# Patient Record
Sex: FEMALE | Race: WHITE | NOT HISPANIC OR LATINO | Employment: OTHER | URBAN - METROPOLITAN AREA
[De-identification: names, ages, dates, MRNs, and addresses within clinical notes are randomized per-mention and may not be internally consistent; named-entity substitution may affect disease eponyms.]

---

## 2017-01-13 ENCOUNTER — HOSPITAL ENCOUNTER (OUTPATIENT)
Dept: RADIOLOGY | Age: 82
Discharge: HOME | End: 2017-01-13
Payer: MEDICARE

## 2017-01-13 ENCOUNTER — TRANSCRIBE ORDERS (OUTPATIENT)
Dept: ADMINISTRATIVE | Age: 82
End: 2017-01-13

## 2017-01-13 ENCOUNTER — ALLSCRIPTS OFFICE VISIT (OUTPATIENT)
Dept: OTHER | Facility: OTHER | Age: 82
End: 2017-01-13

## 2017-01-13 DIAGNOSIS — R05.9 COUGH: ICD-10-CM

## 2017-01-13 PROCEDURE — 71020 HB CHEST X-RAY 2VW FRONTAL&LATL: CPT

## 2017-01-16 ENCOUNTER — GENERIC CONVERSION - ENCOUNTER (OUTPATIENT)
Dept: OTHER | Facility: OTHER | Age: 82
End: 2017-01-16

## 2017-01-16 ENCOUNTER — ALLSCRIPTS OFFICE VISIT (OUTPATIENT)
Dept: OTHER | Facility: OTHER | Age: 82
End: 2017-01-16

## 2017-01-24 ENCOUNTER — HOSPITAL ENCOUNTER (EMERGENCY)
Facility: HOSPITAL | Age: 82
Discharge: HOME | End: 2017-01-24
Attending: EMERGENCY MEDICINE | Admitting: EMERGENCY MEDICINE
Payer: MEDICARE

## 2017-01-24 ENCOUNTER — APPOINTMENT (EMERGENCY)
Dept: RADIOLOGY | Facility: HOSPITAL | Age: 82
End: 2017-01-24
Payer: MEDICARE

## 2017-01-24 VITALS
TEMPERATURE: 98.3 F | WEIGHT: 126 LBS | DIASTOLIC BLOOD PRESSURE: 63 MMHG | RESPIRATION RATE: 18 BRPM | HEART RATE: 108 BPM | SYSTOLIC BLOOD PRESSURE: 144 MMHG | OXYGEN SATURATION: 96 %

## 2017-01-24 DIAGNOSIS — R09.82 POST-NASAL DRIP: Primary | ICD-10-CM

## 2017-01-24 LAB
ANION GAP SERPL CALCULATED.3IONS-SCNC: 9 MMOL/L (ref 4–13)
BASOPHILS # BLD AUTO: 0.02 THOUSANDS/ΜL (ref 0–0.1)
BASOPHILS NFR BLD AUTO: 0 % (ref 0–1)
BUN SERPL-MCNC: 14 MG/DL (ref 5–25)
CALCIUM SERPL-MCNC: 9.3 MG/DL (ref 8.3–10.1)
CHLORIDE SERPL-SCNC: 103 MMOL/L (ref 100–108)
CO2 SERPL-SCNC: 26 MMOL/L (ref 21–32)
CREAT SERPL-MCNC: 0.95 MG/DL (ref 0.6–1.3)
EOSINOPHIL # BLD AUTO: 0.06 THOUSAND/ΜL (ref 0–0.61)
EOSINOPHIL NFR BLD AUTO: 1 % (ref 0–6)
ERYTHROCYTE [DISTWIDTH] IN BLOOD BY AUTOMATED COUNT: 12.4 % (ref 11.6–15.1)
GFR SERPL CREATININE-BSD FRML MDRD: 56.5 ML/MIN/1.73SQ M
GLUCOSE SERPL-MCNC: 89 MG/DL (ref 65–140)
HCT VFR BLD AUTO: 39.6 % (ref 34.8–46.1)
HGB BLD-MCNC: 13.4 G/DL (ref 11.5–15.4)
LYMPHOCYTES # BLD AUTO: 1.5 THOUSANDS/ΜL (ref 0.6–4.47)
LYMPHOCYTES NFR BLD AUTO: 28 % (ref 14–44)
MCH RBC QN AUTO: 34.1 PG (ref 26.8–34.3)
MCHC RBC AUTO-ENTMCNC: 33.8 G/DL (ref 31.4–37.4)
MCV RBC AUTO: 101 FL (ref 82–98)
MONOCYTES # BLD AUTO: 0.49 THOUSAND/ΜL (ref 0.17–1.22)
MONOCYTES NFR BLD AUTO: 9 % (ref 4–12)
NEUTROPHILS # BLD AUTO: 3.37 THOUSANDS/ΜL (ref 1.85–7.62)
NEUTS SEG NFR BLD AUTO: 62 % (ref 43–75)
NRBC BLD AUTO-RTO: 0 /100 WBCS
PLATELET # BLD AUTO: 284 THOUSANDS/UL (ref 149–390)
PMV BLD AUTO: 8.8 FL (ref 8.9–12.7)
POTASSIUM SERPL-SCNC: 3.9 MMOL/L (ref 3.5–5.3)
RBC # BLD AUTO: 3.93 MILLION/UL (ref 3.81–5.12)
SODIUM SERPL-SCNC: 138 MMOL/L (ref 136–145)
WBC # BLD AUTO: 5.45 THOUSAND/UL (ref 4.31–10.16)

## 2017-01-24 PROCEDURE — 94640 AIRWAY INHALATION TREATMENT: CPT

## 2017-01-24 PROCEDURE — 99283 EMERGENCY DEPT VISIT LOW MDM: CPT

## 2017-01-24 PROCEDURE — 80048 BASIC METABOLIC PNL TOTAL CA: CPT | Performed by: EMERGENCY MEDICINE

## 2017-01-24 PROCEDURE — 36415 COLL VENOUS BLD VENIPUNCTURE: CPT | Performed by: EMERGENCY MEDICINE

## 2017-01-24 PROCEDURE — 71020 HB CHEST X-RAY 2VW FRONTAL&LATL: CPT

## 2017-01-24 PROCEDURE — 85025 COMPLETE CBC W/AUTO DIFF WBC: CPT | Performed by: EMERGENCY MEDICINE

## 2017-01-24 RX ORDER — ALBUTEROL SULFATE 2.5 MG/3ML
5 SOLUTION RESPIRATORY (INHALATION) ONCE
Status: COMPLETED | OUTPATIENT
Start: 2017-01-24 | End: 2017-01-24

## 2017-01-24 RX ORDER — FLUTICASONE PROPIONATE 50 MCG
1 SPRAY, SUSPENSION (ML) NASAL ONCE
Status: COMPLETED | OUTPATIENT
Start: 2017-01-24 | End: 2017-01-24

## 2017-01-24 RX ADMIN — FLUTICASONE PROPIONATE 1 SPRAY: 50 SPRAY, METERED NASAL at 15:16

## 2017-01-24 RX ADMIN — IPRATROPIUM BROMIDE 0.5 MG: 0.5 SOLUTION RESPIRATORY (INHALATION) at 13:19

## 2017-01-24 RX ADMIN — ALBUTEROL SULFATE 5 MG: 2.5 SOLUTION RESPIRATORY (INHALATION) at 13:19

## 2017-05-30 ENCOUNTER — ALLSCRIPTS OFFICE VISIT (OUTPATIENT)
Dept: OTHER | Facility: OTHER | Age: 82
End: 2017-05-30

## 2017-07-06 ENCOUNTER — GENERIC CONVERSION - ENCOUNTER (OUTPATIENT)
Dept: OTHER | Facility: OTHER | Age: 82
End: 2017-07-06

## 2017-08-04 ENCOUNTER — GENERIC CONVERSION - ENCOUNTER (OUTPATIENT)
Dept: OTHER | Facility: OTHER | Age: 82
End: 2017-08-04

## 2017-08-14 ENCOUNTER — GENERIC CONVERSION - ENCOUNTER (OUTPATIENT)
Dept: OTHER | Facility: OTHER | Age: 82
End: 2017-08-14

## 2017-09-20 ENCOUNTER — ALLSCRIPTS OFFICE VISIT (OUTPATIENT)
Dept: OTHER | Facility: OTHER | Age: 82
End: 2017-09-20

## 2017-10-19 ENCOUNTER — GENERIC CONVERSION - ENCOUNTER (OUTPATIENT)
Dept: OTHER | Facility: OTHER | Age: 82
End: 2017-10-19

## 2017-11-03 ENCOUNTER — ALLSCRIPTS OFFICE VISIT (OUTPATIENT)
Dept: OTHER | Facility: OTHER | Age: 82
End: 2017-11-03

## 2017-11-04 NOTE — PROGRESS NOTES
Assessment  Assessed    1  Aortic valve disorder (424 1) (I35 9)   2  Cough (786 2) (R05)    Plan  Cough    · Follow-up PRN Evaluation and Treatment  Follow-up  Status: Complete  Done:  [id number]   Ordered; For: Cough; Ordered By: CHRISTINE Escamilla Performed:  Due: [id number]    Discussion/Summary  Cardiology Discussion Summary Free Text Note Form [facility]:   1  Aortic insufficiency, mild  Refractory cough better, to consider gERD or sinus  Return PRN    Patient's Capacity to Self-Care: Patient is able to Self-Care  Chief Complaint  Chief Complaint Free Text Note Form: Annual f/u with EKG - Angel Miller denies chest pain, no swelling in LE  She said she gets occasional SOB  History of Present Illness  HPI: One year follow up of aortic valve disease, still some stress, anxiety type, infrequent, dyspnea; refractory cough, improved  never severe and no LVE, echo 2012 suggested trileaflet valve normal LVEF, no need to retest   half mile walk daily  today normal  cough now mild, occasional sinusitis, no overt GERD         Review of Systems  Cardiology Female ROS:     Cardiac: no chest pain  Skin: no rashes seen   Genitourinary: no kidney problems   General: no changes in weight  Respiratory: shortness of breath-- and-- cough/sputum--   minimal AVERY, no sputm  Gastrointestinal: no abdonimal pain      Active Problems  Problems    1  Acute sinusitis (461 9) (J01 90)   2  Acute sinusitis, recurrence not specified, unspecified location (461 9) (J01 90)   3  Aortic valve disorder (424 1) (I35 9)   4  Benign essential HTN (401 1) (I10)   5  Cough (786 2) (R05)   6  History of cancer of right breast (V10 3) (Z85 3)   7  Hypercholesteremia (272 0) (E78 00)   8  Hyperlipidemia (272 4) (E78 5)   9  Loss of height (781 91) (R29 890)   10  Muscle ache (729 1) (M79 1)   11  Upper respiratory infection, acute (465 9) (J06 9)    Past Medical History  Problems    1  History of Breast Cancer (V10 3)   2   History of hyperlipidemia (V12 29) (Z86 39)    Surgical History  Problems    1  History of Breast Surgery Lumpectomy    Family History  Mother    1  Family history of malignant neoplasm of breast (V16 3) (Z80 3)  Father    2  Family history of Heart problem  Sister    3  Family history of Heart problem  Brother    4  Family history of Heart problem    Social History  Problems    · Never A Smoker    Current Meds   1  Benzonatate 100 MG Oral Capsule; TAKE 1 CAPSULE EVERY 8 HOURS AS NEEDED; Therapy: [id number] to (Olivia Groves)  Requested for: [id number]; Last   Rx:19Oct2017 Ordered   2  Calcium TABS; Therapy: (Recorded:05Nov2015) to Recorded   3  Centrum Silver Oral Tablet; Therapy: (Recorded:05Nov2015) to Recorded   4  Rosuvastatin Calcium 5 MG Oral Tablet; take 1 tablet by mouth once daily; Therapy: 09HSC5714 to Recorded    Allergies  Medication    1  Actonel TABS   2  Crestor TABS   3  Pravachol TABS   4  Vytorin TABS  Non-Medication    5  Mold    Vitals  Vital Signs    Recorded: 71GPU8345 02:10PM   Heart Rate 87, Apical   Systolic 049, LUE   Diastolic 60, LUE   Height 5 ft 3 in   Weight 125 lb    BMI Calculated 22 14   BSA Calculated 1 58     Physical Exam    Constitutional   General appearance: No acute distress, well appearing and well nourished  Neck   Neck and thyroid: Normal, supple, trachea midline, no thyromegaly  -- thyroid normal, JVP nl    Pulmonary   Respiratory effort: No increased work of breathing or signs of respiratory distress  Auscultation of lungs: Clear to auscultation, no rales, no rhonchi, no wheezing, good air movement  Cardiovascular   Palpation of heart: Normal PMI, no thrills  Auscultation of heart: Abnormal  -- no AI  Carotid pulses: Normal, 2+ bilaterally  Pedal pulses: Normal, 2+ bilaterally  Examination of extremities for edema and/or varicosities: Normal     Abdomen   Liver and spleen: No hepatomegaly or splenomegaly      Musculoskeletal Gait and station: Normal gait    Skin - Skin and subcutaneous tissue: Normal without rashes or lesions  Skin is warm and well perfused, normal turgor  Psychiatric - Orientation to person, place, and time: Normal -- Mood and affect: Normal       Signatures   Electronically signed by : JAZ Bullock ; Nov  3 2017  2:31PM EST                       (Author)

## 2018-01-11 NOTE — RESULT NOTES
Message   normal, will review with pt today     Verified Results  * XR CHEST PA & LATERAL [id number] 01:05PM Stephani Nnamdi Order Number: [id number]   Performing Comments: has some inspiratory wheeze right base     Test Name Result Flag Reference   XR CHEST PA & LATERAL (Report)     CHEST - DUAL ENERGY     INDICATION: Cough and wheezing  COMPARISON: None     VIEWS: PA (including soft tissue/bone algorithms) and lateral projections; 4 images     FINDINGS: Surgical clips overlie the right hemithorax  Cardiomediastinal silhouette appears unremarkable  The lungs are clear  No pneumothorax or pleural effusion  Bidirectional scoliosis of the thoracolumbar spine identified  IMPRESSION:     No active pulmonary disease         Workstation performed: [id number]     Signed by:   Rossy Coto MD   1/16/17

## 2018-01-14 VITALS
BODY MASS INDEX: 22.33 KG/M2 | DIASTOLIC BLOOD PRESSURE: 72 MMHG | RESPIRATION RATE: 16 BRPM | WEIGHT: 126.03 LBS | TEMPERATURE: 97.5 F | OXYGEN SATURATION: 95 % | HEIGHT: 63 IN | SYSTOLIC BLOOD PRESSURE: 128 MMHG | HEART RATE: 94 BPM

## 2018-01-14 VITALS
SYSTOLIC BLOOD PRESSURE: 120 MMHG | HEIGHT: 59 IN | BODY MASS INDEX: 25.4 KG/M2 | RESPIRATION RATE: 16 BRPM | TEMPERATURE: 98.5 F | DIASTOLIC BLOOD PRESSURE: 60 MMHG | WEIGHT: 126 LBS | HEART RATE: 70 BPM

## 2018-01-14 VITALS
HEIGHT: 63 IN | HEART RATE: 87 BPM | WEIGHT: 125 LBS | DIASTOLIC BLOOD PRESSURE: 60 MMHG | BODY MASS INDEX: 22.15 KG/M2 | SYSTOLIC BLOOD PRESSURE: 110 MMHG

## 2018-01-14 VITALS
SYSTOLIC BLOOD PRESSURE: 140 MMHG | RESPIRATION RATE: 14 BRPM | DIASTOLIC BLOOD PRESSURE: 98 MMHG | HEART RATE: 96 BPM | TEMPERATURE: 97.5 F

## 2018-01-14 VITALS
DIASTOLIC BLOOD PRESSURE: 78 MMHG | HEART RATE: 84 BPM | SYSTOLIC BLOOD PRESSURE: 128 MMHG | WEIGHT: 125.05 LBS | OXYGEN SATURATION: 98 % | HEIGHT: 63 IN | RESPIRATION RATE: 14 BRPM | TEMPERATURE: 98.2 F | BODY MASS INDEX: 22.16 KG/M2

## 2018-01-14 VITALS
TEMPERATURE: 98.7 F | RESPIRATION RATE: 12 BRPM | SYSTOLIC BLOOD PRESSURE: 136 MMHG | DIASTOLIC BLOOD PRESSURE: 70 MMHG | HEART RATE: 72 BPM

## 2018-01-14 VITALS
WEIGHT: 123 LBS | SYSTOLIC BLOOD PRESSURE: 120 MMHG | HEIGHT: 63 IN | OXYGEN SATURATION: 98 % | HEART RATE: 91 BPM | DIASTOLIC BLOOD PRESSURE: 82 MMHG | TEMPERATURE: 97.5 F | BODY MASS INDEX: 21.79 KG/M2 | RESPIRATION RATE: 16 BRPM

## 2018-02-02 DIAGNOSIS — E78.00 HYPERCHOLESTEREMIA: Primary | ICD-10-CM

## 2018-02-02 DIAGNOSIS — Z13.89 SCREENING FOR GENITOURINARY CONDITION: ICD-10-CM

## 2018-02-02 RX ORDER — ROSUVASTATIN CALCIUM 5 MG/1
1 TABLET, COATED ORAL DAILY
COMMUNITY
Start: 2017-11-03 | End: 2018-06-28

## 2018-02-28 ENCOUNTER — OFFICE VISIT (OUTPATIENT)
Dept: INTERNAL MEDICINE CLINIC | Facility: CLINIC | Age: 83
End: 2018-02-28
Payer: MEDICARE

## 2018-02-28 VITALS
TEMPERATURE: 98.5 F | SYSTOLIC BLOOD PRESSURE: 144 MMHG | BODY MASS INDEX: 24.85 KG/M2 | HEIGHT: 60 IN | HEART RATE: 109 BPM | DIASTOLIC BLOOD PRESSURE: 100 MMHG | OXYGEN SATURATION: 97 % | WEIGHT: 126.6 LBS

## 2018-02-28 DIAGNOSIS — J01.90 ACUTE SINUSITIS, RECURRENCE NOT SPECIFIED, UNSPECIFIED LOCATION: Primary | ICD-10-CM

## 2018-02-28 PROCEDURE — 99213 OFFICE O/P EST LOW 20 MIN: CPT | Performed by: INTERNAL MEDICINE

## 2018-02-28 RX ORDER — AMOXICILLIN AND CLAVULANATE POTASSIUM 875; 125 MG/1; MG/1
1 TABLET, FILM COATED ORAL EVERY 12 HOURS SCHEDULED
Qty: 14 TABLET | Refills: 0 | Status: SHIPPED | OUTPATIENT
Start: 2018-02-28 | End: 2018-03-07

## 2018-02-28 NOTE — PROGRESS NOTES
Assessment/Plan:    Acute sinusitis   Will cover with Augmentin, follow up if not improving       Diagnoses and all orders for this visit:    Acute sinusitis, recurrence not specified, unspecified location  -     amoxicillin-clavulanate (AUGMENTIN) 875-125 mg per tablet; Take 1 tablet by mouth every 12 (twelve) hours for 7 days    Other orders  -     Multiple Vitamins-Minerals (CENTRUM SILVER 50+WOMEN PO); Take by mouth  -     Calcium Carb-Cholecalciferol (CALCIUM 1000 + D) 1000-800 MG-UNIT TABS; Take by mouth          Subjective:      Patient ID: Al Metzger is a 80 y o  female  Onset this morning of feeling lousy, coughing, yellow mucus, no significant aches  No sore throat, no pleuritic pain, no shortness of breath  She does have sinus pressure  The following portions of the patient's history were reviewed and updated as appropriate: allergies, current medications, past family history, past medical history, past social history, past surgical history and problem list     Review of Systems   Constitutional: Positive for fatigue  Negative for chills and fever  HENT: Positive for congestion and sinus pressure  Negative for sore throat  Respiratory: Positive for cough  Negative for shortness of breath  Cardiovascular: Negative for chest pain  Gastrointestinal: Negative for constipation and diarrhea  Genitourinary: Negative for dysuria  Musculoskeletal: Negative for arthralgias and myalgias  Objective:      /100   Pulse (!) 109   Temp 98 5 °F (36 9 °C) (Oral)   Ht 5' (1 524 m)   Wt 57 4 kg (126 lb 9 6 oz)   SpO2 97%   BMI 24 72 kg/m²          Physical Exam   Constitutional: She appears well-developed and well-nourished  HENT:   Head: Normocephalic and atraumatic  Right Ear: External ear normal    Left Ear: External ear normal    Nose: Nose normal    Mouth/Throat: Oropharynx is clear and moist  No oropharyngeal exudate  Neck: No tracheal deviation present  Pulmonary/Chest: Effort normal and breath sounds normal  No respiratory distress  She has no wheezes  She has no rales  Vitals reviewed

## 2018-03-02 ENCOUNTER — TELEPHONE (OUTPATIENT)
Dept: INTERNAL MEDICINE CLINIC | Facility: CLINIC | Age: 83
End: 2018-03-02

## 2018-03-02 DIAGNOSIS — J01.90 ACUTE SINUSITIS, RECURRENCE NOT SPECIFIED, UNSPECIFIED LOCATION: Primary | ICD-10-CM

## 2018-03-02 RX ORDER — AZITHROMYCIN 250 MG/1
250 TABLET, FILM COATED ORAL EVERY 24 HOURS
Qty: 6 TABLET | Refills: 0 | Status: SHIPPED | OUTPATIENT
Start: 2018-03-02 | End: 2018-03-07

## 2018-03-02 NOTE — TELEPHONE ENCOUNTER
Patient is requesting a new medication  She said the one you just recently prescribed is giving her diarrhea    Pharmacy:[facility]    Please advise

## 2018-03-19 ENCOUNTER — TELEPHONE (OUTPATIENT)
Dept: OTHER | Facility: HOSPITAL | Age: 83
End: 2018-03-19

## 2018-03-19 NOTE — TELEPHONE ENCOUNTER
Pt called in stating she needs a tetanus and pertussis vaccine when she comes in for her appt on 3/21 as she is going to have new grandchildren  Marquita Warren She wanted to let you know ahead of time

## 2018-03-20 ENCOUNTER — LAB (OUTPATIENT)
Dept: LAB | Age: 83
End: 2018-03-20
Payer: MEDICARE

## 2018-03-20 ENCOUNTER — TRANSCRIBE ORDERS (OUTPATIENT)
Dept: ADMINISTRATIVE | Age: 83
End: 2018-03-20

## 2018-03-20 DIAGNOSIS — Z13.89 ENCOUNTER FOR ROUTINE SCREENING FOR MALFORMATION USING ULTRASONICS: Primary | ICD-10-CM

## 2018-03-20 DIAGNOSIS — E78.00 HYPERCHOLESTEREMIA: ICD-10-CM

## 2018-03-20 LAB
ALBUMIN SERPL BCP-MCNC: 3.7 G/DL (ref 3.5–5)
ALP SERPL-CCNC: 73 U/L (ref 46–116)
ALT SERPL W P-5'-P-CCNC: 24 U/L (ref 12–78)
ANION GAP SERPL CALCULATED.3IONS-SCNC: 6 MMOL/L (ref 4–13)
AST SERPL W P-5'-P-CCNC: 29 U/L (ref 5–45)
BACTERIA UR QL AUTO: ABNORMAL /HPF
BASOPHILS # BLD AUTO: 0.05 THOUSANDS/ΜL (ref 0–0.1)
BASOPHILS NFR BLD AUTO: 1 % (ref 0–1)
BILIRUB SERPL-MCNC: 0.67 MG/DL (ref 0.2–1)
BILIRUB UR QL STRIP: NEGATIVE
BUN SERPL-MCNC: 16 MG/DL (ref 5–25)
CALCIUM SERPL-MCNC: 8.5 MG/DL (ref 8.3–10.1)
CHLORIDE SERPL-SCNC: 103 MMOL/L (ref 100–108)
CHOLEST SERPL-MCNC: 185 MG/DL (ref 50–200)
CLARITY UR: CLEAR
CO2 SERPL-SCNC: 27 MMOL/L (ref 21–32)
COLOR UR: YELLOW
CREAT SERPL-MCNC: 0.94 MG/DL (ref 0.6–1.3)
EOSINOPHIL # BLD AUTO: 0.34 THOUSAND/ΜL (ref 0–0.61)
EOSINOPHIL NFR BLD AUTO: 8 % (ref 0–6)
ERYTHROCYTE [DISTWIDTH] IN BLOOD BY AUTOMATED COUNT: 12.6 % (ref 11.6–15.1)
GFR SERPL CREATININE-BSD FRML MDRD: 57 ML/MIN/1.73SQ M
GLUCOSE P FAST SERPL-MCNC: 88 MG/DL (ref 65–99)
GLUCOSE UR STRIP-MCNC: NEGATIVE MG/DL
HCT VFR BLD AUTO: 37.9 % (ref 34.8–46.1)
HDLC SERPL-MCNC: 70 MG/DL (ref 40–60)
HGB BLD-MCNC: 12.6 G/DL (ref 11.5–15.4)
HGB UR QL STRIP.AUTO: NEGATIVE
HYALINE CASTS #/AREA URNS LPF: ABNORMAL /LPF
KETONES UR STRIP-MCNC: NEGATIVE MG/DL
LDLC SERPL CALC-MCNC: 94 MG/DL (ref 0–100)
LEUKOCYTE ESTERASE UR QL STRIP: ABNORMAL
LYMPHOCYTES # BLD AUTO: 1.87 THOUSANDS/ΜL (ref 0.6–4.47)
LYMPHOCYTES NFR BLD AUTO: 45 % (ref 14–44)
MCH RBC QN AUTO: 34.1 PG (ref 26.8–34.3)
MCHC RBC AUTO-ENTMCNC: 33.2 G/DL (ref 31.4–37.4)
MCV RBC AUTO: 102 FL (ref 82–98)
MONOCYTES # BLD AUTO: 0.41 THOUSAND/ΜL (ref 0.17–1.22)
MONOCYTES NFR BLD AUTO: 10 % (ref 4–12)
NEUTROPHILS # BLD AUTO: 1.5 THOUSANDS/ΜL (ref 1.85–7.62)
NEUTS SEG NFR BLD AUTO: 36 % (ref 43–75)
NITRITE UR QL STRIP: NEGATIVE
NON-SQ EPI CELLS URNS QL MICRO: ABNORMAL /HPF
NRBC BLD AUTO-RTO: 0 /100 WBCS
PH UR STRIP.AUTO: 6.5 [PH] (ref 4.5–8)
PLATELET # BLD AUTO: 303 THOUSANDS/UL (ref 149–390)
PMV BLD AUTO: 9.1 FL (ref 8.9–12.7)
POTASSIUM SERPL-SCNC: 3.9 MMOL/L (ref 3.5–5.3)
PROT SERPL-MCNC: 7 G/DL (ref 6.4–8.2)
PROT UR STRIP-MCNC: NEGATIVE MG/DL
RBC # BLD AUTO: 3.7 MILLION/UL (ref 3.81–5.12)
RBC #/AREA URNS AUTO: ABNORMAL /HPF
SODIUM SERPL-SCNC: 136 MMOL/L (ref 136–145)
SP GR UR STRIP.AUTO: 1.01 (ref 1–1.03)
TRIGL SERPL-MCNC: 107 MG/DL
TSH SERPL DL<=0.05 MIU/L-ACNC: 2.91 UIU/ML (ref 0.36–3.74)
UROBILINOGEN UR QL STRIP.AUTO: 0.2 E.U./DL
WBC # BLD AUTO: 4.17 THOUSAND/UL (ref 4.31–10.16)
WBC #/AREA URNS AUTO: ABNORMAL /HPF

## 2018-03-20 PROCEDURE — 80061 LIPID PANEL: CPT

## 2018-03-20 PROCEDURE — 80053 COMPREHEN METABOLIC PANEL: CPT

## 2018-03-20 PROCEDURE — 85025 COMPLETE CBC W/AUTO DIFF WBC: CPT

## 2018-03-20 PROCEDURE — 84443 ASSAY THYROID STIM HORMONE: CPT

## 2018-03-20 PROCEDURE — 81001 URINALYSIS AUTO W/SCOPE: CPT | Performed by: INTERNAL MEDICINE

## 2018-03-20 PROCEDURE — 36415 COLL VENOUS BLD VENIPUNCTURE: CPT

## 2018-06-25 ENCOUNTER — OFFICE VISIT (OUTPATIENT)
Dept: INTERNAL MEDICINE CLINIC | Facility: CLINIC | Age: 83
End: 2018-06-25
Payer: MEDICARE

## 2018-06-25 VITALS
OXYGEN SATURATION: 97 % | HEART RATE: 104 BPM | HEIGHT: 60 IN | WEIGHT: 127 LBS | SYSTOLIC BLOOD PRESSURE: 140 MMHG | BODY MASS INDEX: 24.94 KG/M2 | DIASTOLIC BLOOD PRESSURE: 80 MMHG

## 2018-06-25 DIAGNOSIS — M25.562 ACUTE PAIN OF LEFT KNEE: Primary | ICD-10-CM

## 2018-06-25 PROCEDURE — 99213 OFFICE O/P EST LOW 20 MIN: CPT | Performed by: NURSE PRACTITIONER

## 2018-06-25 RX ORDER — SIMVASTATIN 5 MG
TABLET ORAL
Refills: 0 | COMMUNITY
Start: 2018-03-29 | End: 2018-06-28 | Stop reason: SDUPTHER

## 2018-06-28 DIAGNOSIS — E78.5 HYPERLIPIDEMIA, UNSPECIFIED HYPERLIPIDEMIA TYPE: Primary | ICD-10-CM

## 2018-06-28 RX ORDER — ROSUVASTATIN CALCIUM 5 MG/1
5 TABLET, COATED ORAL DAILY
Qty: 90 TABLET | Refills: 1 | OUTPATIENT
Start: 2018-06-28

## 2018-06-28 RX ORDER — SIMVASTATIN 5 MG
5 TABLET ORAL
Qty: 90 TABLET | Refills: 0 | Status: SHIPPED | OUTPATIENT
Start: 2018-06-28 | End: 2018-10-02 | Stop reason: SDUPTHER

## 2018-06-28 NOTE — PROGRESS NOTES
Assessment/Plan:       Diagnoses and all orders for this visit:    Acute pain of left knee  -     Ambulatory referral to Orthopedic Surgery; Future  -     diclofenac sodium (VOLTAREN) 1 %; Apply 2 g topically 4 (four) times a day    Other orders  -     simvastatin (ZOCOR) 5 MG tablet;           Subjective:      Patient ID: Dru Galindo is a 80 y o  female  3 weeks of L knee pain  Using ace wrap and aleve  Went to urgent care, neg xray      Knee Pain    The incident occurred more than 1 week ago  There was no injury mechanism  The pain is present in the left knee  The quality of the pain is described as aching  The pain is moderate  The pain has been intermittent since onset  The symptoms are aggravated by movement  She has tried rest, non-weight bearing and NSAIDs for the symptoms  The treatment provided mild relief  The following portions of the patient's history were reviewed and updated as appropriate: allergies, current medications, past family history, past medical history, past social history, past surgical history and problem list     Review of Systems   Constitutional: Negative  HENT: Negative  Eyes: Negative  Respiratory: Negative  Cardiovascular: Negative  Gastrointestinal: Negative  Musculoskeletal: Positive for arthralgias  Neurological: Negative  Objective:      /80 (BP Location: Left arm, Patient Position: Sitting, Cuff Size: Adult)   Pulse 104   Ht 5' (1 524 m)   Wt 57 6 kg (127 lb)   SpO2 97%   BMI 24 80 kg/m²          Physical Exam   Constitutional: She is oriented to person, place, and time  She appears well-developed and well-nourished  HENT:   Head: Normocephalic and atraumatic  Eyes: Conjunctivae are normal  Pupils are equal, round, and reactive to light  Neck: Normal range of motion  Neck supple  Cardiovascular: Normal rate and regular rhythm  Pulmonary/Chest: Effort normal and breath sounds normal    Abdominal: Soft   Bowel sounds are normal    Musculoskeletal: Normal range of motion  Neurological: She is alert and oriented to person, place, and time  Skin: Skin is warm and dry

## 2018-06-28 NOTE — TELEPHONE ENCOUNTER
There a refill for crestor but its saying she has an active allergy to this medication, can we check with her please?

## 2018-06-29 ENCOUNTER — TELEPHONE (OUTPATIENT)
Dept: OBGYN CLINIC | Facility: HOSPITAL | Age: 83
End: 2018-06-29

## 2018-06-29 ENCOUNTER — OFFICE VISIT (OUTPATIENT)
Dept: OBGYN CLINIC | Facility: HOSPITAL | Age: 83
End: 2018-06-29
Payer: MEDICARE

## 2018-06-29 VITALS
HEART RATE: 97 BPM | SYSTOLIC BLOOD PRESSURE: 127 MMHG | BODY MASS INDEX: 24.97 KG/M2 | WEIGHT: 127.2 LBS | HEIGHT: 60 IN | DIASTOLIC BLOOD PRESSURE: 76 MMHG

## 2018-06-29 DIAGNOSIS — M25.562 ACUTE PAIN OF LEFT KNEE: ICD-10-CM

## 2018-06-29 DIAGNOSIS — M70.51 PES ANSERINUS BURSITIS OF RIGHT KNEE: Primary | ICD-10-CM

## 2018-06-29 PROCEDURE — 20610 DRAIN/INJ JOINT/BURSA W/O US: CPT | Performed by: ORTHOPAEDIC SURGERY

## 2018-06-29 PROCEDURE — 99204 OFFICE O/P NEW MOD 45 MIN: CPT | Performed by: ORTHOPAEDIC SURGERY

## 2018-06-29 RX ORDER — MELOXICAM 7.5 MG/1
7.5 TABLET ORAL DAILY
Qty: 60 TABLET | Refills: 0 | Status: SHIPPED | OUTPATIENT
Start: 2018-06-29 | End: 2019-05-10 | Stop reason: ALTCHOICE

## 2018-06-29 RX ORDER — BETAMETHASONE SODIUM PHOSPHATE AND BETAMETHASONE ACETATE 3; 3 MG/ML; MG/ML
6 INJECTION, SUSPENSION INTRA-ARTICULAR; INTRALESIONAL; INTRAMUSCULAR; SOFT TISSUE
Status: COMPLETED | OUTPATIENT
Start: 2018-06-29 | End: 2018-06-29

## 2018-06-29 RX ORDER — BUPIVACAINE HYDROCHLORIDE 2.5 MG/ML
2 INJECTION, SOLUTION INFILTRATION; PERINEURAL
Status: COMPLETED | OUTPATIENT
Start: 2018-06-29 | End: 2018-06-29

## 2018-06-29 RX ORDER — LIDOCAINE HYDROCHLORIDE 10 MG/ML
1 INJECTION, SOLUTION INFILTRATION; PERINEURAL
Status: COMPLETED | OUTPATIENT
Start: 2018-06-29 | End: 2018-06-29

## 2018-06-29 RX ADMIN — BETAMETHASONE SODIUM PHOSPHATE AND BETAMETHASONE ACETATE 6 MG: 3; 3 INJECTION, SUSPENSION INTRA-ARTICULAR; INTRALESIONAL; INTRAMUSCULAR; SOFT TISSUE at 13:53

## 2018-06-29 RX ADMIN — BUPIVACAINE HYDROCHLORIDE 2 ML: 2.5 INJECTION, SOLUTION INFILTRATION; PERINEURAL at 13:53

## 2018-06-29 RX ADMIN — LIDOCAINE HYDROCHLORIDE 1 ML: 10 INJECTION, SOLUTION INFILTRATION; PERINEURAL at 13:53

## 2018-06-29 NOTE — PROGRESS NOTES
Assessment:  1  Acute pain of left knee  Ambulatory referral to Orthopedic Surgery     Patient Active Problem List   Diagnosis    Hypercholesteremia    Acute sinusitis           Plan        Cortisone injection given into the pes bursa  Referral to physical therapy  Voltaren gel prescribed  Follow up with us an as-needed basis if he continues have problem we can always give another injection in this area if needed  Subjective:     Patient ID:    Chief Complaint:Chelsey Tyson 80 y o  female      HPI    Patient comes in today with regards to Left knee  The patient reports that the pain is in the  Anterior medial aspect of the left knee and has been going on for  1 month  The pain is rated at6 at its best and10 at its worst   The pain is described as  Stab and now it just hurts especially when she will  It is worsened with  Walking steps also her but it is about the same going up and down, and is made better with gel oral medication  The patient has taken   Aleve, Advil,Voltaren gel for treatment  No specific injury      The following portions of the patient's history were reviewed and updated as appropriate: allergies, current medications, past family history, past social history, past surgical history and problem list         Social History     Social History    Marital status: /Civil Union     Spouse name: N/A    Number of children: N/A    Years of education: N/A     Occupational History    Not on file  Social History Main Topics    Smoking status: Never Smoker    Smokeless tobacco: Never Used    Alcohol use Yes    Drug use: No    Sexual activity: Not on file     Other Topics Concern    Not on file     Social History Narrative    No narrative on file     Past Medical History:   Diagnosis Date    Cancer [facility])      History reviewed  No pertinent surgical history    Allergies   Allergen Reactions    Molds & Smuts     Pravastatin     Risedronate      Current Outpatient Prescriptions on File Prior to Visit   Medication Sig Dispense Refill    Calcium Carb-Cholecalciferol (CALCIUM 1000 + D) 1000-800 MG-UNIT TABS Take by mouth      diclofenac sodium (VOLTAREN) 1 % Apply 2 g topically 4 (four) times a day 1 Tube 0    Multiple Vitamins-Minerals (CENTRUM SILVER 50+WOMEN PO) Take by mouth      simvastatin (ZOCOR) 5 MG tablet Take 1 tablet (5 mg total) by mouth daily at bedtime 90 tablet 0     No current facility-administered medications on file prior to visit  Objective:    Review of Systems   Constitutional: Negative  HENT: Negative  Eyes: Negative  Respiratory: Negative  Cardiovascular: Negative  Gastrointestinal: Negative  Endocrine: Negative  Genitourinary: Negative  Skin: Negative  Allergic/Immunologic: Negative  Neurological: Negative  Hematological: Negative  Psychiatric/Behavioral: Negative  Right Knee Exam   Right knee exam is normal       Left Knee Exam     Tenderness   The patient is experiencing tenderness in the medial joint line  Range of Motion   The patient has normal left knee ROM  Tests   Liang:  Medial - negative Lateral - negative  Varus: negative  Valgus: negative    Other   Erythema: absent  Sensation: normal  Pulse: present  Swelling: mild  Effusion: effusion present    Comments:   Pinpoint tender over the pes bursa this is where the her most irritated area is  Flexion with hip flexion and external rotation also causes some mild pain strength and range-of-motion normal bilateral lower extremities no effusion no ecchymosis  No erythema  Physical Exam   Constitutional: She is oriented to person, place, and time  She appears well-developed  HENT:   Head: Normocephalic  Eyes: Pupils are equal, round, and reactive to light  Neck: Normal range of motion  Cardiovascular: Normal rate  Pulmonary/Chest: Effort normal    Abdominal: She exhibits no distension     Musculoskeletal: Left knee: She exhibits effusion  Neurological: She is alert and oriented to person, place, and time  Skin: Skin is warm  Psychiatric: She has a normal mood and affect  Nursing note and vitals reviewed  Large joint arthrocentesis  Date/Time: 6/29/2018 1:53 PM  Consent given by: patient  Supporting Documentation  Indications: pain   Procedure Details  Location: knee (Pes bursa) - R knee  Needle size: 22 G  Ultrasound guidance: no  Approach: anterolateral  Medications administered: 1 mL lidocaine 1 %; 6 mg betamethasone acetate-betamethasone sodium phosphate 6 (3-3) mg/mL; 2 mL bupivacaine 0 25 %    Patient tolerance: patient tolerated the procedure well with no immediate complications               I have personally reviewed pertinent films in PACS and my interpretation is Mild arthritis right knee  Portions of the record may have been created with voice recognition software   Occasional wrong word or "sound a like" substitutions may have occurred due to the inherent limitations of voice recognition software   Read the chart carefully and recognize, using context, where substitutions have occurred

## 2018-07-02 ENCOUNTER — EVALUATION (OUTPATIENT)
Dept: PHYSICAL THERAPY | Facility: REHABILITATION | Age: 83
End: 2018-07-02
Payer: MEDICARE

## 2018-07-02 DIAGNOSIS — M25.562 ACUTE PAIN OF LEFT KNEE: Primary | ICD-10-CM

## 2018-07-02 DIAGNOSIS — M70.51 PES ANSERINUS BURSITIS OF RIGHT KNEE: ICD-10-CM

## 2018-07-02 PROCEDURE — 97140 MANUAL THERAPY 1/> REGIONS: CPT | Performed by: PHYSICAL THERAPIST

## 2018-07-02 PROCEDURE — G8991 OTHER PT/OT GOAL STATUS: HCPCS | Performed by: PHYSICAL THERAPIST

## 2018-07-02 PROCEDURE — 97161 PT EVAL LOW COMPLEX 20 MIN: CPT | Performed by: PHYSICAL THERAPIST

## 2018-07-02 PROCEDURE — G8990 OTHER PT/OT CURRENT STATUS: HCPCS | Performed by: PHYSICAL THERAPIST

## 2018-07-02 PROCEDURE — 97110 THERAPEUTIC EXERCISES: CPT | Performed by: PHYSICAL THERAPIST

## 2018-07-02 NOTE — PROGRESS NOTES
PT Evaluation     Today's date: 2018  Patient name: Keysha Kulkarni  : 1935  MRN: [id number]  Referring provider: Tennille Vega DO  Dx:   Encounter Diagnosis     ICD-10-CM    1  Acute pain of left knee M25 562 Ambulatory referral to Physical Therapy   2  Pes anserinus bursitis of right knee M70 51 Ambulatory referral to Physical Therapy       Start Time: 1400  Stop Time: 1500  Total time in clinic (min): 60 minutes    Assessment  Impairments: abnormal gait, abnormal muscle firing, abnormal muscle tone, abnormal or restricted ROM, impaired balance, impaired physical strength, lacks appropriate home exercise program, pain with function and weight-bearing intolerance    Assessment details: Keysha Kulkarni is a 80 y o  female who presents with complaints of Acute pain of left knee/ Pes anserinus bursitis of right knee  No further referral appears necessary at this time based upon examination results  She reports no relief as of yet following injection and was educated that this may take time to set in  She is presenting with quad and hip weakness leading to balance and gait dysfunction  Prognosis is good given HEP compliance and PT 2-3x/wk  Positive prognostic indicators include positive attitude toward recovery  Please contact me if you have any questions or recommendations  Thank you for the opportunity to share in  Chelsey's care      Understanding of Dx/Px/POC: good   Prognosis: fair    Plan  Patient would benefit from: skilled physical therapy  Planned modality interventions: cryotherapy  Other planned modality interventions: NMES  Planned therapy interventions: balance, manual therapy, therapeutic activities, therapeutic exercise, patient education, home exercise program, flexibility, joint mobilization, Verduzco taping, strengthening, stretching, graded motor, graded exercise, graded activity, gait training and functional ROM exercises  Frequency: 2x week  Duration in weeks: 6  Treatment plan discussed with: patient  Plan details: Pt was educated on HEP  Subjective Evaluation    History of Present Illness  Mechanism of injury: Pt is a 81 y/o female presenting with pes anserine bursitis starting 1 month ago  Pt saw MD and nurse practitioner about 1 month ago and received an x-ray  Pt went to an orthopaedic doctor on Friday and recieved a Cortizone injection  Pt denies change in sx from injection  Pt states ability to cook and clean with pain  Pt denies difficulties dressing, sleeping, and showering  Pt reports a step-to gait pattern while negotiating stairs with use of a handrail  Pt states pain during STS and uses arm of chair for support  Pt describes a pulling sensation and denies radiating sx in LE  Pt ambulates with an antalgic gait due to weakness and pain  Pain  At best pain ratin  Location: anterior medial L knee  Relieving factors: medications  Aggravating factors: stair climbing, walking, sitting and standing    Social Support  Stairs in house: yes   12      Diagnostic Tests  X-ray: normal  Patient Goals  Patient goals for therapy: decreased pain  Patient goal: play golf        Objective     Active Range of Motion     Right Knee   Flexion: 109 degrees   Extension: 1 degrees     Additional Active Range of Motion Details  Pt reported tightness in flexion    Passive Range of Motion     Right Knee   Flexion: 134 degrees with pain  Extension: 0 degrees     Strength/Myotome Testing     Left Hip   Planes of Motion   Flexion: 4-  Abduction: 3+  External rotation: 4-  Internal rotation: 4-    Right Hip   Planes of Motion   Flexion: 4    Left Knee   Flexion: 4+  Prone flexion: 4+    Right Knee   Flexion: 3  Prone flexion: 3    Left Ankle/Foot   Dorsiflexion: 5  Plantar flexion: 5    Right Ankle/Foot   Dorsiflexion: 5  Plantar flexion: 5    Additional Strength Details  Pain along pes anserine with IR    General Comments     Knee Comments  TTP around pes anserine   Decreased hamstring flexibility R>L      Flowsheet Rows      Most Recent Value   PT/OT G-Codes   Current Score  41   Projected Score  57   FOTO information reviewed  Yes   Assessment Type  Evaluation   G code set  Other PT/OT Primary   Other PT Primary Current Status ()  CK   Other PT Primary Goal Status ()  CK          Precautions: hard of hearing L ear    Daily Treatment Diary     Manual              GISTM medial thigh, pes anserine                                                                      Exercise Diary              nustep             Mini-squats pain free             HS curls             biodex LOS             biodex RC             SLR abd             SLS 5"             bridges             Side stepping             Step ups fwd/lat             HR/TR             LAQ             Quad set 2x10, 5"            LLE clam 2x10, 5"            Supine heel slide 10x10"                                                                                 Modalities              CP prn                                           ST  Pt to decrease pain by 3 subjective ratings in 4 wks  2  Pt to increase AROM knee flex by 10 degrees in 4 wks  3  Pt to increase MMT scores by 1/2 grade in 4 wks  LT  Pt to increase FOTO score, > 57 in 8 wks  2  Pt to increase MMT scores by 1 grade in 8 wks  3   Pt to be independent in HEP  4  Pt to negotiate stairs pain-free in 8 wks

## 2018-07-05 ENCOUNTER — OFFICE VISIT (OUTPATIENT)
Dept: PHYSICAL THERAPY | Facility: REHABILITATION | Age: 83
End: 2018-07-05
Payer: MEDICARE

## 2018-07-05 DIAGNOSIS — M70.51 PES ANSERINUS BURSITIS OF RIGHT KNEE: ICD-10-CM

## 2018-07-05 DIAGNOSIS — M25.562 ACUTE PAIN OF LEFT KNEE: Primary | ICD-10-CM

## 2018-07-05 PROCEDURE — 97140 MANUAL THERAPY 1/> REGIONS: CPT

## 2018-07-05 PROCEDURE — 97112 NEUROMUSCULAR REEDUCATION: CPT

## 2018-07-05 NOTE — PROGRESS NOTES
Daily Note     Today's date: 2018  Patient name: Katelynn Drew  : 1935  MRN: [id number]  Referring provider: Heron Hendricks DO  Dx:   Encounter Diagnosis     ICD-10-CM    1  Acute pain of left knee M25 562    2  Pes anserinus bursitis of right knee M70 51                   Subjective: Pt notes that knee is feeling slight improvement upon arrival      Objective: See treatment diary below      Precautions: hard of hearing L ear    Daily Treatment Diary     Manual             GISTM medial thigh, pes anserine   KP                                                                   Exercise Diary             nustep  8'           Mini-squats pain free  10           HS curls  10 ea           biodex LOS             biodex RC             SLR abd             SLS 5"             bridges             Side stepping  3 laps mirror           Step ups fwd/lat  20ea 4"           HR/TR  20ea           LAQ  2x10           Quad set 2x10, 5" 2x10, 5"           LLE clam 2x10, 5" 2x10 5"           Supine heel slide 10x10" 10x10"                                                                                Modalities              CP prn                                           Assessment: Tolerated treatment fair  Patient demonstrated fatigue post treatment and would benefit from continued PT  Pt had tenderness with mild pressure during ISTM, noted some relief afterwards  Pt was somewhat apprehensive with exercises, did fair  Reviewed HEP with pt, pt understood  Pt  able to complete all exercises with no increase in pain during or after session  Pt 1:1 with PTA [id number], IEP for remainder  Plan: Continue per plan of care

## 2018-07-09 ENCOUNTER — OFFICE VISIT (OUTPATIENT)
Dept: PHYSICAL THERAPY | Facility: REHABILITATION | Age: 83
End: 2018-07-09
Payer: MEDICARE

## 2018-07-09 DIAGNOSIS — M70.51 PES ANSERINUS BURSITIS OF RIGHT KNEE: ICD-10-CM

## 2018-07-09 DIAGNOSIS — M25.562 ACUTE PAIN OF LEFT KNEE: Primary | ICD-10-CM

## 2018-07-09 PROCEDURE — 97140 MANUAL THERAPY 1/> REGIONS: CPT

## 2018-07-09 NOTE — PROGRESS NOTES
Daily Note     Today's date: 2018  Patient name: Katelynn Drew  : 1935  MRN: [id number]  Referring provider: Heron Hendricks DO  Dx:   Encounter Diagnosis     ICD-10-CM    1  Acute pain of left knee M25 562    2  Pes anserinus bursitis of right knee M70 51                   Subjective: Pt reported increased discomfort after LV but subsided after a day or two  Improvement with ambulation noted  Objective: See treatment diary below  Manual                   GISTM medial thigh, pes anserine    KP  TK                                                                                                                       Exercise Diary                   nustep   8'  10'                 Mini-squats pain free   10  10                 HS curls   10 ea  10 ea                  biodex LOS                       biodex RC                       SLR abd                       SLS 5"                       bridges      10                 Side stepping   3 laps mirror  3 laps                 Step ups fwd/lat   20ea 4"  4"x20                 HR/TR   20ea  20 ea                  LAQ   2x10  20                 Quad set 2x10, 5" 2x10, 5"  5"x20                 LLE clam 2x10, 5" 2x10 5"  3"x20                 Supine heel slide 10x10" 10x10"  10"x10                                                                                                                                               Modalities                        CP prn                                                                              Assessment: Tolerated treatment well  Patient demonstrated fatigue post treatment and exhibited good technique with therapeutic exercises  Gentle GISTM performed to good tolerance  VC's needed for correct technique  Plan: Continue per plan of care   1 on  with PTA for 10 mins; performed rest of session under fitness program

## 2018-07-12 ENCOUNTER — OFFICE VISIT (OUTPATIENT)
Dept: PHYSICAL THERAPY | Facility: REHABILITATION | Age: 83
End: 2018-07-12
Payer: MEDICARE

## 2018-07-12 DIAGNOSIS — M25.562 ACUTE PAIN OF LEFT KNEE: ICD-10-CM

## 2018-07-12 DIAGNOSIS — M70.51 PES ANSERINUS BURSITIS OF RIGHT KNEE: Primary | ICD-10-CM

## 2018-07-12 PROCEDURE — 97140 MANUAL THERAPY 1/> REGIONS: CPT | Performed by: PHYSICAL THERAPIST

## 2018-07-12 PROCEDURE — 97110 THERAPEUTIC EXERCISES: CPT | Performed by: PHYSICAL THERAPIST

## 2018-07-12 PROCEDURE — 97112 NEUROMUSCULAR REEDUCATION: CPT | Performed by: PHYSICAL THERAPIST

## 2018-07-12 NOTE — PROGRESS NOTES
Daily Note     Today's date: 2018  Patient name: Kelsey Montes  : 1935  MRN: [id number]  Referring provider: Huan Membreno DO  Dx:   Encounter Diagnosis     ICD-10-CM    1  Pes anserinus bursitis of right knee M70 51    2  Acute pain of left knee M25 562        Start Time: 1445  Stop Time: 1540  Total time in clinic (min): 55 minutes   1on1 [id number] and performed remaining TE's as part of IEP  Subjective: Pt reports feeling much better since starting PT and was able to walk around shopping yesterday  Pt also notes she is now able to perform steps reciprocally  Objective: See treatment diary below                  GISTM medial thigh, pes anserine    KP  TK  MP                L HS stretch        perf                                                                                             Exercise Diary                 nustep   8'  10'  10'               Mini-squats pain free   10  10  10               HS curls   10 ea  10 ea   10 ea               biodex LOS                       biodex RC                       SLR abd                       SLS 5"                       bridges      10  10               Side stepping   3 laps mirror  3 laps  3 laps               Step ups fwd/lat   20ea 4"  4"x20  4"x20               HR/TR   20ea  20 ea   20 ea               LAQ   2x10  20  20               Quad set 2x10, 5" 2x10, 5"  5"x20  5"x20               LLE clam 2x10, 5" 2x10 5"  3"x20  3"x20               Supine heel slide 10x10" 10x10"  10"x10  10"x10               Hamstring stretch strap        10"x10  manual                                                                                                                     Modalities                        CP prn                                                                             Assessment: Tolerated treatment fair  Patient would benefit from continued PT   Pt presents with decrease in swelling and tenderness around pes anserine  Plan: Continue per plan of care

## 2018-07-16 ENCOUNTER — OFFICE VISIT (OUTPATIENT)
Dept: PHYSICAL THERAPY | Facility: REHABILITATION | Age: 83
End: 2018-07-16
Payer: MEDICARE

## 2018-07-16 DIAGNOSIS — M25.562 ACUTE PAIN OF LEFT KNEE: ICD-10-CM

## 2018-07-16 DIAGNOSIS — M70.51 PES ANSERINUS BURSITIS OF RIGHT KNEE: Primary | ICD-10-CM

## 2018-07-16 PROCEDURE — 97110 THERAPEUTIC EXERCISES: CPT

## 2018-07-16 PROCEDURE — 97112 NEUROMUSCULAR REEDUCATION: CPT

## 2018-07-16 NOTE — PROGRESS NOTES
Daily Note     Today's date: 2018  Patient name: Suzanne Rivera  : 1935  MRN: [id number]  Referring provider: Caren Owusu DO  Dx:   Encounter Diagnosis     ICD-10-CM    1  Pes anserinus bursitis of right knee M70 51    2  Acute pain of left knee M25 562                   Subjective: Pt reported intermittent knee stiffness  Pt noted trying to walk every morning  Objective: See treatment diary below                 GISTM medial thigh, pes anserine    KP  TK  MP  TK              L HS stretch        perf  Tk                                                                                           Exercise Diary               nustep   8'  10'  10'  10'             Mini-squats pain free   10  10  10  15             HS curls   10 ea  10 ea   10 ea  20             biodex LOS                       biodex RC                       SLR abd          10             SLS 5"                       bridges      10  10  3"x20             Side stepping   3 laps mirror  3 laps  3 laps  x3             Step ups fwd/lat   20ea 4"  4"x20  4"x20  4"x20 ea             HR/TR   20ea  20 ea   20 ea  20 ea              LAQ   2x10  20  20  20             Quad set 2x10, 5" 2x10, 5"  5"x20  5"x20  5"x20             LLE clam 2x10, 5" 2x10 5"  3"x20  3"x20  20             Supine heel slide 10x10" 10x10"  10"x10  10"x10  10"x10             Hamstring stretch strap        10"x10  manual  manual                                                                                                                   Modalities                        CP prn                                                                              Assessment: Tolerated treatment well  Patient demonstrated fatigue post treatment and exhibited good technique with therapeutic exercises  Continues to tolerate gentle GISTm well  VC's needed for correct technique  Plan: Continue per plan of care    with PTA and PT

## 2018-07-19 ENCOUNTER — OFFICE VISIT (OUTPATIENT)
Dept: PHYSICAL THERAPY | Facility: REHABILITATION | Age: 83
End: 2018-07-19
Payer: MEDICARE

## 2018-07-19 DIAGNOSIS — M25.562 ACUTE PAIN OF LEFT KNEE: Primary | ICD-10-CM

## 2018-07-19 DIAGNOSIS — M70.51 PES ANSERINUS BURSITIS OF RIGHT KNEE: ICD-10-CM

## 2018-07-19 PROCEDURE — 97110 THERAPEUTIC EXERCISES: CPT | Performed by: PHYSICAL THERAPIST

## 2018-07-19 PROCEDURE — 97112 NEUROMUSCULAR REEDUCATION: CPT | Performed by: PHYSICAL THERAPIST

## 2018-07-19 NOTE — PROGRESS NOTES
Daily Note     Today's date: 2018  Patient name: Viraj Baugh  : 1935  MRN: [id number]  Referring provider: Lalit Vyas DO  Dx:   Encounter Diagnosis     ICD-10-CM    1  Pes anserinus bursitis of right knee M70 51            1on1 entire session  Subjective: Pt reports having less pain, but continued c/o stiffness  Pt states she is able to tolerate more activity and has been performing steps in reciprocal fashion  Objective: See treatment diary below  Manuals            GISTM medial thigh, pes anserine    KP  TK  MP  TK  TP            L HS stretch        perf  Tk TP            L quads stretch            TP            L tib/fem distraction            TP                                         Exercise Diary             nustep   8'  10'  10'  10'  10'           Mini-squats pain free   10  10  10  15 20           HS curls   10 ea  10 ea   10 ea  20  20           biodex LOS            med diff x2           biodex RC            med speed 1'x2           SLR abd          10             SLS 5"                       bridges      10  10  3"x20  3"x20           Side stepping   3 laps mirror  3 laps  3 laps  x3  x3           Step ups fwd/lat   20ea 4"  4"x20  4"x20  4"x20 ea  6"x20ea           HR/TR   20ea  20 ea   20 ea  20 ea   20ea           LAQ   2x10  20  20  20  20           Quad set 2x10, 5" 2x10, 5"  5"x20  5"x20  5"x20  hep           LLE clam 2x10, 5" 2x10 5"  3"x20  3"x20  20  20           Supine heel slide 10x10" 10x10"  10"x10  10"x10  10"x10  np           Hamstring stretch strap        10"x10  manual  manual  man                                                                                                                 Modalities                        CP prn                                                     Assessment: Tolerated treatment well  Good tolerance to progressions this session    Less tenderness along pes anserine  Relief reported with additional manuals this session  Patient would benefit from continued PT      Plan: Continue per plan of care

## 2018-07-23 ENCOUNTER — OFFICE VISIT (OUTPATIENT)
Dept: PHYSICAL THERAPY | Facility: REHABILITATION | Age: 83
End: 2018-07-23
Payer: MEDICARE

## 2018-07-23 DIAGNOSIS — M70.52 PES ANSERINUS BURSITIS OF LEFT KNEE: Primary | ICD-10-CM

## 2018-07-23 DIAGNOSIS — M25.562 ACUTE PAIN OF LEFT KNEE: ICD-10-CM

## 2018-07-23 PROCEDURE — 97112 NEUROMUSCULAR REEDUCATION: CPT

## 2018-07-23 PROCEDURE — 97140 MANUAL THERAPY 1/> REGIONS: CPT

## 2018-07-23 PROCEDURE — 97110 THERAPEUTIC EXERCISES: CPT

## 2018-07-23 NOTE — PROGRESS NOTES
Daily Note     Today's date: 2018  Patient name: Terry Oh  : 1935  MRN: [id number]  Referring provider: Raciel Braun DO  Dx:   Encounter Diagnosis     ICD-10-CM    1  Pes anserinus bursitis of right knee M70 51    2  Acute pain of left knee M25 562      1:1 with PTA CR 11:50- 12:35  Subjective: Patient states that she has been attempting to be more active and walk more  States that pain has improved significantly however joint stiffness persists         Objective: See treatment diary below  Manuals          GISTM medial thigh, pes anserine    KP  TK  MP  TK  TP  5'  Distal quad          L HS stretch        perf  Tk TP  2 5'          L quads stretch            TP  2 5'          L tib/fem distraction            TP  NP               Exercise Diary         nustep   8'  10'  10'  10'  10'  10 mins       Mini-squats pain free   10  10  10  15 20 2x10       HS curls   10 ea  10 ea   10 ea  20  20  20       biodex LOS            med diff x2  med  Diff x2       biodex RC            med speed 1'x2  med  Speed  1'x2       SLR abd          10    10       SLS 5"                     bridges      10  10  3"x20  3"x20  3"x20       Side stepping   3 laps mirror  3 laps  3 laps  x3  x3  3 laps       Step ups fwd/lat   20ea 4"  4"x20  4"x20  4"x20 ea  6"x20ea  6"x20 ea        HR/TR   20ea  20 ea   20 ea  20 ea   20ea  20 ea        LAQ   2x10  20  20  20  20  5"x20       Quad set 2x10, 5" 2x10, 5"  5"x20  5"x20  5"x20  hep  HEP       LLE clam 2x10, 5" 2x10 5"  3"x20  3"x20  20  20  20       Supine heel slide 10x10" 10x10"  10"x10  10"x10  10"x10  np  10"x10       Hamstring stretch strap        10"x10  manual  manual  man  manual                                                                                                     Modalities                        CP prn                                                     Assessment: Tolerated treatment well  Poor hip and ankle strategy on Biodex despite cues  Unable to perform without UE ( A  )  Patient deferred IASTM to pes anserine and states that they have been focusing on distal quad  Patient would benefit from continued PT      Plan: Continue per plan of care

## 2018-07-26 ENCOUNTER — OFFICE VISIT (OUTPATIENT)
Dept: PHYSICAL THERAPY | Facility: REHABILITATION | Age: 83
End: 2018-07-26
Payer: MEDICARE

## 2018-07-26 DIAGNOSIS — M25.562 ACUTE PAIN OF LEFT KNEE: ICD-10-CM

## 2018-07-26 DIAGNOSIS — M70.52 PES ANSERINUS BURSITIS OF LEFT KNEE: Primary | ICD-10-CM

## 2018-07-26 DIAGNOSIS — M70.51 PES ANSERINUS BURSITIS OF RIGHT KNEE: ICD-10-CM

## 2018-07-26 PROCEDURE — 97140 MANUAL THERAPY 1/> REGIONS: CPT

## 2018-07-26 PROCEDURE — 97112 NEUROMUSCULAR REEDUCATION: CPT

## 2018-07-26 PROCEDURE — 97110 THERAPEUTIC EXERCISES: CPT

## 2018-07-26 NOTE — PROGRESS NOTES
Daily Note     Today's date: 2018  Patient name: Katelynn Drew  : 1935  MRN: [id number]  Referring provider: Heron Hendricks DO  Dx:   Encounter Diagnosis     ICD-10-CM    1  Pes anserinus bursitis of left knee M70 52    2  Acute pain of left knee M25 562    3  Pes anserinus bursitis of right knee M70 51      1:1 with PTA CR 2:00- 2:50  Subjective: Patient reports shopping for several hours yesterday with increased stiffness today but no pain         Objective: See treatment diary below  Manuals        GISTM medial thigh, pes anserine    KP  TK  MP  TK  TP  5'  Distal quad  5'  Pes anserine and quad        L HS stretch        perf  Tk TP  2 5'  2 5'        L quads stretch            TP  2 5'  2 5'        L tib/fem distraction            TP  NP  NP             Exercise Diary       nustep   8'  10'  10'  10'  10'  10 mins  10 mins     Mini-squats pain free   10  10  10  15 20 2x10  2x10     HS curls   10 ea  10 ea   10 ea  20  20  20  20 ea      biodex LOS            med diff x2  med  Diff x2  med  Static x2     biodex RC            med speed 1'x2  med  Speed  1'x2  med  Speed  1'x2     SLR abd          10    10  10     SLS 5"               5"x5     bridges      10  10  3"x20  3"x20  3"x20  3"x20     Side stepping   3 laps mirror  3 laps  3 laps  x3  x3  3 laps  3 laps     Step ups fwd/lat   20ea 4"  4"x20  4"x20  4"x20 ea  6"x20ea  6"x20 ea   6"x 20 ea      HR/TR   20ea  20 ea   20 ea  20 ea   20ea  20 ea   20 ea       LAQ   2x10  20  20  20  20  5"x20  5"x20     Quad set 2x10, 5" 2x10, 5"  5"x20  5"x20  5"x20  hep  HEP  HEP     LLE clam 2x10, 5" 2x10 5"  3"x20  3"x20  20  20  20  20     Supine heel slide 10x10" 10x10"  10"x10  10"x10  10"x10  np  10"x10 10"x10     Hamstring stretch strap        10"x10  manual  manual  man  manual  manual                                                                                                   Modalities                        CP prn                                                     Assessment: Tolerated treatment well  Patient would benefit from continued PT  Challenged with addition of SLS requiring min use of UE  Consider adding light resistance to sidestepping NV  Patient able to tolerate gentle pressure along pes anserine with IASTM  Plan: Continue per plan of care

## 2018-07-30 ENCOUNTER — OFFICE VISIT (OUTPATIENT)
Dept: PHYSICAL THERAPY | Facility: REHABILITATION | Age: 83
End: 2018-07-30
Payer: MEDICARE

## 2018-07-30 DIAGNOSIS — M25.562 ACUTE PAIN OF LEFT KNEE: ICD-10-CM

## 2018-07-30 DIAGNOSIS — M70.52 PES ANSERINUS BURSITIS OF LEFT KNEE: Primary | ICD-10-CM

## 2018-07-30 PROCEDURE — 97140 MANUAL THERAPY 1/> REGIONS: CPT | Performed by: PHYSICAL THERAPIST

## 2018-07-30 PROCEDURE — 97112 NEUROMUSCULAR REEDUCATION: CPT | Performed by: PHYSICAL THERAPIST

## 2018-07-30 PROCEDURE — 97110 THERAPEUTIC EXERCISES: CPT | Performed by: PHYSICAL THERAPIST

## 2018-07-30 PROCEDURE — G8991 OTHER PT/OT GOAL STATUS: HCPCS | Performed by: PHYSICAL THERAPIST

## 2018-07-30 PROCEDURE — G8990 OTHER PT/OT CURRENT STATUS: HCPCS | Performed by: PHYSICAL THERAPIST

## 2018-07-30 NOTE — PROGRESS NOTES
Daily Note     Today's date: 2018  Patient name: Suzanne Rivera  : 1935  MRN: [id number]  Referring provider: Caren Owusu DO  Dx:   Encounter Diagnosis     ICD-10-CM    1  Pes anserinus bursitis of left knee M70 52    2  Acute pain of left knee M25 562            1on1 entire session  Subjective: Pt reports chief complaint of knee stiffness  Pt notes intermittent twinge, but nothing that lasts  Overall improved function and is now performing steps reciprocally          Objective: See treatment diary below  Manuals      GISTM medial thigh, pes anserine    KP  TK  MP  TK  TP  5'  Distal quad  5'  Pes anserine and quad  TP      L HS stretch        perf  Tk TP  2 5'  2 5'  TP      L quads stretch            TP  2 5'  2 5'  TP      L tib/fem distraction            TP  NP  NP  NP           Exercise Diary     nustep   8'  10'  10'  10'  10'  10 mins  10 mins  10'   Mini-squats pain free   10  10  10  15 20 2x10  2x10  2x10   HS curls   10 ea  10 ea   10 ea  20  20  20  20 ea   20   biodex LOS            med diff x2  med  Diff x2  med  Static x2  med diff x2   biodex RC            med speed 1'x2  med  Speed  1'x2  med  Speed  1'x2  med speed 1'x2   SLR abd          10    10  10  10   SLS 5"               5"x5  10"x3   bridges      10  10  3"x20  3"x20  3"x20  3"x20  3"x20   Side stepping   3 laps mirror  3 laps  3 laps  x3  x3  3 laps  3 laps  3 laps   Step ups fwd/lat   20ea 4"  4"x20  4"x20  4"x20 ea  6"x20ea  6"x20 ea   6"x 20 ea   6"x20ea   HR/TR   20ea  20 ea   20 ea  20 ea   20ea  20 ea   20 ea    20ea   LAQ   2x10  20  20  20  20  5"x20  5"x20  5"x20   Quad set 2x10, 5" 2x10, 5"  5"x20  5"x20  5"x20  hep  HEP  HEP  HEP   LLE clam 2x10, 5" 2x10 5"  3"x20  3"x20  20  20  20  20  20   Supine heel slide 10x10" 10x10"  10"x10  10"x10  10"x10  np  10"x10 10"x10  10"x10   Hamstring stretch strap        10"x10  manual  manual  man  manual  manual  manual                                                                                                 Modalities                        CP prn                                                     Assessment: Tolerated treatment well  Patient denies pain with TE perrformance and manuals  Plan: Continue per plan of care  RA NV for possible d/c

## 2018-08-06 ENCOUNTER — EVALUATION (OUTPATIENT)
Dept: PHYSICAL THERAPY | Facility: REHABILITATION | Age: 83
End: 2018-08-06
Payer: MEDICARE

## 2018-08-06 DIAGNOSIS — M25.562 ACUTE PAIN OF LEFT KNEE: ICD-10-CM

## 2018-08-06 DIAGNOSIS — M70.52 PES ANSERINUS BURSITIS OF LEFT KNEE: Primary | ICD-10-CM

## 2018-08-06 PROCEDURE — 97110 THERAPEUTIC EXERCISES: CPT | Performed by: PHYSICAL THERAPIST

## 2018-08-06 PROCEDURE — G8992 OTHER PT/OT  D/C STATUS: HCPCS | Performed by: PHYSICAL THERAPIST

## 2018-08-06 PROCEDURE — G8991 OTHER PT/OT GOAL STATUS: HCPCS | Performed by: PHYSICAL THERAPIST

## 2018-08-06 NOTE — PROGRESS NOTES
PT Discharge    Today's date: 2018  Patient name: Francisco Gonzales  : 1935  MRN: [id number]  Referring provider: Jessica Camacho DO  Dx:   Encounter Diagnosis     ICD-10-CM    1  Pes anserinus bursitis of left knee M70 52    2  Acute pain of left knee M25 562                   Assessment  Impairments: abnormal or restricted ROM, activity intolerance, impaired physical strength and pain with function    Assessment details: Pt has progressed well, demonstrating improvement in knee ROM/flexibility, strength, and function with a decrease in pain and edema since starting therapy  Pt feels comfortable being d/c to ongoing HEP at this time  Updated HEP reviewed and issued to pt  Thank you for the referral    Understanding of Dx/Px/POC: good   Prognosis: good    Goals  Short Term:  Pt will report decreased levels of pain by at least 2 subjective ratings in 4 weeks-met  Pt will demonstrate improved ROM by at least 10 degrees in 4 weeks-partially met  Pt will demonstrate improved strength by 1/2 grade MMT in 4 weeks-met  Long Term:   Pt will be independent in their HEP in 8 weeks-met  Pt will demonstrate improved FOTO, > 57-met  Pt will be independent with all ADL's-met    Plan  Treatment plan discussed with: patient        Subjective Evaluation    History of Present Illness  Mechanism of injury: Pt reports 80% improvement since starting therapy  FOTO improved to 63 (was 41 at IE)  Pt reports overall improvement in pain and function since starting therapy  Pt notes improvement with steps (performing reciprocally), ambulation (has resumed taking her daily walks), prolonged standing (some stiffness after several hours while standing to do her jigsaw puzzle), household chores, sit to stand transfers (no longer requires UE assistance)  Pt has not resumed golfing      Pain  At best pain ratin  At worst pain rating: 3  Location: L knee    Treatments  Current treatment: physical therapy  Patient Goals  Patient goals for therapy: decreased pain, decreased edema, return to sport/leisure activities, independence with ADLs/IADLs, increased strength and increased motion          Objective     Tenderness     Additional Tenderness Details  No TTP noted  Active Range of Motion   Left Knee   Flexion: 115 degrees   Extension: 1 degrees     Passive Range of Motion   Left Knee   Normal passive range of motion    Strength/Myotome Testing     Left Hip   Planes of Motion   Flexion: 4+  Abduction: 4+    Left Knee   Flexion: 4  Extension: 5    General Comments     Knee Comments  Mild tightness persists in hamstrings and quads  Precautions: hearing difficulty L ear      Daily Treatment Diary   Manuals 7/2 7/5 7/9 7/12 7/16 7/19 7/23 7/26 7/30     GISTM medial thigh, pes anserine    KP  TK  MP  TK  TP  5'  Distal quad  5'  Pes anserine and quad  TP      L HS stretch        perf  Tk TP  2 5'  2 5'  TP      L quads stretch            TP  2 5'  2 5'  TP      L tib/fem distraction            TP  NP  NP  NP           Exercise Diary  7/2 7/5 7/9 7/12 7/16 7/19 7/23 7/26 7/30   nustep   8'  10'  10'  10'  10'  10 mins  10 mins  10'   Mini-squats pain free   10  10  10  15 20 2x10  2x10  2x10   HS curls   10 ea  10 ea   10 ea  20  20  20  20 ea   20   biodex LOS            med diff x2  med  Diff x2  med  Static x2  med diff x2   biodex RC            med speed 1'x2  med  Speed  1'x2  med  Speed  1'x2  med speed 1'x2   SLR abd          10    10  10  10   SLS 5"               5"x5  10"x3   bridges      10  10  3"x20  3"x20  3"x20  3"x20  3"x20   Side stepping   3 laps mirror  3 laps  3 laps  x3  x3  3 laps  3 laps  3 laps   Step ups fwd/lat   20ea 4"  4"x20  4"x20  4"x20 ea  6"x20ea  6"x20 ea   6"x 20 ea   6"x20ea   HR/TR   20ea  20 ea   20 ea  20 ea   20ea  20 ea   20 ea    20ea   LAQ   2x10  20  20  20  20  5"x20  5"x20  5"x20   Quad set 2x10, 5" 2x10, 5"  5"x20  5"x20  5"x20  hep  HEP  HEP  HEP   LLE clam 2x10, 5" 2x10 5"  3"x20  3"x20  20  20  20  20  20   Supine heel slide 10x10" 10x10"  10"x10  10"x10  10"x10  np  10"x10 10"x10  10"x10   Hamstring stretch strap        10"x10  manual  manual  man  manual  manual  manual                                                                                                 Modalities                        CP prn                                                 Updated measurements and functional status taken this session  Reviewed TE's for d/c and pt demonstrates good understanding

## 2018-10-02 ENCOUNTER — OFFICE VISIT (OUTPATIENT)
Dept: INTERNAL MEDICINE CLINIC | Facility: CLINIC | Age: 83
End: 2018-10-02
Payer: MEDICARE

## 2018-10-02 VITALS
OXYGEN SATURATION: 96 % | HEART RATE: 92 BPM | HEIGHT: 60 IN | SYSTOLIC BLOOD PRESSURE: 124 MMHG | BODY MASS INDEX: 24.58 KG/M2 | DIASTOLIC BLOOD PRESSURE: 72 MMHG | RESPIRATION RATE: 16 BRPM | WEIGHT: 125.2 LBS

## 2018-10-02 DIAGNOSIS — E78.5 HYPERLIPIDEMIA, UNSPECIFIED HYPERLIPIDEMIA TYPE: ICD-10-CM

## 2018-10-02 DIAGNOSIS — I35.9 AORTIC VALVE DISEASE: ICD-10-CM

## 2018-10-02 DIAGNOSIS — Z00.00 HEALTHCARE MAINTENANCE: Primary | ICD-10-CM

## 2018-10-02 PROCEDURE — G0439 PPPS, SUBSEQ VISIT: HCPCS | Performed by: NURSE PRACTITIONER

## 2018-10-02 RX ORDER — SIMVASTATIN 5 MG
5 TABLET ORAL
Qty: 90 TABLET | Refills: 1 | Status: SHIPPED | OUTPATIENT
Start: 2018-10-02 | End: 2019-04-12 | Stop reason: SDUPTHER

## 2018-10-02 NOTE — PROGRESS NOTES
Assessment and Plan:    Problem List Items Addressed This Visit     None      Visit Diagnoses     Healthcare maintenance    -  Primary    Hyperlipidemia, unspecified hyperlipidemia type        Relevant Medications    simvastatin (ZOCOR) 5 MG tablet    Aortic valve disease        Relevant Orders    Ambulatory referral to Cardiology        There are no preventive care reminders to display for this patient  HPI:  Nghia Valladares is a 80 y o  female here for her Subsequent Wellness Visit  Patient Active Problem List   Diagnosis    Hypercholesteremia    Acute sinusitis    Pes anserinus bursitis of right knee     Past Medical History:   Diagnosis Date    [facility] [address]     [email or web address]     Hyperlipidemia      Past Surgical History:   Procedure Laterality Date    BREAST SURGERY      cancer (> 5 years)     Family History   Problem Relation Age of Onset    Breast cancer Mother     Heart disease Father         heart problem    Heart disease Sister         heart problem    Heart disease Brother         heart problem     History   Smoking Status    Never Smoker   Smokeless Tobacco    Never Used     History   Alcohol Use    Yes      History   Drug Use No       Current Outpatient Prescriptions   Medication Sig Dispense Refill    Calcium Carb-Cholecalciferol (CALCIUM 1000 + D) 1000-800 MG-UNIT TABS Take by mouth      diclofenac sodium (VOLTAREN) 1 % Apply 2 g topically 4 (four) times a day 1 Tube 0    meloxicam (MOBIC) 7 5 mg tablet Take 1 tablet (7 5 mg total) by mouth daily 60 tablet 0    Multiple Vitamins-Minerals (CENTRUM SILVER 50+WOMEN PO) Take by mouth      simvastatin (ZOCOR) 5 MG tablet Take 1 tablet (5 mg total) by mouth daily at bedtime 90 tablet 1     No current facility-administered medications for this visit        Allergies   Allergen Reactions    Molds & Smuts     Pravastatin     Risedronate      Immunization History   Administered Date(s) Administered    Influenza 09/29/2014, 10/19/2015, 10/26/2016, 11/08/2017    Influenza Quadrivalent Preservative Free 3 years and older IM 10/19/2011       Patient Care Team:  Francy Nascimento MD as PCP - General (Internal Medicine)  MD Deidra Aldana as Nurse Practitioner (Internal Medicine)    Medicare Screening Tests and Risk Assessments:  Last Medicare Wellness visit information reviewed, patient interviewed, no change since last AWV  Health Risk Assessment:  Patient rates overall health as good  Patient feels that their physical health rating is Same  Eyesight was rated as Same  Hearing was rated as Same  Patient feels that their emotional and mental health rating is Same  Pain experienced by patient in the last 7 days has been Some  Patient's pain rating has been 5/10  Patient states that she has experienced no weight loss or gain in last 6 months  Emotional/Mental Health:  Patient has been feeling nervous/anxious  Broken Bones/Falls: Fall Risk Assessment:    In the past year, patient has experienced: No history of falling in past year          Bladder/Bowel:  Patient has not leaked urine accidently in the last six months  Patient reports no loss of bowel control  Immunizations:  Patient has had a flu vaccination within the last year  Patient has not received a pneumonia shot  Patient has not received a shingles shot  Patient has received tetanus/diphtheria shot  Home Safety:  Patient has trouble with stairs inside or outside of their home  Patient currently reports that there are safety hazards present in home , working smoke alarms, working carbon monoxide detectors  Preventative Screenings:   Breast cancer screening performed, colon cancer screen completed, cholesterol screen completed, glaucoma eye exam completed,     Nutrition:  Current diet: Regular with servings of the following:    Medications:  Patient is currently taking over-the-counter supplements  Patient is able to manage medications  Lifestyle Choices:  Patient reports no tobacco use  Patient has not smoked or used tobacco in the past   Patient reports alcohol use  Patient drives a vehicle  Patient wears seat belt  Activities of Daily Living:  Can get out of bed by his or her self, able to dress self, able to make own meals, able to do own shopping, able to bathe self, can do own laundry/housekeeping, can manage own money, pay bills and track expenses    Previous Hospitalizations:  No hospitalization or ED visit in past 12 months        Advanced Directives:  Patient has decided on a power of   Patient has spoken to designated power of   Patient has completed advanced directive          Preventative Screening/Counseling:      Cardiovascular:      General: Screening Current      Counseling: Healthy Weight and Healthy Diet          Diabetes:      General: Screening Current      Counseling: Healthy Diet and Healthy Weight          Colorectal Cancer:      General: Screening Current          Breast Cancer:      General: Screening Not Indicated          Cervical Cancer:      General: Screening Not Indicated          Osteoporosis:      General: Screening Current          AAA:      General: Screening Not Indicated          Glaucoma:      General: Screening Current          HIV:      General: Screening Not Indicated          Hepatitis C:      General: Screening Not Indicated        Advanced Directives:   Patient has living will for healthcare,

## 2018-11-15 ENCOUNTER — OFFICE VISIT (OUTPATIENT)
Dept: CARDIOLOGY CLINIC | Facility: CLINIC | Age: 83
End: 2018-11-15
Payer: MEDICARE

## 2018-11-15 VITALS
DIASTOLIC BLOOD PRESSURE: 68 MMHG | WEIGHT: 125 LBS | HEIGHT: 60 IN | BODY MASS INDEX: 24.54 KG/M2 | HEART RATE: 75 BPM | SYSTOLIC BLOOD PRESSURE: 134 MMHG

## 2018-11-15 DIAGNOSIS — I35.9 AORTIC VALVE DISEASE: ICD-10-CM

## 2018-11-15 PROCEDURE — 99213 OFFICE O/P EST LOW 20 MIN: CPT | Performed by: INTERNAL MEDICINE

## 2018-11-15 PROCEDURE — 93000 ELECTROCARDIOGRAM COMPLETE: CPT | Performed by: INTERNAL MEDICINE

## 2018-11-15 RX ORDER — A/SINGAPORE/GP1908/2015 IVR-180 (H1N1) (AN A/MICHIGAN/45/2015 (H1N1)PDM09-LIKE VIRUS), A/HONG KONG/4801/2014, NYMC X-263B (H3N2) (AN A/HONG KONG/4801/2014-LIKE VIRUS), AND B/BRISBANE/60/2008, WILD TYPE (A B/BRISBANE/60/2008-LIKE VIRUS) 15; 15; 15 UG/.5ML; UG/.5ML; UG/.5ML
INJECTION, SUSPENSION INTRAMUSCULAR
Refills: 0 | COMMUNITY
Start: 2018-10-09 | End: 2019-05-10 | Stop reason: ALTCHOICE

## 2018-11-15 NOTE — PATIENT INSTRUCTIONS
I will schedule follow-up for you in 1 year  I will ask that the cardiologist your physician requested see you      For this visit I have asked that she have an echocardiogram and also another electrocardiogram   Today's EKG was normal

## 2018-11-15 NOTE — PROGRESS NOTES
Cardiology Follow Up    Gabbie Rey  1935  [id number]  [address] [facility]  [phone]    This pleasant 51-year-old woman is seen in 1 year follow-up of aortic valve disease  She has aortic insufficiency which is quite difficult to hear, prior echocardiograms have not shown left ventricular dilatation exam today does not suggest LV enlargement, PMI not displaced    Blood pressure, usually normal   Rare orthostasis    Lipids, normal on therapy  GFR, fasting glucose, have been normal   She has some stress, her son has MS but he is now living in [place] and has adequate support  She takes good care of herself  Most days she walks 20-30 minutes without effort dyspnea or any other symptoms    1  Aortic valve disease  Ambulatory referral to Cardiology    POCT ECG    Echo complete with contrast if indicated    ECG 12 lead       Interval History:  No hospitalizations no injuries    Patient Active Problem List   Diagnosis    Hypercholesteremia    Acute sinusitis    Pes anserinus bursitis of right knee    Aortic valve disease     Past Medical History:   Diagnosis Date    [facility] cancer ([facility] )     Cancer ([facility] )     Hyperlipidemia      Social History     Social History    Marital status: /Civil Union     Spouse name: N/A    Number of children: N/A    Years of education: N/A     Occupational History    Not on file       Social History Main Topics    Smoking status: Never Smoker    Smokeless tobacco: Never Used    Alcohol use Yes    Drug use: No    Sexual activity: Not on file     Other Topics Concern    Not on file     Social History Narrative    No narrative on file      Family History   Problem Relation Age of Onset    Breast cancer Mother     Heart disease Father         heart problem    Heart disease Sister         heart problem    Heart disease Brother heart problem     Past Surgical History:   Procedure Laterality Date    BREAST SURGERY      cancer (> 5 years)       Current Outpatient Prescriptions:     Calcium Carb-Cholecalciferol (CALCIUM 1000 + D) 1000-800 MG-UNIT TABS, Take by mouth, Disp: , Rfl:     FLUAD 0 5 ML KATHIE, inject 0 5 milliliter intramuscularly, Disp: , Rfl: 0    Multiple Vitamins-Minerals (CENTRUM SILVER 50+WOMEN PO), Take by mouth, Disp: , Rfl:     simvastatin (ZOCOR) 5 MG tablet, Take 1 tablet (5 mg total) by mouth daily at bedtime, Disp: 90 tablet, Rfl: 1    diclofenac sodium (VOLTAREN) 1 %, Apply 2 g topically 4 (four) times a day (Patient not taking: Reported on 11/15/2018 ), Disp: 1 Tube, Rfl: 0    meloxicam (MOBIC) 7 5 mg tablet, Take 1 tablet (7 5 mg total) by mouth daily (Patient not taking: Reported on 11/15/2018 ), Disp: 60 tablet, Rfl: 0  Allergies   Allergen Reactions    Molds & Smuts     Pravastatin     Risedronate        Labs:not applicable  Imaging: No results found  Review of Systems:  Review of Systems   Respiratory: Negative  Cardiovascular: Negative  Neurological: Negative for dizziness, syncope, weakness and light-headedness  Physical Exam:  Physical Exam   Constitutional: She appears well-nourished  Neck: Normal carotid pulses, no hepatojugular reflux and no JVD present  Carotid bruit is not present  Cardiovascular: Normal rate and regular rhythm  Murmur (A2 is well preserved  AI could not be heard at base are apex sitting or sitting forward ) heard  Pulmonary/Chest: Effort normal and breath sounds normal  No respiratory distress  She has no wheezes  She has no rales  Abdominal: Soft  She exhibits no distension  There is no hepatosplenomegaly  Musculoskeletal: She exhibits no edema  Discussion/Summary:  Aortic valve disease    Her physician is asked that she continue to see cardiologist   Echo will be done 1 year    Lipids, good results statin well-tolerated    Blood pressure, normal

## 2019-01-23 ENCOUNTER — OFFICE VISIT (OUTPATIENT)
Dept: INTERNAL MEDICINE CLINIC | Facility: CLINIC | Age: 84
End: 2019-01-23
Payer: MEDICARE

## 2019-01-23 VITALS
HEIGHT: 60 IN | TEMPERATURE: 98.3 F | DIASTOLIC BLOOD PRESSURE: 75 MMHG | WEIGHT: 122.8 LBS | OXYGEN SATURATION: 98 % | BODY MASS INDEX: 24.11 KG/M2 | SYSTOLIC BLOOD PRESSURE: 120 MMHG | HEART RATE: 88 BPM

## 2019-01-23 DIAGNOSIS — J01.00 ACUTE NON-RECURRENT MAXILLARY SINUSITIS: Primary | ICD-10-CM

## 2019-01-23 PROCEDURE — 99213 OFFICE O/P EST LOW 20 MIN: CPT | Performed by: INTERNAL MEDICINE

## 2019-01-23 RX ORDER — AMOXICILLIN AND CLAVULANATE POTASSIUM 875; 125 MG/1; MG/1
1 TABLET, FILM COATED ORAL EVERY 12 HOURS SCHEDULED
Qty: 14 TABLET | Refills: 0 | Status: SHIPPED | OUTPATIENT
Start: 2019-01-23 | End: 2019-01-30

## 2019-01-23 NOTE — PATIENT INSTRUCTIONS
Problem List Items Addressed This Visit        Respiratory    Acute non-recurrent maxillary sinusitis - Primary     Will cover with Augmentin, follow up if not improving         Relevant Medications    amoxicillin-clavulanate (AUGMENTIN) 875-125 mg per tablet

## 2019-01-23 NOTE — PROGRESS NOTES
Assessment/Plan:    Acute non-recurrent maxillary sinusitis  Will cover with Augmentin, follow up if not improving       Diagnoses and all orders for this visit:    Acute non-recurrent maxillary sinusitis  -     amoxicillin-clavulanate (AUGMENTIN) 875-125 mg per tablet; Take 1 tablet by mouth every 12 (twelve) hours for 7 days          Subjective:      Patient ID: Farrukh Perales is a 80 y o  female  Patient started with cold symptoms a couple of weeks ago, and has now developed more sinusitis symptoms, she has lost her sense of smell, has sinus pressure, no foul-smelling mucus, no fevers chills or sweats, she has lots of postnasal drip, no cough, no pleuritic pain, no shortness of breath        The following portions of the patient's history were reviewed and updated as appropriate: allergies, current medications, past family history, past medical history, past social history, past surgical history and problem list     Review of Systems   Constitutional: Negative for chills and fever  HENT: Positive for congestion, postnasal drip and sinus pressure  Negative for sore throat  Respiratory: Negative for cough and shortness of breath  Cardiovascular: Negative for chest pain  Musculoskeletal: Negative for arthralgias and myalgias  Neurological: Positive for headaches  Objective:      /75   Pulse 88   Temp 98 3 °F (36 8 °C)   Ht 5' (1 524 m)   Wt 55 7 kg (122 lb 12 8 oz)   SpO2 98%   BMI 23 98 kg/m²          Physical Exam   Constitutional: She is oriented to person, place, and time  She appears well-developed and well-nourished  HENT:   Mouth/Throat: Oropharynx is clear and moist  No oropharyngeal exudate  Ear canals okay, cannot see the tympanic membranes, nasal mucosa okay   Eyes: No scleral icterus  Neck: Normal range of motion  Neck supple  Cardiovascular: Normal rate, regular rhythm and normal heart sounds  No murmur heard    Pulmonary/Chest: Breath sounds normal  No respiratory distress  She has no wheezes  She has no rales  Neurological: She is alert and oriented to person, place, and time  Vitals reviewed

## 2019-02-19 ENCOUNTER — OFFICE VISIT (OUTPATIENT)
Dept: INTERNAL MEDICINE CLINIC | Facility: CLINIC | Age: 84
End: 2019-02-19
Payer: MEDICARE

## 2019-02-19 VITALS
HEIGHT: 60 IN | HEART RATE: 93 BPM | BODY MASS INDEX: 23.98 KG/M2 | DIASTOLIC BLOOD PRESSURE: 76 MMHG | TEMPERATURE: 97.9 F | SYSTOLIC BLOOD PRESSURE: 130 MMHG | OXYGEN SATURATION: 98 %

## 2019-02-19 DIAGNOSIS — J01.00 ACUTE NON-RECURRENT MAXILLARY SINUSITIS: Primary | ICD-10-CM

## 2019-02-19 PROCEDURE — 99213 OFFICE O/P EST LOW 20 MIN: CPT | Performed by: NURSE PRACTITIONER

## 2019-02-19 RX ORDER — DOXYCYCLINE 100 MG/1
100 CAPSULE ORAL 2 TIMES DAILY
Qty: 14 CAPSULE | Refills: 0 | Status: SHIPPED | OUTPATIENT
Start: 2019-02-19 | End: 2019-02-26

## 2019-02-19 NOTE — PROGRESS NOTES
Assessment/Plan:     Diagnoses and all orders for this visit:    Acute non-recurrent maxillary sinusitis  -     doxycycline monohydrate (MONODOX) 100 mg capsule; Take 1 capsule (100 mg total) by mouth 2 (two) times a day for 7 days        Since she recently had amoxicillin I will prescribe doxycycline for sinusitis    Drink plenty of fluids and rest  May take over the counter mucinex and cough syrup/cough drops  Call if worsening  Subjective:      Patient ID: Nicci Wynn is a 80 y o  female  2 weeks of sinus congestion, pain, headache,     Little bit of cough  Post nasal drip  Yellow phlegm    Gradually feeling worse    Denies sob, chest pain, fever or chills      The following portions of the patient's history were reviewed and updated as appropriate: allergies, current medications, past family history, past medical history, past social history, past surgical history and problem list     Review of Systems   Constitutional: Positive for fatigue  HENT: Positive for postnasal drip, rhinorrhea, sinus pressure and sinus pain  Eyes: Negative  Respiratory: Positive for cough  Cardiovascular: Negative  Gastrointestinal: Negative  Musculoskeletal: Negative  Neurological: Negative  Objective:      /76   Pulse 93   Temp 97 9 °F (36 6 °C)   Ht 5' (1 524 m)   SpO2 98%   BMI 23 98 kg/m²          Physical Exam   Constitutional: She is oriented to person, place, and time  She appears well-developed and well-nourished  HENT:   Head: Normocephalic and atraumatic  Eyes: Pupils are equal, round, and reactive to light  Conjunctivae are normal    Neck: Normal range of motion  Neck supple  Cardiovascular: Normal rate and regular rhythm  Pulmonary/Chest: Effort normal and breath sounds normal    Abdominal: Soft  Bowel sounds are normal    Musculoskeletal: Normal range of motion  Neurological: She is alert and oriented to person, place, and time     Skin: Skin is warm and dry

## 2019-03-07 PROBLEM — H72.92 PERFORATION OF LEFT TYMPANIC MEMBRANE: Status: ACTIVE | Noted: 2019-03-07

## 2019-03-07 PROBLEM — H61.23 BILATERAL IMPACTED CERUMEN: Status: ACTIVE | Noted: 2019-03-07

## 2019-03-20 DIAGNOSIS — I10 ESSENTIAL HYPERTENSION: Primary | ICD-10-CM

## 2019-04-09 ENCOUNTER — APPOINTMENT (OUTPATIENT)
Dept: LAB | Age: 84
End: 2019-04-09
Payer: MEDICARE

## 2019-04-09 DIAGNOSIS — I10 ESSENTIAL HYPERTENSION: ICD-10-CM

## 2019-04-09 LAB
ALBUMIN SERPL BCP-MCNC: 3.8 G/DL (ref 3.5–5)
ALP SERPL-CCNC: 75 U/L (ref 46–116)
ALT SERPL W P-5'-P-CCNC: 25 U/L (ref 12–78)
ANION GAP SERPL CALCULATED.3IONS-SCNC: 5 MMOL/L (ref 4–13)
AST SERPL W P-5'-P-CCNC: 20 U/L (ref 5–45)
BASOPHILS # BLD AUTO: 0.06 THOUSANDS/ΜL (ref 0–0.1)
BASOPHILS NFR BLD AUTO: 1 % (ref 0–1)
BILIRUB SERPL-MCNC: 0.75 MG/DL (ref 0.2–1)
BUN SERPL-MCNC: 16 MG/DL (ref 5–25)
CALCIUM SERPL-MCNC: 8.3 MG/DL (ref 8.3–10.1)
CHLORIDE SERPL-SCNC: 104 MMOL/L (ref 100–108)
CHOLEST SERPL-MCNC: 244 MG/DL (ref 50–200)
CO2 SERPL-SCNC: 28 MMOL/L (ref 21–32)
CREAT SERPL-MCNC: 0.99 MG/DL (ref 0.6–1.3)
EOSINOPHIL # BLD AUTO: 0.12 THOUSAND/ΜL (ref 0–0.61)
EOSINOPHIL NFR BLD AUTO: 3 % (ref 0–6)
ERYTHROCYTE [DISTWIDTH] IN BLOOD BY AUTOMATED COUNT: 12.2 % (ref 11.6–15.1)
GFR SERPL CREATININE-BSD FRML MDRD: 53 ML/MIN/1.73SQ M
GLUCOSE P FAST SERPL-MCNC: 88 MG/DL (ref 65–99)
HCT VFR BLD AUTO: 40.4 % (ref 34.8–46.1)
HDLC SERPL-MCNC: 76 MG/DL (ref 40–60)
HGB BLD-MCNC: 13.1 G/DL (ref 11.5–15.4)
IMM GRANULOCYTES # BLD AUTO: 0.01 THOUSAND/UL (ref 0–0.2)
IMM GRANULOCYTES NFR BLD AUTO: 0 % (ref 0–2)
LDLC SERPL CALC-MCNC: 136 MG/DL (ref 0–100)
LYMPHOCYTES # BLD AUTO: 1.34 THOUSANDS/ΜL (ref 0.6–4.47)
LYMPHOCYTES NFR BLD AUTO: 31 % (ref 14–44)
MCH RBC QN AUTO: 34.5 PG (ref 26.8–34.3)
MCHC RBC AUTO-ENTMCNC: 32.4 G/DL (ref 31.4–37.4)
MCV RBC AUTO: 106 FL (ref 82–98)
MONOCYTES # BLD AUTO: 0.35 THOUSAND/ΜL (ref 0.17–1.22)
MONOCYTES NFR BLD AUTO: 8 % (ref 4–12)
NEUTROPHILS # BLD AUTO: 2.39 THOUSANDS/ΜL (ref 1.85–7.62)
NEUTS SEG NFR BLD AUTO: 57 % (ref 43–75)
NONHDLC SERPL-MCNC: 168 MG/DL
NRBC BLD AUTO-RTO: 0 /100 WBCS
PLATELET # BLD AUTO: 248 THOUSANDS/UL (ref 149–390)
PMV BLD AUTO: 9 FL (ref 8.9–12.7)
POTASSIUM SERPL-SCNC: 3.6 MMOL/L (ref 3.5–5.3)
PROT SERPL-MCNC: 7 G/DL (ref 6.4–8.2)
RBC # BLD AUTO: 3.8 MILLION/UL (ref 3.81–5.12)
SODIUM SERPL-SCNC: 137 MMOL/L (ref 136–145)
TRIGL SERPL-MCNC: 160 MG/DL
TSH SERPL DL<=0.05 MIU/L-ACNC: 3.35 UIU/ML (ref 0.36–3.74)
WBC # BLD AUTO: 4.27 THOUSAND/UL (ref 4.31–10.16)

## 2019-04-09 PROCEDURE — 80061 LIPID PANEL: CPT

## 2019-04-09 PROCEDURE — 80053 COMPREHEN METABOLIC PANEL: CPT

## 2019-04-09 PROCEDURE — 36415 COLL VENOUS BLD VENIPUNCTURE: CPT

## 2019-04-09 PROCEDURE — 85025 COMPLETE CBC W/AUTO DIFF WBC: CPT

## 2019-04-09 PROCEDURE — 84443 ASSAY THYROID STIM HORMONE: CPT

## 2019-04-12 DIAGNOSIS — E78.5 HYPERLIPIDEMIA, UNSPECIFIED HYPERLIPIDEMIA TYPE: ICD-10-CM

## 2019-04-15 RX ORDER — SIMVASTATIN 5 MG
5 TABLET ORAL
Qty: 90 TABLET | Refills: 1 | Status: SHIPPED | OUTPATIENT
Start: 2019-04-15 | End: 2019-10-15 | Stop reason: SDUPTHER

## 2019-05-10 ENCOUNTER — OFFICE VISIT (OUTPATIENT)
Dept: INTERNAL MEDICINE CLINIC | Facility: CLINIC | Age: 84
End: 2019-05-10
Payer: MEDICARE

## 2019-05-10 VITALS
HEIGHT: 60 IN | RESPIRATION RATE: 18 BRPM | SYSTOLIC BLOOD PRESSURE: 122 MMHG | BODY MASS INDEX: 24.03 KG/M2 | HEART RATE: 99 BPM | OXYGEN SATURATION: 97 % | TEMPERATURE: 99.7 F | WEIGHT: 122.4 LBS | DIASTOLIC BLOOD PRESSURE: 68 MMHG

## 2019-05-10 DIAGNOSIS — R05.9 COUGH: Primary | ICD-10-CM

## 2019-05-10 PROCEDURE — 99213 OFFICE O/P EST LOW 20 MIN: CPT | Performed by: NURSE PRACTITIONER

## 2019-05-10 RX ORDER — DEXTROMETHORPHAN HYDROBROMIDE AND PROMETHAZINE HYDROCHLORIDE 15; 6.25 MG/5ML; MG/5ML
5 SYRUP ORAL 4 TIMES DAILY PRN
Qty: 118 ML | Refills: 0 | Status: SHIPPED | OUTPATIENT
Start: 2019-05-10 | End: 2019-11-07 | Stop reason: ALTCHOICE

## 2019-05-14 ENCOUNTER — TELEPHONE (OUTPATIENT)
Dept: INTERNAL MEDICINE CLINIC | Facility: CLINIC | Age: 84
End: 2019-05-14

## 2019-05-14 PROBLEM — R05.9 COUGH: Status: ACTIVE | Noted: 2019-05-14

## 2019-08-25 PROBLEM — C50.919 BREAST CANCER (HCC): Status: ACTIVE | Noted: 2019-08-25

## 2019-10-02 ENCOUNTER — IMMUNIZATIONS (OUTPATIENT)
Dept: INTERNAL MEDICINE CLINIC | Facility: CLINIC | Age: 84
End: 2019-10-02
Payer: MEDICARE

## 2019-10-02 DIAGNOSIS — Z23 NEED FOR INFLUENZA VACCINATION: Primary | ICD-10-CM

## 2019-10-02 PROCEDURE — G0008 ADMIN INFLUENZA VIRUS VAC: HCPCS

## 2019-10-02 PROCEDURE — 90662 IIV NO PRSV INCREASED AG IM: CPT

## 2019-10-15 DIAGNOSIS — E78.5 HYPERLIPIDEMIA, UNSPECIFIED HYPERLIPIDEMIA TYPE: ICD-10-CM

## 2019-10-15 RX ORDER — SIMVASTATIN 5 MG
5 TABLET ORAL
Qty: 90 TABLET | Refills: 3 | Status: SHIPPED | OUTPATIENT
Start: 2019-10-15 | End: 2020-10-02 | Stop reason: SDUPTHER

## 2019-11-07 ENCOUNTER — OFFICE VISIT (OUTPATIENT)
Dept: INTERNAL MEDICINE CLINIC | Facility: CLINIC | Age: 84
End: 2019-11-07
Payer: MEDICARE

## 2019-11-07 VITALS
DIASTOLIC BLOOD PRESSURE: 68 MMHG | HEIGHT: 60 IN | BODY MASS INDEX: 23.75 KG/M2 | SYSTOLIC BLOOD PRESSURE: 118 MMHG | HEART RATE: 83 BPM | RESPIRATION RATE: 14 BRPM | WEIGHT: 121 LBS | TEMPERATURE: 97.5 F | OXYGEN SATURATION: 98 %

## 2019-11-07 DIAGNOSIS — Z00.00 HEALTHCARE MAINTENANCE: Primary | ICD-10-CM

## 2019-11-07 DIAGNOSIS — J01.10 ACUTE NON-RECURRENT FRONTAL SINUSITIS: ICD-10-CM

## 2019-11-07 PROCEDURE — G0439 PPPS, SUBSEQ VISIT: HCPCS | Performed by: NURSE PRACTITIONER

## 2019-11-07 RX ORDER — AMOXICILLIN AND CLAVULANATE POTASSIUM 875; 125 MG/1; MG/1
1 TABLET, FILM COATED ORAL EVERY 12 HOURS SCHEDULED
Qty: 14 TABLET | Refills: 0 | Status: SHIPPED | OUTPATIENT
Start: 2019-11-07 | End: 2019-11-14

## 2019-11-07 NOTE — PROGRESS NOTES
Assessment and Plan:     Problem List Items Addressed This Visit        Respiratory    Acute non-recurrent frontal sinusitis     Start antibiotics  Fluids and rest         Relevant Medications    amoxicillin-clavulanate (AUGMENTIN) 875-125 mg per tablet      Other Visit Diagnoses     Healthcare maintenance    -  Primary           Preventive health issues were discussed with patient, and age appropriate screening tests were ordered as noted in patient's After Visit Summary  Personalized health advice and appropriate referrals for health education or preventive services given if needed, as noted in patient's After Visit Summary  History of Present Illness:     Patient presents for Medicare Annual Wellness visit    She complains of sinus pressure and congestion for 2 weeks  Also having headache and dry mouth       Patient Care Team:  Silva Iyer as PCP - General (Family Medicine)  MD Ale Bone as Nurse Practitioner (Internal Medicine)     Problem List:     Patient Active Problem List   Diagnosis    Hypercholesteremia    Acute non-recurrent maxillary sinusitis    Pes anserinus bursitis of right knee    Aortic valve disease    Perforation of left tympanic membrane    Bilateral impacted cerumen    Cough    Breast cancer ([address]    Acute non-recurrent frontal sinusitis      Past Medical and Surgical History:     Past Medical History:   Diagnosis Date    Breast cancer ([address]     Cancer ([address]     Hyperlipidemia      Past Surgical History:   Procedure Laterality Date    BREAST SURGERY      cancer (> 5 years)      Family History:     Family History   Problem Relation Age of Onset    Breast cancer Mother     Heart disease Father         heart problem    Heart disease Sister         heart problem    Heart disease Brother         heart problem      Social History:     Social History     Socioeconomic History    Marital status: /Civil Union     Spouse name: Not on file    Number of children: Not on file    Years of education: Not on file    Highest education level: Not on file   Occupational History    Not on file   Social Needs    Financial resource strain: Not on file    Food insecurity:     Worry: Not on file     Inability: Not on file    Transportation needs:     Medical: Not on file     Non-medical: Not on file   Tobacco Use    Smoking status: Never Smoker    Smokeless tobacco: Never Used   Substance and Sexual Activity    Alcohol use: Yes    Drug use: No    Sexual activity: Not on file   Lifestyle    Physical activity:     Days per week: Not on file     Minutes per session: Not on file    Stress: Not on file   Relationships    Social connections:     Talks on phone: Not on file     Gets together: Not on file     Attends [employer] service: Not on file     Active member of club or organization: Not on file     Attends meetings of clubs or organizations: Not on file     Relationship status: Not on file    Intimate partner violence:     Fear of current or ex partner: Not on file     Emotionally abused: Not on file     Physically abused: Not on file     Forced sexual activity: Not on file   Other Topics Concern    Not on file   Social History Narrative    Not on file       Medications and Allergies:     Current Outpatient Medications   Medication Sig Dispense Refill    Calcium Carb-Cholecalciferol (CALCIUM 1000 + D) 1000-800 MG-UNIT TABS Take by mouth      Multiple Vitamins-Minerals (CENTRUM SILVER 50+WOMEN PO) Take by mouth      simvastatin (ZOCOR) 5 MG tablet Take 1 tablet (5 mg total) by mouth daily at bedtime 90 tablet 3    amoxicillin-clavulanate (AUGMENTIN) 875-125 mg per tablet Take 1 tablet by mouth every 12 (twelve) hours for 7 days 14 tablet 0     No current facility-administered medications for this visit        Allergies   Allergen Reactions    Molds & Smuts     Pravastatin     Risedronate       Immunizations:     Immunization History Administered Date(s) Administered    INFLUENZA 09/29/2014, 10/19/2015, 10/26/2016, 11/08/2017, 10/09/2018    Influenza Quadrivalent Preservative Free 3 years and older IM 10/19/2011    Influenza, high dose seasonal 0 5 mL 10/02/2019      Health Maintenance: There are no preventive care reminders to display for this patient  There are no preventive care reminders to display for this patient  Medicare Health Risk Assessment:     /68   Pulse 83   Temp 97 5 °F (36 4 °C) (Oral)   Resp 14   Ht 5' (1 524 m)   Wt 54 9 kg (121 lb)   SpO2 98%   BMI 23 63 kg/m²          Health Risk Assessment:   Patient rates overall health as excellent  Patient feels that their physical health rating is same  Eyesight was rated as same  Hearing was rated as same  Patient feels that their emotional and mental health rating is same  Pain experienced in the last 7 days has been none  Patient states that she has experienced no weight loss or gain in last 6 months  Depression Screening:   PHQ-2 Score: 0      Fall Risk Screening: In the past year, patient has experienced: no history of falling in past year      Urinary Incontinence Screening:   Patient has not leaked urine accidently in the last six months  Home Safety:  Patient does not have trouble with stairs inside or outside of their home  Patient has working smoke alarms and has working carbon monoxide detector  Home safety hazards include: none  Nutrition:   Current diet is Regular and Limited junk food  Medications:   Patient is not currently taking any over-the-counter supplements  Patient is able to manage medications  Activities of Daily Living (ADLs)/Instrumental Activities of Daily Living (IADLs):   Walk and transfer into and out of bed and chair?: Yes  Dress and groom yourself?: Yes    Bathe or shower yourself?: Yes    Feed yourself?  Yes  Do your laundry/housekeeping?: Yes  Manage your money, pay your bills and track your expenses?: Yes  Make your own meals?: Yes    Do your own shopping?: Yes    Previous Hospitalizations:   Any hospitalizations or ED visits within the last 12 months?: No      Advance Care Planning:   Living will: Yes    Durable POA for healthcare:  Yes    Advanced directive: Yes      PREVENTIVE SCREENINGS      Cardiovascular Screening:    General: Screening Not Indicated and History Lipid Disorder      Diabetes Screening:     General: Screening Current      Breast Cancer Screening:     General: History Breast Cancer      Cervical Cancer Screening:    General: Screening Not Indicated      LANI Batres

## 2019-11-07 NOTE — PATIENT INSTRUCTIONS
Medicare Preventive Visit Patient Instructions  Thank you for completing your Welcome to Medicare Visit or Medicare Annual Wellness Visit today  Your next wellness visit will be due in one year (11/7/2020)  The screening/preventive services that you may require over the next 5-10 years are detailed below  Some tests may not apply to you based off risk factors and/or age  Screening tests ordered at today's visit but not completed yet may show as past due  Also, please note that scanned in results may not display below  Preventive Screenings:  Service Recommendations Previous Testing/Comments   Colorectal Cancer Screening  * Colonoscopy    * Fecal Occult Blood Test (FOBT)/Fecal Immunochemical Test (FIT)  * Fecal DNA/Cologuard Test  * Flexible Sigmoidoscopy Age: 54-65 years old   Colonoscopy: every 10 years (may be performed more frequently if at higher risk)  OR  FOBT/FIT: every 1 year  OR  Cologuard: every 3 years  OR  Sigmoidoscopy: every 5 years  Screening may be recommended earlier than age 48 if at higher risk for colorectal cancer  Also, an individualized decision between you and your healthcare provider will decide whether screening between the ages of 74-80 would be appropriate  Colonoscopy: Not on file  FOBT/FIT: Not on file  Cologuard: Not on file  Sigmoidoscopy: Not on file         Breast Cancer Screening Age: 36 years old  Frequency: every 1-2 years  Not required if history of left and right mastectomy Mammogram: 08/13/2019    History Breast Cancer   Cervical Cancer Screening Between the ages of 21-29, pap smear recommended once every 3 years  Between the ages of 33-67, can perform pap smear with HPV co-testing every 5 years     Recommendations may differ for women with a history of total hysterectomy, cervical cancer, or abnormal pap smears in past  Pap Smear: Not on file    Screening Not Indicated   Hepatitis C Screening Once for adults born between 1945 and 1965  More frequently in patients at high risk for Hepatitis C Hep C Antibody: Not on file       Diabetes Screening 1-2 times per year if you're at risk for diabetes or have pre-diabetes Fasting glucose: 88 mg/dL   A1C: No results in last 5 years    Screening Current   Cholesterol Screening Once every 5 years if you don't have a lipid disorder  May order more often based on risk factors  Lipid panel: 04/09/2019    Screening Not Indicated  History Lipid Disorder     Other Preventive Screenings Covered by Medicare:  1  Abdominal Aortic Aneurysm (AAA) Screening: covered once if your at risk  You're considered to be at risk if you have a family history of AAA  2  Lung Cancer Screening: covers low dose CT scan once per year if you meet all of the following conditions: (1) Age 50-69; (2) No signs or symptoms of lung cancer; (3) Current smoker or have quit smoking within the last 15 years; (4) You have a tobacco smoking history of at least 30 pack years (packs per day multiplied by number of years you smoked); (5) You get a written order from a healthcare provider  3  Glaucoma Screening: covered annually if you're considered high risk: (1) You have diabetes OR (2) Family history of glaucoma OR (3)  aged 48 and older OR (3)  American aged 72 and older  3  Osteoporosis Screening: covered every 2 years if you meet one of the following conditions: (1) You're estrogen deficient and at risk for osteoporosis based off medical history and other findings; (2) Have a vertebral abnormality; (3) On glucocorticoid therapy for more than 3 months; (4) Have primary hyperparathyroidism; (5) On osteoporosis medications and need to assess response to drug therapy  · Last bone density test (DXA Scan): Not on file  5  HIV Screening: covered annually if you're between the age of 12-76  Also covered annually if you are younger than 13 and older than 72 with risk factors for HIV infection   For pregnant patients, it is covered up to 3 times per pregnancy  Immunizations:  Immunization Recommendations   Influenza Vaccine Annual influenza vaccination during flu season is recommended for all persons aged >= 6 months who do not have contraindications   Pneumococcal Vaccine (Prevnar and Pneumovax)  * Prevnar = PCV13  * Pneumovax = PPSV23   Adults 25-60 years old: 1-3 doses may be recommended based on certain risk factors  Adults 72 years old: Prevnar (PCV13) vaccine recommended followed by Pneumovax (PPSV23) vaccine  If already received PPSV23 since turning 65, then PCV13 recommended at least one year after PPSV23 dose  Hepatitis B Vaccine 3 dose series if at intermediate or high risk (ex: diabetes, end stage renal disease, liver disease)   Tetanus (Td) Vaccine - COST NOT COVERED BY MEDICARE PART B Following completion of primary series, a booster dose should be given every 10 years to maintain immunity against tetanus  Td may also be given as tetanus wound prophylaxis  Tdap Vaccine - COST NOT COVERED BY MEDICARE PART B Recommended at least once for all adults  For pregnant patients, recommended with each pregnancy  Shingles Vaccine (Shingrix) - COST NOT COVERED BY MEDICARE PART B  2 shot series recommended in those aged 48 and above     Health Maintenance Due:  There are no preventive care reminders to display for this patient  Immunizations Due:  There are no preventive care reminders to display for this patient  Advance Directives   What are advance directives? Advance directives are legal documents that state your wishes and plans for medical care  These plans are made ahead of time in case you lose your ability to make decisions for yourself  Advance directives can apply to any medical decision, such as the treatments you want, and if you want to donate organs  What are the types of advance directives? There are many types of advance directives, and each state has rules about how to use them   You may choose a combination of any of the following:  · Living will: This is a written record of the treatment you want  You can also choose which treatments you do not want, which to limit, and which to stop at a certain time  This includes surgery, medicine, IV fluid, and tube feedings  · Durable power of  for healthcare [facility]): This is a written record that states who you want to make healthcare choices for you when you are unable to make them for yourself  This person, called a proxy, is usually a family member or a friend  You may choose more than 1 proxy  · Do not resuscitate (DNR) order:  A DNR order is used in case your heart stops beating or you stop breathing  It is a request not to have certain forms of treatment, such as CPR  A DNR order may be included in other types of advance directives  · Medical directive: This covers the care that you want if you are in a coma, near death, or unable to make decisions for yourself  You can list the treatments you want for each condition  Treatment may include pain medicine, surgery, blood transfusions, dialysis, IV or tube feedings, and a ventilator (breathing machine)  · Values history: This document has questions about your views, beliefs, and how you feel and think about life  This information can help others choose the care that you would choose  Why are advance directives important? An advance directive helps you control your care  Although spoken wishes may be used, it is better to have your wishes written down  Spoken wishes can be misunderstood, or not followed  Treatments may be given even if you do not want them  An advance directive may make it easier for your family to make difficult choices about your care  © Copyright [employer] 2018 Information is for End User's use only and may not be sold, redistributed or otherwise used for commercial purposes   All illustrations and images included in CareNotes® are the copyrighted property of A D [employer] M [employer]  or [employer]

## 2019-11-10 PROBLEM — J01.10 ACUTE NON-RECURRENT FRONTAL SINUSITIS: Status: ACTIVE | Noted: 2019-11-10

## 2019-11-11 ENCOUNTER — TELEPHONE (OUTPATIENT)
Dept: INTERNAL MEDICINE CLINIC | Facility: CLINIC | Age: 84
End: 2019-11-11

## 2019-11-11 NOTE — TELEPHONE ENCOUNTER
Prednisone is for sinus pressure and congestion but it wont really work for dry mouth  There are different kinds of sprays and mouth wash over the counter for dry mouth, such as biotene   She can also try rinsing with salt water

## 2019-11-11 NOTE — TELEPHONE ENCOUNTER
Pt said she was told to call today if her mouth is still dry and she said it is  Pt said you would give her prednisone if so  Pt uses [facility] on [address]  We can leave a message on her home phone  Thank you

## 2019-11-18 ENCOUNTER — TELEPHONE (OUTPATIENT)
Dept: INTERNAL MEDICINE CLINIC | Facility: CLINIC | Age: 84
End: 2019-11-18

## 2019-11-18 NOTE — TELEPHONE ENCOUNTER
I dont think she should take more antibiotics since she was on a strong one already  Can she come in tomorrow for me to re evaluate her?   Mean time if her sinuses are still stuffed she can take mucinex

## 2019-11-18 NOTE — TELEPHONE ENCOUNTER
Pt said the medication you gave her last week made her nauseous and she stopped taking it  She was on her forth day and she threw it away  She wants to know if there is anything else she can take? Please advise

## 2019-11-19 ENCOUNTER — OFFICE VISIT (OUTPATIENT)
Dept: INTERNAL MEDICINE CLINIC | Facility: CLINIC | Age: 84
End: 2019-11-19
Payer: MEDICARE

## 2019-11-19 VITALS
TEMPERATURE: 98.7 F | RESPIRATION RATE: 16 BRPM | HEART RATE: 88 BPM | BODY MASS INDEX: 23.71 KG/M2 | DIASTOLIC BLOOD PRESSURE: 64 MMHG | WEIGHT: 120.8 LBS | OXYGEN SATURATION: 97 % | SYSTOLIC BLOOD PRESSURE: 104 MMHG | HEIGHT: 60 IN

## 2019-11-19 DIAGNOSIS — J01.00 ACUTE NON-RECURRENT MAXILLARY SINUSITIS: Primary | ICD-10-CM

## 2019-11-19 PROCEDURE — 99213 OFFICE O/P EST LOW 20 MIN: CPT | Performed by: NURSE PRACTITIONER

## 2019-11-19 RX ORDER — DOXYCYCLINE HYCLATE 100 MG/1
100 CAPSULE ORAL EVERY 12 HOURS SCHEDULED
Qty: 14 CAPSULE | Refills: 0 | Status: SHIPPED | OUTPATIENT
Start: 2019-11-19 | End: 2019-11-26

## 2019-11-19 NOTE — PROGRESS NOTES
Assessment/Plan:    Acute non-recurrent maxillary sinusitis  Start doxycycline   Drink fluids and rest  For dry mouth try biotene or salt water rinse       Diagnoses and all orders for this visit:    Acute non-recurrent maxillary sinusitis  -     doxycycline hyclate (VIBRAMYCIN) 100 mg capsule; Take 1 capsule (100 mg total) by mouth every 12 (twelve) hours for 7 days          Subjective:      Patient ID: Karen Graham is a 80 y o  female  Patient is here for follow up of sinus pressure/pain and dry mouth  Finished 3 days of antibiotic but had to stop due to gi upset  Did feel better on abx  Pain is on her forehead  No fever or chills      The following portions of the patient's history were reviewed and updated as appropriate: allergies, current medications, past family history, past medical history, past social history, past surgical history and problem list     Review of Systems   Constitutional: Negative  HENT: Positive for rhinorrhea, sinus pressure and sinus pain  Eyes: Negative  Respiratory: Negative  Cardiovascular: Negative  Gastrointestinal: Negative  Musculoskeletal: Negative  Neurological: Positive for headaches  Objective:      /64   Pulse 88   Temp 98 7 °F (37 1 °C) (Tympanic)   Resp 16   Ht 5' (1 524 m)   Wt 54 8 kg (120 lb 12 8 oz)   SpO2 97%   BMI 23 59 kg/m²          Physical Exam   Constitutional: She is oriented to person, place, and time  She appears well-developed and well-nourished  HENT:   Head: Normocephalic and atraumatic  Right Ear: External ear normal    Left Ear: External ear normal    Nose: Nose normal    Mouth/Throat: Oropharynx is clear and moist    Eyes: Pupils are equal, round, and reactive to light  Conjunctivae are normal    Neck: Normal range of motion  Neck supple  Cardiovascular: Normal rate and regular rhythm  Pulmonary/Chest: Effort normal and breath sounds normal    Abdominal: Soft   Bowel sounds are normal  Musculoskeletal: Normal range of motion  Neurological: She is alert and oriented to person, place, and time  Skin: Skin is warm and dry  Nursing note and vitals reviewed

## 2019-12-02 ENCOUNTER — OFFICE VISIT (OUTPATIENT)
Dept: INTERNAL MEDICINE CLINIC | Facility: CLINIC | Age: 84
End: 2019-12-02
Payer: MEDICARE

## 2019-12-02 VITALS
TEMPERATURE: 98.2 F | RESPIRATION RATE: 14 BRPM | SYSTOLIC BLOOD PRESSURE: 138 MMHG | HEART RATE: 108 BPM | DIASTOLIC BLOOD PRESSURE: 82 MMHG | HEIGHT: 60 IN | BODY MASS INDEX: 23.2 KG/M2 | OXYGEN SATURATION: 96 % | WEIGHT: 118.2 LBS

## 2019-12-02 DIAGNOSIS — J32.0 CHRONIC MAXILLARY SINUSITIS: ICD-10-CM

## 2019-12-02 DIAGNOSIS — R68.2 DRY MOUTH: Primary | ICD-10-CM

## 2019-12-02 PROCEDURE — 99213 OFFICE O/P EST LOW 20 MIN: CPT | Performed by: NURSE PRACTITIONER

## 2019-12-02 RX ORDER — CEFUROXIME AXETIL 500 MG/1
500 TABLET ORAL EVERY 12 HOURS SCHEDULED
Qty: 14 TABLET | Refills: 0 | Status: SHIPPED | OUTPATIENT
Start: 2019-12-02 | End: 2019-12-09

## 2019-12-06 PROBLEM — J32.0 CHRONIC MAXILLARY SINUSITIS: Status: ACTIVE | Noted: 2019-12-06

## 2019-12-06 PROBLEM — R68.2 DRY MOUTH: Status: ACTIVE | Noted: 2019-12-06

## 2019-12-06 NOTE — PROGRESS NOTES
Assessment/Plan:    Chronic maxillary sinusitis  Start antibiotics  See ENT    Dry mouth  biotiene and mouth sprays otc  Avoid salty sugary foods       Diagnoses and all orders for this visit:    Dry mouth  -     cefuroxime (CEFTIN) 500 mg tablet; Take 1 tablet (500 mg total) by mouth every 12 (twelve) hours for 7 days    Chronic maxillary sinusitis  -     cefuroxime (CEFTIN) 500 mg tablet; Take 1 tablet (500 mg total) by mouth every 12 (twelve) hours for 7 days          Subjective:      Patient ID: Fanta Chang is a 80 y o  female  Patient is here for continuous dry mouth and sinus congestion  Felt better on doxycycline but it returned after she stopped it    She has tried biotene and salt water gargle but not effective      The following portions of the patient's history were reviewed and updated as appropriate: allergies, current medications, past family history, past medical history, past social history, past surgical history and problem list     Review of Systems   Constitutional: Negative  HENT: Positive for congestion, postnasal drip and rhinorrhea  Eyes: Negative  Respiratory: Negative  Cardiovascular: Negative  Gastrointestinal: Negative  Musculoskeletal: Negative  Neurological: Negative  Objective:      /82   Pulse (!) 108   Temp 98 2 °F (36 8 °C) (Tympanic)   Resp 14   Ht 5' (1 524 m)   Wt 53 6 kg (118 lb 3 2 oz)   SpO2 96%   BMI 23 08 kg/m²          Physical Exam   Constitutional: She is oriented to person, place, and time  She appears well-developed and well-nourished  HENT:   Head: Normocephalic and atraumatic  Right Ear: External ear normal    Left Ear: External ear normal    Nose: Nose normal    Mouth/Throat: Oropharynx is clear and moist    Eyes: Pupils are equal, round, and reactive to light  Conjunctivae are normal    Neck: Normal range of motion  Neck supple  Cardiovascular: Normal rate and regular rhythm     Pulmonary/Chest: Effort normal and breath sounds normal    Abdominal: Soft  Bowel sounds are normal    Musculoskeletal: Normal range of motion  Neurological: She is alert and oriented to person, place, and time  Skin: Skin is warm and dry  Nursing note and vitals reviewed

## 2019-12-18 ENCOUNTER — OFFICE VISIT (OUTPATIENT)
Dept: CARDIOLOGY CLINIC | Facility: CLINIC | Age: 84
End: 2019-12-18
Payer: MEDICARE

## 2019-12-18 VITALS
WEIGHT: 119.7 LBS | HEART RATE: 91 BPM | SYSTOLIC BLOOD PRESSURE: 122 MMHG | HEIGHT: 60 IN | BODY MASS INDEX: 23.5 KG/M2 | DIASTOLIC BLOOD PRESSURE: 74 MMHG

## 2019-12-18 DIAGNOSIS — E78.00 HYPERCHOLESTEREMIA: ICD-10-CM

## 2019-12-18 DIAGNOSIS — I35.9 AORTIC VALVE DISEASE: Primary | ICD-10-CM

## 2019-12-18 DIAGNOSIS — I35.1 NONRHEUMATIC AORTIC VALVE INSUFFICIENCY: ICD-10-CM

## 2019-12-18 PROCEDURE — 99213 OFFICE O/P EST LOW 20 MIN: CPT | Performed by: INTERNAL MEDICINE

## 2019-12-18 PROCEDURE — 93000 ELECTROCARDIOGRAM COMPLETE: CPT | Performed by: INTERNAL MEDICINE

## 2019-12-18 NOTE — PROGRESS NOTES
[facility]  Follow up note  Kris Eduardo 80 y o  female MRN: [id number]        Problems    1  Aortic valve disease  POCT ECG   2  Hypercholesteremia     3  Nonrheumatic aortic valve insufficiency         Impression:     Aortic regurgitation  o No clear indication on severity on prior notes, but appears to have been probably somewhere between mild and moderate  o Non audible on examination   o No associated symptoms  o Repeat echocardiogram was ordered for tomorrow   Hypercholesterolemia  o Unusually high cholesterol for her as of 4/19, prior LDL 94, previous to that 70, 83, and most recent total cholesterol 244, previously 185  o She denies any issues with noncompliance at that time  She has been stable on simvastatin 5 mg which has been ineffective dose in the past     Plan:     I suggested no change to her simvastatin, proceed with repeat lipids in the early part of next year as normal, and hopefully the result from 04/19 was just an outlying her   She will proceed with her echocardiogram tomorrow for aortic valve regurgitation reassessment, but she has no concerning symptoms, and unlikely to discover  And advanced regurgitation lesion  HPI:   Kris Eduardo is a 80y o  year old female with aortic valve regurgitation, hypercholesterolemia, presents for an annual follow-up, previously a patient of Dr Flaca Block, and establishing with me at this time  She offers me no complaints  She has no shortness of breath with exertion, but occasionally when she lays down she has a sense of shortness of breath on a rare occasion for a minute or so, and then it resolves, no other classic orthopnea, lower extremity edema  Her cholesterol in April was significantly elevated, total cholesterol 244,  despite being on 5 mg of simvastatin which she has been on chronically, with prior LDL 94, and prior total cholesterol 185   She denies any noncompliance, any intolerance, and any absence of medication at that time  She walks the [employer] regularly for 15 minutes with friends and denies any dyspnea      Review of Systems   Constitutional: Negative for appetite change, diaphoresis, fatigue and fever  Respiratory: Positive for shortness of breath  Negative for chest tightness and wheezing  Cardiovascular: Negative for chest pain, palpitations and leg swelling  Gastrointestinal: Negative for abdominal pain and blood in stool  Musculoskeletal: Negative for arthralgias and joint swelling  Skin: Negative for rash  Neurological: Negative for dizziness, syncope and light-headedness  Past Medical History:   Diagnosis Date    [facility] )     Cancer ([facility] )     Hyperlipidemia      Social History     Substance and Sexual Activity   Alcohol Use Yes     Social History     Substance and Sexual Activity   Drug Use No     Social History     Tobacco Use   Smoking Status Never Smoker   Smokeless Tobacco Never Used       Allergies: Allergies   Allergen Reactions    Pravastatin     Risedronate        Medications:     Current Outpatient Medications:     Calcium Carb-Cholecalciferol (CALCIUM 1000 + D) 1000-800 MG-UNIT TABS, Take by mouth, Disp: , Rfl:     clotrimazole (MYCELEX) 10 mg adri, DISSOLVE 1 IN THE MOUTH FIVE TIMES QD FOR 14 CONSECUTIVE DAYS, Disp: , Rfl: 0    Multiple Vitamins-Minerals (CENTRUM SILVER 50+WOMEN PO), Take by mouth, Disp: , Rfl:     simvastatin (ZOCOR) 5 MG tablet, Take 1 tablet (5 mg total) by mouth daily at bedtime, Disp: 90 tablet, Rfl: 3      Vitals:    12/18/19 1424   BP: 122/74   Pulse: 91     Weight (last 2 days)     Date/Time   Weight    12/18/19 1424   54 3 (119 7)            Physical Exam   Constitutional: No distress  HENT:   Head: Normocephalic and atraumatic  Eyes: Conjunctivae are normal  No scleral icterus  Neck: Normal range of motion  No JVD present     Cardiovascular: Normal rate, regular rhythm, normal heart sounds and intact distal pulses  No murmur heard  Pulmonary/Chest: Effort normal and breath sounds normal  No respiratory distress  She has no wheezes  She has no rhonchi  She has no rales  Musculoskeletal: She exhibits no edema or tenderness  Right lower leg: Normal  She exhibits no edema  Left lower leg: Normal  She exhibits no edema  Skin: Skin is warm and dry  She is not diaphoretic  Laboratory Studies:    Laboratory studies personally reviewed    Cardiac testing:     EKG reviewed personally:  Normal sinus rhythm, normal EKG      Nikita Mayes MD    Portions of the record may have been created with voice recognition software  Occasional wrong word or "sound a like" substitutions may have occurred due to the inherent limitations of voice recognition software  Read the chart carefully and recognize, using context, where substitutions have occurred

## 2019-12-19 ENCOUNTER — HOSPITAL ENCOUNTER (OUTPATIENT)
Dept: NON INVASIVE DIAGNOSTICS | Facility: CLINIC | Age: 84
Discharge: HOME | End: 2019-12-19
Payer: MEDICARE

## 2019-12-19 DIAGNOSIS — I35.9 AORTIC VALVE DISEASE: ICD-10-CM

## 2019-12-19 PROCEDURE — 93306 TTE W/DOPPLER COMPLETE: CPT

## 2019-12-19 PROCEDURE — 93306 TTE W/DOPPLER COMPLETE: CPT | Performed by: INTERNAL MEDICINE

## 2019-12-26 ENCOUNTER — TELEPHONE (OUTPATIENT)
Dept: CARDIOLOGY CLINIC | Facility: CLINIC | Age: 84
End: 2019-12-26

## 2019-12-26 NOTE — TELEPHONE ENCOUNTER
Notes recorded by Jus Recinos MD on 12/26/2019 at 1:19 PM EST  Please tell her echo is unchanged, valve leaks but not severe    I called & sw/Bari, , provided results

## 2020-03-17 ENCOUNTER — TELEPHONE (OUTPATIENT)
Dept: INTERNAL MEDICINE CLINIC | Facility: CLINIC | Age: 85
End: 2020-03-17

## 2020-03-17 DIAGNOSIS — Z00.00 HEALTHCARE MAINTENANCE: Primary | ICD-10-CM

## 2020-04-23 ENCOUNTER — TRANSCRIBE ORDERS (OUTPATIENT)
Dept: ADMINISTRATIVE | Age: 85
End: 2020-04-23

## 2020-04-23 ENCOUNTER — APPOINTMENT (OUTPATIENT)
Dept: LAB | Age: 85
End: 2020-04-23
Payer: MEDICARE

## 2020-04-23 DIAGNOSIS — Z00.00 HEALTHCARE MAINTENANCE: ICD-10-CM

## 2020-04-23 LAB
ALBUMIN SERPL BCP-MCNC: 3.7 G/DL (ref 3.5–5)
ALP SERPL-CCNC: 73 U/L (ref 46–116)
ALT SERPL W P-5'-P-CCNC: 23 U/L (ref 12–78)
ANION GAP SERPL CALCULATED.3IONS-SCNC: 5 MMOL/L (ref 4–13)
AST SERPL W P-5'-P-CCNC: 17 U/L (ref 5–45)
BASOPHILS # BLD AUTO: 0.05 THOUSANDS/ΜL (ref 0–0.1)
BASOPHILS NFR BLD AUTO: 1 % (ref 0–1)
BILIRUB SERPL-MCNC: 0.68 MG/DL (ref 0.2–1)
BUN SERPL-MCNC: 12 MG/DL (ref 5–25)
CALCIUM SERPL-MCNC: 8.7 MG/DL (ref 8.3–10.1)
CHLORIDE SERPL-SCNC: 104 MMOL/L (ref 100–108)
CHOLEST SERPL-MCNC: 177 MG/DL (ref 50–200)
CO2 SERPL-SCNC: 30 MMOL/L (ref 21–32)
CREAT SERPL-MCNC: 0.95 MG/DL (ref 0.6–1.3)
EOSINOPHIL # BLD AUTO: 0.1 THOUSAND/ΜL (ref 0–0.61)
EOSINOPHIL NFR BLD AUTO: 2 % (ref 0–6)
ERYTHROCYTE [DISTWIDTH] IN BLOOD BY AUTOMATED COUNT: 12.3 % (ref 11.6–15.1)
GFR SERPL CREATININE-BSD FRML MDRD: 55 ML/MIN/1.73SQ M
GLUCOSE P FAST SERPL-MCNC: 96 MG/DL (ref 65–99)
HCT VFR BLD AUTO: 39.1 % (ref 34.8–46.1)
HDLC SERPL-MCNC: 72 MG/DL
HGB BLD-MCNC: 12.8 G/DL (ref 11.5–15.4)
IMM GRANULOCYTES # BLD AUTO: 0.02 THOUSAND/UL (ref 0–0.2)
IMM GRANULOCYTES NFR BLD AUTO: 1 % (ref 0–2)
LDLC SERPL CALC-MCNC: 82 MG/DL (ref 0–100)
LYMPHOCYTES # BLD AUTO: 1.41 THOUSANDS/ΜL (ref 0.6–4.47)
LYMPHOCYTES NFR BLD AUTO: 34 % (ref 14–44)
MCH RBC QN AUTO: 34 PG (ref 26.8–34.3)
MCHC RBC AUTO-ENTMCNC: 32.7 G/DL (ref 31.4–37.4)
MCV RBC AUTO: 104 FL (ref 82–98)
MONOCYTES # BLD AUTO: 0.36 THOUSAND/ΜL (ref 0.17–1.22)
MONOCYTES NFR BLD AUTO: 9 % (ref 4–12)
NEUTROPHILS # BLD AUTO: 2.16 THOUSANDS/ΜL (ref 1.85–7.62)
NEUTS SEG NFR BLD AUTO: 53 % (ref 43–75)
NONHDLC SERPL-MCNC: 105 MG/DL
NRBC BLD AUTO-RTO: 0 /100 WBCS
PLATELET # BLD AUTO: 251 THOUSANDS/UL (ref 149–390)
PMV BLD AUTO: 8.9 FL (ref 8.9–12.7)
POTASSIUM SERPL-SCNC: 3.8 MMOL/L (ref 3.5–5.3)
PROT SERPL-MCNC: 7.1 G/DL (ref 6.4–8.2)
RBC # BLD AUTO: 3.76 MILLION/UL (ref 3.81–5.12)
SODIUM SERPL-SCNC: 139 MMOL/L (ref 136–145)
TRIGL SERPL-MCNC: 116 MG/DL
TSH SERPL DL<=0.05 MIU/L-ACNC: 2.49 UIU/ML (ref 0.36–3.74)
WBC # BLD AUTO: 4.1 THOUSAND/UL (ref 4.31–10.16)

## 2020-04-23 PROCEDURE — 84443 ASSAY THYROID STIM HORMONE: CPT

## 2020-04-23 PROCEDURE — 80061 LIPID PANEL: CPT

## 2020-04-23 PROCEDURE — 80053 COMPREHEN METABOLIC PANEL: CPT

## 2020-04-23 PROCEDURE — 85025 COMPLETE CBC W/AUTO DIFF WBC: CPT

## 2020-04-23 PROCEDURE — 36415 COLL VENOUS BLD VENIPUNCTURE: CPT

## 2020-10-02 DIAGNOSIS — E78.5 HYPERLIPIDEMIA, UNSPECIFIED HYPERLIPIDEMIA TYPE: ICD-10-CM

## 2020-10-02 RX ORDER — SIMVASTATIN 5 MG
5 TABLET ORAL
Qty: 90 TABLET | Refills: 3 | Status: SHIPPED | OUTPATIENT
Start: 2020-10-02 | End: 2021-12-06 | Stop reason: SDUPTHER

## 2021-01-13 ENCOUNTER — OFFICE VISIT (OUTPATIENT)
Dept: CARDIOLOGY CLINIC | Facility: CLINIC | Age: 86
End: 2021-01-13
Payer: MEDICARE

## 2021-01-13 VITALS
BODY MASS INDEX: 21.75 KG/M2 | HEART RATE: 92 BPM | HEIGHT: 60 IN | SYSTOLIC BLOOD PRESSURE: 130 MMHG | DIASTOLIC BLOOD PRESSURE: 72 MMHG | WEIGHT: 110.8 LBS

## 2021-01-13 DIAGNOSIS — E78.00 HYPERCHOLESTEREMIA: ICD-10-CM

## 2021-01-13 DIAGNOSIS — I35.1 NONRHEUMATIC AORTIC VALVE INSUFFICIENCY: Primary | ICD-10-CM

## 2021-01-13 PROCEDURE — 99213 OFFICE O/P EST LOW 20 MIN: CPT | Performed by: INTERNAL MEDICINE

## 2021-01-13 PROCEDURE — 93000 ELECTROCARDIOGRAM COMPLETE: CPT | Performed by: INTERNAL MEDICINE

## 2021-01-13 NOTE — PROGRESS NOTES
[facility]  Follow up note  Kris Eduardo 80 y o  female MRN: [id number]        Problems    1  Nonrheumatic aortic valve insufficiency  POCT ECG   2  Hypercholesteremia         Impression:     Aortic regurgitation  o   No worsening symptoms   o Denies shortness of breath   o Denies edema or CHF like symptoms   o Mild-to-moderate based on  Last echo 12/19  o No updated testing needed this time   Hypercholesterolemia  o  very well controlled at this time, LDL 82    Plan:      annual follow-up with me      HPI:   Kris Eduardo is a 80y o  year old female with aortic valve regurgitation, hypercholesterolemia, presents for an annual follow-up,  Mostly stressed due to her 's health and repeat visits to the hospital   She offers no complaints of dyspnea on exertion, palpitations, lightheadedness, edema, orthopnea, chest pain  Cholesterol is well controlled with an LDL of 82 on statin therapy  Review of Systems   Constitutional: Negative for appetite change, diaphoresis, fatigue and fever  Respiratory: Negative for chest tightness, shortness of breath and wheezing  Cardiovascular: Negative for chest pain, palpitations and leg swelling  Gastrointestinal: Negative for abdominal pain and blood in stool  Musculoskeletal: Negative for arthralgias and joint swelling  Skin: Negative for rash  Neurological: Negative for dizziness, syncope and light-headedness  Past Medical History:   Diagnosis Date    [facility]     [facility] ([facility] )     Hyperlipidemia      Social History     Substance and Sexual Activity   Alcohol Use Yes     Social History     Substance and Sexual Activity   Drug Use No     Social History     Tobacco Use   Smoking Status Never Smoker   Smokeless Tobacco Never Used       Allergies:   Allergies   Allergen Reactions    Pravastatin     Risedronate        Medications:     Current Outpatient Medications:     Calcium Carb-Cholecalciferol (CALCIUM 1000 + D) 1000-800 MG-UNIT TABS, Take by mouth, Disp: , Rfl:     Multiple Vitamins-Minerals (CENTRUM SILVER 50+WOMEN PO), Take by mouth, Disp: , Rfl:     simvastatin (ZOCOR) 5 MG tablet, Take 1 tablet (5 mg total) by mouth daily at bedtime, Disp: 90 tablet, Rfl: 3    clotrimazole (MYCELEX) 10 mg adri, DISSOLVE 1 IN THE MOUTH FIVE TIMES QD FOR 14 CONSECUTIVE DAYS, Disp: , Rfl: 0      Vitals:    01/13/21 1507   BP: 130/72   Pulse: 92     Weight (last 2 days)     Date/Time   Weight    01/13/21 1507   50 3 (110 8)            Physical Exam  Constitutional:       General: She is not in acute distress  Appearance: She is not diaphoretic  HENT:      Head: Normocephalic and atraumatic  Eyes:      General: No scleral icterus  Conjunctiva/sclera: Conjunctivae normal    Neck:      Musculoskeletal: Normal range of motion  Vascular: No JVD  Cardiovascular:      Rate and Rhythm: Normal rate and regular rhythm  Heart sounds: Normal heart sounds  No murmur  Pulmonary:      Effort: Pulmonary effort is normal  No respiratory distress  Breath sounds: Normal breath sounds  No wheezing, rhonchi or rales  Musculoskeletal:         General: No tenderness  Right lower leg: Normal  No edema  Left lower leg: Normal  No edema  Skin:     General: Skin is warm and dry  Laboratory Studies:     lab studies personally reviewed    Cardiac testing:     EKG reviewed personally:  Normal sinus rhythm, normal EKG    Echocardiogram  12/19- EF normal, mild-to-moderate AI    Kellen Herman MD    Portions of the record may have been created with voice recognition software  Occasional wrong word or "sound a like" substitutions may have occurred due to the inherent limitations of voice recognition software  Read the chart carefully and recognize, using context, where substitutions have occurred

## 2021-01-26 ENCOUNTER — TELEMEDICINE (OUTPATIENT)
Dept: INTERNAL MEDICINE CLINIC | Facility: CLINIC | Age: 86
End: 2021-01-26
Payer: MEDICARE

## 2021-01-26 ENCOUNTER — TELEPHONE (OUTPATIENT)
Dept: INTERNAL MEDICINE CLINIC | Facility: CLINIC | Age: 86
End: 2021-01-26

## 2021-01-26 VITALS — TEMPERATURE: 97.7 F

## 2021-01-26 DIAGNOSIS — U07.1 COVID-19 VIRUS DETECTED: Primary | ICD-10-CM

## 2021-01-26 DIAGNOSIS — F51.01 PRIMARY INSOMNIA: ICD-10-CM

## 2021-01-26 DIAGNOSIS — R11.0 NAUSEA: ICD-10-CM

## 2021-01-26 PROCEDURE — 99442 PR PHYS/QHP TELEPHONE EVALUATION 11-20 MIN: CPT | Performed by: NURSE PRACTITIONER

## 2021-01-26 RX ORDER — ONDANSETRON 4 MG/1
4 TABLET, FILM COATED ORAL EVERY 8 HOURS PRN
Qty: 20 TABLET | Refills: 0 | Status: SHIPPED | OUTPATIENT
Start: 2021-01-26 | End: 2021-05-27

## 2021-01-26 RX ORDER — TRAZODONE HYDROCHLORIDE 50 MG/1
50 TABLET ORAL
Qty: 30 TABLET | Refills: 0 | Status: SHIPPED | OUTPATIENT
Start: 2021-01-26 | End: 2021-05-27

## 2021-01-26 NOTE — TELEPHONE ENCOUNTER
Patient asked if it is ok to continue Tylenol cold and flu sever? She started yesterday with otc med     Please advise

## 2021-01-26 NOTE — PROGRESS NOTES
COVID-19 Virtual Visit     Assessment/Plan:    Problem List Items Addressed This Visit     None      Visit Diagnoses     COVID-19 virus detected    -  Primary    Relevant Medications    Misc  Devices (Pulse Oximeter) MISC    Primary insomnia        Relevant Medications    traZODone (DESYREL) 50 mg tablet    Nausea        Relevant Medications    ondansetron (ZOFRAN) 4 mg tablet         Disposition:     I recommended continued isolation until at least 24 hours have passed since recovery defined as resolution of fever without the use of fever-reducing medications AND improvement in COVID symptoms AND 10 days have passed since onset of symptoms (or 10 days have passed since date of first positive viral diagnostic test for asymptomatic patients)  I have spent 15 minutes directly with the patient  Greater than 50% of this time was spent in counseling/coordination of care regarding: instructions for management and patient and family education  She refused regeneron infusion and refused to go to ER       Encounter provider LANI Steward    Provider located at [address] [place] [id number]    Recent Visits  Date Type Provider Dept   01/29/21 Telemedicine Kj Cruz, [address] [place] [address]   01/27/21 Telemedicine Romaine Ward recent visits within past 7 days and meeting all other requirements     Future Appointments  No visits were found meeting these conditions  Showing future appointments within next 150 days and meeting all other requirements        Patient agrees to participate in a virtual check in via telephone or video visit instead of presenting to the office to address urgent/immediate medical needs  Patient is aware this is a billable service  After connecting through Telephone, the patient was identified by name and date of birth   Camille Cortes was informed that this was a telemedicine visit and that the exam was being conducted confidentially over secure lines  My office door was closed  No one else was in the room  Lizeth Dobson acknowledged consent and understanding of privacy and security of the telemedicine visit  I informed the patient that I have reviewed her record in Epic and presented the opportunity for her to ask any questions regarding the visit today  The patient agreed to participate  It was my intent to perform this visit via video technology but the patient was not able to do a video connection so the visit was completed via audio telephone only  Subjective:   Lizeth Dobson is a 80 y o  female who has been screened for COVID-19  Symptom change since last report: improving  Patient's symptoms include fatigue, malaise and myalgias  Patient denies fever, chills, congestion, rhinorrhea, sore throat, anosmia, loss of taste, shortness of breath, chest tightness, abdominal pain, nausea, vomiting, diarrhea and headaches  [facility] has been staying home and has isolated themselves in her home  She is taking care to not share personal items and is cleaning all surfaces that are touched often, like counters, tabletops, and doorknobs using household cleaning sprays or wipes  She is wearing a mask when she leaves her room  Patient is here for insomnia, not sleeping, fatigued   Dx with [id number]    No results found for: DEBBIE [address]  Past Medical History:   Diagnosis Date    [facility]     [facility]     Hyperlipidemia      Past Surgical History:   Procedure Laterality Date    BREAST SURGERY      cancer (> 5 years)     Current Outpatient Medications   Medication Sig Dispense Refill    Calcium Carb-Cholecalciferol (CALCIUM 1000 + D) 1000-800 MG-UNIT TABS Take by mouth      clotrimazole (MYCELEX) 10 mg adri DISSOLVE 1 IN THE MOUTH FIVE TIMES QD FOR 14 CONSECUTIVE DAYS  0    Multiple Vitamins-Minerals (CENTRUM SILVER 50+WOMEN PO) Take by mouth      simvastatin (ZOCOR) 5 MG tablet Take 1 tablet (5 mg total) by mouth daily at bedtime 90 tablet 3    Misc  Devices (Pulse Oximeter) MISC Use daily 1 each 0    ondansetron (ZOFRAN) 4 mg tablet Take 1 tablet (4 mg total) by mouth every 8 (eight) hours as needed for nausea or vomiting 20 tablet 0    traZODone (DESYREL) 50 mg tablet Take 1 tablet (50 mg total) by mouth daily at bedtime 30 tablet 0     No current facility-administered medications for this visit  Allergies   Allergen Reactions    Pravastatin     Risedronate        Review of Systems   Constitutional: Positive for fatigue  Negative for chills and fever  HENT: Negative for congestion, rhinorrhea and sore throat  Respiratory: Negative for chest tightness and shortness of breath  Gastrointestinal: Negative for abdominal pain, diarrhea, nausea and vomiting  Musculoskeletal: Positive for myalgias  Neurological: Negative for headaches  Objective:    Vitals:    01/26/21 0917   Temp: 97 7 °F (36 5 °C)   TempSrc: Temporal       VIRTUAL VISIT DISCLAIMER    Dolly Anderson acknowledges that she has consented to an online visit or consultation  She understands that the online visit is based solely on information provided by her, and that, in the absence of a face-to-face physical evaluation by the physician, the diagnosis she receives is both limited and provisional in terms of accuracy and completeness  This is not intended to replace a full medical face-to-face evaluation by the physician  Dolly Anderson understands and accepts these terms

## 2021-01-27 ENCOUNTER — TELEMEDICINE (OUTPATIENT)
Dept: INTERNAL MEDICINE CLINIC | Facility: CLINIC | Age: 86
End: 2021-01-27
Payer: MEDICARE

## 2021-01-27 VITALS — OXYGEN SATURATION: 95 % | TEMPERATURE: 97.4 F

## 2021-01-27 DIAGNOSIS — U07.1 COVID-19 VIRUS INFECTION: Primary | ICD-10-CM

## 2021-01-27 PROCEDURE — 99442 PR PHYS/QHP TELEPHONE EVALUATION 11-20 MIN: CPT | Performed by: NURSE PRACTITIONER

## 2021-01-27 NOTE — PROGRESS NOTES
COVID-19 Virtual Visit     Assessment/Plan:    Problem List Items Addressed This Visit     None      Visit Diagnoses     COVID-19 virus infection    -  Primary         Disposition:     I recommended continued isolation until at least 24 hours have passed since recovery defined as resolution of fever without the use of fever-reducing medications AND improvement in COVID symptoms AND 10 days have passed since onset of symptoms (or 10 days have passed since date of first positive viral diagnostic test for asymptomatic patients)  I have spent 15 minutes directly with the patient  Greater than 50% of this time was spent in counseling/coordination of care regarding: instructions for management and impressions  Encounter provider El Cordero [place]    Provider located at [address] [id number] [facility] [id number]    Recent Visits  Date Type Provider Dept   01/26/21 Telephone Izetta Nageotte, [place] [address]   [facility] visits within past 7 days and meeting all other requirements     Today's Visits  Date Type Provider Dept   01/27/21 Telemedicine El Cordero, [address] [place] [address]   Showing today's visits and meeting all other requirements     Future Appointments  No visits were found meeting these conditions  Showing future appointments within next 150 days and meeting all other requirements        Patient agrees to participate in a virtual check in via telephone or video visit instead of presenting to the office to address urgent/immediate medical needs  Patient is aware this is a billable service  After connecting through Telephone, the patient was identified by name and date of birth  Thom Stein was informed that this was a telemedicine visit and that the exam was being conducted confidentially over secure lines  My office door was closed   No one else was in the room  Dori Muñoz acknowledged consent and understanding of privacy and security of the telemedicine visit  I informed the patient that I have reviewed her record in Epic and presented the opportunity for her to ask any questions regarding the visit today  The patient agreed to participate  It was my intent to perform this visit via video technology but the patient was not able to do a video connection so the visit was completed via audio telephone only  Subjective:   Dori Muñoz is a 80 y o  female who has been screened for COVID-19  Symptom change since last report: improving  Patient's symptoms include fatigue  Patient denies fever, chills, malaise, congestion, rhinorrhea, sore throat, anosmia, loss of taste, cough, shortness of breath, chest tightness, abdominal pain, nausea, vomiting, diarrhea, myalgias and headaches  [facility] has been staying home and has isolated themselves in her home  She is taking care to not share personal items and is cleaning all surfaces that are touched often, like counters, tabletops, and doorknobs using household cleaning sprays or wipes  She is wearing a mask when she leaves her room  No results found for: DEBBIE Rao [address]  Past Medical History:   Diagnosis Date    [facility]     [facility] )     Hyperlipidemia      Past Surgical History:   Procedure Laterality Date    BREAST SURGERY      cancer (> 5 years)     Current Outpatient Medications   Medication Sig Dispense Refill    Calcium Carb-Cholecalciferol (CALCIUM 1000 + D) 1000-800 MG-UNIT TABS Take by mouth      clotrimazole (MYCELEX) 10 mg adri DISSOLVE 1 IN THE MOUTH FIVE TIMES QD FOR 14 CONSECUTIVE DAYS  0    Misc   Devices (Pulse Oximeter) MISC Use daily 1 each 0    Multiple Vitamins-Minerals (CENTRUM SILVER 50+WOMEN PO) Take by mouth      ondansetron (ZOFRAN) 4 mg tablet Take 1 tablet (4 mg total) by mouth every 8 (eight) hours as needed for nausea or vomiting 20 tablet 0    simvastatin (ZOCOR) 5 MG tablet Take 1 tablet (5 mg total) by mouth daily at bedtime 90 tablet 3    traZODone (DESYREL) 50 mg tablet Take 1 tablet (50 mg total) by mouth daily at bedtime 30 tablet 0     No current facility-administered medications for this visit  Allergies   Allergen Reactions    Pravastatin     Risedronate        Review of Systems   Constitutional: Positive for fatigue  Negative for chills and fever  HENT: Negative for congestion, rhinorrhea and sore throat  Respiratory: Negative for cough, chest tightness and shortness of breath  Gastrointestinal: Negative for abdominal pain, diarrhea, nausea and vomiting  Musculoskeletal: Negative for myalgias  Neurological: Negative for headaches  Objective:    Vitals:    01/27/21 1102   Temp: (!) 97 4 °F (36 3 °C)   TempSrc: Temporal   SpO2: 95%       VIRTUAL VISIT DISCLAIMER    Shama Lara acknowledges that she has consented to an online visit or consultation  She understands that the online visit is based solely on information provided by her, and that, in the absence of a face-to-face physical evaluation by the physician, the diagnosis she receives is both limited and provisional in terms of accuracy and completeness  This is not intended to replace a full medical face-to-face evaluation by the physician  Shama Lara understands and accepts these terms

## 2021-01-29 ENCOUNTER — TELEMEDICINE (OUTPATIENT)
Dept: INTERNAL MEDICINE CLINIC | Facility: CLINIC | Age: 86
End: 2021-01-29
Payer: MEDICARE

## 2021-01-29 VITALS — TEMPERATURE: 97 F | OXYGEN SATURATION: 97 %

## 2021-01-29 DIAGNOSIS — U07.1 COVID-19 VIRUS INFECTION: Primary | ICD-10-CM

## 2021-01-29 PROCEDURE — 99442 PR PHYS/QHP TELEPHONE EVALUATION 11-20 MIN: CPT | Performed by: NURSE PRACTITIONER

## 2021-01-29 NOTE — PROGRESS NOTES
COVID-19 Virtual Visit     Assessment/Plan:    Problem List Items Addressed This Visit     None      Visit Diagnoses     COVID-19 virus infection    -  Primary         Disposition:     I recommended continued isolation until at least 24 hours have passed since recovery defined as resolution of fever without the use of fever-reducing medications AND improvement in COVID symptoms AND 10 days have passed since onset of symptoms (or 10 days have passed since date of first positive viral diagnostic test for asymptomatic patients)  I have spent 15 minutes directly with the patient  Greater than 50% of this time was spent in counseling/coordination of care regarding: instructions for management and patient and family education  Encounter provider Saud Coffey, [facility]    Provider located at [id number] [facility] [id number]    Recent Visits  Date Type Provider Dept   01/29/21 Telemedicine aSud Coffey, [address] [place] [address]   01/27/21 Telemedicine Romaine Rai recent visits within past 7 days and meeting all other requirements     Future Appointments  No visits were found meeting these conditions  Showing future appointments within next 150 days and meeting all other requirements        Patient agrees to participate in a virtual check in via telephone or video visit instead of presenting to the office to address urgent/immediate medical needs  Patient is aware this is a billable service  After connecting through Telephone, the patient was identified by name and date of birth  Melody Calles was informed that this was a telemedicine visit and that the exam was being conducted confidentially over secure lines  My office door was closed  No one else was in the room  Melody Calles acknowledged consent and understanding of privacy and security of the telemedicine visit   I informed the patient that I have reviewed her record in [address] [place] [address] and presented the opportunity for her to ask any questions regarding the visit today  The patient agreed to participate  It was my intent to perform this visit via video technology but the patient was not able to do a video connection so the visit was completed via audio telephone only  Subjective:   Kristel Castellanos is a 80 y o  female who has been screened for COVID-19  Symptom change since last report: improving  Patient's symptoms include fatigue, cough and myalgias  Patient denies fever, chills, malaise, congestion, rhinorrhea, sore throat, anosmia, loss of taste, shortness of breath, chest tightness, abdominal pain, nausea, vomiting, diarrhea and headaches  Horace Rojas has been staying home and has isolated themselves in her home  She is taking care to not share personal items and is cleaning all surfaces that are touched often, like counters, tabletops, and doorknobs using household cleaning sprays or wipes  She is wearing a mask when she leaves her room  No results found for: Ya Ogden, DEBBIE, [address]  Past Medical History:   Diagnosis Date    [facility] ([facility] [address] )     [facility]     Hyperlipidemia      Past Surgical History:   Procedure Laterality Date    BREAST SURGERY      cancer (> 5 years)     Current Outpatient Medications   Medication Sig Dispense Refill    Calcium Carb-Cholecalciferol (CALCIUM 1000 + D) 1000-800 MG-UNIT TABS Take by mouth      clotrimazole (MYCELEX) 10 mg adri DISSOLVE 1 IN THE MOUTH FIVE TIMES QD FOR 14 CONSECUTIVE DAYS  0    Misc   Devices (Pulse Oximeter) MISC Use daily 1 each 0    Multiple Vitamins-Minerals (CENTRUM SILVER 50+WOMEN PO) Take by mouth      ondansetron (ZOFRAN) 4 mg tablet Take 1 tablet (4 mg total) by mouth every 8 (eight) hours as needed for nausea or vomiting 20 tablet 0    simvastatin (ZOCOR) 5 MG tablet Take 1 tablet (5 mg total) by mouth daily at bedtime 90 tablet 3    traZODone (DESYREL) 50 mg tablet Take 1 tablet (50 mg total) by mouth daily at bedtime 30 tablet 0     No current facility-administered medications for this visit  Allergies   Allergen Reactions    Pravastatin     Risedronate        Review of Systems   Constitutional: Positive for fatigue  Negative for chills and fever  HENT: Negative for congestion, rhinorrhea and sore throat  Respiratory: Positive for cough  Negative for chest tightness and shortness of breath  Gastrointestinal: Negative for abdominal pain, diarrhea, nausea and vomiting  Musculoskeletal: Positive for myalgias  Neurological: Negative for headaches  Objective:    Vitals:    01/29/21 0946   Temp: (!) 97 °F (36 1 °C)   SpO2: 97%       Physical Exam  Vitals signs reviewed  Constitutional:       Appearance: Normal appearance  Eyes:      Conjunctiva/sclera: Conjunctivae normal       Pupils: Pupils are equal, round, and reactive to light  Neurological:      Mental Status: She is alert  Psychiatric:         Mood and Affect: Mood normal          Behavior: Behavior normal        VIRTUAL VISIT DISCLAIMER    Dianne Martinez acknowledges that she has consented to an online visit or consultation  She understands that the online visit is based solely on information provided by her, and that, in the absence of a face-to-face physical evaluation by the physician, the diagnosis she receives is both limited and provisional in terms of accuracy and completeness  This is not intended to replace a full medical face-to-face evaluation by the physician  Dianne Martinez understands and accepts these terms

## 2021-01-30 ENCOUNTER — NURSE TRIAGE (OUTPATIENT)
Dept: OTHER | Facility: OTHER | Age: 86
End: 2021-01-30

## 2021-01-30 NOTE — TELEPHONE ENCOUNTER
Regarding: Unable to sleep  ----- Message from Marilyn Declid sent at 1/30/2021 11:07 AM EST -----  "I was given sleeping pills, but they are not working  I haven't been able to sleep   I would like some advice on what to do "

## 2021-01-30 NOTE — TELEPHONE ENCOUNTER
TC to on-call provider, "Pt is COVID+ x5days  She is having fatigue, congestion, cough, and loss of taste and smell  Major complaint is insomnia  PCP prescribed Trazodone 50mg @[facility] on 1/26  Pt stated it isn't helping and she desperately needs sleep  States no hx of insomnia prior to COVID  How should I advise?"    Per provider, "Try melatonin , also can increase trazadone to 100 mg"    Pt advised  Reason for Disposition   Requesting medication for sleep ("sleeping pill")    Answer Assessment - Initial Assessment Questions  1  DESCRIPTION: "Tell me about your sleeping problem "       Started, COVID  2  ONSET: "How long have you been having trouble sleeping?" (e g , days, weeks, months)      A week ago, diagnosed with COVID  3  RECURRENT: "Have you had sleeping problems before?"  If yes: "What happened that time?" "What helped your sleeping problem go away in the past?"       Denies  4  STRESS: "Is there anything in your life that is making you feel stressed or tense?"      Denies  5  PAIN: "Do you have any pain that is keeping you awake?" (e g , back pain, headache, abdominal pain)      Denies  6  CAFFEINE ABUSE: "Do you drink caffeinated beverages, and how much each day?" (e g , coffee, tea, ty)      Denies  7  SUBSTANCE ABUSE: "Do you use any illegal drugs or alcohol?"      Denies  8  OTHER SYMPTOMS: "Do you have any other symptoms?"  (e g , difficulty breathing)    Fatigue, congestion, "its in my head", loss of taste and smell, dry cough      Protocols used: [id number]

## 2021-02-18 ENCOUNTER — TRANSITIONAL CARE MANAGEMENT (OUTPATIENT)
Dept: INTERNAL MEDICINE CLINIC | Facility: CLINIC | Age: 86
End: 2021-02-18

## 2021-02-26 ENCOUNTER — TELEMEDICINE (OUTPATIENT)
Dept: INTERNAL MEDICINE CLINIC | Facility: CLINIC | Age: 86
End: 2021-02-26
Payer: MEDICARE

## 2021-02-26 DIAGNOSIS — E87.8 ELECTROLYTE ABNORMALITY: ICD-10-CM

## 2021-02-26 DIAGNOSIS — Z23 ENCOUNTER FOR IMMUNIZATION: ICD-10-CM

## 2021-02-26 DIAGNOSIS — U07.1 COVID-19 VIRUS INFECTION: Primary | ICD-10-CM

## 2021-02-26 PROCEDURE — 99495 TRANSJ CARE MGMT MOD F2F 14D: CPT | Performed by: INTERNAL MEDICINE

## 2021-02-26 NOTE — ASSESSMENT & PLAN NOTE
Patient was hospitalized for this, instructed patient to keep an eye out for any increased shortness of breath, and reach out if this were to occur

## 2021-02-26 NOTE — PROGRESS NOTES
Assessment/Plan:        Problem List Items Addressed This Visit        Other    COVID-19 virus infection - Primary      Patient was hospitalized for this, instructed patient to keep an eye out for any increased shortness of breath, and reach out if this were to occur         Electrolyte abnormality      Patient with hyponatremia and hypokalemia in the hospital, I recommend rechecking, order entered         Relevant Orders    Basic metabolic panel      Other Visit Diagnoses     Encounter for immunization                 Reason for visit is TCM  TCM Call (since 1/26/2021)     Date and time call was made  2/18/2021 11:13 AM    Hospital care reviewed  Records not available    Patient was hospitialized at  Other (comment)        Comment  [facility]    Date of Admission  02/15/21    Date of discharge  02/20/21    Diagnosis  positive covid, Pulmonary nodule    Disposition  Home    Were the patients medications reviewed and updated  No      TCM Call (since 1/26/2021)     Should patient be enrolled in anticoag monitoring? No    Scheduled for follow up? Yes    I have advised the patient to call PCP with any new or worsening symptoms  [address]          Encounter provider Jada Pritchard MD       Provider located at [address] [place] [id number]      Recent Visits  No visits were found meeting these conditions  Showing recent visits within past 7 days and meeting all other requirements     Today's Visits  Date Type Provider Dept   02/26/21 Telemedicine MD Fernando Hassan Tristan today's visits and meeting all other requirements     Future Appointments  No visits were found meeting these conditions  Showing future appointments within next 150 days and meeting all other requirements        After connecting through [facility], the patient was identified by name and date of birth   Shola Reynoso was informed that this is a telemedicine visit and that the visit is being conducted through [facility] and patient was informed that this is a secure, HIPAA-compliant platform  She agrees to proceed     My office door was closed  No one else was in the room  She acknowledged consent and understanding of privacy and security of the video platform  The patient has agreed to participate and understands they can discontinue the visit at any time  Patient is aware this is a billable service  Subjective:     Patient ID: Balaji Hdz is a 80 y o  female  I reviewed patient's hospitalization for headache and nausea, she was found to have COVID and COVID pneumonia, she also had hyponatremia and hypokalemia which were treated  Since home, patient reports having fatigue, no fevers, no chills, no CP, no SOB, no diarrhea, no headache, nausea has mostly improved  Son and daughter-in-law are helping with patient  Her   one week ago  Review of Systems   Constitutional: Positive for fatigue  Negative for chills and fever  HENT: Negative for congestion, nosebleeds, postnasal drip, sore throat and trouble swallowing  Eyes: Negative for pain  Respiratory: Negative for cough, chest tightness, shortness of breath and wheezing  Cardiovascular: Negative for chest pain, palpitations and leg swelling  Gastrointestinal: Positive for nausea ( occasional)  Negative for abdominal pain, constipation, diarrhea and vomiting  Endocrine: Negative for polydipsia and polyuria  Genitourinary: Negative for dysuria, flank pain and hematuria  Musculoskeletal: Negative for arthralgias  Skin: Negative for rash  Neurological: Negative for dizziness, tremors, light-headedness and headaches  Hematological: Does not bruise/bleed easily  Psychiatric/Behavioral: Negative for confusion and dysphoric mood  The patient is not nervous/anxious  Objective:     There were no vitals filed for this visit     Physical Exam        Transitional Care Management Review:  Shruthi Rudd is a 80 y o  female here for TCM follow up  During the TCM phone call patient stated:    TCM Call (since 1/26/2021)     Date and time call was made  2/18/2021 11:13 AM    Hospital care reviewed  Records not available    Patient was hospitialized at  Other (comment)        Comment  [facility]    Date of Admission  02/15/21    Date of discharge  02/20/21    Diagnosis  positive covid, Pulmonary nodule    Disposition  Home    Were the patients medications reviewed and updated  No      TCM Call (since 1/26/2021)     Should patient be enrolled in anticoag monitoring? No    Scheduled for follow up? Yes    I have advised the patient to call PCP with any new or worsening symptoms  [address]        It was my intent to perform this visit via video technology but the patient was not able to do a video connection so the visit was completed via audio telephone only  I spent 20 minutes with the patient during this visit      Christy Klein MD

## 2021-02-26 NOTE — PATIENT INSTRUCTIONS
Problem List Items Addressed This Visit        Other    COVID-19 virus infection - Primary      Patient was hospitalized for this, instructed patient to keep an eye out for any increased shortness of breath, and reach out if this were to occur         Electrolyte abnormality      Patient with hyponatremia and hypokalemia in the hospital, I recommend rechecking, order entered         Relevant Orders    Basic metabolic panel      Other Visit Diagnoses     Encounter for immunization

## 2021-05-21 ENCOUNTER — IMMUNIZATIONS (OUTPATIENT)
Dept: FAMILY MEDICINE CLINIC | Facility: HOSPITAL | Age: 86
End: 2021-05-21

## 2021-05-21 DIAGNOSIS — Z23 ENCOUNTER FOR IMMUNIZATION: Primary | ICD-10-CM

## 2021-05-21 PROCEDURE — 91300 SARS-COV-2 / COVID-19 MRNA VACCINE (PFIZER-BIONTECH) 30 MCG: CPT

## 2021-05-21 PROCEDURE — 0001A SARS-COV-2 / COVID-19 MRNA VACCINE (PFIZER-BIONTECH) 30 MCG: CPT

## 2021-05-25 RX ORDER — ACETAMINOPHEN 325 MG/1
2 TABLET ORAL EVERY 4 HOURS PRN
COMMUNITY
Start: 2021-02-05 | End: 2022-04-21

## 2021-05-25 RX ORDER — SODIUM PHOSPHATE, DIBASIC AND SODIUM PHOSPHATE, MONOBASIC 7; 19 G/133ML; G/133ML
1 ENEMA RECTAL
COMMUNITY
Start: 2021-02-05 | End: 2022-04-21

## 2021-05-25 RX ORDER — MAG HYDROX/ALUMINUM HYD/SIMETH 400-400-40
30 SUSPENSION, ORAL (FINAL DOSE FORM) ORAL
COMMUNITY
Start: 2021-02-05 | End: 2022-04-21

## 2021-05-25 RX ORDER — BISACODYL 10 MG
1 SUPPOSITORY, RECTAL RECTAL
COMMUNITY
Start: 2021-02-05 | End: 2022-04-21

## 2021-05-25 RX ORDER — ACETAMINOPHEN 325 MG/1
2 TABLET ORAL EVERY 4 HOURS PRN
COMMUNITY
Start: 2021-02-05 | End: 2021-05-27 | Stop reason: SDUPTHER

## 2021-05-27 ENCOUNTER — OFFICE VISIT (OUTPATIENT)
Dept: INTERNAL MEDICINE CLINIC | Facility: CLINIC | Age: 86
End: 2021-05-27
Payer: MEDICARE

## 2021-05-27 VITALS
BODY MASS INDEX: 23.18 KG/M2 | SYSTOLIC BLOOD PRESSURE: 108 MMHG | DIASTOLIC BLOOD PRESSURE: 64 MMHG | WEIGHT: 110.4 LBS | OXYGEN SATURATION: 98 % | HEIGHT: 58 IN | HEART RATE: 90 BPM

## 2021-05-27 DIAGNOSIS — E78.2 MIXED HYPERLIPIDEMIA: ICD-10-CM

## 2021-05-27 DIAGNOSIS — Z23 ENCOUNTER FOR IMMUNIZATION: ICD-10-CM

## 2021-05-27 DIAGNOSIS — Z00.00 MEDICARE ANNUAL WELLNESS VISIT, SUBSEQUENT: Primary | ICD-10-CM

## 2021-05-27 PROCEDURE — G0439 PPPS, SUBSEQ VISIT: HCPCS | Performed by: NURSE PRACTITIONER

## 2021-05-27 PROCEDURE — 1123F ACP DISCUSS/DSCN MKR DOCD: CPT | Performed by: NURSE PRACTITIONER

## 2021-05-27 NOTE — PATIENT INSTRUCTIONS
Medicare Preventive Visit Patient Instructions  Thank you for completing your Welcome to Medicare Visit or Medicare Annual Wellness Visit today  Your next wellness visit will be due in one year (5/28/2022)  The screening/preventive services that you may require over the next 5-10 years are detailed below  Some tests may not apply to you based off risk factors and/or age  Screening tests ordered at today's visit but not completed yet may show as past due  Also, please note that scanned in results may not display below  Preventive Screenings:  Service Recommendations Previous Testing/Comments   Colorectal Cancer Screening  * Colonoscopy    * Fecal Occult Blood Test (FOBT)/Fecal Immunochemical Test (FIT)  * Fecal DNA/Cologuard Test  * Flexible Sigmoidoscopy Age: 54-65 years old   Colonoscopy: every 10 years (may be performed more frequently if at higher risk)  OR  FOBT/FIT: every 1 year  OR  Cologuard: every 3 years  OR  Sigmoidoscopy: every 5 years  Screening may be recommended earlier than age 48 if at higher risk for colorectal cancer  Also, an individualized decision between you and your healthcare provider will decide whether screening between the ages of 74-80 would be appropriate  Colonoscopy: Not on file  FOBT/FIT: Not on file  Cologuard: Not on file  Sigmoidoscopy: Not on file          Breast Cancer Screening Age: 36 years old  Frequency: every 1-2 years  Not required if history of left and right mastectomy Mammogram: 08/13/2019        Cervical Cancer Screening Between the ages of 21-29, pap smear recommended once every 3 years  Between the ages of 33-67, can perform pap smear with HPV co-testing every 5 years     Recommendations may differ for women with a history of total hysterectomy, cervical cancer, or abnormal pap smears in past  Pap Smear: Not on file        Hepatitis C Screening Once for adults born between [facility]  More frequently in patients at high risk for Hepatitis C Hep C Antibody: Not on file        Diabetes Screening 1-2 times per year if you're at risk for diabetes or have pre-diabetes Fasting glucose: 96 mg/dL   A1C: No results in last 5 years        Cholesterol Screening Once every 5 years if you don't have a lipid disorder  May order more often based on risk factors  Lipid panel: 04/23/2020          Other Preventive Screenings Covered by Medicare:  1  Abdominal Aortic Aneurysm (AAA) Screening: covered once if your at risk  You're considered to be at risk if you have a family history of AAA  2  Lung Cancer Screening: covers low dose CT scan once per year if you meet all of the following conditions: (1) Age 50-69; (2) No signs or symptoms of lung cancer; (3) Current smoker or have quit smoking within the last 15 years; (4) You have a tobacco smoking history of at least 30 pack years (packs per day multiplied by number of years you smoked); (5) You get a written order from a healthcare provider  3  Glaucoma Screening: covered annually if you're considered high risk: (1) You have diabetes OR (2) Family history of glaucoma OR (3)  aged 48 and older OR (3)  American aged 72 and older  3  Osteoporosis Screening: covered every 2 years if you meet one of the following conditions: (1) You're estrogen deficient and at risk for osteoporosis based off medical history and other findings; (2) Have a vertebral abnormality; (3) On glucocorticoid therapy for more than 3 months; (4) Have primary hyperparathyroidism; (5) On osteoporosis medications and need to assess response to drug therapy  · Last bone density test (DXA Scan): Not on file  5  HIV Screening: covered annually if you're between the age of 12-76  Also covered annually if you are younger than 13 and older than 72 with risk factors for HIV infection  For pregnant patients, it is covered up to 3 times per pregnancy      Immunizations:  Immunization Recommendations   Influenza Vaccine Annual influenza vaccination during flu season is recommended for all persons aged >= 6 months who do not have contraindications   Pneumococcal Vaccine (Prevnar and Pneumovax)  * Prevnar = PCV13  * Pneumovax = PPSV23   Adults 25-60 years old: 1-3 doses may be recommended based on certain risk factors  Adults 72 years old: Prevnar (PCV13) vaccine recommended followed by Pneumovax (PPSV23) vaccine  If already received PPSV23 since turning 65, then PCV13 recommended at least one year after PPSV23 dose  Hepatitis B Vaccine 3 dose series if at intermediate or high risk (ex: diabetes, end stage renal disease, liver disease)   Tetanus (Td) Vaccine - COST NOT COVERED BY MEDICARE PART B Following completion of primary series, a booster dose should be given every 10 years to maintain immunity against tetanus  Td may also be given as tetanus wound prophylaxis  Tdap Vaccine - COST NOT COVERED BY MEDICARE PART B Recommended at least once for all adults  For pregnant patients, recommended with each pregnancy  Shingles Vaccine (Shingrix) - COST NOT COVERED BY MEDICARE PART B  2 shot series recommended in those aged 48 and above     Health Maintenance Due:  There are no preventive care reminders to display for this patient  Immunizations Due:      Topic Date Due    Pneumococcal Vaccine: 65+ Years (1 of 1 - PPSV23) Never done     Advance Directives   What are advance directives? Advance directives are legal documents that state your wishes and plans for medical care  These plans are made ahead of time in case you lose your ability to make decisions for yourself  Advance directives can apply to any medical decision, such as the treatments you want, and if you want to donate organs  What are the types of advance directives? There are many types of advance directives, and each state has rules about how to use them  You may choose a combination of any of the following:  · Living will: This is a written record of the treatment you want   You can also choose which treatments you do not want, which to limit, and which to stop at a certain time  This includes surgery, medicine, IV fluid, and tube feedings  · Durable power of  for healthcare [facility]): This is a written record that states who you want to make healthcare choices for you when you are unable to make them for yourself  This person, called a proxy, is usually a family member or a friend  You may choose more than 1 proxy  · Do not resuscitate (DNR) order:  A DNR order is used in case your heart stops beating or you stop breathing  It is a request not to have certain forms of treatment, such as CPR  A DNR order may be included in other types of advance directives  · Medical directive: This covers the care that you want if you are in a coma, near death, or unable to make decisions for yourself  You can list the treatments you want for each condition  Treatment may include pain medicine, surgery, blood transfusions, dialysis, IV or tube feedings, and a ventilator (breathing machine)  · Values history: This document has questions about your views, beliefs, and how you feel and think about life  This information can help others choose the care that you would choose  Why are advance directives important? An advance directive helps you control your care  Although spoken wishes may be used, it is better to have your wishes written down  Spoken wishes can be misunderstood, or not followed  Treatments may be given even if you do not want them  An advance directive may make it easier for your family to make difficult choices about your care  © Copyright [employer] 2018 Information is for End User's use only and may not be sold, redistributed or otherwise used for commercial purposes   All illustrations and images included in CareNotes® are the copyrighted property of [employer]  or [employer]

## 2021-05-27 NOTE — PROGRESS NOTES
Assessment and Plan:     Problem List Items Addressed This Visit     None      Visit Diagnoses     Medicare annual wellness visit, subsequent    -  Primary    Relevant Orders    CBC and differential    Comprehensive metabolic panel    TSH, 3rd generation with Free T4 reflex    Encounter for immunization        Mixed hyperlipidemia        Relevant Orders    Lipid panel    TSH, 3rd generation with Free T4 reflex           Preventive health issues were discussed with patient, and age appropriate screening tests were ordered as noted in patient's After Visit Summary  Personalized health advice and appropriate referrals for health education or preventive services given if needed, as noted in patient's After Visit Summary       History of Present Illness:     Patient presents for Medicare Annual Wellness visit    Patient Care Team:  Severiano Most as PCP - General (Family Medicine)  MD Angel Wheeler as Nurse Practitioner (Internal Medicine)     Problem List:     Patient Active Problem List   Diagnosis    Hypercholesteremia    Acute non-recurrent maxillary sinusitis    Pes anserinus bursitis of right knee    Nonrheumatic aortic valve insufficiency    Perforation of left tympanic membrane    Bilateral impacted cerumen    Cough    [facility]    Acute non-recurrent frontal sinusitis    Dry mouth    Chronic maxillary sinusitis    COVID-19 virus infection    Electrolyte abnormality      Past Medical and Surgical History:     Past Medical History:   Diagnosis Date    [facility]     Cancer ([facility]     Hyperlipidemia      Past Surgical History:   Procedure Laterality Date    BREAST SURGERY      cancer (> 5 years)      Family History:     Family History   Problem Relation Age of Onset    Breast cancer Mother     Heart disease Father         heart problem    Heart disease Sister         heart problem    Heart disease Brother         heart problem      Social History:        Social History     Socioeconomic History    Marital status:       Spouse name: None    Number of children: None    Years of education: None    Highest education level: None   Occupational History    None   Social Needs    Financial resource strain: None    Food insecurity     Worry: None     Inability: None    Transportation needs     Medical: None     Non-medical: None   Tobacco Use    Smoking status: Never Smoker    Smokeless tobacco: Never Used   Substance and Sexual Activity    Alcohol use: Yes     Frequency: 4 or more times a week     Drinks per session: 1 or 2     Binge frequency: Never    Drug use: No    Sexual activity: Not Currently   Lifestyle    Physical activity     Days per week: None     Minutes per session: None    Stress: None   Relationships    Social connections     Talks on phone: None     Gets together: None     Attends Mu-ism service: None     Active member of club or organization: None     Attends meetings of clubs or organizations: None     Relationship status: None    Intimate partner violence     Fear of current or ex partner: None     Emotionally abused: None     Physically abused: None     Forced sexual activity: None   Other Topics Concern    None   Social History Narrative    None      Medications and Allergies:     Current Outpatient Medications   Medication Sig Dispense Refill    acetaminophen (Tylenol) 325 mg tablet Take 2 tablets by mouth every 4 (four) hours as needed for fever      Calcium Carb-Cholecalciferol (CALCIUM 1000 + D) 1000-800 MG-UNIT TABS Take by mouth      Doxylamine Succinate, Sleep, (UNISOM PO) Take 1 tablet by mouth daily at bedtime as needed 1 to 1 5 tablets at night      Multiple Vitamins-Minerals (CENTRUM SILVER 50+WOMEN PO) Take by mouth      psyllium (METAMUCIL) 58 6 % packet Take 1 packet by mouth daily      simvastatin (ZOCOR) 5 MG tablet Take 1 tablet (5 mg total) by mouth daily at bedtime 90 tablet 3    bisacodyl (Dulcolax) 10 mg suppository Insert 1 suppository into the rectum every third day as needed for constipation      clotrimazole (MYCELEX) 10 mg adri DISSOLVE 1 IN THE MOUTH FIVE TIMES QD FOR 14 CONSECUTIVE DAYS  0    magnesium hydroxide (Milk of Magnesia) 400 mg/5 mL oral suspension Take 30 mL by mouth every third day as needed for constipation      Misc  Devices (Pulse Oximeter) MISC Use daily (Patient not taking: Reported on 5/27/2021) 1 each 0    sodium phosphate-biphosphate (FLEET) 7-19 g 118 mL enema Insert 1 Units into the rectum every third day as needed for constipation       No current facility-administered medications for this visit  Allergies   Allergen Reactions    Pravastatin     Risedronate       Immunizations:     Immunization History   Administered Date(s) Administered    INFLUENZA 09/29/2014, 10/19/2015, 10/26/2016, 11/08/2017, 10/09/2018, 09/14/2020    Influenza Quadrivalent Preservative Free 3 years and older IM 10/19/2011    Influenza, high dose seasonal 0 7 mL 10/02/2019    SARS-CoV-2 / COVID-19 mRNA IM (Pfizer-BioNTech) 05/21/2021    Tuberculin Skin Test 02/06/2021, 02/15/2021      Health Maintenance: There are no preventive care reminders to display for this patient  Topic Date Due    Pneumococcal Vaccine: 65+ Years (1 of 1 - PPSV23) Never done      Medicare Health Risk Assessment:     /64   Pulse 90   Ht 4' 10 25" (1 48 m)   Wt 50 1 kg (110 lb 6 4 oz)   SpO2 98%   BMI 22 88 kg/m²      Narcisa Leavitt is here for her Subsequent Wellness visit  Health Risk Assessment:   Patient rates overall health as good  Patient feels that their physical health rating is slightly worse  Patient is satisfied with their life  Eyesight was rated as same  Hearing was rated as same  Patient feels that their emotional and mental health rating is much better  Patients states they are never, rarely angry  Patient states they are always unusually tired/fatigued   Pain experienced in the last 7 days has been none  Patient states that she has experienced no weight loss or gain in last 6 months  Depression Screening:   PHQ-2 Score: 0      Fall Risk Screening: In the past year, patient has experienced: no history of falling in past year      Urinary Incontinence Screening:   Patient has not leaked urine accidently in the last six months  Home Safety:  Patient does not have trouble with stairs inside or outside of their home  Patient has working smoke alarms and has working carbon monoxide detector  Home safety hazards include: none  Nutrition:   Current diet is Regular  Medications:   Patient is not currently taking any over-the-counter supplements  Patient is able to manage medications  Activities of Daily Living (ADLs)/Instrumental Activities of Daily Living (IADLs):   Walk and transfer into and out of bed and chair?: Yes  Dress and groom yourself?: Yes    Bathe or shower yourself?: Yes    Feed yourself? Yes  Do your laundry/housekeeping?: Yes  Manage your money, pay your bills and track your expenses?: Yes  Make your own meals?: Yes    Do your own shopping?: Yes    Previous Hospitalizations:   Any hospitalizations or ED visits within the last 12 months?: Yes    How many hospitalizations have you had in the last year?: 1-2    Hospitalization Comments: The patient had covid in January and was transferred to a rehab after    Advance Care Planning:   Living will: Yes    Durable POA for healthcare:  Yes    Advanced directive: Yes    Five wishes given: No      PREVENTIVE SCREENINGS      Cardiovascular Screening:    General: Screening Not Indicated and History Lipid Disorder      Colorectal Cancer Screening:     General: Screening Not Indicated      Breast Cancer Screening:     General: History Breast Cancer      Cervical Cancer Screening:    General: Screening Not Indicated      Lung Cancer Screening:     General: Screening Not Indicated    Screening, Brief Intervention, and Referral to Treatment (SBIRT)    Screening  Typical number of drinks in a day: 1  Typical number of drinks in a week: 7  Interpretation: Low risk drinking behavior  AUDIT-C Screenin) How often did you have a drink containing alcohol in the past year? 4 or more times a week  2) How many drinks did you have on a typical day when you were drinking in the past year? 1 to 2  3) How often did you have 6 or more drinks on one occasion in the past year? never    AUDIT-C Score: 4  Interpretation: Score 3-12 (female): POSITIVE screen for alcohol misuse    AUDIT Screenin) How often during the last year have you found that you were not able to stop drinking once you had started? 0 - never  5) How often during the last year have you failed to do what was normally expected from you because of drinking? 0 - never  6) How often during the last year have you needed a first drink in the morning to get yourself going after a heavy drinking session?  0 - never  7) How often during the last year have you had a feeling of guilt or remorse after drinking? 0 - never  8) How often during the last year have you been unable to remember what happened the night before because you had been drinking? 0 - never  9) Have you or someone else been injured as a result of your drinking? 0 - no  10) Has a relative or friend or a doctor or another health worker been concerned about your drinking or suggested you cut down? 0 - no    AUDIT Score: 4  Interpretation: Low risk alcohol consumption    Single Item Drug Screening:  How often have you used an illegal drug (including marijuana) or a prescription medication for non-medical reasons in the past year? never    Single Item Drug Screen Score: 0  Interpretation: Negative screen for possible drug use disorder      LANI Shankar

## 2021-06-16 ENCOUNTER — IMMUNIZATIONS (OUTPATIENT)
Dept: FAMILY MEDICINE CLINIC | Facility: HOSPITAL | Age: 86
End: 2021-06-16

## 2021-06-16 DIAGNOSIS — Z23 ENCOUNTER FOR IMMUNIZATION: Primary | ICD-10-CM

## 2021-06-16 PROCEDURE — 91300 SARS-COV-2 / COVID-19 MRNA VACCINE (PFIZER-BIONTECH) 30 MCG: CPT

## 2021-06-16 PROCEDURE — 0002A SARS-COV-2 / COVID-19 MRNA VACCINE (PFIZER-BIONTECH) 30 MCG: CPT

## 2021-07-19 ENCOUNTER — APPOINTMENT (OUTPATIENT)
Dept: LAB | Age: 86
End: 2021-07-19
Payer: MEDICARE

## 2021-07-19 DIAGNOSIS — Z00.00 MEDICARE ANNUAL WELLNESS VISIT, SUBSEQUENT: ICD-10-CM

## 2021-07-19 DIAGNOSIS — E78.2 MIXED HYPERLIPIDEMIA: ICD-10-CM

## 2021-07-19 LAB
ALBUMIN SERPL BCP-MCNC: 3.4 G/DL (ref 3.5–5)
ALP SERPL-CCNC: 86 U/L (ref 46–116)
ALT SERPL W P-5'-P-CCNC: 28 U/L (ref 12–78)
ANION GAP SERPL CALCULATED.3IONS-SCNC: 6 MMOL/L (ref 4–13)
AST SERPL W P-5'-P-CCNC: 20 U/L (ref 5–45)
BASOPHILS # BLD AUTO: 0.04 THOUSANDS/ΜL (ref 0–0.1)
BASOPHILS NFR BLD AUTO: 1 % (ref 0–1)
BILIRUB SERPL-MCNC: 0.66 MG/DL (ref 0.2–1)
BUN SERPL-MCNC: 17 MG/DL (ref 5–25)
CALCIUM ALBUM COR SERPL-MCNC: 9.3 MG/DL (ref 8.3–10.1)
CALCIUM SERPL-MCNC: 8.8 MG/DL (ref 8.3–10.1)
CHLORIDE SERPL-SCNC: 104 MMOL/L (ref 100–108)
CHOLEST SERPL-MCNC: 215 MG/DL (ref 50–200)
CO2 SERPL-SCNC: 25 MMOL/L (ref 21–32)
CREAT SERPL-MCNC: 0.86 MG/DL (ref 0.6–1.3)
EOSINOPHIL # BLD AUTO: 0.11 THOUSAND/ΜL (ref 0–0.61)
EOSINOPHIL NFR BLD AUTO: 3 % (ref 0–6)
ERYTHROCYTE [DISTWIDTH] IN BLOOD BY AUTOMATED COUNT: 12.9 % (ref 11.6–15.1)
GFR SERPL CREATININE-BSD FRML MDRD: 62 ML/MIN/1.73SQ M
GLUCOSE P FAST SERPL-MCNC: 87 MG/DL (ref 65–99)
HCT VFR BLD AUTO: 38.9 % (ref 34.8–46.1)
HDLC SERPL-MCNC: 79 MG/DL
HGB BLD-MCNC: 13 G/DL (ref 11.5–15.4)
IMM GRANULOCYTES # BLD AUTO: 0.01 THOUSAND/UL (ref 0–0.2)
IMM GRANULOCYTES NFR BLD AUTO: 0 % (ref 0–2)
LDLC SERPL CALC-MCNC: 116 MG/DL (ref 0–100)
LYMPHOCYTES # BLD AUTO: 1.61 THOUSANDS/ΜL (ref 0.6–4.47)
LYMPHOCYTES NFR BLD AUTO: 39 % (ref 14–44)
MCH RBC QN AUTO: 34.7 PG (ref 26.8–34.3)
MCHC RBC AUTO-ENTMCNC: 33.4 G/DL (ref 31.4–37.4)
MCV RBC AUTO: 104 FL (ref 82–98)
MONOCYTES # BLD AUTO: 0.39 THOUSAND/ΜL (ref 0.17–1.22)
MONOCYTES NFR BLD AUTO: 10 % (ref 4–12)
NEUTROPHILS # BLD AUTO: 1.94 THOUSANDS/ΜL (ref 1.85–7.62)
NEUTS SEG NFR BLD AUTO: 47 % (ref 43–75)
NONHDLC SERPL-MCNC: 136 MG/DL
NRBC BLD AUTO-RTO: 0 /100 WBCS
PLATELET # BLD AUTO: 266 THOUSANDS/UL (ref 149–390)
PMV BLD AUTO: 9.2 FL (ref 8.9–12.7)
POTASSIUM SERPL-SCNC: 3.4 MMOL/L (ref 3.5–5.3)
PROT SERPL-MCNC: 6.9 G/DL (ref 6.4–8.2)
RBC # BLD AUTO: 3.75 MILLION/UL (ref 3.81–5.12)
SODIUM SERPL-SCNC: 135 MMOL/L (ref 136–145)
T4 FREE SERPL-MCNC: 0.96 NG/DL (ref 0.76–1.46)
TRIGL SERPL-MCNC: 101 MG/DL
TSH SERPL DL<=0.05 MIU/L-ACNC: 4.26 UIU/ML (ref 0.36–3.74)
WBC # BLD AUTO: 4.1 THOUSAND/UL (ref 4.31–10.16)

## 2021-07-19 PROCEDURE — 80061 LIPID PANEL: CPT

## 2021-07-19 PROCEDURE — 84439 ASSAY OF FREE THYROXINE: CPT

## 2021-07-19 PROCEDURE — 85025 COMPLETE CBC W/AUTO DIFF WBC: CPT

## 2021-07-19 PROCEDURE — 80053 COMPREHEN METABOLIC PANEL: CPT

## 2021-07-19 PROCEDURE — 36415 COLL VENOUS BLD VENIPUNCTURE: CPT

## 2021-07-19 PROCEDURE — 84443 ASSAY THYROID STIM HORMONE: CPT

## 2021-08-17 ENCOUNTER — OFFICE VISIT (OUTPATIENT)
Dept: SURGERY | Facility: CLINIC | Age: 86
End: 2021-08-17
Payer: MEDICARE

## 2021-08-17 VITALS — HEIGHT: 59 IN | BODY MASS INDEX: 23.18 KG/M2 | WEIGHT: 115 LBS

## 2021-08-17 DIAGNOSIS — C50.919 BREAST CANCER (HCC): Primary | ICD-10-CM

## 2021-08-17 PROCEDURE — 99213 OFFICE O/P EST LOW 20 MIN: CPT | Performed by: SURGERY

## 2021-08-17 NOTE — PROGRESS NOTES
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  A biopsy in August 2011 was completed  In September 2011 underwent right breast lumpectomy with 7 lymph nodes removed  This was receptor positive  She completed radiation therapy  She returns today for follow-up visit  She tells me that she contracted COVID in January  She remains fatigued  In addition she has no taste or smell  She denies any breast tissue complaints  I recent mammogram was unremarkable  Examination reveals no dominant masses on either breast   No evidence for axillary or supraclavicular adenopathy  Reassurance was provided  A follow-up visit 1 year after an updated mammogram was requested  She is agreeable  Diagnoses and all orders for this visit:    Breast cancer [facility])        Subjective:      Patient ID: Amado Bruno is a 80 y o  female  Patient presents for yearly breast exam   Mammogram 8/13/2021  The following portions of the patient's history were reviewed and updated as appropriate:     She  has a past medical history of Breast cancer ([facility] [address] ), Cancer ([facility] ), and Hyperlipidemia  She  has a past surgical history that includes Breast surgery  Her family history includes Breast cancer in her mother; Heart disease in her brother, father, and sister  She  reports that she has never smoked  She has never used smokeless tobacco  She reports current alcohol use  She reports that she does not use drugs    Current Outpatient Medications   Medication Sig Dispense Refill    acetaminophen (Tylenol) 325 mg tablet Take 2 tablets by mouth every 4 (four) hours as needed for fever      bisacodyl (Dulcolax) 10 mg suppository Insert 1 suppository into the rectum every third day as needed for constipation      Calcium Carb-Cholecalciferol (CALCIUM 1000 + D) 1000-800 MG-UNIT TABS Take by mouth      clotrimazole (MYCELEX) 10 mg adri DISSOLVE 1 IN THE MOUTH FIVE TIMES QD FOR 14 CONSECUTIVE DAYS  0    Doxylamine Succinate, Sleep, (UNISOM PO) Take 1 tablet by mouth daily at bedtime as needed 1 to 1 5 tablets at night      magnesium hydroxide (Milk of Magnesia) 400 mg/5 mL oral suspension Take 30 mL by mouth every third day as needed for constipation      Misc  Devices (Pulse Oximeter) MISC Use daily (Patient not taking: Reported on 5/27/2021) 1 each 0    Multiple Vitamins-Minerals (CENTRUM SILVER 50+WOMEN PO) Take by mouth      psyllium (METAMUCIL) 58 6 % packet Take 1 packet by mouth daily      simvastatin (ZOCOR) 5 MG tablet Take 1 tablet (5 mg total) by mouth daily at bedtime 90 tablet 3    sodium phosphate-biphosphate (FLEET) 7-19 g 118 mL enema Insert 1 Units into the rectum every third day as needed for constipation       No current facility-administered medications for this visit  She is allergic to pravastatin and risedronate       Review of Systems   Constitutional: Positive for activity change and fatigue  HENT: Negative  Negative for sore throat and trouble swallowing  Eyes: Negative  Respiratory: Negative  Cardiovascular: Negative  Gastrointestinal: Negative  Endocrine: Negative  Genitourinary: Negative  Musculoskeletal: Negative  Skin: Negative  Allergic/Immunologic: Negative  Neurological: Positive for weakness  Psychiatric/Behavioral: Negative for agitation, behavioral problems and confusion  The patient is not nervous/anxious  Objective:      Ht 4' 11" (1 499 m)   Wt 52 2 kg (115 lb)   BMI 23 23 kg/m²          Physical Exam  Constitutional:       Appearance: Normal appearance  She is well-developed  HENT:      Head: Normocephalic and atraumatic  Nose: Nose normal    Eyes:      Extraocular Movements: Extraocular movements intact  Conjunctiva/sclera: Conjunctivae normal    Cardiovascular:      Rate and Rhythm: Normal rate and regular rhythm  Heart sounds: Normal heart sounds     Pulmonary:      Effort: Pulmonary effort is normal  Breath sounds: Normal breath sounds  Chest:       Musculoskeletal:      Cervical back: Normal range of motion  Skin:     General: Skin is warm and dry  Neurological:      Mental Status: She is alert and oriented to person, place, and time     Psychiatric:         Mood and Affect: Mood normal

## 2021-10-18 ENCOUNTER — APPOINTMENT (OUTPATIENT)
Dept: LAB | Age: 86
End: 2021-10-18
Payer: MEDICARE

## 2021-10-18 DIAGNOSIS — E87.8 ELECTROLYTE ABNORMALITY: ICD-10-CM

## 2021-10-18 DIAGNOSIS — E03.9 HYPOTHYROIDISM, UNSPECIFIED TYPE: ICD-10-CM

## 2021-10-18 LAB
ANION GAP SERPL CALCULATED.3IONS-SCNC: 4 MMOL/L (ref 4–13)
BUN SERPL-MCNC: 12 MG/DL (ref 5–25)
CALCIUM SERPL-MCNC: 9.3 MG/DL (ref 8.3–10.1)
CHLORIDE SERPL-SCNC: 101 MMOL/L (ref 100–108)
CO2 SERPL-SCNC: 27 MMOL/L (ref 21–32)
CREAT SERPL-MCNC: 0.96 MG/DL (ref 0.6–1.3)
GFR SERPL CREATININE-BSD FRML MDRD: 54 ML/MIN/1.73SQ M
GLUCOSE SERPL-MCNC: 188 MG/DL (ref 65–140)
POTASSIUM SERPL-SCNC: 3.6 MMOL/L (ref 3.5–5.3)
SODIUM SERPL-SCNC: 132 MMOL/L (ref 136–145)
TSH SERPL DL<=0.05 MIU/L-ACNC: 1.48 UIU/ML (ref 0.36–3.74)

## 2021-10-18 PROCEDURE — 80048 BASIC METABOLIC PNL TOTAL CA: CPT

## 2021-10-18 PROCEDURE — 36415 COLL VENOUS BLD VENIPUNCTURE: CPT

## 2021-10-18 PROCEDURE — 84443 ASSAY THYROID STIM HORMONE: CPT

## 2021-10-25 ENCOUNTER — TELEPHONE (OUTPATIENT)
Dept: INTERNAL MEDICINE CLINIC | Facility: CLINIC | Age: 86
End: 2021-10-25

## 2021-12-06 DIAGNOSIS — E78.5 HYPERLIPIDEMIA, UNSPECIFIED HYPERLIPIDEMIA TYPE: ICD-10-CM

## 2021-12-06 RX ORDER — SIMVASTATIN 5 MG
5 TABLET ORAL
Qty: 90 TABLET | Refills: 3 | Status: SHIPPED | OUTPATIENT
Start: 2021-12-06

## 2022-02-15 ENCOUNTER — OFFICE VISIT (OUTPATIENT)
Dept: CARDIOLOGY CLINIC | Facility: CLINIC | Age: 87
End: 2022-02-15
Payer: MEDICARE

## 2022-02-15 VITALS
BODY MASS INDEX: 23.56 KG/M2 | DIASTOLIC BLOOD PRESSURE: 64 MMHG | SYSTOLIC BLOOD PRESSURE: 142 MMHG | HEIGHT: 59 IN | HEART RATE: 84 BPM | WEIGHT: 116.9 LBS

## 2022-02-15 DIAGNOSIS — U07.1 COVID-19 VIRUS INFECTION: ICD-10-CM

## 2022-02-15 DIAGNOSIS — I35.1 NONRHEUMATIC AORTIC VALVE INSUFFICIENCY: ICD-10-CM

## 2022-02-15 DIAGNOSIS — E78.00 HYPERCHOLESTEREMIA: Primary | ICD-10-CM

## 2022-02-15 PROCEDURE — 99213 OFFICE O/P EST LOW 20 MIN: CPT | Performed by: INTERNAL MEDICINE

## 2022-02-15 PROCEDURE — 93000 ELECTROCARDIOGRAM COMPLETE: CPT | Performed by: INTERNAL MEDICINE

## 2022-02-15 NOTE — PROGRESS NOTES
[facility]  Follow up note  Gricelda De La Cruz 80 y o  female MRN: [id number]        Problems    1  Hypercholesteremia     2  Nonrheumatic aortic valve insufficiency  POCT ECG   3  COVID-19 virus infection         Impression:     Covid 19 2/21  o Some occasional palpitations, lethargy, unfortunately lost her  to [place] at that time, has been dealing with loss of taste and smell, and had worse loss and taste of smell with the COVID vaccine at subsequent booster that she even had with initial COVID infection   Aortic regurgitation  o  mild-to-moderate, last assessed late 2019, no murmur today, no symptoms and normal EKG therefore  Does not warrant further testing at this time   Palpitations  o   A skipping heartbeat lasting just a few seconds, not even daily or weekly, she does not want to pursue testing at this time due to the rarity of the symptoms, Roseanne Him call back if they worsen   Hypercholesterolemia  o LDL 82>>116 as of 7/21, but only on a primary prevention low-dose statin, no changes will be made today    Plan:      annual follow-up    HILDAO patch if her symptoms of palpitations worsen or become more frequent      HPI:   Gricelda De La Cruz is a 80y o  year old female with aortic valve regurgitation, hypercholesterolemia, presents for an annual follow-up,  She developed COVID-19 2/21, hospitalized at [facility], unfortunately COVID took her 's life, she gets palpitations every once in a while, not often enough to test for, symptoms last a few seconds, she has not had any worsening shortness of breath, no orthopnea, no CHF symptoms, no edema, and her aortic valve regurgitation, mild-to-moderate in 2019 does not appear to have worsened clinically  Her LDL yuridia from  as of last July, she was compliant with simvastatin, but is on a for primary prevention at advanced age and therefore we discussed leaving the dose at current dose    She has loss of taste and smell after her booster after head started to resolve  She is very frustrated by this      Review of Systems   Constitutional: Negative for appetite change, diaphoresis, fatigue and fever  Respiratory: Negative for chest tightness, shortness of breath and wheezing  Cardiovascular: Negative for chest pain, palpitations and leg swelling  Gastrointestinal: Negative for abdominal pain and blood in stool  Musculoskeletal: Negative for arthralgias and joint swelling  Skin: Negative for rash  Neurological: Negative for dizziness, syncope and light-headedness  Past Medical History:   Diagnosis Date    Breast cancer ([facility] )     Cancer ([facility] )     Hyperlipidemia      Social History     Substance and Sexual Activity   Alcohol Use Yes     Social History     Substance and Sexual Activity   Drug Use No     Social History     Tobacco Use   Smoking Status Never Smoker   Smokeless Tobacco Never Used       Allergies:   Allergies   Allergen Reactions    Pravastatin     Risedronate        Medications:     Current Outpatient Medications:     Calcium Carb-Cholecalciferol (CALCIUM 1000 + D) 1000-800 MG-UNIT TABS, Take by mouth, Disp: , Rfl:     Multiple Vitamins-Minerals (CENTRUM SILVER 50+WOMEN PO), Take by mouth, Disp: , Rfl:     simvastatin (ZOCOR) 5 MG tablet, Take 1 tablet (5 mg total) by mouth daily at bedtime, Disp: 90 tablet, Rfl: 3    acetaminophen (Tylenol) 325 mg tablet, Take 2 tablets by mouth every 4 (four) hours as needed for fever (Patient not taking: Reported on 2/15/2022 ), Disp: , Rfl:     bisacodyl (Dulcolax) 10 mg suppository, Insert 1 suppository into the rectum every third day as needed for constipation (Patient not taking: Reported on 2/15/2022 ), Disp: , Rfl:     clotrimazole (MYCELEX) 10 mg adri, DISSOLVE 1 IN THE MOUTH FIVE TIMES QD FOR 14 CONSECUTIVE DAYS (Patient not taking: Reported on 2/15/2022), Disp: , Rfl: 0    Doxylamine Succinate, Sleep, (UNISOM PO), Take 1 tablet by mouth daily at bedtime as needed 1 to 1 5 tablets at night (Patient not taking: Reported on 2/15/2022 ), Disp: , Rfl:     magnesium hydroxide (Milk of Magnesia) 400 mg/5 mL oral suspension, Take 30 mL by mouth every third day as needed for constipation (Patient not taking: Reported on 2/15/2022 ), Disp: , Rfl:     Misc  Devices (Pulse Oximeter) MISC, Use daily (Patient not taking: Reported on 5/27/2021), Disp: 1 each, Rfl: 0    psyllium (METAMUCIL) 58 6 % packet, Take 1 packet by mouth daily (Patient not taking: Reported on 2/15/2022 ), Disp: , Rfl:     sodium phosphate-biphosphate (FLEET) 7-19 g 118 mL enema, Insert 1 Units into the rectum every third day as needed for constipation (Patient not taking: Reported on 2/15/2022 ), Disp: , Rfl:       Vitals:    02/15/22 1302   BP: 142/64   Pulse: 84     Weight (last 2 days)     Date/Time Weight    02/15/22 1302 53 (116 9)        Physical Exam  Constitutional:       General: She is not in acute distress  Appearance: She is not diaphoretic  HENT:      Head: Normocephalic and atraumatic  Eyes:      General: No scleral icterus  Conjunctiva/sclera: Conjunctivae normal    Neck:      Vascular: No JVD  Cardiovascular:      Rate and Rhythm: Normal rate and regular rhythm  Heart sounds: Normal heart sounds  No murmur heard  Pulmonary:      Effort: Pulmonary effort is normal  No respiratory distress  Breath sounds: Normal breath sounds  No wheezing, rhonchi or rales  Musculoskeletal:         General: No tenderness  Cervical back: Normal range of motion  Right lower leg: Normal  No edema  Left lower leg: Normal  No edema  Skin:     General: Skin is warm and dry           Laboratory Studies:    Labs personally reviewed    Cardiac testing:     EKG reviewed personally:   Results for orders placed or performed in visit on 02/15/22   POCT ECG    Impression     Sinus rhythm, first-degree AV block           Echocardiogram  12/19- EF normal, mild-to-moderate AI    Abelardo Elizalde MD    Portions of the record may have been created with voice recognition software  Occasional wrong word or "sound a like" substitutions may have occurred due to the inherent limitations of voice recognition software  Read the chart carefully and recognize, using context, where substitutions have occurred

## 2022-04-13 ENCOUNTER — NEW PATIENT (OUTPATIENT)
Dept: URBAN - METROPOLITAN AREA CLINIC 6 | Facility: CLINIC | Age: 87
End: 2022-04-13

## 2022-04-13 DIAGNOSIS — H43.22: ICD-10-CM

## 2022-04-13 DIAGNOSIS — Z96.1: ICD-10-CM

## 2022-04-13 DIAGNOSIS — H26.493: ICD-10-CM

## 2022-04-13 PROCEDURE — 99204 OFFICE O/P NEW MOD 45 MIN: CPT

## 2022-04-13 ASSESSMENT — VISUAL ACUITY
OS_SC: 20/25-1
OU_CC: J3
OD_SC: 20/30-1

## 2022-04-13 ASSESSMENT — TONOMETRY
OD_IOP_MMHG: 16
OS_IOP_MMHG: 15

## 2022-04-21 ENCOUNTER — OFFICE VISIT (OUTPATIENT)
Dept: INTERNAL MEDICINE CLINIC | Facility: CLINIC | Age: 87
End: 2022-04-21
Payer: MEDICARE

## 2022-04-21 VITALS
WEIGHT: 117.2 LBS | OXYGEN SATURATION: 98 % | DIASTOLIC BLOOD PRESSURE: 88 MMHG | BODY MASS INDEX: 23.63 KG/M2 | SYSTOLIC BLOOD PRESSURE: 162 MMHG | TEMPERATURE: 97.9 F | HEART RATE: 95 BPM | HEIGHT: 59 IN

## 2022-04-21 DIAGNOSIS — U09.9 COVID-19 LONG HAULER: ICD-10-CM

## 2022-04-21 DIAGNOSIS — E55.9 VITAMIN D DEFICIENCY, UNSPECIFIED: ICD-10-CM

## 2022-04-21 DIAGNOSIS — R53.82 CHRONIC FATIGUE: Primary | ICD-10-CM

## 2022-04-21 DIAGNOSIS — R63.8 OTHER SYMPTOMS AND SIGNS CONCERNING FOOD AND FLUID INTAKE: ICD-10-CM

## 2022-04-21 PROCEDURE — 99213 OFFICE O/P EST LOW 20 MIN: CPT | Performed by: NURSE PRACTITIONER

## 2022-04-21 NOTE — PROGRESS NOTES
Assessment/Plan:    Chronic fatigue  Her chronic fatigue most likely coming from COVID long hauler symptoms  Drink lots of fluids and take vitamins   Blood work ordered rule out vitamin deficiencies       Diagnoses and all orders for this visit:    Chronic fatigue  -     TSH, 3rd generation with Free T4 reflex; Future  -     Vitamin D 25 hydroxy; Future  -     Comprehensive metabolic panel; Future  -     CBC and differential; Future  -     Iron Panel (Includes Ferritin, Iron Sat%, Iron, and TIBC); Future    COVID-19 long hauler  -     TSH, 3rd generation with Free T4 reflex; Future  -     Vitamin D 25 hydroxy; Future  -     Comprehensive metabolic panel; Future  -     CBC and differential; Future  -     Iron Panel (Includes Ferritin, Iron Sat%, Iron, and TIBC); Future    Vitamin D deficiency, unspecified   -     Vitamin D 25 hydroxy; Future    Other symptoms and signs concerning food and fluid intake   -     Iron Panel (Includes Ferritin, Iron Sat%, Iron, and TIBC); Future          Subjective:      Patient ID: Luis Ackerman is a 80 y o  female  Patient is here for follow-up of chronic fatigue and altered taste and smell  She had COVID-19 last January and has been feeling tired since then   December got covid19 booster and she feels very tired and shakey   Got worse recently  Altered taste and smell  No sob no cough      The following portions of the patient's history were reviewed and updated as appropriate: allergies, current medications, past family history, past medical history, past social history, past surgical history and problem list     Review of Systems   Constitutional: Positive for fatigue  HENT: Negative  Eyes: Negative  Respiratory: Negative  Cardiovascular: Negative  Gastrointestinal: Negative  Musculoskeletal: Negative  Neurological: Negative            Objective:      /88   Pulse 95   Temp 97 9 °F (36 6 °C) (Tympanic)   Ht 4' 11" (1 499 m)   Wt 53 2 kg (117 lb 3 2 oz)   SpO2 98%   BMI 23 67 kg/m²          Physical Exam  Vitals and nursing note reviewed  Constitutional:       Appearance: She is well-developed  HENT:      Head: Normocephalic and atraumatic  Right Ear: External ear normal       Left Ear: External ear normal       Nose: Nose normal    Eyes:      Conjunctiva/sclera: Conjunctivae normal       Pupils: Pupils are equal, round, and reactive to light  Cardiovascular:      Rate and Rhythm: Normal rate and regular rhythm  Pulmonary:      Effort: Pulmonary effort is normal       Breath sounds: Normal breath sounds  Abdominal:      General: Bowel sounds are normal       Palpations: Abdomen is soft  Musculoskeletal:         General: Normal range of motion  Cervical back: Normal range of motion and neck supple  Skin:     General: Skin is warm and dry  Neurological:      Mental Status: She is alert and oriented to person, place, and time

## 2022-04-22 ENCOUNTER — APPOINTMENT (OUTPATIENT)
Dept: LAB | Age: 87
End: 2022-04-22
Payer: MEDICARE

## 2022-04-22 DIAGNOSIS — R53.82 CHRONIC FATIGUE: ICD-10-CM

## 2022-04-22 DIAGNOSIS — U09.9 COVID-19 LONG HAULER: ICD-10-CM

## 2022-04-22 DIAGNOSIS — E55.9 VITAMIN D DEFICIENCY, UNSPECIFIED: ICD-10-CM

## 2022-04-22 DIAGNOSIS — R63.8 OTHER SYMPTOMS AND SIGNS CONCERNING FOOD AND FLUID INTAKE: ICD-10-CM

## 2022-04-22 LAB
25(OH)D3 SERPL-MCNC: 38 NG/ML (ref 30–100)
ALBUMIN SERPL BCP-MCNC: 3.7 G/DL (ref 3.5–5)
ALP SERPL-CCNC: 82 U/L (ref 46–116)
ALT SERPL W P-5'-P-CCNC: 25 U/L (ref 12–78)
ANION GAP SERPL CALCULATED.3IONS-SCNC: 6 MMOL/L (ref 4–13)
AST SERPL W P-5'-P-CCNC: 24 U/L (ref 5–45)
BASOPHILS # BLD AUTO: 0.06 THOUSANDS/ΜL (ref 0–0.1)
BASOPHILS NFR BLD AUTO: 1 % (ref 0–1)
BILIRUB SERPL-MCNC: 0.73 MG/DL (ref 0.2–1)
BUN SERPL-MCNC: 11 MG/DL (ref 5–25)
CALCIUM SERPL-MCNC: 8.5 MG/DL (ref 8.3–10.1)
CHLORIDE SERPL-SCNC: 99 MMOL/L (ref 100–108)
CO2 SERPL-SCNC: 28 MMOL/L (ref 21–32)
CREAT SERPL-MCNC: 0.95 MG/DL (ref 0.6–1.3)
EOSINOPHIL # BLD AUTO: 0.18 THOUSAND/ΜL (ref 0–0.61)
EOSINOPHIL NFR BLD AUTO: 4 % (ref 0–6)
ERYTHROCYTE [DISTWIDTH] IN BLOOD BY AUTOMATED COUNT: 13.2 % (ref 11.6–15.1)
FERRITIN SERPL-MCNC: 516 NG/ML (ref 8–388)
GFR SERPL CREATININE-BSD FRML MDRD: 54 ML/MIN/1.73SQ M
GLUCOSE P FAST SERPL-MCNC: 93 MG/DL (ref 65–99)
HCT VFR BLD AUTO: 37.5 % (ref 34.8–46.1)
HGB BLD-MCNC: 12.5 G/DL (ref 11.5–15.4)
IMM GRANULOCYTES # BLD AUTO: 0.01 THOUSAND/UL (ref 0–0.2)
IMM GRANULOCYTES NFR BLD AUTO: 0 % (ref 0–2)
IRON SATN MFR SERPL: 55 % (ref 15–50)
IRON SERPL-MCNC: 147 UG/DL (ref 50–170)
LYMPHOCYTES # BLD AUTO: 1.54 THOUSANDS/ΜL (ref 0.6–4.47)
LYMPHOCYTES NFR BLD AUTO: 34 % (ref 14–44)
MCH RBC QN AUTO: 34.4 PG (ref 26.8–34.3)
MCHC RBC AUTO-ENTMCNC: 33.3 G/DL (ref 31.4–37.4)
MCV RBC AUTO: 103 FL (ref 82–98)
MONOCYTES # BLD AUTO: 0.38 THOUSAND/ΜL (ref 0.17–1.22)
MONOCYTES NFR BLD AUTO: 8 % (ref 4–12)
NEUTROPHILS # BLD AUTO: 2.37 THOUSANDS/ΜL (ref 1.85–7.62)
NEUTS SEG NFR BLD AUTO: 53 % (ref 43–75)
NRBC BLD AUTO-RTO: 0 /100 WBCS
PLATELET # BLD AUTO: 278 THOUSANDS/UL (ref 149–390)
PMV BLD AUTO: 8.7 FL (ref 8.9–12.7)
POTASSIUM SERPL-SCNC: 4.2 MMOL/L (ref 3.5–5.3)
PROT SERPL-MCNC: 6.8 G/DL (ref 6.4–8.2)
RBC # BLD AUTO: 3.63 MILLION/UL (ref 3.81–5.12)
SODIUM SERPL-SCNC: 133 MMOL/L (ref 136–145)
TIBC SERPL-MCNC: 265 UG/DL (ref 250–450)
TSH SERPL DL<=0.05 MIU/L-ACNC: 3.52 UIU/ML (ref 0.45–4.5)
WBC # BLD AUTO: 4.54 THOUSAND/UL (ref 4.31–10.16)

## 2022-04-22 PROCEDURE — 83540 ASSAY OF IRON: CPT

## 2022-04-22 PROCEDURE — 83550 IRON BINDING TEST: CPT

## 2022-04-22 PROCEDURE — 84443 ASSAY THYROID STIM HORMONE: CPT

## 2022-04-22 PROCEDURE — 80053 COMPREHEN METABOLIC PANEL: CPT

## 2022-04-22 PROCEDURE — 82728 ASSAY OF FERRITIN: CPT

## 2022-04-22 PROCEDURE — 85025 COMPLETE CBC W/AUTO DIFF WBC: CPT

## 2022-04-22 PROCEDURE — 82306 VITAMIN D 25 HYDROXY: CPT

## 2022-04-22 PROCEDURE — 36415 COLL VENOUS BLD VENIPUNCTURE: CPT

## 2022-04-25 PROBLEM — R53.82 CHRONIC FATIGUE: Status: ACTIVE | Noted: 2022-04-25

## 2022-04-25 NOTE — ASSESSMENT & PLAN NOTE
Her chronic fatigue most likely coming from COVID long hauler symptoms  Drink lots of fluids and take vitamins   Blood work ordered rule out vitamin deficiencies

## 2022-04-29 ENCOUNTER — TELEPHONE (OUTPATIENT)
Dept: INTERNAL MEDICINE CLINIC | Facility: CLINIC | Age: 87
End: 2022-04-29

## 2022-04-29 NOTE — TELEPHONE ENCOUNTER
Informed patient  The patient would like to know if she needs to add the vitamin d and c to her multivitamin she already takes  Please advise  Thank you  Faith Galicia

## 2022-04-29 NOTE — TELEPHONE ENCOUNTER
Looks like her bloodwork was all normal  Unfortunately her fatigue may be from having covid-19  This is going to take time to go away   Keep taking vitamin c and d

## 2022-06-07 ENCOUNTER — OFFICE VISIT (OUTPATIENT)
Dept: INTERNAL MEDICINE CLINIC | Facility: CLINIC | Age: 87
End: 2022-06-07
Payer: MEDICARE

## 2022-06-07 VITALS
HEART RATE: 96 BPM | BODY MASS INDEX: 23.59 KG/M2 | WEIGHT: 117 LBS | OXYGEN SATURATION: 100 % | RESPIRATION RATE: 16 BRPM | DIASTOLIC BLOOD PRESSURE: 80 MMHG | HEIGHT: 59 IN | SYSTOLIC BLOOD PRESSURE: 140 MMHG

## 2022-06-07 DIAGNOSIS — R53.82 CHRONIC FATIGUE: ICD-10-CM

## 2022-06-07 DIAGNOSIS — Z00.00 MEDICARE ANNUAL WELLNESS VISIT, SUBSEQUENT: Primary | ICD-10-CM

## 2022-06-07 DIAGNOSIS — Z12.31 ENCOUNTER FOR SCREENING MAMMOGRAM FOR BREAST CANCER: ICD-10-CM

## 2022-06-07 PROCEDURE — G0439 PPPS, SUBSEQ VISIT: HCPCS | Performed by: NURSE PRACTITIONER

## 2022-06-07 NOTE — PROGRESS NOTES
Assessment and Plan:     Problem List Items Addressed This Visit        Other    Chronic fatigue     Most likely from [id number]  Gave patient option of doing additional testing such as stress echo, mri of brain to rule out other causes of her symptoms  She said she'll wait and if she's not improving we can pursue that             Other Visit Diagnoses     Medicare annual wellness visit, subsequent    -  Primary    Encounter for screening mammogram for breast cancer               Preventive health issues were discussed with patient, and age appropriate screening tests were ordered as noted in patient's After Visit Summary  Personalized health advice and appropriate referrals for health education or preventive services given if needed, as noted in patient's After Visit Summary       History of Present Illness:     Patient presents for Medicare Annual Wellness visit    Since December she is having nausea, altered taste/smell, headache, mild cough on and off    Patient Care Team:  Gabriel Nava as PCP - General (Family Medicine)  MD Roma Pichardo as Nurse Practitioner (Internal Medicine)     Problem List:     Patient Active Problem List   Diagnosis    Hypercholesteremia    Acute non-recurrent maxillary sinusitis    Pes anserinus bursitis of right knee    Nonrheumatic aortic valve insufficiency    Perforation of left tympanic membrane    Bilateral impacted cerumen    Cough    Breast cancer ([facility] [address] )    Acute non-recurrent frontal sinusitis    Dry mouth    Chronic maxillary sinusitis    COVID-19 virus infection    Electrolyte abnormality    Chronic fatigue      Past Medical and Surgical History:     Past Medical History:   Diagnosis Date    Breast cancer ([address] )     Cancer ([address]     COVID-19     Hyperlipidemia      Past Surgical History:   Procedure Laterality Date    BREAST SURGERY      cancer (> 5 years)      Family History:     Family History   Problem Relation Age of Onset    Breast cancer Mother     Heart disease Father         heart problem    Heart disease Sister         heart problem    Heart disease Brother         heart problem      Social History:     Social History     Socioeconomic History    Marital status:      Spouse name: None    Number of children: None    Years of education: None    Highest education level: None   Occupational History    None   Tobacco Use    Smoking status: Never Smoker    Smokeless tobacco: Never Used   Vaping Use    Vaping Use: Never used   Substance and Sexual Activity    Alcohol use: Yes     Alcohol/week: 1 0 standard drink     Types: 1 Glasses of wine per week    Drug use: No    Sexual activity: Not Currently   Other Topics Concern    None   Social History Narrative    None     Social Determinants of Health     Financial Resource Strain: Not on file   Food Insecurity: Not on file   Transportation Needs: Not on file   Physical Activity: Not on file   Stress: Not on file   Social Connections: Not on file   Intimate Partner Violence: Not on file   Housing Stability: Not on file      Medications and Allergies:     Current Outpatient Medications   Medication Sig Dispense Refill    Calcium Carb-Cholecalciferol 1000-800 MG-UNIT TABS Take by mouth      Multiple Vitamins-Minerals (CENTRUM SILVER 50+WOMEN PO) Take by mouth      simvastatin (ZOCOR) 5 MG tablet Take 1 tablet (5 mg total) by mouth daily at bedtime 90 tablet 3     No current facility-administered medications for this visit       Allergies   Allergen Reactions    Pravastatin     Risedronate       Immunizations:     Immunization History   Administered Date(s) Administered    COVID-19 PFIZER VACCINE 0 3 ML IM 05/21/2021, 06/16/2021    INFLUENZA 09/29/2014, 10/19/2015, 10/26/2016, 11/08/2017, 10/09/2018, 09/14/2020, 11/16/2021    Influenza Quadrivalent Preservative Free 3 years and older IM 10/19/2011    Influenza, high dose seasonal 0 7 mL 10/02/2019    Tuberculin Skin Test 02/06/2021, 02/15/2021      Health Maintenance:         Topic Date Due    Breast Cancer Screening: Mammogram  08/13/2022         Topic Date Due    Pneumococcal Vaccine: 65+ Years (1 - PCV) Never done    COVID-19 Vaccine (3 - Booster for Shearer Miko series) 11/16/2021      Medicare Health Risk Assessment:     /80   Pulse 96   Resp 16   Ht 4' 11" (1 499 m)   Wt 53 1 kg (117 lb)   SpO2 100%   BMI 23 63 kg/m²      Gavi Gann is here for her Subsequent Wellness visit  Health Risk Assessment:   Patient rates overall health as good  Patient feels that their physical health rating is same  Patient is satisfied with their life  Eyesight was rated as same  Hearing was rated as same  Patient feels that their emotional and mental health rating is same  Patients states they are never, rarely angry  Patient states they are never, rarely unusually tired/fatigued  Pain experienced in the last 7 days has been none  Patient states that she has experienced no weight loss or gain in last 6 months  Depression Screening:   PHQ-2 Score: 0      Fall Risk Screening: In the past year, patient has experienced: no history of falling in past year      Urinary Incontinence Screening:   Patient has not leaked urine accidently in the last six months  Home Safety:  Patient does not have trouble with stairs inside or outside of their home  Patient has working smoke alarms and has working carbon monoxide detector  Home safety hazards include: none  Nutrition:   Current diet is Regular and Limited junk food  Medications:   Patient is currently taking over-the-counter supplements  OTC medications include: see medication list  Patient is able to manage medications  Activities of Daily Living (ADLs)/Instrumental Activities of Daily Living (IADLs):   Walk and transfer into and out of bed and chair?: Yes  Dress and groom yourself?: Yes    Bathe or shower yourself?: Yes    Feed yourself?  Yes  Do your laundry/housekeeping?: Yes  Manage your money, pay your bills and track your expenses?: Yes  Make your own meals?: Yes    Do your own shopping?: Yes    Previous Hospitalizations:   Any hospitalizations or ED visits within the last 12 months?: No      Advance Care Planning:   Living will: Yes    Durable POA for healthcare:  Yes    Advanced directive: Yes      PREVENTIVE SCREENINGS      Cardiovascular Screening:    General: Screening Not Indicated and History Lipid Disorder      Diabetes Screening:     General: Screening Current      Colorectal Cancer Screening:     General: Screening Not Indicated      Breast Cancer Screening:     General: History Breast Cancer      Cervical Cancer Screening:    General: Screening Not Indicated      Lung Cancer Screening:     General: Screening Not Indicated    Screening, Brief Intervention, and Referral to Treatment (SBIRT)    Screening      Single Item Drug Screening:  How often have you used an illegal drug (including marijuana) or a prescription medication for non-medical reasons in the past year? never    Single Item Drug Screen Score: 0  Interpretation: Negative screen for possible drug use disorder      LANI Guidry

## 2022-06-07 NOTE — PATIENT INSTRUCTIONS
Medicare Preventive Visit Patient Instructions  Thank you for completing your Welcome to Medicare Visit or Medicare Annual Wellness Visit today  Your next wellness visit will be due in one year (6/8/2023)  The screening/preventive services that you may require over the next 5-10 years are detailed below  Some tests may not apply to you based off risk factors and/or age  Screening tests ordered at today's visit but not completed yet may show as past due  Also, please note that scanned in results may not display below  Preventive Screenings:  Service Recommendations Previous Testing/Comments   Colorectal Cancer Screening  * Colonoscopy    * Fecal Occult Blood Test (FOBT)/Fecal Immunochemical Test (FIT)  * Fecal DNA/Cologuard Test  * Flexible Sigmoidoscopy Age: 54-65 years old   Colonoscopy: every 10 years (may be performed more frequently if at higher risk)  OR  FOBT/FIT: every 1 year  OR  Cologuard: every 3 years  OR  Sigmoidoscopy: every 5 years  Screening may be recommended earlier than age 48 if at higher risk for colorectal cancer  Also, an individualized decision between you and your healthcare provider will decide whether screening between the ages of 74-80 would be appropriate  Colonoscopy: Not on file  FOBT/FIT: Not on file  Cologuard: Not on file  Sigmoidoscopy: Not on file          Breast Cancer Screening Age: 36 years old  Frequency: every 1-2 years  Not required if history of left and right mastectomy Mammogram: 08/13/2021        Cervical Cancer Screening Between the ages of 21-29, pap smear recommended once every 3 years  Between the ages of 33-67, can perform pap smear with HPV co-testing every 5 years     Recommendations may differ for women with a history of total hysterectomy, cervical cancer, or abnormal pap smears in past  Pap Smear: Not on file        Hepatitis C Screening Once for adults born between [facility]  More frequently in patients at high risk for Hepatitis C Hep C Antibody: Not on file        Diabetes Screening 1-2 times per year if you're at risk for diabetes or have pre-diabetes Fasting glucose: 93 mg/dL   A1C: No results in last 5 years        Cholesterol Screening Once every 5 years if you don't have a lipid disorder  May order more often based on risk factors  Lipid panel: 07/19/2021          Other Preventive Screenings Covered by Medicare:  1  Abdominal Aortic Aneurysm (AAA) Screening: covered once if your at risk  You're considered to be at risk if you have a family history of AAA  2  Lung Cancer Screening: covers low dose CT scan once per year if you meet all of the following conditions: (1) Age 50-69; (2) No signs or symptoms of lung cancer; (3) Current smoker or have quit smoking within the last 15 years; (4) You have a tobacco smoking history of at least 30 pack years (packs per day multiplied by number of years you smoked); (5) You get a written order from a healthcare provider  3  Glaucoma Screening: covered annually if you're considered high risk: (1) You have diabetes OR (2) Family history of glaucoma OR (3)  aged 48 and older OR (3)  American aged 72 and older  3  Osteoporosis Screening: covered every 2 years if you meet one of the following conditions: (1) You're estrogen deficient and at risk for osteoporosis based off medical history and other findings; (2) Have a vertebral abnormality; (3) On glucocorticoid therapy for more than 3 months; (4) Have primary hyperparathyroidism; (5) On osteoporosis medications and need to assess response to drug therapy  · Last bone density test (DXA Scan): Not on file  5  HIV Screening: covered annually if you're between the age of 12-76  Also covered annually if you are younger than 13 and older than 72 with risk factors for HIV infection  For pregnant patients, it is covered up to 3 times per pregnancy      Immunizations:  Immunization Recommendations   Influenza Vaccine Annual influenza vaccination during flu season is recommended for all persons aged >= 6 months who do not have contraindications   Pneumococcal Vaccine (Prevnar and Pneumovax)  * Prevnar = PCV13  * Pneumovax = PPSV23   Adults 25-60 years old: 1-3 doses may be recommended based on certain risk factors  Adults 72 years old: Prevnar (PCV13) vaccine recommended followed by Pneumovax (PPSV23) vaccine  If already received PPSV23 since turning 65, then PCV13 recommended at least one year after PPSV23 dose  Hepatitis B Vaccine 3 dose series if at intermediate or high risk (ex: diabetes, end stage renal disease, liver disease)   Tetanus (Td) Vaccine - COST NOT COVERED BY MEDICARE PART B Following completion of primary series, a booster dose should be given every 10 years to maintain immunity against tetanus  Td may also be given as tetanus wound prophylaxis  Tdap Vaccine - COST NOT COVERED BY MEDICARE PART B Recommended at least once for all adults  For pregnant patients, recommended with each pregnancy  Shingles Vaccine (Shingrix) - COST NOT COVERED BY MEDICARE PART B  2 shot series recommended in those aged 48 and above     Health Maintenance Due:      Topic Date Due    Breast Cancer Screening: Mammogram  08/13/2022     Immunizations Due:      Topic Date Due    DTaP,Tdap,and Td Vaccines (1 - Tdap) Never done    Pneumococcal Vaccine: 65+ Years (1 - PCV) Never done    COVID-19 Vaccine (3 - Booster for Pfizer series) 11/16/2021     Advance Directives   What are advance directives? Advance directives are legal documents that state your wishes and plans for medical care  These plans are made ahead of time in case you lose your ability to make decisions for yourself  Advance directives can apply to any medical decision, such as the treatments you want, and if you want to donate organs  What are the types of advance directives? There are many types of advance directives, and each state has rules about how to use them   You may choose a combination of any of the following:  · Living will: This is a written record of the treatment you want  You can also choose which treatments you do not want, which to limit, and which to stop at a certain time  This includes surgery, medicine, IV fluid, and tube feedings  · Durable power of  for healthcare [facility]): This is a written record that states who you want to make healthcare choices for you when you are unable to make them for yourself  This person, called a proxy, is usually a family member or a friend  You may choose more than 1 proxy  · Do not resuscitate (DNR) order:  A DNR order is used in case your heart stops beating or you stop breathing  It is a request not to have certain forms of treatment, such as CPR  A DNR order may be included in other types of advance directives  · Medical directive: This covers the care that you want if you are in a coma, near death, or unable to make decisions for yourself  You can list the treatments you want for each condition  Treatment may include pain medicine, surgery, blood transfusions, dialysis, IV or tube feedings, and a ventilator (breathing machine)  · Values history: This document has questions about your views, beliefs, and how you feel and think about life  This information can help others choose the care that you would choose  Why are advance directives important? An advance directive helps you control your care  Although spoken wishes may be used, it is better to have your wishes written down  Spoken wishes can be misunderstood, or not followed  Treatments may be given even if you do not want them  An advance directive may make it easier for your family to make difficult choices about your care  © Copyright [employer] 2018 Information is for End User's use only and may not be sold, redistributed or otherwise used for commercial purposes   All illustrations and images included in CareNotes® are the copyrighted property of A D A M , Inc  or [employer] [facility]

## 2022-06-09 NOTE — ASSESSMENT & PLAN NOTE
Most likely from covid19  Gave patient option of doing additional testing such as stress echo, mri of brain to rule out other causes of her symptoms   She said she'll wait and if she's not improving we can pursue that

## 2022-07-20 ENCOUNTER — TELEPHONE (OUTPATIENT)
Dept: CARDIOLOGY CLINIC | Facility: CLINIC | Age: 87
End: 2022-07-20

## 2022-07-20 NOTE — TELEPHONE ENCOUNTER
P/c'd, states she went to [facility] yesterday   She went for fatigue, dizziness, giddiness and abd pain  Being treated for cystitis  Unable to check bp at home  Her Bp was 162/76, Pulse And EKG per [facility] interpreted as Unspecified atrial flutter  She had booster shot in [facility] 2021 and has been having problems ever since  Has severe fatigue and no taste or smell even now  She had covid in 2/2021  EKG under media       Dr Socorro Galvez pt     Please advise

## 2022-08-01 ENCOUNTER — OFFICE VISIT (OUTPATIENT)
Dept: INTERNAL MEDICINE CLINIC | Facility: CLINIC | Age: 87
End: 2022-08-01
Payer: MEDICARE

## 2022-08-01 VITALS
BODY MASS INDEX: 23.67 KG/M2 | SYSTOLIC BLOOD PRESSURE: 128 MMHG | HEIGHT: 59 IN | OXYGEN SATURATION: 98 % | DIASTOLIC BLOOD PRESSURE: 68 MMHG | HEART RATE: 97 BPM | WEIGHT: 117.4 LBS | TEMPERATURE: 97.6 F

## 2022-08-01 DIAGNOSIS — R89.9 ABNORMAL LABORATORY TEST: Primary | ICD-10-CM

## 2022-08-01 LAB
SL AMB  POCT GLUCOSE, UA: ABNORMAL
SL AMB LEUKOCYTE ESTERASE,UA: ABNORMAL
SL AMB POCT BILIRUBIN,UA: ABNORMAL
SL AMB POCT BLOOD,UA: ABNORMAL
SL AMB POCT CLARITY,UA: CLEAR
SL AMB POCT COLOR,UA: YELLOW
SL AMB POCT KETONES,UA: ABNORMAL
SL AMB POCT NITRITE,UA: ABNORMAL
SL AMB POCT PH,UA: 6
SL AMB POCT SPECIFIC GRAVITY,UA: 1.01
SL AMB POCT URINE PROTEIN: ABNORMAL
SL AMB POCT UROBILINOGEN: ABNORMAL

## 2022-08-01 PROCEDURE — 81002 URINALYSIS NONAUTO W/O SCOPE: CPT | Performed by: GENERAL ACUTE CARE HOSPITAL

## 2022-08-01 PROCEDURE — 99212 OFFICE O/P EST SF 10 MIN: CPT | Performed by: GENERAL ACUTE CARE HOSPITAL

## 2022-08-01 NOTE — ASSESSMENT & PLAN NOTE
DATE OF ADMISSION:  11/30/2017

 

SUMMARY:  The chart was reviewed.  The past medical history is extensively

reviewed with the primary physician, Dr. Mireles and also the CAT scan and

the recent imaging were reviewed with radiology, Dr. Flores and the patient

was seen.  The patient has a large abscess involving right lobe of the liver

that would possibly be better treated with interventional radiologist at a

tertiary center.  It was felt that it is a complex abscess that may require

more specialized catheters that would be available here.  Also has a pelvic

abscess probably related to diverticular problems that she was admitted here

about a year ago with diverticulitis.

 

OBJECTIVE:  Jennifer is in no acute distress.  She is resting, walking around

the room quietly with her family  in room.  She is coherent with what is going 
on.

 She is not complaining of any abdominal pain.

 

RECOMMENDATIONS:  At this point, my recommendation as stated would be to

refer the patient to a tertiary center, certainly does not need to be done

immediately tonight. She is started on broad spectrum antibiotics and

pending her treatment there, whether or not she comes back here or may keep

her there for a few days, this was discussed with the patient.  Eventually,

once these abscesses resolved, there are 2 issues that need to be addressed

is the diverticular problem and secondly she does have a lesion on the left

lower lobe that may warrant further specification.  The lesion of left lower

lobe, I did not discuss with the patient at this time.

 

 

 

DIMITRIOS Not UTI since she has no clinical symptoms  Continue to monitor

## 2022-08-01 NOTE — PROGRESS NOTES
Assessment/Plan:   Problem List Items Addressed This Visit        Other    Abnormal laboratory test - Primary     Not UTI since she has no clinical symptoms  Continue to monitor  Diagnoses and all orders for this visit:    Encounter for immunization    Encounter for screening mammogram for breast cancer          Subjective:     Patient ID: Seth Bloom is a 80 y o  female      HPI    Yellow discharge on underwear   No dysuria, abdominal pain, burning,itchiness hematuria, fever, etc      Review of Systems      Objective:     Physical Exam

## 2022-08-10 ENCOUNTER — OFFICE VISIT (OUTPATIENT)
Dept: INTERNAL MEDICINE CLINIC | Facility: CLINIC | Age: 87
End: 2022-08-10
Payer: MEDICARE

## 2022-08-10 VITALS
DIASTOLIC BLOOD PRESSURE: 64 MMHG | BODY MASS INDEX: 24.03 KG/M2 | HEART RATE: 105 BPM | WEIGHT: 119.2 LBS | HEIGHT: 59 IN | SYSTOLIC BLOOD PRESSURE: 122 MMHG | OXYGEN SATURATION: 96 %

## 2022-08-10 DIAGNOSIS — N89.8 VAGINAL DISCHARGE: Primary | ICD-10-CM

## 2022-08-10 DIAGNOSIS — I87.2 VENOUS INSUFFICIENCY: ICD-10-CM

## 2022-08-10 DIAGNOSIS — Z23 ENCOUNTER FOR IMMUNIZATION: ICD-10-CM

## 2022-08-10 DIAGNOSIS — Z12.31 ENCOUNTER FOR SCREENING MAMMOGRAM FOR BREAST CANCER: ICD-10-CM

## 2022-08-10 PROCEDURE — 99213 OFFICE O/P EST LOW 20 MIN: CPT | Performed by: GENERAL ACUTE CARE HOSPITAL

## 2022-08-10 NOTE — PATIENT INSTRUCTIONS
For leg swelling: get compression stockings, leg elevation, exercise  For vaginal discharge: refer to ob/gyn

## 2022-08-10 NOTE — PROGRESS NOTES
Assessment/Plan:   Problem List Items Addressed This Visit        Cardiovascular and Mediastinum    Venous insufficiency     Compression sock  Leg elevation  Exercise  Relevant Orders    Compression Stocking       Other    Vaginal discharge - Primary     Refer to ob/gyn         Relevant Orders    Ambulatory Referral to Obstetrics / Gynecology      Other Visit Diagnoses     Encounter for immunization        Encounter for screening mammogram for breast cancer                 Diagnoses and all orders for this visit:    Vaginal discharge  -     Ambulatory Referral to Obstetrics / Gynecology; Future    Encounter for immunization    Encounter for screening mammogram for breast cancer    Venous insufficiency  -     Compression Stocking          Subjective:     Patient ID: Terri Santos is a 80 y o  female      HPI    B/l leg swelling  Continue to have vaginal discharge    Review of Systems      Objective:     Physical Exam

## 2022-08-16 ENCOUNTER — OFFICE VISIT (OUTPATIENT)
Dept: SURGERY | Facility: CLINIC | Age: 87
End: 2022-08-16
Payer: MEDICARE

## 2022-08-16 DIAGNOSIS — Z12.31 ENCOUNTER FOR SCREENING MAMMOGRAM FOR BREAST CANCER: ICD-10-CM

## 2022-08-16 DIAGNOSIS — C50.919 BREAST CANCER (HCC): ICD-10-CM

## 2022-08-16 DIAGNOSIS — Z12.31 ENCOUNTER FOR SCREENING MAMMOGRAM FOR BREAST CANCER: Primary | ICD-10-CM

## 2022-08-16 PROCEDURE — 99213 OFFICE O/P EST LOW 20 MIN: CPT | Performed by: SURGERY

## 2022-08-16 NOTE — PROGRESS NOTES
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  A biopsy in August 2011 was completed  In September 2011 underwent right breast lumpectomy with 7 lymph nodes removed  This was receptor positive  She completed radiation therapy  She returns today for follow-up visit  She is no particular breast complaints  Her mammogram is unremarkable  She has been trouble with post COVID vaccine symptoms as you know  Examination reveals no dominant breast masses  Evidence for nipple discharge or adenopathy  Reassurance was provided  A follow-up visit at 1 year after an updated mammogram was requested  There are no diagnoses linked to this encounter  Subjective:      Patient ID: Nicci Wynn is a 80 y o  female  Presents for yearly breast exam  HX ductal carcinoma in situ with lumpectomy in 2011  Mammogram 8/15/2022  Doing well, no breast related concerns  The following portions of the patient's history were reviewed and updated as appropriate:     She  has a past medical history of Breast cancer ([facility] [address] ), Cancer ([facility] [address] ), COVID-19, and Hyperlipidemia  She  has a past surgical history that includes Breast surgery  Her family history includes Breast cancer in her mother; Heart disease in her brother, father, and sister  She  reports that she has never smoked  She has never used smokeless tobacco  She reports current alcohol use of about 1 0 standard drink of alcohol per week  She reports that she does not use drugs  Current Outpatient Medications   Medication Sig Dispense Refill    Calcium Carb-Cholecalciferol 1000-800 MG-UNIT TABS Take by mouth      Multiple Vitamins-Minerals (CENTRUM SILVER 50+WOMEN PO) Take by mouth      simvastatin (ZOCOR) 5 MG tablet Take 1 tablet (5 mg total) by mouth daily at bedtime 90 tablet 3     No current facility-administered medications for this visit  She is allergic to pravastatin and risedronate       Review of Systems   Constitutional: Positive for fatigue  Negative for activity change  HENT: Negative  Eyes: Negative  Respiratory: Negative  Cardiovascular: Negative  Gastrointestinal: Negative  Endocrine: Negative  Genitourinary: Negative  Musculoskeletal: Negative  Skin: Negative  Allergic/Immunologic: Negative  Neurological: Negative  Psychiatric/Behavioral: Negative for agitation, behavioral problems and confusion  The patient is not nervous/anxious  Objective: There were no vitals taken for this visit  Physical Exam  Constitutional:       Appearance: Normal appearance  She is well-developed  HENT:      Head: Normocephalic and atraumatic  Nose: Nose normal    Eyes:      Extraocular Movements: Extraocular movements intact  Conjunctiva/sclera: Conjunctivae normal    Cardiovascular:      Rate and Rhythm: Normal rate and regular rhythm  Heart sounds: Normal heart sounds  Pulmonary:      Effort: Pulmonary effort is normal       Breath sounds: Normal breath sounds  Chest:       Abdominal:      General: Abdomen is flat  Musculoskeletal:         General: Normal range of motion  Cervical back: Normal range of motion  Skin:     General: Skin is warm and dry  Neurological:      Mental Status: She is alert and oriented to person, place, and time     Psychiatric:         Mood and Affect: Mood normal

## 2022-09-07 ENCOUNTER — OFFICE VISIT (OUTPATIENT)
Dept: INTERNAL MEDICINE CLINIC | Facility: CLINIC | Age: 87
End: 2022-09-07
Payer: MEDICARE

## 2022-09-07 VITALS
HEIGHT: 59 IN | BODY MASS INDEX: 23.83 KG/M2 | OXYGEN SATURATION: 99 % | HEART RATE: 104 BPM | WEIGHT: 118.2 LBS | SYSTOLIC BLOOD PRESSURE: 154 MMHG | DIASTOLIC BLOOD PRESSURE: 76 MMHG

## 2022-09-07 DIAGNOSIS — N89.8 VAGINAL DISCHARGE: ICD-10-CM

## 2022-09-07 DIAGNOSIS — U07.1 COVID-19 VIRUS INFECTION: ICD-10-CM

## 2022-09-07 DIAGNOSIS — N18.30 STAGE 3 CHRONIC KIDNEY DISEASE, UNSPECIFIED WHETHER STAGE 3A OR 3B CKD (HCC): ICD-10-CM

## 2022-09-07 DIAGNOSIS — E87.8 ELECTROLYTE ABNORMALITY: ICD-10-CM

## 2022-09-07 DIAGNOSIS — Z23 ENCOUNTER FOR IMMUNIZATION: Primary | ICD-10-CM

## 2022-09-07 DIAGNOSIS — M81.0 AGE RELATED OSTEOPOROSIS, UNSPECIFIED PATHOLOGICAL FRACTURE PRESENCE: ICD-10-CM

## 2022-09-07 DIAGNOSIS — Z65.8 FEELING LONELY: ICD-10-CM

## 2022-09-07 PROBLEM — J01.00 ACUTE NON-RECURRENT MAXILLARY SINUSITIS: Status: RESOLVED | Noted: 2018-02-28 | Resolved: 2022-09-07

## 2022-09-07 PROBLEM — J01.10 ACUTE NON-RECURRENT FRONTAL SINUSITIS: Status: RESOLVED | Noted: 2019-11-10 | Resolved: 2022-09-07

## 2022-09-07 PROCEDURE — 99213 OFFICE O/P EST LOW 20 MIN: CPT | Performed by: GENERAL ACUTE CARE HOSPITAL

## 2022-09-07 NOTE — PROGRESS NOTES
Assessment/Plan:    Vaginal discharge  Has appointment with objamesn    COVID-19 virus infection  Reports having different symptoms since her covid infecton/vaccine  Chemical smell in nose, eye burning, dry mouth, etc  Continue to monitor  Electrolyte abnormality  Chronic stable hyponatremia  continue to monitor  Could be diet related  Diagnoses and all orders for this visit:    Encounter for immunization    Age related osteoporosis, unspecified pathological fracture presence  -     DXA bone density spine hip and pelvis; Future    Stage 3 chronic kidney disease, unspecified whether stage 3a or 3b CKD ([address] )    Vaginal discharge    Feeling lonely  -     Ambulatory Referral to Social Work Care Management Program; Future    COVID-19 virus infection    Electrolyte abnormality          Subjective:      Patient ID: Brook Byrne is a 80 y o  female  HPI    Vaginal discharge better  Independent on ADLs and iADLs  Lives alone       The following portions of the patient's history were reviewed and updated as appropriate: allergies, current medications, past family history, past medical history, past social history, past surgical history and problem list     Review of Systems      Objective:      /76   Pulse 104   Ht 4' 11" (1 499 m)   Wt 53 6 kg (118 lb 3 2 oz)   SpO2 99%   BMI 23 87 kg/m²          Physical Exam

## 2022-09-07 NOTE — ASSESSMENT & PLAN NOTE
Reports having different symptoms since her covid infecton/vaccine  Chemical smell in nose, eye burning, dry mouth, etc  Continue to monitor

## 2022-09-09 ENCOUNTER — PATIENT OUTREACH (OUTPATIENT)
Dept: INTERNAL MEDICINE CLINIC | Facility: CLINIC | Age: 87
End: 2022-09-09

## 2022-09-09 NOTE — PROGRESS NOTES
OPCM SW received referral to assist patient with [facility]  Chart reviewed and phone call placed to patient  Patient not available, message left with contact information, awaiting call back from patient

## 2022-09-14 ENCOUNTER — PATIENT OUTREACH (OUTPATIENT)
Dept: INTERNAL MEDICINE CLINIC | Facility: CLINIC | Age: 87
End: 2022-09-14

## 2022-09-14 NOTE — PROGRESS NOTES
OPCM SW received return call from patient  Patient indicates that she resides alone and son and DIL live nearby  Patient is independent with ADLs and IADLs and drives  Patient does report that she does not drive far  Patient provided with [facility] at [address] [place] [address] H on [address] in [place], patient will not drive to [place], and reported that the [facility] in [place] would be too far  Patient also given the [facility] which patient declined due to location  Patient has this  phone number for further needs

## 2022-09-20 ENCOUNTER — OFFICE VISIT (OUTPATIENT)
Dept: OBGYN CLINIC | Facility: CLINIC | Age: 87
End: 2022-09-20
Payer: MEDICARE

## 2022-09-20 VITALS
SYSTOLIC BLOOD PRESSURE: 136 MMHG | DIASTOLIC BLOOD PRESSURE: 72 MMHG | HEIGHT: 56 IN | BODY MASS INDEX: 26.1 KG/M2 | WEIGHT: 116 LBS

## 2022-09-20 DIAGNOSIS — N89.8 VAGINAL DISCHARGE: Primary | ICD-10-CM

## 2022-09-20 LAB
BV WHIFF TEST VAG QL: NORMAL
CLUE CELLS SPEC QL WET PREP: NORMAL
PH SMN: 6 [PH]
T VAGINALIS VAG QL WET PREP: NORMAL
YEAST VAG QL WET PREP: NORMAL

## 2022-09-20 PROCEDURE — 99203 OFFICE O/P NEW LOW 30 MIN: CPT | Performed by: PHYSICIAN ASSISTANT

## 2022-09-20 PROCEDURE — 87210 SMEAR WET MOUNT SALINE/INK: CPT | Performed by: PHYSICIAN ASSISTANT

## 2022-09-20 NOTE — PROGRESS NOTES
Assessment/Plan:    1  Vaginal discharge  -reviewed likely physiologic discharge given negative wet mount, normal exam and improvement in discharge  Advised pt to call if discharge worsens, she experiences bleeding, irritation/itching, odor with her discharge  - POCT wet mount      Subjective:      Patient ID: Mary Herrera is a 80 y o  female  Pt presents today with c/o increased vaginal discharge  She was referred for an exam today due to persistent yellowish discharge for the past 1-2 months  She is s/p total hysterectomy >15 years ago  Had hysterectomy for irregular bleeding patterns  Denies any hx of endometrial or cervical cancer or precancerous cell changes  Describes the discharge today as thin and yellow tinged  She only notices discharge on her panty liner when waking up in the morning  Does not report discharge throughout the day  States discharge is improving  She is not sexually active  She states she was treated for a UTI by urgent care about a month ago but followed up with her PCP who confirmed she did not have a UTI  PCP referred pt to GYN today due to persistent discharge  Denies associated odor, irritation, abd pain, current vaginal itching or irritation, fever, chills, dysuria, urinary leakage/incontinence, constipation, bowel incontinence, hematuria, blood in stool  or change in household detergents or hygiene products  She follows regularly with internal medicine/geriatrics for routine health maintenance/screening  Also follows with general surgery as she has a hx of DCIS   They are ordering her mammograms        The following portions of the patient's history were reviewed and updated as appropriate: allergies, current medications, past family history, past medical history, past social history, past surgical history and problem list     Review of Systems      Objective:      /72 (BP Location: Left arm, Patient Position: Sitting, Cuff Size: Standard)   Ht 4' 8" (1 422 m)   Wt 52 6 kg (116 lb)   BMI 26 01 kg/m²          Physical Exam  Vitals reviewed  Constitutional:       Appearance: Normal appearance  HENT:      Head: Normocephalic and atraumatic  Pulmonary:      Effort: Pulmonary effort is normal    Abdominal:      General: Abdomen is flat  There is no distension  Palpations: Abdomen is soft  There is no mass  Tenderness: There is no abdominal tenderness  There is no guarding  Genitourinary:     General: Normal vulva  Labia:         Right: No rash, tenderness or lesion  Left: No rash, tenderness or lesion  Vagina: Vaginal discharge (minimal yellow /white discharge noted) present  No erythema, bleeding or lesions  Uterus: Absent  Comments: Cervix surgically absent  No evidence of fistula on exam  Ovaries not palpable  Neurological:      Mental Status: She is alert

## 2022-09-29 ENCOUNTER — OFFICE VISIT (OUTPATIENT)
Dept: INTERNAL MEDICINE CLINIC | Facility: CLINIC | Age: 87
End: 2022-09-29
Payer: MEDICARE

## 2022-09-29 VITALS
WEIGHT: 117.4 LBS | HEART RATE: 78 BPM | BODY MASS INDEX: 26.32 KG/M2 | OXYGEN SATURATION: 98 % | DIASTOLIC BLOOD PRESSURE: 82 MMHG | SYSTOLIC BLOOD PRESSURE: 122 MMHG

## 2022-09-29 DIAGNOSIS — R68.2 DRY MOUTH: Primary | ICD-10-CM

## 2022-09-29 PROCEDURE — 99213 OFFICE O/P EST LOW 20 MIN: CPT | Performed by: NURSE PRACTITIONER

## 2022-09-29 NOTE — PROGRESS NOTES
Assessment/Plan:    1  Dry mouth    The case discussed with patient using patient centered shared decision making  The patient was counseled regarding instructions for management,-- risk factor reductions,-- prognosis,-- impressions,-- risks and benefits of treatment options,-- importance of compliance with treatment  I have reviewed the instructions with the patient, answering all questions to her satisfaction  No evidence of thrush, oral infection, lesions  Recommend switching to a more natural tooth paste->Preston's  Instructed that whitening tooth paste contains Sodium Laureth Sulfate  This ingredient is very irritating and can cause dry mouth    Patient is seeing the ENT 10/6 for an ear problem but she will discuss mouth dryness during that visit    She declines labs to r/o organic cause of mouth dryness "I've had the problem for a long time"     BMI Counseling: Body mass index is 26 32 kg/m²  The BMI is above normal  Nutrition recommendations include moderation in carbohydrate intake and increasing intake of lean protein  Rationale for BMI follow-up plan is due to patient being overweight or obese  Daily walk           There are no Patient Instructions on file for this visit  No follow-ups on file  Subjective:      Patient ID: Johnnie Vann is a 80 y o  female      Chief Complaint   Patient presents with    dry mouth       81 yo female with longstanding c/o dry mouth presents to discuss the same-chart review reveals several past office visits for same    She feels it is worse over past month  She does admit to switching tooth past brand to [address] has been acceptable for years    Otherwise in usual state of health      The following portions of the patient's history were reviewed and updated as appropriate: allergies, current medications, past family history, past medical history, past social history, past surgical history and problem list     Review of Systems   Constitutional: Negative for fever  HENT: Negative for dental problem, drooling, sore throat and trouble swallowing  Respiratory: Negative  Gastrointestinal: Negative for abdominal pain and vomiting  Current Outpatient Medications   Medication Sig Dispense Refill    Calcium Carb-Cholecalciferol 1000-800 MG-UNIT TABS Take by mouth      Multiple Vitamins-Minerals (CENTRUM SILVER 50+WOMEN PO) Take by mouth      simvastatin (ZOCOR) 5 MG tablet Take 1 tablet (5 mg total) by mouth daily at bedtime 90 tablet 3     No current facility-administered medications for this visit  Objective:    /82   Pulse 78   Wt 53 3 kg (117 lb 6 4 oz)   SpO2 98%   BMI 26 32 kg/m²        Physical Exam  Vitals and nursing note reviewed  Constitutional:       General: She is not in acute distress  Appearance: Normal appearance  She is not ill-appearing  Comments: Frail elderly female   HENT:      Mouth/Throat:      Comments: Mucosa sl dry  No white plaques, redness, fissures, ulcers observed  Lymphadenopathy:      Cervical: No cervical adenopathy  Skin:     Coloration: Skin is not pale  Neurological:      General: No focal deficit present  Mental Status: She is alert                  Selene Tang

## 2022-10-10 ENCOUNTER — OFFICE VISIT (OUTPATIENT)
Dept: INTERNAL MEDICINE CLINIC | Facility: CLINIC | Age: 87
End: 2022-10-10
Payer: MEDICARE

## 2022-10-10 VITALS
HEART RATE: 84 BPM | DIASTOLIC BLOOD PRESSURE: 82 MMHG | OXYGEN SATURATION: 96 % | HEIGHT: 56 IN | BODY MASS INDEX: 26.27 KG/M2 | SYSTOLIC BLOOD PRESSURE: 150 MMHG | WEIGHT: 116.8 LBS

## 2022-10-10 DIAGNOSIS — Z23 ENCOUNTER FOR IMMUNIZATION: ICD-10-CM

## 2022-10-10 DIAGNOSIS — R13.19 ESOPHAGEAL DYSPHAGIA: ICD-10-CM

## 2022-10-10 DIAGNOSIS — R68.2 DRY MOUTH: Primary | ICD-10-CM

## 2022-10-10 DIAGNOSIS — K92.1 BLACK STOOL: ICD-10-CM

## 2022-10-10 PROBLEM — U07.1 COVID-19 VIRUS INFECTION: Status: RESOLVED | Noted: 2021-02-26 | Resolved: 2022-10-10

## 2022-10-10 PROCEDURE — 99214 OFFICE O/P EST MOD 30 MIN: CPT | Performed by: GENERAL ACUTE CARE HOSPITAL

## 2022-10-10 PROCEDURE — 90662 IIV NO PRSV INCREASED AG IM: CPT

## 2022-10-10 PROCEDURE — G0008 ADMIN INFLUENZA VIRUS VAC: HCPCS

## 2022-10-10 NOTE — ASSESSMENT & PLAN NOTE
Discussed see GI for upper endoscopy  Continue to monitor  If it gets worse let me know  Will put referral for your

## 2022-10-10 NOTE — ASSESSMENT & PLAN NOTE
Likely from pepto bismo use  Stop pepto bismo  It should improve  If after a week, you still have black stool please let me know

## 2022-10-10 NOTE — PATIENT INSTRUCTIONS
1  Dry mouth  Assessment & Plan:  Try OTC artificial saliva  Try sugar free gum  If you continue to have dry mouth, make an appointment with ENT  2  Esophageal dysphagia  Assessment & Plan:  Discussed see GI for upper endoscopy  Continue to monitor  If it gets worse let me know  Will put referral for your  3  Black stool  Assessment & Plan:  Likely from pepto bismo use  Stop pepto bismo  It should improve  If after a week, you still have black stool please let me know         4  Encounter for immunization  -     influenza vaccine, high-dose, PF 0 5 mL

## 2022-10-10 NOTE — ASSESSMENT & PLAN NOTE
Try OTC artificial saliva  Try sugar free gum  If you continue to have dry mouth, make an appointment with ENT

## 2022-10-19 DIAGNOSIS — M81.0 AGE RELATED OSTEOPOROSIS, UNSPECIFIED PATHOLOGICAL FRACTURE PRESENCE: ICD-10-CM

## 2022-11-01 ENCOUNTER — TELEPHONE (OUTPATIENT)
Dept: INTERNAL MEDICINE CLINIC | Facility: CLINIC | Age: 87
End: 2022-11-01

## 2022-11-01 PROBLEM — M81.0 AGE-RELATED OSTEOPOROSIS WITHOUT CURRENT PATHOLOGICAL FRACTURE: Status: ACTIVE | Noted: 2022-11-01

## 2022-11-01 NOTE — TELEPHONE ENCOUNTER
----- Message from Haider Alvarado MD sent at 11/1/2022  1:56 PM EDT -----  Bone scan showed osteoporosis  This means you bone density is very low which put you at high risk of having fracture  There is medication available for this     If she is interested in discussing the treatment, please schedule a visit with me

## 2022-11-22 ENCOUNTER — OFFICE VISIT (OUTPATIENT)
Dept: INTERNAL MEDICINE CLINIC | Facility: CLINIC | Age: 87
End: 2022-11-22

## 2022-11-22 VITALS
OXYGEN SATURATION: 98 % | WEIGHT: 119.2 LBS | HEART RATE: 94 BPM | BODY MASS INDEX: 26.82 KG/M2 | HEIGHT: 56 IN | SYSTOLIC BLOOD PRESSURE: 138 MMHG | DIASTOLIC BLOOD PRESSURE: 96 MMHG

## 2022-11-22 DIAGNOSIS — R68.2 DRY MOUTH: Primary | ICD-10-CM

## 2022-11-22 RX ORDER — CLOTRIMAZOLE 10 MG/1
LOZENGE ORAL; TOPICAL
COMMUNITY
Start: 2022-10-19

## 2022-11-22 RX ORDER — PILOCARPINE HYDROCHLORIDE 40 MG/ML
SOLUTION/ DROPS OPHTHALMIC
Qty: 15 ML | Refills: 1 | Status: SHIPPED | OUTPATIENT
Start: 2022-11-22

## 2022-11-22 NOTE — ASSESSMENT & PLAN NOTE
· Failed preventive/supportive measures  · She saw dentist and ENT and reports no improvement with their measures  · She was also treated for thrush with no improvement  · Will try pilocarpine solution but as oral route  · Check Sjogren antibodies   · Continue with supportive measures including well hydration , rinsing mouth 4 times daily,and sugar free lozenges   Hand out give to her

## 2022-11-23 ENCOUNTER — APPOINTMENT (OUTPATIENT)
Dept: LAB | Age: 87
End: 2022-11-23

## 2022-11-23 DIAGNOSIS — R68.2 DRY MOUTH: ICD-10-CM

## 2022-11-25 ENCOUNTER — TELEPHONE (OUTPATIENT)
Dept: INTERNAL MEDICINE CLINIC | Facility: CLINIC | Age: 87
End: 2022-11-25

## 2022-11-25 LAB
ENA SS-A AB SER-ACNC: <0.2 AI (ref 0–0.9)
ENA SS-B AB SER-ACNC: <0.2 AI (ref 0–0.9)

## 2022-11-28 ENCOUNTER — TELEMEDICINE (OUTPATIENT)
Dept: INTERNAL MEDICINE CLINIC | Facility: CLINIC | Age: 87
End: 2022-11-28

## 2022-11-28 ENCOUNTER — TELEPHONE (OUTPATIENT)
Dept: INTERNAL MEDICINE CLINIC | Facility: CLINIC | Age: 87
End: 2022-11-28

## 2022-11-28 VITALS — HEIGHT: 57 IN | OXYGEN SATURATION: 97 % | WEIGHT: 119 LBS | BODY MASS INDEX: 25.67 KG/M2

## 2022-11-28 DIAGNOSIS — J02.9 ACUTE PHARYNGITIS, UNSPECIFIED ETIOLOGY: ICD-10-CM

## 2022-11-28 DIAGNOSIS — R68.89 FLU-LIKE SYMPTOMS: Primary | ICD-10-CM

## 2022-11-28 NOTE — TELEPHONE ENCOUNTER
Pt had a virtual visit with you this AM and was to come by the office for a COVID/FLU swab however she went to [facility] instead - says that they tested her and she came up positive for Influenza  Marielos Collins  asking if she should be isolated 7-10 days or what the precautions are

## 2022-11-28 NOTE — ASSESSMENT & PLAN NOTE
Recommend to check flu/covid  Home covid test negative but I recommend pcr test for accuracy and she is still within treatment window if positive  It's 20min drive to our office, I recommend going to a nearby urgent care for test  Pt wants to try to come here  Will have staff reach out to schedule      cepacol sent for sore throat

## 2022-11-28 NOTE — PROGRESS NOTES
Virtual Regular Visit    Verification of patient location:    Patient is located in the following state in which I hold an active license [place]      Assessment/Plan:    Problem List Items Addressed This Visit        Other    Flu-like symptoms - Primary     Recommend to check flu/covid  Home covid test negative but I recommend pcr test for accuracy and she is still within treatment window if positive  It's 20min drive to our office, I recommend going to a nearby urgent care for test  Pt wants to try to come here  Will have staff reach out to schedule  cepacol sent for sore throat         Relevant Medications    menthol-cetylpyridinium (CEPACOL) 3 MG lozenge    Other Relevant Orders    Covid/Flu- Office Collect   Other Visit Diagnoses     Acute pharyngitis, unspecified etiology        Relevant Orders    Covid/Flu- Office Collect               Reason for visit is   Chief Complaint   Patient presents with   • Virtual Brief Visit     Cold like symptoms started Saturday 11/26, chills, cough, sore throat  Negative at home covid test   • Virtual Regular Visit        Encounter provider Houston De La Fuente MD    Provider located at [address] [place] [id number]      Recent Visits  Date Type Provider Dept   11/25/22 Telephone Houston De La Fuente MD [place] [id number]   11/22/22 Office Visit Moe Mackey MD [address] [facility] recent visits within past 7 days and meeting all other requirements  Today's Visits  Date Type Provider Dept   11/28/22 Telemedicine Houston De La Fuente MD [address] [facility] today's visits and meeting all other requirements  Future Appointments  No visits were found meeting these conditions  Showing future appointments within next 150 days and meeting all other requirements       The patient was identified by name and date of birth   Luis Ackerman was informed that this is a telemedicine visit and that the visit is being conducted through the [employer]  She agrees to proceed     My office door was closed  No one else was in the room  She acknowledged consent and understanding of privacy and security of the video platform  The patient has agreed to participate and understands they can discontinue the visit at any time  Patient is aware this is a billable service  Naila Laura is a 80 y o  female    HPI    Virtual Brief Visit (Cold like symptoms started Saturday 11/26, chills, cough, sore throat  Negative at home covid test)    Started Sat, started cough and sore throat  Denies other symptoms  "I don't feel well"  Takes tylenol but does not help  Past Medical History:   Diagnosis Date   • Breast cancer ([facility] [address] )    • [facility]    • COVID-19    • Hyperlipidemia        Past Surgical History:   Procedure Laterality Date   • BREAST SURGERY      cancer (> 5 years)       Current Outpatient Medications   Medication Sig Dispense Refill   • Calcium Carb-Cholecalciferol 1000-800 MG-UNIT TABS Take by mouth     • menthol-cetylpyridinium (CEPACOL) 3 MG lozenge Take 1 lozenge (3 mg total) by mouth as needed for sore throat 30 lozenge 1   • Multiple Vitamins-Minerals (CENTRUM SILVER 50+WOMEN PO) Take by mouth     • pilocarpine (ISOPTO CARPINE) 4 % ophthalmic solution 2 drops pilocarpine 4% mix with small amount of water to swish and spit or place 2 drops on the tongue 3-4 times/day   To be used orally 15 mL 1   • simvastatin (ZOCOR) 5 MG tablet Take 1 tablet (5 mg total) by mouth daily at bedtime 90 tablet 3   • clotrimazole (MYCELEX) 10 mg imani DISSOLVE 1 IMANI BY MOUTH FIVE TIMES DAILY FOR 14 DAYS (Patient not taking: Reported on 11/28/2022)     • famotidine (PEPCID) 40 MG tablet Take 1 tablet (40 mg total) by mouth daily at bedtime 90 tablet 2   • ipratropium (ATROVENT) 0 03 % nasal spray 2 sprays into each nostril 3 (three) times a day Use up to three times a day with runny nose, before meals (Patient not taking: Reported on 11/22/2022) 30 mL 2     No current facility-administered medications for this visit          Allergies   Allergen Reactions   • Pravastatin    • Risedronate        Review of Systems    Video Exam    Vitals:    11/28/22 1040   SpO2: 97%   Weight: 54 kg (119 lb)   Height: 4' 9" (1 448 m)       Physical Exam     I spent 10 minutes directly with the patient during this visit

## 2022-11-28 NOTE — TELEPHONE ENCOUNTER
Recommend isolation for 5 days since symptom onset (till this Thursday)  Did they give her medication Tamiflu? If no I can send her the Rx  I also sent her a cough medicine  F/u with me next week if symptoms don't improve

## 2022-12-12 ENCOUNTER — OFFICE VISIT (OUTPATIENT)
Dept: INTERNAL MEDICINE CLINIC | Facility: CLINIC | Age: 87
End: 2022-12-12

## 2022-12-12 VITALS
OXYGEN SATURATION: 98 % | WEIGHT: 117.2 LBS | HEIGHT: 57 IN | DIASTOLIC BLOOD PRESSURE: 80 MMHG | SYSTOLIC BLOOD PRESSURE: 144 MMHG | HEART RATE: 91 BPM | TEMPERATURE: 98.6 F | BODY MASS INDEX: 25.28 KG/M2

## 2022-12-12 DIAGNOSIS — R05.3 CHRONIC COUGH: ICD-10-CM

## 2022-12-12 DIAGNOSIS — R68.2 DRY MOUTH: Primary | ICD-10-CM

## 2022-12-12 DIAGNOSIS — M81.0 AGE RELATED OSTEOPOROSIS, UNSPECIFIED PATHOLOGICAL FRACTURE PRESENCE: ICD-10-CM

## 2022-12-12 RX ORDER — PILOCARPINE HYDROCHLORIDE 40 MG/ML
SOLUTION/ DROPS OPHTHALMIC
Qty: 15 ML | Refills: 1 | Status: SHIPPED | OUTPATIENT
Start: 2022-12-12

## 2022-12-12 RX ORDER — PILOCARPINE HYDROCHLORIDE 40 MG/ML
SOLUTION/ DROPS OPHTHALMIC
Qty: 15 ML | Refills: 1 | Status: SHIPPED | OUTPATIENT
Start: 2022-12-12 | End: 2022-12-12

## 2022-12-12 NOTE — PROGRESS NOTES
Name: Kamala Noel      : 1935      MRN: [id number]  Encounter Provider: Mai Rodriguez MD  Encounter Date: 2022   [facility]    Assessment & Plan     1  Dry mouth  Assessment & Plan:  Chronic  Pt has had multiple visits with me, ENT and urgent care regarding this  She has tried artificial saliva, sucking hard candy, she stopped using mouth wash  Last visit we tried pilocarpine eye drop to be used on tongue but that caused burning sensation  We dicussed that to try putting drops of pilocarpine to small amount of water to swish and spit  We also reviewed conservative management recommended by ENT  We reviewed her medications that might potentially cause dry mouth  She previously uses Atrovent nasal spray for runny nose  Now she only uses it as needed and she does not think it is causing her dry mouth  I recommend to check with her dentist for further recommendation  We checked Sjogren antibody which was negative  She does complain of dry eyes sometimes but otherwise no other sign of autoimmune disease  However given her significant symptom we will refer to rheumatology to see if she is a candidate for further investigation into this  Orders:  -     pilocarpine (ISOPTO CARPINE) 4 % ophthalmic solution; 2 drops pilocarpine 4% mix with small amount of water to swish and spit  3-4 times/day  To be used orally  -     Ambulatory Referral to Rheumatology; Future    2  Chronic cough  Assessment & Plan:  Dry cough, for months, improving, does not bother her  Could be postnasal drip related  Recommend using Flonase      3  Age related osteoporosis, unspecified pathological fracture presence  Assessment & Plan: We reviewed her recent bone scan which showed osteoporosis  Vitamin D and calcium levels are within normal limit    Patient has risedronate on her allergy list but she could not recall she was on this medication before or what reaction she had  Will refer to rheumatology to discuss other treatment options  Orders:  -     Ambulatory Referral to Rheumatology; Future         Subjective      HPI   Pt is here for regular follow up  Main concern is her dry mouth    "it's driving me crazy"  She has had several visits with me and ENT regarding this  Denies dysphagia  States when eating, she has to constantly sipping on water  C/o loss of taste  She tried artificial saliva, sucking hard candy  In last visit, we ordered pilocarpine eye drop to be used on tongue, but she said that caused burning sensation       Chronic dry cough, continued    Review of Systems    Current Outpatient Medications on File Prior to Visit   Medication Sig   • Calcium Carb-Cholecalciferol 1000-800 MG-UNIT TABS Take by mouth   • ipratropium (ATROVENT) 0 03 % nasal spray 2 sprays into each nostril 3 (three) times a day Use up to three times a day with runny nose, before meals   • Multiple Vitamins-Minerals (CENTRUM SILVER 50+WOMEN PO) Take by mouth   • simvastatin (ZOCOR) 5 MG tablet Take 1 tablet (5 mg total) by mouth daily at bedtime   • clotrimazole (MYCELEX) 10 mg imani DISSOLVE 1 IMANI BY MOUTH FIVE TIMES DAILY FOR 14 DAYS (Patient not taking: Reported on 12/12/2022)   • famotidine (PEPCID) 40 MG tablet Take 1 tablet (40 mg total) by mouth daily at bedtime       Objective     /80   Pulse 91   Temp 98 6 °F (37 °C) (Tympanic)   Ht 4' 9" (1 448 m)   Wt 53 2 kg (117 lb 3 2 oz)   SpO2 98%   BMI 25 36 kg/m²     Physical Exam  Ko Mcintosh MD

## 2022-12-12 NOTE — ASSESSMENT & PLAN NOTE
Dry cough, for months, improving, does not bother her  Could be postnasal drip related    Recommend using Flonase

## 2022-12-12 NOTE — PATIENT INSTRUCTIONS
Dry mouth:    2 drops pilocarpine 4% eye drop, to small amount of water to swish and spit  3-4 times a day  Dry mouth recommendation from ENT:  "Recommend to follow up with the dentist for further evaluation and management  Recommend chewing or sucking sugar-free gum or candies to stimulate salivary flow  Recommend sucking on ice chips, sipping water with meals to aid in chewing and swallowing food  Recommend using alcohol-free mouth rinse  Avoid carbonated drinks, caffeine, tobacco, and alcohol  Recommend good oral hygiene    "      Check with your dentist about  dry mouth     Refer to rheumatology to discuss osteoporosis and dry mouth

## 2022-12-13 PROBLEM — K92.1 BLACK STOOL: Status: RESOLVED | Noted: 2022-10-10 | Resolved: 2022-12-13

## 2022-12-13 PROBLEM — R68.89 FLU-LIKE SYMPTOMS: Status: RESOLVED | Noted: 2022-11-28 | Resolved: 2022-12-13

## 2022-12-13 PROBLEM — N89.8 VAGINAL DISCHARGE: Status: RESOLVED | Noted: 2022-08-10 | Resolved: 2022-12-13

## 2022-12-13 NOTE — ASSESSMENT & PLAN NOTE
We reviewed her recent bone scan which showed osteoporosis  Vitamin D and calcium levels are within normal limit  Patient has risedronate on her allergy list but she could not recall she was on this medication before or what reaction she had  Will refer to rheumatology to discuss other treatment options

## 2022-12-13 NOTE — ASSESSMENT & PLAN NOTE
Chronic  Pt has had multiple visits with me, ENT and urgent care regarding this  She has tried artificial saliva, sucking hard candy, she stopped using mouth wash  Last visit we tried pilocarpine eye drop to be used on tongue but that caused burning sensation  We dicussed that to try putting drops of pilocarpine to small amount of water to swish and spit  We also reviewed conservative management recommended by ENT  We reviewed her medications that might potentially cause dry mouth  She previously uses Atrovent nasal spray for runny nose  Now she only uses it as needed and she does not think it is causing her dry mouth  I recommend to check with her dentist for further recommendation  We checked Sjogren antibody which was negative  She does complain of dry eyes sometimes but otherwise no other sign of autoimmune disease  However given her significant symptom we will refer to rheumatology to see if she is a candidate for further investigation into this

## 2022-12-14 ENCOUNTER — TELEPHONE (OUTPATIENT)
Dept: INTERNAL MEDICINE CLINIC | Facility: CLINIC | Age: 87
End: 2022-12-14

## 2022-12-14 NOTE — TELEPHONE ENCOUNTER
Patient called and said she is feeling worse  [facility] Her lips and her tongue are burning  She asked if you could send a antibiotic to the pharmacy  Please call patient or advise how you want to proceed    Thank You

## 2022-12-14 NOTE — TELEPHONE ENCOUNTER
I spoke to the patient  She stated she had the burning before  She forgot to tell you when she was here  Could you please call her as she still wants an antibiotic    Thank You

## 2022-12-14 NOTE — TELEPHONE ENCOUNTER
If it's from the pilocarpine drop, she should stop it first    She has no sign of bacterial infection yesterday when I saw her  She does not need antibiotics  If still have concern of infection, please schedule a visit with resident tomorrow

## 2022-12-15 NOTE — TELEPHONE ENCOUNTER
The patient is feeling much better and does not want to come in   Brigidojason Uriarte said she could see her tomorrow if needed

## 2023-01-03 ENCOUNTER — TELEPHONE (OUTPATIENT)
Dept: INTERNAL MEDICINE CLINIC | Facility: CLINIC | Age: 88
End: 2023-01-03

## 2023-01-03 NOTE — TELEPHONE ENCOUNTER
----- Message from Jose Martínez PA-C sent at 1/3/2023 12:05 PM EST -----  Good afternoon Dr La Nena Samuel  I mentioned it the 1st time I saw her b/c she states she had a cough over 1 month, but when I saw her last visit, she did not complaint about it that much  I could also order it if needed  She did mention headaches that are fairly new too so I had thought about that as well  She's really frustrated about her tongue and seems to be perseverating on it  I put her on salagen so I am hoping that helps  Please reach out if you have any other questions/concerns or need me to order anything  Have a great new year! Vicki Mendenhall   ----- Message -----  From: Miguelina Betancourt MD  Sent: 1/3/2023  11:34 AM EST  To: Jose Martínez PA-C    Hi Vicki Mendenhall,   This pt just saw you for dry mouth  I am her PCP  She made an appiontment with me tomorrow asking to get chest xray  She said she was told by ENT to get this but does not know why  I looked at your note but did not find any mentioning of chest xray  I see you mentioned brain image for headache  So I just want to clarify with your regarding this chest xray  She has an appointment with me tomorrow to discuss this  Thank you       Uriel Jauregui

## 2023-01-03 NOTE — TELEPHONE ENCOUNTER
Patient called back and said her cough is on going and would like to see you to discuss so she wants to keep appointment

## 2023-01-03 NOTE — TELEPHONE ENCOUNTER
----- Message from Eldon Henderson MD sent at 1/3/2023  1:48 PM EST -----  This pt has an appointment with me tmr with reason "Need chest xray from ENT"  I checked with ENT  They said chest xray was initially mentioned due to her cough, but then cough resolved when she saw them last time , so she does not need chest xray any more  If that's the only reason for tomorrow's visit, she can cancel it  If she has other issues she wants to discuss with me, she can keep it

## 2023-01-04 ENCOUNTER — OFFICE VISIT (OUTPATIENT)
Dept: INTERNAL MEDICINE CLINIC | Facility: CLINIC | Age: 88
End: 2023-01-04

## 2023-01-04 ENCOUNTER — APPOINTMENT (OUTPATIENT)
Dept: RADIOLOGY | Age: 88
End: 2023-01-04

## 2023-01-04 VITALS
BODY MASS INDEX: 25.24 KG/M2 | WEIGHT: 117 LBS | DIASTOLIC BLOOD PRESSURE: 68 MMHG | HEIGHT: 57 IN | OXYGEN SATURATION: 97 % | SYSTOLIC BLOOD PRESSURE: 132 MMHG | HEART RATE: 92 BPM

## 2023-01-04 DIAGNOSIS — E87.1 HYPONATREMIA: ICD-10-CM

## 2023-01-04 DIAGNOSIS — R05.1 ACUTE COUGH: Primary | ICD-10-CM

## 2023-01-04 DIAGNOSIS — N18.30 STAGE 3 CHRONIC KIDNEY DISEASE, UNSPECIFIED WHETHER STAGE 3A OR 3B CKD (HCC): ICD-10-CM

## 2023-01-04 DIAGNOSIS — E53.8 B12 DEFICIENCY: ICD-10-CM

## 2023-01-04 DIAGNOSIS — R25.1 TREMOR: ICD-10-CM

## 2023-01-04 DIAGNOSIS — R05.1 ACUTE COUGH: ICD-10-CM

## 2023-01-04 RX ORDER — BENZONATATE 200 MG/1
200 CAPSULE ORAL 3 TIMES DAILY PRN
Qty: 20 CAPSULE | Refills: 0 | Status: CANCELLED | OUTPATIENT
Start: 2023-01-04

## 2023-01-04 NOTE — PROGRESS NOTES
Name: Rae Gregory      : 1935      MRN: [id number]  Encounter Provider: Bud Mcnally MD  Encounter Date: 2023   Encounter department: [facility]    Assessment & Plan     1  Acute cough  Assessment & Plan:  Dry cough >1w  Get CXR  Robitussin dm prn  Covid/flu swab    Orders:  -     XR chest pa & lateral; Future; Expected date: 2023  -     Covid/Flu- Office Collect    2  Tremor  Assessment & Plan:  Pt has resting tremor left hand but that is chronic she had it since childhood  She reports tremor is worse with movement  Also has loss of smell pt blame it to covid vaccine  Issue with handwriting: micrographia  Has some mild cogwheel rigitidy on exam    Suspecting parkinson but also has symptoms indication essential tremor  Refer to neuro for eval        Orders:  -     Ambulatory Referral to Neurology; Future  -     Vitamin B12; Future  -     Methylmalonic acid, serum; Future    3  Stage 3 chronic kidney disease, unspecified whether stage 3a or 3b CKD (HCC)  -     CBC and differential; Future  -     Basic metabolic panel; Future    4  Hyponatremia  -     Osmolality-"If this is regarding a toxic alcohol, STOP  Test is not routinely indicated  Please consult medical  on call for further guidance "; Future  -     Osmolality, urine    5  B12 deficiency  -     Vitamin B12; Future  -     Methylmalonic acid, serum; Future         Subjective      HPI   Cough   Fatigue   Shaking    "I am shaky and tired" for several weeks  Cough for several days, dry cough  No sore throat no congestion no fever  No SOB    Chest tightness, briefly for seconds    Arms shaking, marija when moving when grabbing things   When sitting no shaking when holdng a book reading shakes a lot    Eat well sleep well  Review of Systems    Current Outpatient Medications on File Prior to Visit   Medication Sig   • Calcium Carb-Cholecalciferol 1000-800 MG-UNIT TABS Take by mouth   • famotidine (PEPCID) 40 MG tablet Take 1 tablet (40 mg total) by mouth daily at bedtime   • ipratropium (ATROVENT) 0 03 % nasal spray 2 sprays into each nostril 3 (three) times a day Use up to three times a day with runny nose, before meals   • Multiple Vitamins-Minerals (CENTRUM SILVER 50+WOMEN PO) Take by mouth   • simvastatin (ZOCOR) 5 MG tablet Take 1 tablet (5 mg total) by mouth daily at bedtime   • al mag oxide-diphenhydramine-lidocaine viscous (MAGIC MOUTHWASH) 1:1:1 suspension 5 ml PO swish and spit Q6 hours prn mouth pain  (Patient not taking: Reported on 1/4/2023)   • clotrimazole (MYCELEX) 10 mg imani DISSOLVE 1 IMANI BY MOUTH FIVE TIMES DAILY FOR 14 DAYS (Patient not taking: Reported on 12/12/2022)   • pilocarpine (ISOPTO CARPINE) 4 % ophthalmic solution 2 drops pilocarpine 4% mix with small amount of water to swish and spit  3-4 times/day   To be used orally (Patient not taking: Reported on 1/4/2023)   • pilocarpine (SALAGEN) 5 mg tablet Take 1 tablet (5 mg total) by mouth 3 (three) times a day       Objective     /68   Pulse 92   Ht 4' 9" (1 448 m)   Wt 53 1 kg (117 lb)   SpO2 97%   BMI 25 32 kg/m²     Physical Exam  Abisai More MD

## 2023-01-04 NOTE — ASSESSMENT & PLAN NOTE
Pt has resting tremor left hand but that is chronic she had it since childhood  She reports tremor is worse with movement  Also has loss of smell pt blame it to covid vaccine  Issue with handwriting: micrographia  Has some mild cogwheel rigitidy on exam    Suspecting parkinson but also has symptoms indication essential tremor     Refer to neuro for eval

## 2023-01-05 ENCOUNTER — TELEPHONE (OUTPATIENT)
Dept: MULTI SPECIALTY CLINIC | Facility: CLINIC | Age: 88
End: 2023-01-05

## 2023-01-05 LAB
FLUAV RNA RESP QL NAA+PROBE: NEGATIVE
FLUBV RNA RESP QL NAA+PROBE: NEGATIVE
SARS-COV-2 RNA RESP QL NAA+PROBE: NEGATIVE

## 2023-01-05 NOTE — TELEPHONE ENCOUNTER
LMOM that tongue culture is negative for fungal elements/thrush at this point, but it grows for total of 4 weeks  Hopefully she is doing better on salagen for dry mouth  Can call office with questions

## 2023-01-11 ENCOUNTER — TELEPHONE (OUTPATIENT)
Dept: INTERNAL MEDICINE CLINIC | Facility: CLINIC | Age: 88
End: 2023-01-11

## 2023-01-11 NOTE — TELEPHONE ENCOUNTER
Her dry mouth is being followed closely by ENT  She should call them for further rec  I have tried everything I have to offer  Unfortunately I don't have further recommendation on this

## 2023-01-11 NOTE — TELEPHONE ENCOUNTER
Patient is calling in that she still has dry mouth  She is very frustrated that she is unable to get answers as to why this is still going on          Please advise

## 2023-01-12 DIAGNOSIS — E78.5 HYPERLIPIDEMIA, UNSPECIFIED HYPERLIPIDEMIA TYPE: ICD-10-CM

## 2023-01-13 RX ORDER — SIMVASTATIN 5 MG
5 TABLET ORAL
Qty: 90 TABLET | Refills: 3 | Status: SHIPPED | OUTPATIENT
Start: 2023-01-13

## 2023-01-17 ENCOUNTER — APPOINTMENT (OUTPATIENT)
Dept: LAB | Age: 88
End: 2023-01-17

## 2023-01-17 DIAGNOSIS — R25.1 TREMOR: ICD-10-CM

## 2023-01-17 DIAGNOSIS — E53.8 B12 DEFICIENCY: ICD-10-CM

## 2023-01-17 DIAGNOSIS — N18.30 STAGE 3 CHRONIC KIDNEY DISEASE, UNSPECIFIED WHETHER STAGE 3A OR 3B CKD (HCC): ICD-10-CM

## 2023-01-17 LAB
ANION GAP SERPL CALCULATED.3IONS-SCNC: 6 MMOL/L (ref 4–13)
BASOPHILS # BLD AUTO: 0.04 THOUSANDS/ÂΜL (ref 0–0.1)
BASOPHILS NFR BLD AUTO: 1 % (ref 0–1)
BUN SERPL-MCNC: 11 MG/DL (ref 5–25)
CALCIUM SERPL-MCNC: 8.8 MG/DL (ref 8.3–10.1)
CHLORIDE SERPL-SCNC: 100 MMOL/L (ref 96–108)
CO2 SERPL-SCNC: 29 MMOL/L (ref 21–32)
CREAT SERPL-MCNC: 1 MG/DL (ref 0.6–1.3)
EOSINOPHIL # BLD AUTO: 0.21 THOUSAND/ÂΜL (ref 0–0.61)
EOSINOPHIL NFR BLD AUTO: 4 % (ref 0–6)
ERYTHROCYTE [DISTWIDTH] IN BLOOD BY AUTOMATED COUNT: 12.9 % (ref 11.6–15.1)
GFR SERPL CREATININE-BSD FRML MDRD: 50 ML/MIN/1.73SQ M
GLUCOSE P FAST SERPL-MCNC: 94 MG/DL (ref 65–99)
HCT VFR BLD AUTO: 35.9 % (ref 34.8–46.1)
HGB BLD-MCNC: 12.1 G/DL (ref 11.5–15.4)
IMM GRANULOCYTES # BLD AUTO: 0.02 THOUSAND/UL (ref 0–0.2)
IMM GRANULOCYTES NFR BLD AUTO: 0 % (ref 0–2)
LYMPHOCYTES # BLD AUTO: 2.16 THOUSANDS/ÂΜL (ref 0.6–4.47)
LYMPHOCYTES NFR BLD AUTO: 45 % (ref 14–44)
MCH RBC QN AUTO: 34.8 PG (ref 26.8–34.3)
MCHC RBC AUTO-ENTMCNC: 33.7 G/DL (ref 31.4–37.4)
MCV RBC AUTO: 103 FL (ref 82–98)
MONOCYTES # BLD AUTO: 0.46 THOUSAND/ÂΜL (ref 0.17–1.22)
MONOCYTES NFR BLD AUTO: 10 % (ref 4–12)
NEUTROPHILS # BLD AUTO: 1.88 THOUSANDS/ÂΜL (ref 1.85–7.62)
NEUTS SEG NFR BLD AUTO: 40 % (ref 43–75)
NRBC BLD AUTO-RTO: 0 /100 WBCS
OSMOLALITY UR: 267 MMOL/KG
PLATELET # BLD AUTO: 307 THOUSANDS/UL (ref 149–390)
PMV BLD AUTO: 8.8 FL (ref 8.9–12.7)
POTASSIUM SERPL-SCNC: 3.7 MMOL/L (ref 3.5–5.3)
RBC # BLD AUTO: 3.48 MILLION/UL (ref 3.81–5.12)
SODIUM SERPL-SCNC: 135 MMOL/L (ref 135–147)
VIT B12 SERPL-MCNC: 866 PG/ML (ref 100–900)
WBC # BLD AUTO: 4.77 THOUSAND/UL (ref 4.31–10.16)

## 2023-01-20 LAB — METHYLMALONATE SERPL-SCNC: 217 NMOL/L (ref 0–378)

## 2023-01-24 ENCOUNTER — OFFICE VISIT (OUTPATIENT)
Dept: INTERNAL MEDICINE CLINIC | Facility: CLINIC | Age: 88
End: 2023-01-24

## 2023-01-24 VITALS
BODY MASS INDEX: 25.8 KG/M2 | HEART RATE: 100 BPM | DIASTOLIC BLOOD PRESSURE: 78 MMHG | WEIGHT: 119.6 LBS | OXYGEN SATURATION: 96 % | HEIGHT: 57 IN | SYSTOLIC BLOOD PRESSURE: 136 MMHG

## 2023-01-24 DIAGNOSIS — R13.10 DYSPHAGIA, UNSPECIFIED TYPE: ICD-10-CM

## 2023-01-24 DIAGNOSIS — Z23 ENCOUNTER FOR IMMUNIZATION: Primary | ICD-10-CM

## 2023-02-07 ENCOUNTER — OFFICE VISIT (OUTPATIENT)
Dept: INTERNAL MEDICINE CLINIC | Facility: CLINIC | Age: 88
End: 2023-02-07

## 2023-02-07 VITALS
SYSTOLIC BLOOD PRESSURE: 142 MMHG | BODY MASS INDEX: 25.36 KG/M2 | TEMPERATURE: 98.5 F | DIASTOLIC BLOOD PRESSURE: 82 MMHG | HEART RATE: 113 BPM | OXYGEN SATURATION: 98 % | WEIGHT: 117.2 LBS

## 2023-02-07 DIAGNOSIS — R68.2 DRY MOUTH: Primary | ICD-10-CM

## 2023-02-07 DIAGNOSIS — K11.7 XEROSTOMIA: ICD-10-CM

## 2023-02-07 RX ORDER — BENZONATATE 200 MG/1
200 CAPSULE ORAL 3 TIMES DAILY PRN
Qty: 20 CAPSULE | Refills: 0 | Status: SHIPPED | OUTPATIENT
Start: 2023-02-07 | End: 2023-03-14 | Stop reason: SDUPTHER

## 2023-02-07 RX ORDER — PILOCARPINE HYDROCHLORIDE 5 MG/1
5 TABLET, FILM COATED ORAL 3 TIMES DAILY
Qty: 90 TABLET | Refills: 2 | Status: SHIPPED | OUTPATIENT
Start: 2023-02-07 | End: 2023-03-14 | Stop reason: SDUPTHER

## 2023-02-07 NOTE — PATIENT INSTRUCTIONS
Can take tessalon pearls for cough up to 3 times daily  Take pilocarpine times a day  Over-the-counter Flonase once daily for 1 week  Follow up with Neurology

## 2023-02-07 NOTE — PROGRESS NOTES
Assessment/Plan:     1  Dry mouth    2  Xerostomia        Chronic cough / Subjective intermittnet cold  Try pilocarpine for 1 week   Flonase x 1 week   Consider magic mouth wash as it was effective   Tessalon Pearls up to TID   F/U in 1 week to see it has been effective, if not effective would try PPI BID , would try to avoid pepcid as pt has nausea     Subjective:      Patient ID: Shayy Hicks is a 80 y o  female  HPI  61-year-old female is here for follow-up of her cough/cold which she has had for about 2 weeks  Like a cold with intermittent coughing spells  X-ray was done which did not show any pneumonia  Taking Pepcid due to side effect of nausea  Dry mouth     Patient with chills, no fevers, no headaches, longstanding tinnitus, no ear muffling or decreased hearing (right ear better), no nasal congestion has some yellowish discharge sometimes, no shortness of breath  Cough drops seem to be working to help soothe the cough  However Robitussin does not seem to be helpful  Some solution from Patient first worked well to bring her taste back, then subsequent ones, it didn't work  Did not attempt antibiotics, Flonase,     Workup:   ENT scope - see note   Covid / Flu neg   CXR neg  Sjogrens ab neg   Tongue Culture pending         Sometimes feels abnormal and she has to catch her breath which resolves  She does have a follow-up with cardiology in 2 weeks where they may put on a patch to monitor for any arrhythmias  The following portions of the patient's history were reviewed and updated as appropriate: allergies, current medications, past family history, past medical history, past social history, past surgical history, and problem list     Review of Systems   Constitutional: Negative for diaphoresis and fever  HENT: Positive for postnasal drip  Negative for ear discharge, ear pain and facial swelling  Respiratory: Positive for cough      Cardiovascular: Negative for chest pain and palpitations  Gastrointestinal: Negative for abdominal distention, abdominal pain, nausea and vomiting  Neurological: Negative for light-headedness  Psychiatric/Behavioral: Negative for confusion and decreased concentration  Objective:      /82   Pulse (!) 113   Temp 98 5 °F (36 9 °C) (Tympanic)   Wt 53 2 kg (117 lb 3 2 oz)   SpO2 98%   BMI 25 36 kg/m²          Physical Exam  Vitals and nursing note reviewed  Constitutional:       General: She is not in acute distress  Appearance: She is not toxic-appearing  HENT:      Head: Normocephalic and atraumatic  Mouth/Throat:      Mouth: Mucous membranes are dry  Eyes:      General:         Right eye: No discharge  Left eye: No discharge  Extraocular Movements: Extraocular movements intact  Conjunctiva/sclera: Conjunctivae normal    Cardiovascular:      Rate and Rhythm: Normal rate and regular rhythm  Pulses: Normal pulses  Heart sounds: Normal heart sounds  Pulmonary:      Effort: Pulmonary effort is normal       Breath sounds: Normal breath sounds  Abdominal:      General: Abdomen is flat  There is no distension  Tenderness: There is no abdominal tenderness  There is no guarding  Skin:     General: Skin is warm  Neurological:      General: No focal deficit present  Mental Status: She is alert and oriented to person, place, and time

## 2023-02-08 ENCOUNTER — RA CDI HCC (OUTPATIENT)
Dept: OTHER | Facility: HOSPITAL | Age: 88
End: 2023-02-08

## 2023-02-10 ENCOUNTER — OFFICE VISIT (OUTPATIENT)
Dept: CARDIOLOGY CLINIC | Facility: CLINIC | Age: 88
End: 2023-02-10

## 2023-02-10 VITALS
HEIGHT: 57 IN | WEIGHT: 118 LBS | HEART RATE: 102 BPM | SYSTOLIC BLOOD PRESSURE: 138 MMHG | BODY MASS INDEX: 25.46 KG/M2 | DIASTOLIC BLOOD PRESSURE: 82 MMHG

## 2023-02-10 DIAGNOSIS — I35.1 NONRHEUMATIC AORTIC VALVE INSUFFICIENCY: ICD-10-CM

## 2023-02-10 DIAGNOSIS — E78.00 HYPERCHOLESTEREMIA: Primary | ICD-10-CM

## 2023-02-10 NOTE — PROGRESS NOTES
[facility]  Follow up note  Onel Gimenez 80 y o  female MRN: [id number]        Problems    1  Hypercholesteremia        2  Nonrheumatic aortic valve insufficiency  POCT ECG          Impression:    Covid 19 2/21  Unfortunately she is continued to have persistent loss of taste, smell, she has a bit of a chronic cough, today heard some audible wheezing and have messaged her PCP in regards to these findings  Aortic regurgitation  Mild to moderate, last assessed in 2019, no audible murmur on exam  No concerning symptoms attributable to her mild to moderate AI  Palpitations  No significant complaints at this type today  Hypercholesterolemia  LD continues on low-dose statin for primary prevention, no changes necessary    Plan:    Continue annual follow-up with me  No additional testing warranted at this time      HPI:   Onel Gimenez is a 80y o  year old female with aortic valve regurgitation, hypercholesterolemia, presents for an annual follow-up,  She developed COVID-19 2/21, hospitalized at [facility], and unfortunately losing her  to COVID-19 at the time, and has unfortunately had a persistent cough, persistent loss of taste and smell, and in general still just does not feel herself  She is very frustrated with her long hauler symptoms  She denies worsening palpitations, still describes as skipped and heavy beat every once in a while, sometimes a cough resolves the symptoms  She denies lightheadedness, dizziness  She has no progressive dyspnea symptoms  She denies edema, orthopnea  She denies lightheadedness or near syncope  Lipids are reasonably controlled at 80years of age from a primary prevention standpoint, on simvastatin  Review of Systems   Constitutional: Positive for fatigue  Negative for appetite change, diaphoresis and fever  Respiratory: Positive for cough  Negative for chest tightness, shortness of breath and wheezing      Cardiovascular: Negative for chest pain, palpitations and leg swelling  Gastrointestinal: Negative for abdominal pain and blood in stool  Musculoskeletal: Negative for arthralgias and joint swelling  Skin: Negative for rash  Neurological: Negative for dizziness, syncope and light-headedness  Past Medical History:   Diagnosis Date   • Breast cancer ([facility] [address] )    • Cancer ([facility] )    • COVID-19    • Hyperlipidemia      Social History     Substance and Sexual Activity   Alcohol Use Yes   • Alcohol/week: 1 0 standard drink   • Types: 1 Glasses of wine per week     Social History     Substance and Sexual Activity   Drug Use No     Social History     Tobacco Use   Smoking Status Never   Smokeless Tobacco Never       Allergies: Allergies   Allergen Reactions   • Pravastatin    • Risedronate        Medications:     Current Outpatient Medications:   •  Calcium Carb-Cholecalciferol 1000-800 MG-UNIT TABS, Take by mouth, Disp: , Rfl:   •  Multiple Vitamins-Minerals (CENTRUM SILVER 50+WOMEN PO), Take by mouth, Disp: , Rfl:   •  pilocarpine (SALAGEN) 5 mg tablet, Take 1 tablet (5 mg total) by mouth 3 (three) times a day, Disp: 90 tablet, Rfl: 2  •  simvastatin (ZOCOR) 5 MG tablet, Take 1 tablet (5 mg total) by mouth daily at bedtime, Disp: 90 tablet, Rfl: 3  •  al mag oxide-diphenhydramine-lidocaine viscous (MAGIC MOUTHWASH) 1:1:1 suspension, 5 ml PO swish and spit Q6 hours prn mouth pain   (Patient not taking: Reported on 1/4/2023), Disp: 600 mL, Rfl: 2  •  benzonatate (TESSALON) 200 MG capsule, Take 1 capsule (200 mg total) by mouth 3 (three) times a day as needed for cough (Patient not taking: Reported on 2/10/2023), Disp: 20 capsule, Rfl: 0  •  clotrimazole (MYCELEX) 10 mg imani, DISSOLVE 1 IMANI BY MOUTH FIVE TIMES DAILY FOR 14 DAYS (Patient not taking: Reported on 12/12/2022), Disp: , Rfl:   •  famotidine (PEPCID) 40 MG tablet, Take 1 tablet (40 mg total) by mouth daily at bedtime (Patient not taking: Reported on 2/7/2023), Disp: 90 tablet, Rfl: 2  •  ipratropium (ATROVENT) 0 03 % nasal spray, 2 sprays into each nostril 3 (three) times a day Use up to three times a day with runny nose, before meals (Patient not taking: Reported on 1/24/2023), Disp: 30 mL, Rfl: 2  •  pilocarpine (ISOPTO CARPINE) 4 % ophthalmic solution, 2 drops pilocarpine 4% mix with small amount of water to swish and spit  3-4 times/day  To be used orally (Patient not taking: Reported on 1/4/2023), Disp: 15 mL, Rfl: 1      Vitals:    02/10/23 1526   BP: 138/82   Pulse: 102     Weight (last 2 days)     Date/Time Weight    02/10/23 1526 53 5 (118)        Physical Exam  Constitutional:       General: She is not in acute distress  Appearance: She is not diaphoretic  HENT:      Head: Normocephalic and atraumatic  Eyes:      General: No scleral icterus  Conjunctiva/sclera: Conjunctivae normal    Neck:      Vascular: No JVD  Cardiovascular:      Rate and Rhythm: Normal rate and regular rhythm  Heart sounds: Normal heart sounds  No murmur heard  Pulmonary:      Effort: Pulmonary effort is normal  No respiratory distress  Breath sounds: Normal breath sounds  No wheezing, rhonchi or rales  Musculoskeletal:         General: No tenderness  Cervical back: Normal range of motion  Right lower leg: Normal  No edema  Left lower leg: Normal  No edema  Skin:     General: Skin is warm and dry  Laboratory Studies:    Labs personally reviewed    Cardiac testing:     EKG reviewed personally:   Results for orders placed or performed in visit on 02/10/23   POCT ECG    Impression    Sinus tachycardia, heart rate 102, normal EKG           Echocardiogram  12/19- EF normal, mild-to-moderate AI    Mere Almanza MD    Portions of the record may have been created with voice recognition software  Occasional wrong word or "sound a like" substitutions may have occurred due to the inherent limitations of voice recognition software    Read the chart carefully and recognize, using context, where substitutions have occurred

## 2023-02-14 ENCOUNTER — TELEPHONE (OUTPATIENT)
Dept: INTERNAL MEDICINE CLINIC | Facility: CLINIC | Age: 88
End: 2023-02-14

## 2023-02-14 NOTE — TELEPHONE ENCOUNTER
----- Message from Darlene Menchaca MD sent at 2/10/2023  3:40 PM EST -----  Regarding: wheezing  I heard some mild audible diffuse wheezing today, lungs clear otherwise, no CHF findings, no cardiac concerns  I know she has had a recurrent cough  Inhalers?  I will leave any pulmonary treatment decisions to you     Slime kSylar

## 2023-02-24 ENCOUNTER — TELEPHONE (OUTPATIENT)
Dept: INTERNAL MEDICINE CLINIC | Facility: CLINIC | Age: 88
End: 2023-02-24

## 2023-02-24 DIAGNOSIS — R19.5 STOOL COLOR ABNORMAL: Primary | ICD-10-CM

## 2023-02-24 NOTE — TELEPHONE ENCOUNTER
Patient is reporting she is having orange colored stool for a few weeks and she is not feeling right  She waited because she hope it would go away  Kristi abd pain or discomfort   She is requesting to do a stool test? Please advise

## 2023-02-28 ENCOUNTER — OFFICE VISIT (OUTPATIENT)
Dept: INTERNAL MEDICINE CLINIC | Facility: CLINIC | Age: 88
End: 2023-02-28

## 2023-02-28 VITALS
BODY MASS INDEX: 25.89 KG/M2 | HEIGHT: 57 IN | WEIGHT: 120 LBS | DIASTOLIC BLOOD PRESSURE: 72 MMHG | SYSTOLIC BLOOD PRESSURE: 108 MMHG | OXYGEN SATURATION: 99 % | HEART RATE: 97 BPM

## 2023-02-28 DIAGNOSIS — K59.00 CONSTIPATION, UNSPECIFIED CONSTIPATION TYPE: Primary | ICD-10-CM

## 2023-02-28 RX ORDER — DOCUSATE SODIUM 100 MG/1
100 CAPSULE, LIQUID FILLED ORAL 2 TIMES DAILY
Qty: 30 CAPSULE | Refills: 1 | Status: SHIPPED | OUTPATIENT
Start: 2023-02-28 | End: 2023-03-30

## 2023-02-28 NOTE — ASSESSMENT & PLAN NOTE
H/o IBS, states constipation is new symptom  No diet, medication changes  X ray Abdomen in ER- no stool burden, obstruction, normal bowel gas    Takes metamucil daily and miralax PRN  Take metamucil daily and start colace, with Miralax PRN  Want to try Florastor for IBS  Not interested in colonoscopy  Seeing Dr Virginia Trejo for EGD next week

## 2023-02-28 NOTE — PROGRESS NOTES
Assessment/Plan:    Constipation  H/o IBS, states constipation is new symptom  No diet, medication changes  X ray Abdomen in ER- no stool burden, obstruction, normal bowel gas  Takes metamucil daily and miralax PRN  Take metamucil daily and start colace, with Miralax PRN  Want to try Florastor for IBS  Not interested in colonoscopy  Seeing Dr Yves Parker for EGD next week       Diagnoses and all orders for this visit:    Constipation, unspecified constipation type  -     docusate sodium (COLACE) 100 mg capsule; Take 1 capsule (100 mg total) by mouth 2 (two) times a day    Other orders  -     Doxylamine Succinate, Sleep, (UNISOM PO); Take 1 5 tablets by mouth daily at bedtime as needed          Subjective:      Patient ID: Ronda Miller is a 80 y o  female  79 y/o female seen in the office after an ER visit  H/o IBS for the past 20 years, unable to say what was predominant  Had constipation initially before the ER visit  Waited for 2-3 days, took Miralax, had diarrhea, same cycle repeated again  Now has constipation  Did not take anything since Saturday  Takes Metamucil everyday  Has miralax at home, did not take it  No fever, chills  No abdominal pain, nausea or vomiting  Not interested in colonoscopy  C/o occasional SOB when climbing up the stairs, unable to state duration, frequency  No asccoiated symptoms like swelling, weight gain  The following portions of the patient's history were reviewed and updated as appropriate: allergies, current medications, past family history, past medical history, past social history, past surgical history and problem list     Review of Systems   Constitutional: Negative  HENT: Negative  Respiratory: Positive for shortness of breath (occasional, with climbing up the stairs)  Gastrointestinal: Positive for constipation  Negative for abdominal distention, abdominal pain, anal bleeding, blood in stool, diarrhea, nausea, rectal pain and vomiting     Genitourinary: Negative  Skin: Negative  Neurological: Negative  Psychiatric/Behavioral: Negative  Objective:      /72   Pulse 97   Ht 4' 9" (1 448 m)   Wt 54 4 kg (120 lb)   SpO2 99%   BMI 25 97 kg/m²            Physical Exam   Physical Exam  Vitals reviewed  HENT:      Head: Normocephalic and atraumatic  Ears:      Comments: Hard of hearing     Nose: Nose normal       Mouth/Throat:      Pharynx: Oropharynx is clear  Cardiovascular:      Rate and Rhythm: Normal rate and regular rhythm  Heart sounds: Murmur heard  Pulmonary:      Effort: Pulmonary effort is normal       Breath sounds: Wheezing (left side wheeze) present  Abdominal:      General: Bowel sounds are normal       Palpations: Abdomen is soft  Tenderness: There is no abdominal tenderness  There is no guarding  Skin:     General: Skin is warm and dry  Capillary Refill: Capillary refill takes 2 to 3 seconds  Neurological:      Mental Status: She is alert and oriented to person, place, and time  Psychiatric:         Mood and Affect: Mood normal          Behavior: Behavior normal          Thought Content: Thought content normal          Judgment: Judgment normal        I have spent a total time of 25 minutes on 02/28/23 in caring for this patient including Patient and family education, Counseling / Coordination of care and Obtaining or reviewing history

## 2023-03-05 PROBLEM — R05.1 ACUTE COUGH: Status: RESOLVED | Noted: 2019-05-14 | Resolved: 2023-03-05

## 2023-03-07 ENCOUNTER — APPOINTMENT (OUTPATIENT)
Dept: LAB | Age: 88
End: 2023-03-07

## 2023-03-07 DIAGNOSIS — R19.5 STOOL COLOR ABNORMAL: ICD-10-CM

## 2023-03-08 ENCOUNTER — LAB REQUISITION (OUTPATIENT)
Dept: LAB | Facility: HOSPITAL | Age: 88
End: 2023-03-08

## 2023-03-08 ENCOUNTER — TELEPHONE (OUTPATIENT)
Dept: INTERNAL MEDICINE CLINIC | Facility: CLINIC | Age: 88
End: 2023-03-08

## 2023-03-08 DIAGNOSIS — K21.9 GASTRO-ESOPHAGEAL REFLUX DISEASE WITHOUT ESOPHAGITIS: ICD-10-CM

## 2023-03-08 DIAGNOSIS — R13.19 OTHER DYSPHAGIA: ICD-10-CM

## 2023-03-08 NOTE — TELEPHONE ENCOUNTER
[facility] called stating pt dropped off the sample at their office  [facility] said pt dropped off a fit kit  But that order is not in her chart    Lab asked if the Occult Blood Fecal test  Lab order # [id number]

## 2023-03-09 DIAGNOSIS — K59.00 CONSTIPATION, UNSPECIFIED CONSTIPATION TYPE: ICD-10-CM

## 2023-03-09 DIAGNOSIS — R19.5 STOOL COLOR ABNORMAL: Primary | ICD-10-CM

## 2023-03-09 NOTE — TELEPHONE ENCOUNTER
The lab called again regarding the fit kit  Could you please order it  The patient dropped off the specimen  The lab is concerned the specimen will no longer be able to be processed  Please notify the lab once the fit kit order has been placed      [phone]

## 2023-03-13 RX ORDER — OMEPRAZOLE 40 MG/1
CAPSULE, DELAYED RELEASE ORAL
COMMUNITY
Start: 2023-03-08

## 2023-03-14 ENCOUNTER — OFFICE VISIT (OUTPATIENT)
Dept: INTERNAL MEDICINE CLINIC | Facility: CLINIC | Age: 88
End: 2023-03-14

## 2023-03-14 VITALS
OXYGEN SATURATION: 100 % | TEMPERATURE: 97.8 F | SYSTOLIC BLOOD PRESSURE: 138 MMHG | BODY MASS INDEX: 23.55 KG/M2 | DIASTOLIC BLOOD PRESSURE: 82 MMHG | HEIGHT: 59 IN | HEART RATE: 110 BPM | WEIGHT: 116.8 LBS

## 2023-03-14 DIAGNOSIS — R68.2 DRY MOUTH: ICD-10-CM

## 2023-03-14 DIAGNOSIS — R05.3 CHRONIC COUGH: Primary | ICD-10-CM

## 2023-03-14 DIAGNOSIS — G47.00 INSOMNIA, UNSPECIFIED TYPE: ICD-10-CM

## 2023-03-14 DIAGNOSIS — K11.7 XEROSTOMIA: ICD-10-CM

## 2023-03-14 RX ORDER — BENZONATATE 200 MG/1
200 CAPSULE ORAL 3 TIMES DAILY PRN
Qty: 20 CAPSULE | Refills: 0 | Status: SHIPPED | OUTPATIENT
Start: 2023-03-14

## 2023-03-14 RX ORDER — LANOLIN ALCOHOL/MO/W.PET/CERES
3 CREAM (GRAM) TOPICAL
Qty: 60 TABLET | Refills: 0 | Status: SHIPPED | OUTPATIENT
Start: 2023-03-14

## 2023-03-14 RX ORDER — PILOCARPINE HYDROCHLORIDE 5 MG/1
5 TABLET, FILM COATED ORAL 3 TIMES DAILY
Qty: 90 TABLET | Refills: 2 | Status: SHIPPED | OUTPATIENT
Start: 2023-03-14

## 2023-03-14 NOTE — PROGRESS NOTES
INTERNAL MEDICINE FOLLOW-UP OFFICE VISIT  [facility]    NAME: Britney Parker  AGE: 80 y o  SEX: female  : 1935     DATE: 3/14/2023     Assessment and Plan:     Problem List Items Addressed This Visit        Digestive    Dry mouth     Sjogren Abs - normal  She is not sure if she tried Pilocarpine that was ordered in 2022  Will refill  She has tried all supportive measures with no benefit  Discontinue Doxylamine succinate (Unisom) as it may cause dry moth  Relevant Medications    benzonatate (TESSALON) 200 MG capsule       Other    Chronic cough - Primary     CXR normal  Medications reviewed  On PPI and pepcid with no much improvement  Obtain PFTs    Continue tesselon          Relevant Orders    Complete PFT with post bronchodilator   Other Visit Diagnoses     Xerostomia        Relevant Medications    pilocarpine (SALAGEN) 5 mg tablet    Insomnia, unspecified type        Relevant Medications    melatonin 3 mg  Discontinue Doxylamine succinate (Unisom) as it may cause dry moth  Return for Next scheduled follow up  Chief Complaint:     Chief Complaint   Patient presents with   • Cough        History of Present Illness:     80year old female patient is being seen for evaluation of chronic cough and dry mouth  No other complaints  The following portions of the patient's history were reviewed and updated as appropriate: allergies, current medications, past family history, past medical history, past social history, past surgical history and problem list      Review of Systems:     Review of Systems   Constitutional: Negative for chills and fever  HENT: Negative for ear pain and sore throat  Eyes: Negative for pain and visual disturbance  Respiratory: Positive for cough  Negative for shortness of breath  Cardiovascular: Negative for chest pain and palpitations  Gastrointestinal: Negative for abdominal pain and vomiting  Genitourinary: Negative for dysuria and hematuria  Musculoskeletal: Negative for arthralgias and back pain  Skin: Negative for color change and rash  Neurological: Negative for seizures and syncope  All other systems reviewed and are negative  Problem List:     Patient Active Problem List   Diagnosis   • Hypercholesteremia   • Pes anserinus bursitis of right knee   • Nonrheumatic aortic valve insufficiency   • Perforation of left tympanic membrane   • Bilateral impacted cerumen   • Breast cancer (HCC)   • Dry mouth   • Chronic maxillary sinusitis   • Electrolyte abnormality   • Chronic fatigue   • Abnormal laboratory test   • Venous insufficiency   • Encounter for screening mammogram for breast cancer   • Stage 3 chronic kidney disease, unspecified whether stage 3a or 3b CKD ([facility] [address] )   • Esophageal dysphagia   • Age related osteoporosis   • Tremor   • Hyponatremia   • Constipation        Objective:     /82   Pulse (!) 110   Temp 97 8 °F (36 6 °C) (Tympanic)   Ht 4' 10 62" (1 489 m)   Wt 53 kg (116 lb 12 8 oz)   SpO2 100%   BMI 23 90 kg/m²     Physical Exam  Vitals and nursing note reviewed  Constitutional:       General: She is not in acute distress  Appearance: She is well-developed  She is not ill-appearing  HENT:      Head: Normocephalic and atraumatic  Mouth/Throat:      Mouth: Mucous membranes are dry  Eyes:      Conjunctiva/sclera: Conjunctivae normal    Cardiovascular:      Rate and Rhythm: Normal rate and regular rhythm  Heart sounds: No murmur heard  Pulmonary:      Effort: Pulmonary effort is normal  No respiratory distress  Breath sounds: Normal breath sounds  No wheezing or rales  Abdominal:      Palpations: Abdomen is soft  Tenderness: There is no abdominal tenderness  Musculoskeletal:         General: No swelling  Cervical back: Neck supple  Skin:     General: Skin is warm and dry        Capillary Refill: Capillary refill takes less than 2 seconds  Neurological:      General: No focal deficit present  Mental Status: She is alert and oriented to person, place, and time  Psychiatric:         Mood and Affect: Mood normal          Pertinent Laboratory/Diagnostic Studies:    Laboratory Results: I have personally reviewed the pertinent laboratory results/reports     Chemistry Profile:   Results from Last 12 Months   Lab Units 01/17/23  0832 04/22/22  0850   POTASSIUM mmol/L 3 7 4 2   CHLORIDE mmol/L 100 99*   CO2 mmol/L 29 28   BUN mg/dL 11 11   CREATININE mg/dL 1 00 0 95   GLUCOSE FASTING mg/dL 94 93   CALCIUM mg/dL 8 8 8 5   AST U/L  --  24   ALT U/L  --  25   ALK PHOS U/L  --  82   EGFR ml/min/1 73sq m 50 54       Radiology/Other Diagnostic Testing Results: I have personally reviewed pertinent reports        Rob Barnes MD  [id number]

## 2023-03-20 ENCOUNTER — TELEPHONE (OUTPATIENT)
Dept: INTERNAL MEDICINE CLINIC | Facility: CLINIC | Age: 88
End: 2023-03-20

## 2023-03-20 NOTE — TELEPHONE ENCOUNTER
Patient tried the 3 mg of melatonin  She states it is not working  She was only able to sleep for a few hours  Asking what else you can recommend        Please advise

## 2023-03-21 DIAGNOSIS — F51.01 PRIMARY INSOMNIA: Primary | ICD-10-CM

## 2023-03-21 RX ORDER — TRAZODONE HYDROCHLORIDE 50 MG/1
25 TABLET ORAL
Qty: 30 TABLET | Refills: 1 | Status: SHIPPED | OUTPATIENT
Start: 2023-03-21

## 2023-04-26 NOTE — PROGRESS NOTES
"Assessment/Plan:        Diagnoses and all orders for this visit:    Encounter for immunization    Dysphagia, unspecified type  -     Ambulatory Referral to Gastroenterology; Future         As stated above after evaluation of significant dysphagia to solids and liquids, reported neg weight loss or red        Flag symptoms , unresponsive to PPI , also previously on famotidine with poor clinical response     Subjective:      Patient ID: Zeferino Siegel is a 80 y o  female  Mrs Catarino Francis here today for evaluation of dysphagia      The following portions of the patient's history were reviewed and updated as appropriate: allergies, current medications, past family history, past medical history, past social history, past surgical history and problem list     Review of Systems   Gastrointestinal:        Dysphagia   All other systems reviewed and are negative  Objective:      /78   Pulse 100   Ht 4' 9\" (1 448 m)   Wt 54 3 kg (119 lb 9 6 oz)   SpO2 96%   BMI 25 88 kg/m²        Physical Exam  Vitals and nursing note reviewed  Constitutional:       General: She is not in acute distress  Appearance: Normal appearance  She is not ill-appearing, toxic-appearing or diaphoretic  HENT:      Head: Normocephalic and atraumatic  Right Ear: Tympanic membrane normal  There is no impacted cerumen  Left Ear: Tympanic membrane normal  There is no impacted cerumen  Nose: Nose normal  No congestion or rhinorrhea  Mouth/Throat:      Mouth: Mucous membranes are moist       Pharynx: No oropharyngeal exudate or posterior oropharyngeal erythema  Eyes:      Extraocular Movements: Extraocular movements intact  Conjunctiva/sclera: Conjunctivae normal       Pupils: Pupils are equal, round, and reactive to light  Cardiovascular:      Rate and Rhythm: Normal rate  Pulses: Normal pulses  Pulmonary:      Effort: Pulmonary effort is normal  No respiratory distress        Breath sounds: No " wheezing or rales  Chest:      Chest wall: No tenderness  Abdominal:      General: Bowel sounds are normal  There is no distension  Palpations: Abdomen is soft  Tenderness: There is abdominal tenderness  Musculoskeletal:         General: Normal range of motion  Cervical back: Normal range of motion  Right lower leg: No edema  Left lower leg: No edema  Skin:     General: Skin is warm  Capillary Refill: Capillary refill takes 2 to 3 seconds  Neurological:      Mental Status: She is alert and oriented to person, place, and time  Psychiatric:         Mood and Affect: Mood normal          Behavior: Behavior normal          Thought Content:  Thought content normal          Judgment: Judgment normal              Therapeutic Lifestyle Change Visit:     One-on-one comprehensive counseling, coaching, and health behavior change visit completed

## 2023-05-12 ENCOUNTER — TELEPHONE (OUTPATIENT)
Dept: NEUROLOGY | Facility: CLINIC | Age: 88
End: 2023-05-12

## 2023-07-11 ENCOUNTER — RA CDI HCC (OUTPATIENT)
Dept: OTHER | Facility: HOSPITAL | Age: 88
End: 2023-07-11

## 2023-07-11 NOTE — PROGRESS NOTES
[facility] coding opportunities       Chart reviewed, no opportunity found: CHART REVIEWED, NO OPPORTUNITY FOUND        Patients Insurance     Medicare Insurance: Medicare

## 2023-07-19 ENCOUNTER — OFFICE VISIT (OUTPATIENT)
Dept: INTERNAL MEDICINE CLINIC | Facility: CLINIC | Age: 88
End: 2023-07-19
Payer: MEDICARE

## 2023-07-19 VITALS
HEIGHT: 59 IN | BODY MASS INDEX: 23.35 KG/M2 | DIASTOLIC BLOOD PRESSURE: 80 MMHG | SYSTOLIC BLOOD PRESSURE: 144 MMHG | WEIGHT: 115.8 LBS

## 2023-07-19 DIAGNOSIS — Z12.31 ENCOUNTER FOR SCREENING MAMMOGRAM FOR BREAST CANCER: ICD-10-CM

## 2023-07-19 DIAGNOSIS — Z00.00 MEDICARE ANNUAL WELLNESS VISIT, SUBSEQUENT: Primary | ICD-10-CM

## 2023-07-19 DIAGNOSIS — M81.0 AGE RELATED OSTEOPOROSIS, UNSPECIFIED PATHOLOGICAL FRACTURE PRESENCE: ICD-10-CM

## 2023-07-19 DIAGNOSIS — Z23 ENCOUNTER FOR IMMUNIZATION: ICD-10-CM

## 2023-07-19 DIAGNOSIS — F51.01 PRIMARY INSOMNIA: ICD-10-CM

## 2023-07-19 DIAGNOSIS — R05.3 CHRONIC COUGH: ICD-10-CM

## 2023-07-19 DIAGNOSIS — K59.01 SLOW TRANSIT CONSTIPATION: ICD-10-CM

## 2023-07-19 DIAGNOSIS — R68.2 DRY MOUTH: ICD-10-CM

## 2023-07-19 DIAGNOSIS — M81.0 OSTEOPOROSIS, UNSPECIFIED OSTEOPOROSIS TYPE, UNSPECIFIED PATHOLOGICAL FRACTURE PRESENCE: ICD-10-CM

## 2023-07-19 PROCEDURE — 99214 OFFICE O/P EST MOD 30 MIN: CPT | Performed by: GENERAL ACUTE CARE HOSPITAL

## 2023-07-19 PROCEDURE — G0438 PPPS, INITIAL VISIT: HCPCS | Performed by: GENERAL ACUTE CARE HOSPITAL

## 2023-07-19 RX ORDER — MOMETASONE FUROATE 100 UG/1
AEROSOL RESPIRATORY (INHALATION)
COMMUNITY
Start: 2023-06-09

## 2023-07-19 RX ORDER — TRAZODONE HYDROCHLORIDE 50 MG/1
25 TABLET ORAL
Qty: 30 TABLET | Refills: 1 | Status: SHIPPED | OUTPATIENT
Start: 2023-07-19

## 2023-07-19 NOTE — ASSESSMENT & PLAN NOTE
Start fiber supplement (metamucil, or benefiber)  Start miralax every day    If no improvement after 1-2 weeks, start Linzess

## 2023-07-19 NOTE — ASSESSMENT & PLAN NOTE
She actually still takes unisom for insomnia on it for many years  She does not recall that last time we told her to stop it  It may cause dry mouth  She stopped pilocarpine, did not help

## 2023-07-19 NOTE — PATIENT INSTRUCTIONS
Stop unisom. It can cause dry mouth. Can start trazodone 25mg for sleep. For constipation:  Start fiber supplement (metamucil, or benefiber)  Start miralax every day  If no improvement after 1-2 weeks, start Linzess    For osteoporosis, refer to rheumatology to discuss treatment      Medicare Preventive Visit Patient Instructions  Thank you for completing your Welcome to Medicare Visit or Medicare Annual Wellness Visit today. Your next wellness visit will be due in one year (7/19/2024). The screening/preventive services that you may require over the next 5-10 years are detailed below. Some tests may not apply to you based off risk factors and/or age. Screening tests ordered at today's visit but not completed yet may show as past due. Also, please note that scanned in results may not display below. Preventive Screenings:  Service Recommendations Previous Testing/Comments   Colorectal Cancer Screening  * Colonoscopy    * Fecal Occult Blood Test (FOBT)/Fecal Immunochemical Test (FIT)  * Fecal DNA/Cologuard Test  * Flexible Sigmoidoscopy Age: 43-73 years old   Colonoscopy: every 10 years (may be performed more frequently if at higher risk)  OR  FOBT/FIT: every 1 year  OR  Cologuard: every 3 years  OR  Sigmoidoscopy: every 5 years  Screening may be recommended earlier than age 39 if at higher risk for colorectal cancer. Also, an individualized decision between you and your healthcare provider will decide whether screening between the ages of 77-80 would be appropriate. Colonoscopy: Not on file  FOBT/FIT: 03/07/2023  Cologuard: Not on file  Sigmoidoscopy: Not on file          Breast Cancer Screening Age: 36 years old  Frequency: every 1-2 years  Not required if history of left and right mastectomy Mammogram: 08/15/2022        Cervical Cancer Screening Between the ages of 21-29, pap smear recommended once every 3 years. Between the ages of 32-69, can perform pap smear with HPV co-testing every 5 years. Recommendations may differ for women with a history of total hysterectomy, cervical cancer, or abnormal pap smears in past. Pap Smear: Not on file        Hepatitis C Screening Once for adults born between 1945 and 1965  More frequently in patients at high risk for Hepatitis C Hep C Antibody: Not on file        Diabetes Screening 1-2 times per year if you're at risk for diabetes or have pre-diabetes Fasting glucose: 94 mg/dL (1/17/2023)  A1C: No results in last 5 years (No results in last 5 years)      Cholesterol Screening Once every 5 years if you don't have a lipid disorder. May order more often based on risk factors. Lipid panel: 07/19/2021          Other Preventive Screenings Covered by Medicare:  Abdominal Aortic Aneurysm (AAA) Screening: covered once if your at risk. You're considered to be at risk if you have a family history of AAA. Lung Cancer Screening: covers low dose CT scan once per year if you meet all of the following conditions: (1) Age 48-67; (2) No signs or symptoms of lung cancer; (3) Current smoker or have quit smoking within the last 15 years; (4) You have a tobacco smoking history of at least 20 pack years (packs per day multiplied by number of years you smoked); (5) You get a written order from a healthcare provider. Glaucoma Screening: covered annually if you're considered high risk: (1) You have diabetes OR (2) Family history of glaucoma OR (3)  aged 48 and older OR (3)  American aged 72 and older  Osteoporosis Screening: covered every 2 years if you meet one of the following conditions: (1) You're estrogen deficient and at risk for osteoporosis based off medical history and other findings; (2) Have a vertebral abnormality; (3) On glucocorticoid therapy for more than 3 months; (4) Have primary hyperparathyroidism; (5) On osteoporosis medications and need to assess response to drug therapy. Last bone density test (DXA Scan): 10/18/2022.   HIV Screening: covered annually if you're between the age of 14-79. Also covered annually if you are younger than 13 and older than 72 with risk factors for HIV infection. For pregnant patients, it is covered up to 3 times per pregnancy. Immunizations:  Immunization Recommendations   Influenza Vaccine Annual influenza vaccination during flu season is recommended for all persons aged >= 6 months who do not have contraindications   Pneumococcal Vaccine   * Pneumococcal conjugate vaccine = PCV13 (Prevnar 13), PCV15 (Vaxneuvance), PCV20 (Prevnar 20)  * Pneumococcal polysaccharide vaccine = PPSV23 (Pneumovax) Adults 20-63 years old: 1-3 doses may be recommended based on certain risk factors  Adults 72 years old: 1-2 doses may be recommended based off what pneumonia vaccine you previously received   Hepatitis B Vaccine 3 dose series if at intermediate or high risk (ex: diabetes, end stage renal disease, liver disease)   Tetanus (Td) Vaccine - COST NOT COVERED BY MEDICARE PART B Following completion of primary series, a booster dose should be given every 10 years to maintain immunity against tetanus. Td may also be given as tetanus wound prophylaxis. Tdap Vaccine - COST NOT COVERED BY MEDICARE PART B Recommended at least once for all adults. For pregnant patients, recommended with each pregnancy. Shingles Vaccine (Shingrix) - COST NOT COVERED BY MEDICARE PART B  2 shot series recommended in those aged 48 and above     Health Maintenance Due:      Topic Date Due    Breast Cancer Screening: Mammogram  08/15/2023    DXA SCAN  10/18/2027     Immunizations Due:      Topic Date Due    Pneumococcal Vaccine: 65+ Years (1 - PCV) Never done    COVID-19 Vaccine (3 - Pfizer series) 08/11/2021    Influenza Vaccine (1) 09/01/2023     Advance Directives   What are advance directives? Advance directives are legal documents that state your wishes and plans for medical care.  These plans are made ahead of time in case you lose your ability to make decisions for yourself. Advance directives can apply to any medical decision, such as the treatments you want, and if you want to donate organs. What are the types of advance directives? There are many types of advance directives, and each state has rules about how to use them. You may choose a combination of any of the following:  Living will: This is a written record of the treatment you want. You can also choose which treatments you do not want, which to limit, and which to stop at a certain time. This includes surgery, medicine, IV fluid, and tube feedings. Durable power of  for [facility]): This is a written record that states who you want to make healthcare choices for you when you are unable to make them for yourself. This person, called a proxy, is usually a family member or a friend. You may choose more than 1 proxy. Do not resuscitate (DNR) order:  A DNR order is used in case your heart stops beating or you stop breathing. It is a request not to have certain forms of treatment, such as CPR. A DNR order may be included in other types of advance directives. Medical directive: This covers the care that you want if you are in a coma, near death, or unable to make decisions for yourself. You can list the treatments you want for each condition. Treatment may include pain medicine, surgery, blood transfusions, dialysis, IV or tube feedings, and a ventilator (breathing machine). Values history: This document has questions about your views, beliefs, and how you feel and think about life. This information can help others choose the care that you would choose. Why are advance directives important? An advance directive helps you control your care. Although spoken wishes may be used, it is better to have your wishes written down. Spoken wishes can be misunderstood, or not followed. Treatments may be given even if you do not want them.  An advance directive may make it easier for your family to make difficult choices about your care. © Copyright [employer] 2018 Information is for End User's use only and may not be sold, redistributed or otherwise used for commercial purposes.  All illustrations and images included in CareNotes® are the copyrighted property of [employer].D.A.M., Inc. or [address] [place] [address]

## 2023-07-19 NOTE — ASSESSMENT & PLAN NOTE
Vaccinations: had booster 11/2022; not interested in pneumonia vaccine or shingrix;    Advance care planning:has living will, POA is son Colin Wright, who lives 10min from her  Cognition: no concern  DEXA:   Healthy diet and regular exercise

## 2023-07-19 NOTE — PROGRESS NOTES
Assessment and Plan:     Problem List Items Addressed This Visit        Digestive    Dry mouth     She actually still takes unisom for insomnia on it for many years  She does not recall that last time we told her to stop it  It may cause dry mouth  She stopped pilocarpine, did not help         Slow transit constipation     Start fiber supplement (metamucil, or benefiber)  Start miralax every day    If no improvement after 1-2 weeks, start Linzess           Relevant Medications    linaCLOtide 72 MCG CAPS       Musculoskeletal and Integument    Age related osteoporosis     Risedronate  on allergy list but she does not recall what happened  Her kids told her not to get treatment but I recommend it again to her. Given the possible allergy refer to rheum to discuss treatment options            Other    Chronic cough     Saw GI was told cough was 2/2 reflex  Started on PPI and pepcid, no improvement  Ok to stop         Encounter for screening mammogram for breast cancer    Medicare annual wellness visit, subsequent - Primary     Vaccinations: had booster 11/2022; not interested in pneumonia vaccine or shingrix; Advance care planning:has living will, POA is son John Bhardwaj, who lives 10min from her  Cognition: no concern  DEXA:   Healthy diet and regular exercise           Primary insomnia     Stop unisom  Beer's list  Could contribute to try mouth  Start trazodone         Relevant Medications    traZODone (DESYREL) 50 mg tablet   Other Visit Diagnoses     Encounter for immunization        Osteoporosis, unspecified osteoporosis type, unspecified pathological fracture presence        Relevant Orders    Ambulatory Referral to Rheumatology           Preventive health issues were discussed with patient, and age appropriate screening tests were ordered as noted in patient's After Visit Summary.   Personalized health advice and appropriate referrals for health education or preventive services given if needed, as noted in patient's After Visit Summary. History of Present Illness:     Patient presents for a Medicare Wellness Visit    HPI     Stool orange color  Constipation  Started by GI pepcid and prilosec in March, did not notice any different. Denies heart burn. Still c/o no taste, no smell since covid  Chronic dry mouth  Still takes unisome        Patient Care Team:  Wes Villatoro MD as PCP - General (Internal Medicine)  MD Ira Le as Nurse Practitioner (Internal Medicine)     Review of Systems:     Review of Systems     Problem List:     Patient Active Problem List   Diagnosis   • Hypercholesteremia   • Pes anserinus bursitis of right knee   • Nonrheumatic aortic valve insufficiency   • Perforation of left tympanic membrane   • Bilateral impacted cerumen   • Chronic cough   • Breast cancer ([address])   • Dry mouth   • Chronic maxillary sinusitis   • Electrolyte abnormality   • Chronic fatigue   • Abnormal laboratory test   • Venous insufficiency   • Encounter for screening mammogram for breast cancer   • Stage 3 chronic kidney disease, unspecified whether stage 3a or 3b CKD ([address])   • Esophageal dysphagia   • Age related osteoporosis   • Tremor   • Hyponatremia   • Constipation   • Slow transit constipation   • Medicare annual wellness visit, subsequent   • Primary insomnia      Past Medical and Surgical History:     Past Medical History:   Diagnosis Date   • Breast cancer ([address])    • Cancer ([address])    • COVID-19    • Hyperlipidemia      Past Surgical History:   Procedure Laterality Date   • BREAST SURGERY      cancer (> 5 years)      Family History:     Family History   Problem Relation Age of Onset   • Breast cancer Mother    • Heart disease Father         heart problem   • Heart disease Sister         heart problem   • Heart disease Brother         heart problem      Social History:     Social History     Socioeconomic History   • Marital status:       Spouse name: None   • Number of children: None   • Years of education: None   • Highest education level: None   Occupational History   • None   Tobacco Use   • Smoking status: Never   • Smokeless tobacco: Never   Vaping Use   • Vaping Use: Never used   Substance and Sexual Activity   • Alcohol use:  Yes     Alcohol/week: 1.0 standard drink of alcohol     Types: 1 Glasses of wine per week   • Drug use: No   • Sexual activity: Not Currently   Other Topics Concern   • None   Social History Narrative   • None     Social Determinants of Health     Financial Resource Strain: Not on file   Food Insecurity: Not on file   Transportation Needs: Not on file   Physical Activity: Not on file   Stress: Not on file   Social Connections: Not on file   Intimate Partner Violence: Not on file   Housing Stability: Not on file      Medications and Allergies:     Current Outpatient Medications   Medication Sig Dispense Refill   • Asmanex  MCG/ACT AERO INHALE TWO PUFFS EVERY TWO TIMES A DAY     • benzonatate (TESSALON) 200 MG capsule Take 1 capsule (200 mg total) by mouth 3 (three) times a day as needed for cough 20 capsule 0   • Calcium Carb-Cholecalciferol 1000-800 MG-UNIT TABS Take by mouth     • famotidine (PEPCID) 40 MG tablet Take 1 tablet (40 mg total) by mouth daily at bedtime (Patient taking differently: Take 40 mg by mouth daily at bedtime PRN) 90 tablet 2   • ipratropium (ATROVENT) 0.03 % nasal spray 2 sprays into each nostril 3 (three) times a day Use up to three times a day with runny nose, before meals 30 mL 2   • linaCLOtide 72 MCG CAPS Take 72 mcg by mouth daily at least 30 minutes prior to the first meal of the day 30 capsule 5   • melatonin 3 mg Take 1 tablet (3 mg total) by mouth daily at bedtime 60 tablet 0   • Multiple Vitamins-Minerals (CENTRUM SILVER 50+WOMEN PO) Take by mouth     • omeprazole (PriLOSEC) 40 MG capsule TAKE 1 CAPSULE BY MOUTH EVERY DAY 30 MINUTES BEFORE BREAKFAST     • simvastatin (ZOCOR) 5 MG tablet Take 1 tablet (5 mg total) by mouth daily at bedtime 90 tablet 3   • traZODone (DESYREL) 50 mg tablet Take 0.5 tablets (25 mg total) by mouth daily at bedtime 30 tablet 1     No current facility-administered medications for this visit. Allergies   Allergen Reactions   • Pravastatin    • Risedronate       Immunizations:     Immunization History   Administered Date(s) Administered   • COVID-19 PFIZER VACCINE 0.3 ML IM 05/21/2021, 06/16/2021   • INFLUENZA 09/29/2014, 10/19/2015, 10/26/2016, 11/08/2017, 10/09/2018, 09/14/2020, 11/16/2021   • Influenza Quadrivalent Preservative Free 3 years and older IM 10/19/2011   • Influenza, high dose seasonal 0.7 mL 10/02/2019, 10/10/2022   • Tuberculin Skin Test 02/06/2021, 02/15/2021      Health Maintenance:         Topic Date Due   • Breast Cancer Screening: Mammogram  08/15/2023   • DXA SCAN  10/18/2027         Topic Date Due   • Pneumococcal Vaccine: 65+ Years (1 - PCV) Never done   • COVID-19 Vaccine (3 - Pfizer series) 08/11/2021   • Influenza Vaccine (1) 09/01/2023      Medicare Screening Tests and Risk Assessments:     Janet Villarreal is here for her Subsequent Wellness visit. Health Risk Assessment:   Patient rates overall health as fair. Patient feels that their physical health rating is slightly worse. Patient is very satisfied with their life. Eyesight was rated as same. Hearing was rated as same. Patient feels that their emotional and mental health rating is same. Patients states they are never, rarely angry. Patient states they are sometimes unusually tired/fatigued. Pain experienced in the last 7 days has been some. Patient states that she has experienced no weight loss or gain in last 6 months. Fall Risk Screening: In the past year, patient has experienced: no history of falling in past year      Urinary Incontinence Screening:   Patient has not leaked urine accidently in the last six months.      Home Safety:  Patient does not have trouble with stairs inside or outside of their home. Patient has working smoke alarms and has working carbon monoxide detector. Home safety hazards include: none. Nutrition:   Current diet is Regular. Medications:   Patient is currently taking over-the-counter supplements. OTC medications include: see medication list. Patient is able to manage medications. Activities of Daily Living (ADLs)/Instrumental Activities of Daily Living (IADLs):   Walk and transfer into and out of bed and chair?: Yes  Dress and groom yourself?: Yes    Bathe or shower yourself?: Yes    Feed yourself? Yes  Do your laundry/housekeeping?: Yes  Manage your money, pay your bills and track your expenses?: Yes  Make your own meals?: Yes    Do your own shopping?: Yes    Previous Hospitalizations:   Any hospitalizations or ED visits within the last 12 months?: No      Advance Care Planning:   Living will: Yes    Durable POA for healthcare: Yes    Advanced directive: Yes      PREVENTIVE SCREENINGS      Cardiovascular Screening:    General: Screening Not Indicated and History Lipid Disorder      Diabetes Screening:     General: Screening Current      Colorectal Cancer Screening:     General: Screening Not Indicated      Breast Cancer Screening:     General: History Breast Cancer      Cervical Cancer Screening:    General: Screening Not Indicated      Osteoporosis Screening:    General: Screening Not Indicated and History Osteoporosis      Lung Cancer Screening:     General: Screening Not Indicated    Screening, Brief Intervention, and Referral to Treatment (SBIRT)    Screening      Single Item Drug Screening:  How often have you used an illegal drug (including marijuana) or a prescription medication for non-medical reasons in the past year? never    Single Item Drug Screen Score: 0  Interpretation: Negative screen for possible drug use disorder    No results found.      Physical Exam:     /80 (BP Location: Left arm, Patient Position: Sitting, Cuff Size: Standard)   Ht 4' 10.62" (1.489 m)   Wt 52.5 kg (115 lb 12.8 oz)   BMI 23.69 kg/m²     Physical Exam     Steven Washington MD

## 2023-07-19 NOTE — ASSESSMENT & PLAN NOTE
Risedronate  on allergy list but she does not recall what happened  Her kids told her not to get treatment but I recommend it again to her.    Given the possible allergy refer to rheum to discuss treatment options

## 2023-08-02 ENCOUNTER — OFFICE VISIT (OUTPATIENT)
Dept: INTERNAL MEDICINE CLINIC | Facility: CLINIC | Age: 88
End: 2023-08-02
Payer: MEDICARE

## 2023-08-02 VITALS
SYSTOLIC BLOOD PRESSURE: 140 MMHG | WEIGHT: 118 LBS | BODY MASS INDEX: 24.77 KG/M2 | OXYGEN SATURATION: 98 % | HEIGHT: 58 IN | HEART RATE: 93 BPM | DIASTOLIC BLOOD PRESSURE: 86 MMHG

## 2023-08-02 DIAGNOSIS — R68.2 DRY MOUTH: ICD-10-CM

## 2023-08-02 DIAGNOSIS — R05.3 CHRONIC COUGH: ICD-10-CM

## 2023-08-02 DIAGNOSIS — F51.01 PRIMARY INSOMNIA: Primary | ICD-10-CM

## 2023-08-02 PROBLEM — J06.9 VIRAL URI: Status: ACTIVE | Noted: 2023-08-02

## 2023-08-02 PROCEDURE — 99213 OFFICE O/P EST LOW 20 MIN: CPT | Performed by: GENERAL ACUTE CARE HOSPITAL

## 2023-08-02 RX ORDER — DOXYLAMINE SUCCINATE 25 MG/1
25 TABLET ORAL DAILY PRN
COMMUNITY
End: 2023-08-02

## 2023-08-02 NOTE — PROGRESS NOTES
Name: Simona Ramos      : 1935      MRN: [id number]  Encounter Provider: Nirmal Avendano MD  Encounter Date: 2023   Encounter department: [facility]    Assessment & Plan     1. Primary insomnia  Assessment & Plan:  She has stopped unisom and started trazodone. Works well  Continue trazodone      2. Chronic cough  Assessment & Plan:  Saw GI was told cough was 2/2 relfex. Still takes ppi, says she has one more weeks per GI. Does not noticed different. Saw pulm who thought likely cough variant asthma, tried steroid inhaler without much improvement. 3. Dry mouth  Assessment & Plan:  Chronic  Had multiple visits with ENT on this  Tried pilocarpine wihtout help  Sjogren ab negative  unisom stopped in last visit without much improvement  Artificial saliva, hard candy           Subjective      HPI    Cold Like Symptoms (No taste or smell, dry mouth, cough, congestion, ear blockage. Has not covid tested. Symptoms have started a few days ago, has gone to ENT. Denies any fevers.)    Continue to c/o chronic symptoms: dry mouth, loss of taste and smell, chronic dry cough. Also felt ear fullness in right, went to ENT, had wax cleaning. Yesterday felt some "cold symptoms in nose". Denies congestion or running nose, but says "I feel I have a cold in my nose". No fever, SOB.       Review of Systems    Current Outpatient Medications on File Prior to Visit   Medication Sig   • Asmanex  MCG/ACT AERO INHALE TWO PUFFS EVERY TWO TIMES A DAY   • benzonatate (TESSALON) 200 MG capsule Take 1 capsule (200 mg total) by mouth 3 (three) times a day as needed for cough   • Calcium Carb-Cholecalciferol 1000-800 MG-UNIT TABS Take by mouth   • famotidine (PEPCID) 40 MG tablet Take 1 tablet (40 mg total) by mouth daily at bedtime (Patient taking differently: Take 40 mg by mouth daily at bedtime PRN)   • ipratropium (ATROVENT) 0.03 % nasal spray 2 sprays into each nostril 3 (three) times a day Use up to three times a day with runny nose, before meals   • linaCLOtide 72 MCG CAPS Take 72 mcg by mouth daily at least 30 minutes prior to the first meal of the day   • melatonin 3 mg Take 1 tablet (3 mg total) by mouth daily at bedtime   • Multiple Vitamins-Minerals (CENTRUM SILVER 50+WOMEN PO) Take by mouth   • omeprazole (PriLOSEC) 40 MG capsule TAKE 1 CAPSULE BY MOUTH EVERY DAY 30 MINUTES BEFORE BREAKFAST   • simvastatin (ZOCOR) 5 MG tablet Take 1 tablet (5 mg total) by mouth daily at bedtime   • traZODone (DESYREL) 50 mg tablet Take 0.5 tablets (25 mg total) by mouth daily at bedtime   • [DISCONTINUED] doxylamine (Unisom SleepTabs) 25 MG tablet Take 25 mg by mouth daily as needed       Objective     /86 (BP Location: Left arm, Patient Position: Sitting, Cuff Size: Standard)   Pulse 93   Ht 4' 10" (1.473 m)   Wt 53.5 kg (118 lb)   SpO2 98%   BMI 24.66 kg/m²     Physical Exam  Юлия Fung MD

## 2023-08-02 NOTE — ASSESSMENT & PLAN NOTE
Chronic  Had multiple visits with ENT on this  Tried pilocarpine wihtout help  Sjogren ab negative  unisom stopped in last visit without much improvement  Artificial saliva, hard candy

## 2023-08-02 NOTE — ASSESSMENT & PLAN NOTE
Saw GI was told cough was 2/2 relfex. Still takes ppi, says she has one more weeks per GI. Does not noticed different. Saw pulm who thought likely cough variant asthma, tried steroid inhaler without much improvement.

## 2023-08-14 ENCOUNTER — APPOINTMENT (OUTPATIENT)
Dept: LAB | Age: 88
End: 2023-08-14
Payer: MEDICARE

## 2023-08-14 DIAGNOSIS — R51.9 NEW ONSET OF HEADACHES AFTER AGE 50: ICD-10-CM

## 2023-08-14 DIAGNOSIS — H93.11 RIGHT-SIDED TINNITUS: ICD-10-CM

## 2023-08-14 LAB
BUN SERPL-MCNC: 10 MG/DL (ref 5–25)
CREAT SERPL-MCNC: 1.06 MG/DL (ref 0.6–1.3)
GFR SERPL CREATININE-BSD FRML MDRD: 47 ML/MIN/1.73SQ M

## 2023-08-14 PROCEDURE — 82565 ASSAY OF CREATININE: CPT

## 2023-08-14 PROCEDURE — 84520 ASSAY OF UREA NITROGEN: CPT

## 2023-08-14 PROCEDURE — 36415 COLL VENOUS BLD VENIPUNCTURE: CPT

## 2023-08-17 ENCOUNTER — HOSPITAL ENCOUNTER (OUTPATIENT)
Dept: RADIOLOGY | Age: 88
Discharge: HOME | End: 2023-08-17
Payer: MEDICARE

## 2023-08-17 DIAGNOSIS — H93.11 RIGHT-SIDED TINNITUS: ICD-10-CM

## 2023-08-17 DIAGNOSIS — R51.9 NEW ONSET OF HEADACHES AFTER AGE 50: ICD-10-CM

## 2023-08-17 PROCEDURE — 70553 MRI BRAIN STEM W/O & W/DYE: CPT

## 2023-08-17 PROCEDURE — G1004 CDSM NDSC: HCPCS

## 2023-08-17 PROCEDURE — A9585 GADOBUTROL INJECTION: HCPCS | Performed by: PHYSICIAN ASSISTANT

## 2023-08-17 RX ORDER — GADOBUTROL 604.72 MG/ML
5 INJECTION INTRAVENOUS
Status: COMPLETED | OUTPATIENT
Start: 2023-08-17 | End: 2023-08-17

## 2023-08-17 RX ADMIN — GADOBUTROL 5 ML: 604.72 INJECTION INTRAVENOUS at 15:26

## 2023-08-26 ENCOUNTER — HOSPITAL ENCOUNTER (EMERGENCY)
Facility: HOSPITAL | Age: 88
Discharge: HOME | End: 2023-08-26
Attending: EMERGENCY MEDICINE | Admitting: EMERGENCY MEDICINE
Payer: MEDICARE

## 2023-08-26 VITALS
RESPIRATION RATE: 16 BRPM | SYSTOLIC BLOOD PRESSURE: 171 MMHG | OXYGEN SATURATION: 97 % | DIASTOLIC BLOOD PRESSURE: 74 MMHG | HEART RATE: 90 BPM | TEMPERATURE: 98.5 F

## 2023-08-26 DIAGNOSIS — F41.9 ANXIETY: ICD-10-CM

## 2023-08-26 DIAGNOSIS — R25.1 TREMOR: Primary | ICD-10-CM

## 2023-08-26 LAB
ALBUMIN SERPL BCP-MCNC: 4.4 G/DL (ref 3.5–5)
ALP SERPL-CCNC: 75 U/L (ref 34–104)
ALT SERPL W P-5'-P-CCNC: 24 U/L (ref 7–52)
ANION GAP SERPL CALCULATED.3IONS-SCNC: 9 MMOL/L
AST SERPL W P-5'-P-CCNC: 24 U/L (ref 13–39)
BASOPHILS # BLD AUTO: 0.04 THOUSANDS/ÂΜL (ref 0–0.1)
BASOPHILS NFR BLD AUTO: 1 % (ref 0–1)
BILIRUB SERPL-MCNC: 0.75 MG/DL (ref 0.2–1)
BUN SERPL-MCNC: 10 MG/DL (ref 5–25)
CALCIUM SERPL-MCNC: 9.2 MG/DL (ref 8.4–10.2)
CARDIAC TROPONIN I PNL SERPL HS: 7 NG/L
CHLORIDE SERPL-SCNC: 100 MMOL/L (ref 96–108)
CO2 SERPL-SCNC: 25 MMOL/L (ref 21–32)
CREAT SERPL-MCNC: 0.75 MG/DL (ref 0.6–1.3)
EOSINOPHIL # BLD AUTO: 0.01 THOUSAND/ÂΜL (ref 0–0.61)
EOSINOPHIL NFR BLD AUTO: 0 % (ref 0–6)
ERYTHROCYTE [DISTWIDTH] IN BLOOD BY AUTOMATED COUNT: 13.3 % (ref 11.6–15.1)
GFR SERPL CREATININE-BSD FRML MDRD: 71 ML/MIN/1.73SQ M
GLUCOSE SERPL-MCNC: 106 MG/DL (ref 65–140)
HCT VFR BLD AUTO: 36.7 % (ref 34.8–46.1)
HGB BLD-MCNC: 12.4 G/DL (ref 11.5–15.4)
IMM GRANULOCYTES # BLD AUTO: 0.02 THOUSAND/UL (ref 0–0.2)
IMM GRANULOCYTES NFR BLD AUTO: 0 % (ref 0–2)
LYMPHOCYTES # BLD AUTO: 1.18 THOUSANDS/ÂΜL (ref 0.6–4.47)
LYMPHOCYTES NFR BLD AUTO: 18 % (ref 14–44)
MCH RBC QN AUTO: 34.6 PG (ref 26.8–34.3)
MCHC RBC AUTO-ENTMCNC: 33.8 G/DL (ref 31.4–37.4)
MCV RBC AUTO: 103 FL (ref 82–98)
MONOCYTES # BLD AUTO: 0.41 THOUSAND/ÂΜL (ref 0.17–1.22)
MONOCYTES NFR BLD AUTO: 6 % (ref 4–12)
NEUTROPHILS # BLD AUTO: 4.81 THOUSANDS/ÂΜL (ref 1.85–7.62)
NEUTS SEG NFR BLD AUTO: 75 % (ref 43–75)
NRBC BLD AUTO-RTO: 0 /100 WBCS
PLATELET # BLD AUTO: 278 THOUSANDS/UL (ref 149–390)
PMV BLD AUTO: 8.7 FL (ref 8.9–12.7)
POTASSIUM SERPL-SCNC: 3.6 MMOL/L (ref 3.5–5.3)
PROT SERPL-MCNC: 7.2 G/DL (ref 6.4–8.4)
RBC # BLD AUTO: 3.58 MILLION/UL (ref 3.81–5.12)
SODIUM SERPL-SCNC: 134 MMOL/L (ref 135–147)
WBC # BLD AUTO: 6.47 THOUSAND/UL (ref 4.31–10.16)

## 2023-08-26 PROCEDURE — 99284 EMERGENCY DEPT VISIT MOD MDM: CPT | Performed by: EMERGENCY MEDICINE

## 2023-08-26 PROCEDURE — 84484 ASSAY OF TROPONIN QUANT: CPT | Performed by: EMERGENCY MEDICINE

## 2023-08-26 PROCEDURE — 93005 ELECTROCARDIOGRAM TRACING: CPT

## 2023-08-26 PROCEDURE — 85025 COMPLETE CBC W/AUTO DIFF WBC: CPT | Performed by: EMERGENCY MEDICINE

## 2023-08-26 PROCEDURE — 99285 EMERGENCY DEPT VISIT HI MDM: CPT

## 2023-08-26 PROCEDURE — 80053 COMPREHEN METABOLIC PANEL: CPT | Performed by: EMERGENCY MEDICINE

## 2023-08-26 PROCEDURE — 36415 COLL VENOUS BLD VENIPUNCTURE: CPT

## 2023-08-26 PROCEDURE — 86618 LYME DISEASE ANTIBODY: CPT | Performed by: EMERGENCY MEDICINE

## 2023-08-26 NOTE — ED PROVIDER NOTES
History  Chief Complaint   Patient presents with   • Shaking     Pt presents to the ED with c/o shaking. Pt reports this has been going on for months, feels weak. 80-year-old female presents with shaking of her arms and occasionally her legs this has been a problem on and off for a few years, patient had a worse episode of it this morning where the shaking was more vigorous so she came to the ER for evaluation. She says she otherwise feels well. No fevers no chills no URI symptoms no abdominal pain.,  No urinary symptoms, no abdominal pain, no falls no injury. Patient's son states she lives alone, spends most of her time during the day in silence doing crossword puzzles, limited person interaction. ,  Patient's   2 years ago. History provided by:  Patient (pts son)       Prior to Admission Medications   Prescriptions Last Dose Informant Patient Reported? Taking?    ALPRAZolam (XANAX) 0.25 mg tablet   No No   Sig: Take 1 tablet (0.25 mg total) by mouth daily at bedtime as needed (tinnitus)   Asmanex  MCG/ACT AERO   Yes No   Sig: INHALE TWO PUFFS EVERY TWO TIMES A DAY   Patient not taking: Reported on 2023   Calcium Carb-Cholecalciferol 1000-800 MG-UNIT TABS  Self Yes No   Sig: Take by mouth   Multiple Vitamins-Minerals (CENTRUM SILVER 50+WOMEN PO)  Self Yes No   Sig: Take by mouth   benzonatate (TESSALON) 200 MG capsule   No No   Sig: Take 1 capsule (200 mg total) by mouth 3 (three) times a day as needed for cough   Patient not taking: Reported on 2023   famotidine (PEPCID) 40 MG tablet  Self No No   Sig: Take 1 tablet (40 mg total) by mouth daily at bedtime   Patient not taking: Reported on 2023   ipratropium (ATROVENT) 0.03 % nasal spray  Self No No   Si sprays into each nostril 3 (three) times a day Use up to three times a day with runny nose, before meals   linaCLOtide 72 MCG CAPS   No No   Sig: Take 72 mcg by mouth daily at least 30 minutes prior to the first meal of the day   Patient not taking: Reported on 8/8/2023   melatonin 3 mg   No No   Sig: Take 1 tablet (3 mg total) by mouth daily at bedtime   Patient not taking: Reported on 8/8/2023   omeprazole (PriLOSEC) 40 MG capsule   Yes No   Sig: TAKE 1 CAPSULE BY MOUTH EVERY DAY 30 MINUTES BEFORE BREAKFAST   simvastatin (ZOCOR) 5 MG tablet  Self No No   Sig: Take 1 tablet (5 mg total) by mouth daily at bedtime   traZODone (DESYREL) 50 mg tablet   No No   Sig: Take 0.5 tablets (25 mg total) by mouth daily at bedtime   Patient not taking: Reported on 8/8/2023   valACYclovir (VALTREX) 1,000 mg tablet   No No   Sig: Take 1 tablet (1,000 mg total) by mouth 3 (three) times a day for 7 days      Facility-Administered Medications: None       Past Medical History:   Diagnosis Date   • Breast cancer ([address] [facility])    • Cancer ([address] [facility])    • COVID-19    • Hyperlipidemia        Past Surgical History:   Procedure Laterality Date   • BREAST SURGERY      cancer (> 5 years)       Family History   Problem Relation Age of Onset   • Breast cancer Mother    • Heart disease Father         heart problem   • Heart disease Sister         heart problem   • Heart disease Brother         heart problem     I have reviewed and agree with the history as documented. E-Cigarette/Vaping   • E-Cigarette Use Never User      E-Cigarette/Vaping Substances   • Nicotine No    • THC No    • CBD No    • Flavoring No    • Other No    • Unknown No      Social History     Tobacco Use   • Smoking status: Never   • Smokeless tobacco: Never   Vaping Use   • Vaping Use: Never used   Substance Use Topics   • Alcohol use: Yes     Alcohol/week: 1.0 standard drink of alcohol     Types: 1 Glasses of wine per week   • Drug use: No       Review of Systems   Constitutional: Negative for activity change, chills, diaphoresis and fever. HENT: Negative for congestion, sinus pressure and sore throat. Eyes: Negative for pain and visual disturbance.    Respiratory: Negative for cough, chest tightness, shortness of breath, wheezing and stridor. Cardiovascular: Negative for chest pain and palpitations. Gastrointestinal: Negative for abdominal distention, abdominal pain, constipation, diarrhea, nausea and vomiting. Genitourinary: Negative for dysuria and frequency. Musculoskeletal: Negative for neck pain and neck stiffness. Skin: Negative for rash. Neurological: Positive for tremors. Negative for dizziness, speech difficulty, light-headedness, numbness and headaches. Physical Exam  Physical Exam  Vitals reviewed. Constitutional:       General: She is not in acute distress. Appearance: She is well-developed. She is not diaphoretic. HENT:      Head: Normocephalic and atraumatic. Right Ear: External ear normal.      Left Ear: External ear normal.      Nose: Nose normal.   Eyes:      General:         Right eye: No discharge. Left eye: No discharge. Pupils: Pupils are equal, round, and reactive to light. Neck:      Trachea: No tracheal deviation. Cardiovascular:      Rate and Rhythm: Normal rate and regular rhythm. Heart sounds: Normal heart sounds. No murmur heard. Pulmonary:      Effort: Pulmonary effort is normal. No respiratory distress. Breath sounds: Normal breath sounds. No stridor. Abdominal:      General: There is no distension. Palpations: Abdomen is soft. Tenderness: There is no abdominal tenderness. There is no guarding or rebound. Musculoskeletal:         General: Normal range of motion. Cervical back: Normal range of motion and neck supple. Skin:     General: Skin is warm and dry. Coloration: Skin is not pale. Findings: No erythema. Neurological:      General: No focal deficit present. Mental Status: She is alert and oriented to person, place, and time.       Comments: Patient with tremors of her hands initially during exam, throughout exam especially with distraction these tremors would stop         Vital Signs  ED Triage Vitals [08/26/23 1039]   Temperature Pulse Respirations Blood Pressure SpO2   98.5 °F (36.9 °C) 99 18 138/93 95 %      Temp Source Heart Rate Source Patient Position - Orthostatic VS BP Location FiO2 (%)   Oral Monitor Lying Right arm --      Pain Score       --           Vitals:    08/26/23 1039 08/26/23 1200   BP: 138/93 (!) 171/74   Pulse: 99 90   Patient Position - Orthostatic VS: Lying Lying         Visual Acuity      ED Medications  Medications - No data to display    Diagnostic Studies  Results Reviewed     Procedure Component Value Units Date/Time    [place] Total AB W Reflex to IGM/IGG [[id number]] Collected: 08/26/23 1136    Lab Status:  In process Specimen: Blood from Arm, Right Updated: 08/26/23 1138    HS Troponin 0hr (reflex protocol) [[id number]]  (Normal) Collected: 08/26/23 1057    Lab Status: Final result Specimen: Blood from Arm, Left Updated: 08/26/23 1131     hs TnI 0hr 7 ng/L     Comprehensive metabolic panel [[id number]]  (Abnormal) Collected: 08/26/23 1057    Lab Status: Final result Specimen: Blood from Arm, Left Updated: 08/26/23 1124     Sodium 134 mmol/L      Potassium 3.6 mmol/L      Chloride 100 mmol/L      CO2 25 mmol/L      ANION GAP 9 mmol/L      BUN 10 mg/dL      Creatinine 0.75 mg/dL      Glucose 106 mg/dL      Calcium 9.2 mg/dL      AST 24 U/L      ALT 24 U/L      Alkaline Phosphatase 75 U/L      Total Protein 7.2 g/dL      Albumin 4.4 g/dL      Total Bilirubin 0.75 mg/dL      eGFR 71 ml/min/1.73sq m     Narrative:      [place] guidelines for Chronic Kidney Disease (CKD):   •  Stage 1 with normal or high GFR (GFR > 90 mL/min/1.73 square meters)  •  Stage 2 Mild CKD (GFR = 60-89 mL/min/1.73 square meters)  •  Stage 3A Moderate CKD (GFR = 45-59 mL/min/1.73 square meters)  •  Stage 3B Moderate CKD (GFR = 30-44 mL/min/1.73 square meters)  •  Stage 4 Severe CKD (GFR = 15-29 mL/min/1.73 square meters)  •  Stage 5 End Stage CKD (GFR <15 mL/min/1.73 square meters)  Note: GFR calculation is accurate only with a steady state creatinine    CBC and differential [[id number]]  (Abnormal) Collected: 08/26/23 1057    Lab Status: Final result Specimen: Blood from Arm, Left Updated: 08/26/23 1108     WBC 6.47 Thousand/uL      RBC 3.58 Million/uL      Hemoglobin 12.4 g/dL      Hematocrit 36.7 %       fL      MCH 34.6 pg      MCHC 33.8 g/dL      RDW 13.3 %      MPV 8.7 fL      Platelets 908 Thousands/uL      nRBC 0 /100 WBCs      Neutrophils Relative 75 %      Immat GRANS % 0 %      Lymphocytes Relative 18 %      Monocytes Relative 6 %      Eosinophils Relative 0 %      Basophils Relative 1 %      Neutrophils Absolute 4.81 Thousands/µL      Immature Grans Absolute 0.02 Thousand/uL      Lymphocytes Absolute 1.18 Thousands/µL      Monocytes Absolute 0.41 Thousand/µL      Eosinophils Absolute 0.01 Thousand/µL      Basophils Absolute 0.04 Thousands/µL                  No orders to display              Procedures  Procedures         ED Course                               SBIRT 22yo+    Flowsheet Row Most Recent Value   Initial Alcohol Screen: US AUDIT-C     1. How often do you have a drink containing alcohol? 0 Filed at: 08/26/2023 1207   2. How many drinks containing alcohol do you have on a typical day you are drinking? 0 Filed at: 08/26/2023 1207   3b. FEMALE Any Age, or MALE 65+: How often do you have 4 or more drinks on one occassion? 0 Filed at: 08/26/2023 1207   Audit-C Score 0 Filed at: 08/26/2023 1207   FOSTER: How many times in the past year have you. .. Used an illegal drug or used a prescription medication for non-medical reasons? Never Filed at: 08/26/2023 1207                    Medical Decision Making        Initial ED assessment: 55-year-old female, intermittent tremors of her hands, been happening for years worse this morning prompting ED visit    Initial DDx includes but is not limited to:    Intention tremor, early Parkinson's, anxiety, this does not appear to be rigors    Initial ED plan:    we will check blood work for electrolyte abnormality, although I do think this is less likely, will give some IV fluids we will monitor in the emergency department. Reviewed old records, patient had similar ED visits for same symptoms without etiology found, patient has had recent imaging of her brain        Final ED summary/disposition:   After evaluation and workup in the emergency department, after long conversations with patient's son I do question if there is a degree of loneliness and anxiety that is going on.,  I do feel patient is stable for discharge, they understand and agree to the plan     Amount and/or Complexity of Data Reviewed  Labs: ordered. Disposition  Final diagnoses:   Tremor   Anxiety     Time reflects when diagnosis was documented in both MDM as applicable and the Disposition within this note     Time User Action Codes Description Comment    8/26/2023  1:22 PM Benedetta Flattery Add [R25.1] Tremor     8/26/2023  1:22 PM Benedetta Flattery Add [F41.9] Anxiety       ED Disposition     ED Disposition   Discharge    Condition   Stable    Date/Time   Sat Aug 26, 2023  1:22 PM    [facility] discharge to home/self care.                Follow-up Information     Follow up With Specialties Details Why Contact Info    López Roth MD Internal Medicine, Geriatric Medicine Call in 1 day To arrange for the next available appointment [address] [facility]  [phone]            Discharge Medication List as of 8/26/2023  1:22 PM      CONTINUE these medications which have NOT CHANGED    Details   ALPRAZolam (XANAX) 0.25 mg tablet Take 1 tablet (0.25 mg total) by mouth daily at bedtime as needed (tinnitus), Starting Tue 8/8/2023, Normal      Asmanex  MCG/ACT AERO INHALE TWO PUFFS EVERY TWO TIMES A DAY, Historical Med      benzonatate (TESSALON) 200 MG capsule Take 1 capsule (200 mg total) by mouth 3 (three) times a day as needed for cough, Starting Tue 3/14/2023, Normal      Calcium Carb-Cholecalciferol 1000-800 MG-UNIT TABS Take by mouth, Historical Med      famotidine (PEPCID) 40 MG tablet Take 1 tablet (40 mg total) by mouth daily at bedtime, Starting Fri 11/18/2022, Normal      ipratropium (ATROVENT) 0.03 % nasal spray 2 sprays into each nostril 3 (three) times a day Use up to three times a day with runny nose, before meals, Starting Fri 11/18/2022, Normal      linaCLOtide 72 MCG CAPS Take 72 mcg by mouth daily at least 30 minutes prior to the first meal of the day, Starting Wed 7/19/2023, Normal      melatonin 3 mg Take 1 tablet (3 mg total) by mouth daily at bedtime, Starting Tue 3/14/2023, Normal      Multiple Vitamins-Minerals (CENTRUM SILVER 50+WOMEN PO) Take by mouth, Historical Med      omeprazole (PriLOSEC) 40 MG capsule TAKE 1 CAPSULE BY MOUTH EVERY DAY 30 MINUTES BEFORE BREAKFAST, Historical Med      simvastatin (ZOCOR) 5 MG tablet Take 1 tablet (5 mg total) by mouth daily at bedtime, Starting Fri 1/13/2023, Normal      traZODone (DESYREL) 50 mg tablet Take 0.5 tablets (25 mg total) by mouth daily at bedtime, Starting Wed 7/19/2023, Normal      valACYclovir (VALTREX) 1,000 mg tablet Take 1 tablet (1,000 mg total) by mouth 3 (three) times a day for 7 days, Starting Thu 8/24/2023, Until Thu 8/31/2023, Normal             No discharge procedures on file.     PDMP Review       Value Time User    PDMP Reviewed  Yes 8/8/2023  4:41 PM Halima Hooper PA-C          ED Provider  Electronically Signed by           Ramya Chandra DO  08/26/23 7140

## 2023-08-27 LAB
ATRIAL RATE: 96 BPM
B BURGDOR IGG+IGM SER QL IA: NEGATIVE
P AXIS: 78 DEGREES
PR INTERVAL: 200 MS
QRS AXIS: 44 DEGREES
QRSD INTERVAL: 74 MS
QT INTERVAL: 358 MS
QTC INTERVAL: 452 MS
T WAVE AXIS: 69 DEGREES
VENTRICULAR RATE: 96 BPM

## 2023-08-27 PROCEDURE — 93010 ELECTROCARDIOGRAM REPORT: CPT | Performed by: INTERNAL MEDICINE

## 2023-08-30 ENCOUNTER — OFFICE VISIT (OUTPATIENT)
Dept: INTERNAL MEDICINE CLINIC | Facility: CLINIC | Age: 88
End: 2023-08-30
Payer: MEDICARE

## 2023-08-30 VITALS
WEIGHT: 116 LBS | OXYGEN SATURATION: 99 % | HEART RATE: 99 BPM | BODY MASS INDEX: 24.35 KG/M2 | DIASTOLIC BLOOD PRESSURE: 60 MMHG | HEIGHT: 58 IN | SYSTOLIC BLOOD PRESSURE: 136 MMHG

## 2023-08-30 DIAGNOSIS — F41.9 ANXIETY: Primary | ICD-10-CM

## 2023-08-30 DIAGNOSIS — R51.9 NONINTRACTABLE HEADACHE, UNSPECIFIED CHRONICITY PATTERN, UNSPECIFIED HEADACHE TYPE: ICD-10-CM

## 2023-08-30 DIAGNOSIS — F51.01 PRIMARY INSOMNIA: ICD-10-CM

## 2023-08-30 DIAGNOSIS — R90.89 ABNORMAL BRAIN MRI: ICD-10-CM

## 2023-08-30 DIAGNOSIS — R25.1 TREMOR: ICD-10-CM

## 2023-08-30 PROCEDURE — 99214 OFFICE O/P EST MOD 30 MIN: CPT | Performed by: GENERAL ACUTE CARE HOSPITAL

## 2023-08-30 RX ORDER — TRAZODONE HYDROCHLORIDE 50 MG/1
25 TABLET ORAL
Qty: 30 TABLET | Refills: 1 | Status: SHIPPED | OUTPATIENT
Start: 2023-08-30

## 2023-08-30 RX ORDER — ACETAMINOPHEN 500 MG
1000 TABLET ORAL 2 TIMES DAILY PRN
COMMUNITY

## 2023-08-30 RX ORDER — BUSPIRONE HYDROCHLORIDE 5 MG/1
5 TABLET ORAL 2 TIMES DAILY
Qty: 60 TABLET | Refills: 5 | Status: SHIPPED | OUTPATIENT
Start: 2023-08-30

## 2023-08-30 NOTE — ASSESSMENT & PLAN NOTE
Recent brain MRI "Smooth linear enhancement of distal canalicular and labyrinthine segments of bilateral facial nerves (right greater than left), only appreciated in Bravo postcontrast sequence (not in other postcontrast sequences). Findings in Bravo sequence may   suggest inflammatory or infectious process. Recommend clinical and if indicated imaging follow-up."  She was started on Valtrex by ENT. She has 3 more days. She has referral to neurology and I encouraged son to make an appointment.

## 2023-08-30 NOTE — ASSESSMENT & PLAN NOTE
In previous visit I asked patient to stop Unisom and start trazodone. Since last visit she self stopped trazodone and restarted Unisom. This could be contributing to her worse dry mouth. Restart trazodone at 25 after a week if tolerates could go up to 50 mg at bedtime. We will also treat underlying possible anxiety.

## 2023-08-30 NOTE — PROGRESS NOTES
Name: Anaya Keating      : 1935      MRN: [id number]  Encounter Provider: Che Spencer MD  Encounter Date: 2023   [facility]    Assessment & Plan     1. Anxiety  -     busPIRone (BUSPAR) 5 mg tablet; Take 1 tablet (5 mg total) by mouth 2 (two) times a day    2. Nonintractable headache, unspecified chronicity pattern, unspecified headache type  Assessment & Plan:  Patient reports pressure feeling in her head initially at the right side of head now it is at forehead. She said it's not pain but more pressure feeling. Brain paranchyma is unremarkable other than microangiopathy paranasal sinus unremarkable. She takes tylenol 1000mg bid which is ok to continue. She has referral to neurology  Unclear if it could be anxiety related. On chart review ER note mentioned possible loneliness and anxiety. She has multiple non-focal symptoms with otherwise unremarkable labs and images, frequent ER visit. Mood disorder is likely we will try buspar. I asked son to let me know in a week about how she does, likely will increase dose by then. Orders:  -     busPIRone (BUSPAR) 5 mg tablet; Take 1 tablet (5 mg total) by mouth 2 (two) times a day    3. Primary insomnia  Assessment & Plan:  In previous visit I asked patient to stop Unisom and start trazodone. Since last visit she self stopped trazodone and restarted Unisom. This could be contributing to her worse dry mouth. Restart trazodone at 25 after a week if tolerates could go up to 50 mg at bedtime. We will also treat underlying possible anxiety. Orders:  -     traZODone (DESYREL) 50 mg tablet; Take 0.5 tablets (25 mg total) by mouth daily at bedtime    4. Tremor  Assessment & Plan: This is chronic for years. resting tremor left hand but that is chronic she had it since childhood  She reports tremor is worse with movement. Also has loss of smell pt blame it to covid vaccine.    Issue with handwriting: micrographia. Has some mild cogwheel rigitidy on exam.   Suspecting parkinson but also has symptoms indication essential tremor. She had referral to neurology in the past but never went. ENT entered referral to neurology again. I encouraged son to make an appointment. 5. Abnormal brain MRI  Assessment & Plan:  Recent brain MRI "Smooth linear enhancement of distal canalicular and labyrinthine segments of bilateral facial nerves (right greater than left), only appreciated in Bravo postcontrast sequence (not in other postcontrast sequences). Findings in Bravo sequence may   suggest inflammatory or infectious process. Recommend clinical and if indicated imaging follow-up."  She was started on Valtrex by ENT. She has 3 more days. She has referral to neurology and I encouraged son to make an appointment. Subjective      HPI      Hospital- has headaches, tremors, hard time walking, diarrhea, dry mouth, tendinitis, has had ct scans mri. Antiviral medication prescribed. Has been in the hospital at least 4-5 times trying to figure out what's going on. Has not been sleeping for the past couple of days even with the sleeping medication)    Patient is accompanied by son Alvin Jeremy today for post ER follow-up. She had several ER visits recently for multiple complaints. The most recent visit is for tremor. She also has complaints of diarrhea, right ear tinnitus, fatigue, headache, discomfort in at the abdomen, insomnia, blurry vision. All has been going on for about a month. She was prescribed Xanax from ER but she could not recall if she takes it. She self stopped trazodone and restarted on Unisom. Review of Systems   Constitutional: Positive for fatigue. HENT: Positive for tinnitus. Eyes:        Blurry vision   Respiratory: Positive for cough. Cardiovascular: Negative for palpitations and leg swelling. Gastrointestinal: Positive for diarrhea.        Current Outpatient Medications on File Prior to Visit   Medication Sig   • acetaminophen (TYLENOL) 500 mg tablet Take 1,000 mg by mouth 2 (two) times a day as needed   • Calcium Carb-Cholecalciferol 1000-800 MG-UNIT TABS Take by mouth   • ipratropium (ATROVENT) 0.03 % nasal spray 2 sprays into each nostril 3 (three) times a day Use up to three times a day with runny nose, before meals   • Multiple Vitamins-Minerals (CENTRUM SILVER 50+WOMEN PO) Take by mouth   • omeprazole (PriLOSEC) 40 MG capsule TAKE 1 CAPSULE BY MOUTH EVERY DAY 30 MINUTES BEFORE BREAKFAST   • simvastatin (ZOCOR) 5 MG tablet Take 1 tablet (5 mg total) by mouth daily at bedtime   • valACYclovir (VALTREX) 1,000 mg tablet Take 1 tablet (1,000 mg total) by mouth 3 (three) times a day for 7 days   • [DISCONTINUED] ALPRAZolam (XANAX) 0.25 mg tablet Take 1 tablet (0.25 mg total) by mouth daily at bedtime as needed (tinnitus)   • Asmanex  MCG/ACT AERO INHALE TWO PUFFS EVERY TWO TIMES A DAY (Patient not taking: Reported on 8/8/2023)   • benzonatate (TESSALON) 200 MG capsule Take 1 capsule (200 mg total) by mouth 3 (three) times a day as needed for cough (Patient not taking: Reported on 8/8/2023)   • famotidine (PEPCID) 40 MG tablet Take 1 tablet (40 mg total) by mouth daily at bedtime (Patient not taking: Reported on 8/8/2023)   • linaCLOtide 72 MCG CAPS Take 72 mcg by mouth daily at least 30 minutes prior to the first meal of the day (Patient not taking: Reported on 8/8/2023)   • melatonin 3 mg Take 1 tablet (3 mg total) by mouth daily at bedtime (Patient not taking: Reported on 8/8/2023)   • [DISCONTINUED] traZODone (DESYREL) 50 mg tablet Take 0.5 tablets (25 mg total) by mouth daily at bedtime (Patient not taking: Reported on 8/8/2023)       Objective     /60 (BP Location: Left arm, Patient Position: Sitting, Cuff Size: Standard)   Pulse 99   Ht 4' 10" (1.473 m)   Wt 52.6 kg (116 lb)   SpO2 99%   BMI 24.24 kg/m²     Physical Exam  Adeel Rachel MD

## 2023-08-30 NOTE — ASSESSMENT & PLAN NOTE
Patient reports pressure feeling in her head initially at the right side of head now it is at forehead. She said it's not pain but more pressure feeling. Brain paranchyma is unremarkable other than microangiopathy paranasal sinus unremarkable. She takes tylenol 1000mg bid which is ok to continue. She has referral to neurology  Unclear if it could be anxiety related. On chart review ER note mentioned possible loneliness and anxiety. She has multiple non-focal symptoms with otherwise unremarkable labs and images, frequent ER visit. Mood disorder is likely we will try buspar. I asked son to let me know in a week about how she does, likely will increase dose by then.

## 2023-08-30 NOTE — ASSESSMENT & PLAN NOTE
This is chronic for years. resting tremor left hand but that is chronic she had it since childhood  She reports tremor is worse with movement. Also has loss of smell pt blame it to covid vaccine. Issue with handwriting: micrographia. Has some mild cogwheel rigitidy on exam.   Suspecting parkinson but also has symptoms indication essential tremor. She had referral to neurology in the past but never went. ENT entered referral to neurology again. I encouraged son to make an appointment.

## 2023-08-30 NOTE — PATIENT INSTRUCTIONS
-Get rid of xanax  -Start the new medication, buspar. Send me a msg in 1-2 week let me know how she does. -reach out to ENT after completing anti-viral  -make appointment with neurology  -do not take unisom  -ok to continue trazodone at night for sleep, after a week ok to increase to 1pill (50mg) at night if needed.

## 2023-09-07 ENCOUNTER — TELEPHONE (OUTPATIENT)
Dept: INTERNAL MEDICINE CLINIC | Facility: CLINIC | Age: 88
End: 2023-09-07

## 2023-09-07 NOTE — TELEPHONE ENCOUNTER
The patient's mouth is still dry. She also cannot sleep. The 25mg of trazodone is not helping. How should she proceed? Please advise. Thank you.

## 2023-09-11 ENCOUNTER — OFFICE VISIT (OUTPATIENT)
Dept: INTERNAL MEDICINE CLINIC | Facility: CLINIC | Age: 88
End: 2023-09-11
Payer: MEDICARE

## 2023-09-11 VITALS
HEIGHT: 58 IN | DIASTOLIC BLOOD PRESSURE: 78 MMHG | SYSTOLIC BLOOD PRESSURE: 132 MMHG | OXYGEN SATURATION: 98 % | WEIGHT: 117.6 LBS | BODY MASS INDEX: 24.68 KG/M2 | HEART RATE: 75 BPM

## 2023-09-11 DIAGNOSIS — F51.01 PRIMARY INSOMNIA: Primary | ICD-10-CM

## 2023-09-11 PROCEDURE — 99213 OFFICE O/P EST LOW 20 MIN: CPT | Performed by: INTERNAL MEDICINE

## 2023-09-11 NOTE — Clinical Note
Patient refused trying any other medications for sleep. She states she is going back on Unisom. Tried to advise against this but she declined.

## 2023-09-11 NOTE — ASSESSMENT & PLAN NOTE
-Advised against resuming Unisom due to anticholinergic effects.  -Discussed potentially increasing trazodone dose to 75 mg nightly or trial of Remeron but the patient declined.

## 2023-09-11 NOTE — PROGRESS NOTES
Name: Liliya Garrett      : 1935      MRN: [id number]  Encounter Provider: Muna Newman MD  Encounter Date: 2023   Encounter department: [facility] [place] [address]. Primary insomnia  Assessment & Plan:  -Advised against resuming Unisom due to anticholinergic effects.  -Discussed potentially increasing trazodone dose to 75 mg nightly or trial of Remeron but the patient declined. Subjective      HPI  Patient presents today as an acute visit. She reports she was previously started on trazodone to assist with sleep but states it is not helping. The dose she was started on was 25 mg daily. She reports this was ineffective so she tried 50mg nightly with no improvement. She reports Rashaun Slot is only thing that has been effective for her. She states she was previously taken off of it due to dry mouth. Currently she is only getting about 2 hours of sleep at night. All other systems negative except for pertinent findings noted in HPI.        Current Outpatient Medications on File Prior to Visit   Medication Sig   • acetaminophen (TYLENOL) 500 mg tablet Take 1,000 mg by mouth 2 (two) times a day as needed   • busPIRone (BUSPAR) 5 mg tablet Take 1 tablet (5 mg total) by mouth 2 (two) times a day   • Calcium Carb-Cholecalciferol 1000-800 MG-UNIT TABS Take by mouth   • ipratropium (ATROVENT) 0.03 % nasal spray 2 sprays into each nostril 3 (three) times a day Use up to three times a day with runny nose, before meals   • Multiple Vitamins-Minerals (CENTRUM SILVER 50+WOMEN PO) Take by mouth   • omeprazole (PriLOSEC) 40 MG capsule TAKE 1 CAPSULE BY MOUTH EVERY DAY 30 MINUTES BEFORE BREAKFAST   • simvastatin (ZOCOR) 5 MG tablet Take 1 tablet (5 mg total) by mouth daily at bedtime   • traZODone (DESYREL) 50 mg tablet Take 0.5 tablets (25 mg total) by mouth daily at bedtime   • Asmanex  MCG/ACT AERO INHALE TWO PUFFS EVERY TWO TIMES A DAY (Patient not taking: Reported on 8/8/2023)   • benzonatate (TESSALON) 200 MG capsule Take 1 capsule (200 mg total) by mouth 3 (three) times a day as needed for cough (Patient not taking: Reported on 8/8/2023)   • famotidine (PEPCID) 40 MG tablet Take 1 tablet (40 mg total) by mouth daily at bedtime (Patient not taking: Reported on 8/8/2023)   • linaCLOtide 72 MCG CAPS Take 72 mcg by mouth daily at least 30 minutes prior to the first meal of the day (Patient not taking: Reported on 8/8/2023)   • melatonin 3 mg Take 1 tablet (3 mg total) by mouth daily at bedtime (Patient not taking: Reported on 8/8/2023)   • valACYclovir (VALTREX) 1,000 mg tablet Take 1 tablet (1,000 mg total) by mouth 3 (three) times a day for 7 days       Objective     /78   Pulse 75   Ht 4' 10" (1.473 m)   Wt 53.3 kg (117 lb 9.6 oz)   SpO2 98%   BMI 24.58 kg/m²     BP Readings from Last 3 Encounters:   09/11/23 132/78   08/30/23 136/60   08/26/23 (!) 171/74        Wt Readings from Last 3 Encounters:   09/11/23 53.3 kg (117 lb 9.6 oz)   08/30/23 52.6 kg (116 lb)   08/08/23 53.5 kg (118 lb)       Physical Exam    General: NAD, Alert and oriented x3   HEENT: NCAT, EOMI, normal conjunctiva  Cardiovascular: RRR, normal S1 and S2, no m/r/g  Pulmonary: Normal respiratory effort, no wheezes, rales or rhonchi  Extremities: No lower extremity edema  Skin: Normal skin color, no rashes     Aldair Lakhani MD

## 2023-09-15 ENCOUNTER — TELEPHONE (OUTPATIENT)
Dept: NEUROLOGY | Facility: CLINIC | Age: 88
End: 2023-09-15

## 2023-09-15 NOTE — TELEPHONE ENCOUNTER
Message was sent to me for scheduling from a triage. I did call the son Belinda Conley but no answer. LMOM to call us back to schedule with Dr. Janine Grewal.

## 2023-09-17 PROBLEM — Z00.00 MEDICARE ANNUAL WELLNESS VISIT, SUBSEQUENT: Status: RESOLVED | Noted: 2023-07-19 | Resolved: 2023-09-17

## 2023-09-18 NOTE — TELEPHONE ENCOUNTER
Patient's son called to schedule an appt with Dr. Lay Macias.  Patient was scheduled for 10/9 at 1 pm in [facility]

## 2023-09-19 ENCOUNTER — APPOINTMENT (EMERGENCY)
Dept: CT IMAGING | Facility: HOSPITAL | Age: 88
End: 2023-09-19
Payer: MEDICARE

## 2023-09-19 ENCOUNTER — HOSPITAL ENCOUNTER (EMERGENCY)
Facility: HOSPITAL | Age: 88
Discharge: HOME | End: 2023-09-19
Attending: EMERGENCY MEDICINE
Payer: MEDICARE

## 2023-09-19 ENCOUNTER — APPOINTMENT (EMERGENCY)
Dept: RADIOLOGY | Facility: HOSPITAL | Age: 88
End: 2023-09-19
Payer: MEDICARE

## 2023-09-19 ENCOUNTER — TELEPHONE (OUTPATIENT)
Dept: OTHER | Facility: HOSPITAL | Age: 88
End: 2023-09-19

## 2023-09-19 ENCOUNTER — TELEPHONE (OUTPATIENT)
Age: 88
End: 2023-09-19

## 2023-09-19 VITALS
HEART RATE: 95 BPM | RESPIRATION RATE: 16 BRPM | OXYGEN SATURATION: 99 % | DIASTOLIC BLOOD PRESSURE: 61 MMHG | SYSTOLIC BLOOD PRESSURE: 130 MMHG | TEMPERATURE: 98.4 F

## 2023-09-19 DIAGNOSIS — R68.83 CHILLS (WITHOUT FEVER): ICD-10-CM

## 2023-09-19 DIAGNOSIS — R10.9 ABDOMINAL DISCOMFORT: ICD-10-CM

## 2023-09-19 DIAGNOSIS — R11.0 NAUSEA: Primary | ICD-10-CM

## 2023-09-19 DIAGNOSIS — J18.9 PNEUMONIA: ICD-10-CM

## 2023-09-19 LAB
ALBUMIN SERPL BCP-MCNC: 4.2 G/DL (ref 3.5–5)
ALP SERPL-CCNC: 73 U/L (ref 34–104)
ALT SERPL W P-5'-P-CCNC: 23 U/L (ref 7–52)
ANION GAP SERPL CALCULATED.3IONS-SCNC: 7 MMOL/L
AST SERPL W P-5'-P-CCNC: 23 U/L (ref 13–39)
BACTERIA UR QL AUTO: ABNORMAL /HPF
BASOPHILS # BLD AUTO: 0.04 THOUSANDS/ÂΜL (ref 0–0.1)
BASOPHILS NFR BLD AUTO: 1 % (ref 0–1)
BILIRUB SERPL-MCNC: 0.76 MG/DL (ref 0.2–1)
BILIRUB UR QL STRIP: NEGATIVE
BUN SERPL-MCNC: 12 MG/DL (ref 5–25)
CALCIUM SERPL-MCNC: 8.9 MG/DL (ref 8.4–10.2)
CARDIAC TROPONIN I PNL SERPL HS: 5 NG/L
CHLORIDE SERPL-SCNC: 97 MMOL/L (ref 96–108)
CLARITY UR: CLEAR
CO2 SERPL-SCNC: 27 MMOL/L (ref 21–32)
COLOR UR: COLORLESS
CREAT SERPL-MCNC: 0.99 MG/DL (ref 0.6–1.3)
D DIMER PPP FEU-MCNC: 0.37 UG/ML FEU
EOSINOPHIL # BLD AUTO: 0.02 THOUSAND/ÂΜL (ref 0–0.61)
EOSINOPHIL NFR BLD AUTO: 0 % (ref 0–6)
ERYTHROCYTE [DISTWIDTH] IN BLOOD BY AUTOMATED COUNT: 14.6 % (ref 11.6–15.1)
GFR SERPL CREATININE-BSD FRML MDRD: 51 ML/MIN/1.73SQ M
GLUCOSE SERPL-MCNC: 130 MG/DL (ref 65–140)
GLUCOSE UR STRIP-MCNC: NEGATIVE MG/DL
HCT VFR BLD AUTO: 35.6 % (ref 34.8–46.1)
HGB BLD-MCNC: 11.8 G/DL (ref 11.5–15.4)
HGB UR QL STRIP.AUTO: NEGATIVE
IMM GRANULOCYTES # BLD AUTO: 0.01 THOUSAND/UL (ref 0–0.2)
IMM GRANULOCYTES NFR BLD AUTO: 0 % (ref 0–2)
KETONES UR STRIP-MCNC: NEGATIVE MG/DL
LEUKOCYTE ESTERASE UR QL STRIP: ABNORMAL
LYMPHOCYTES # BLD AUTO: 0.76 THOUSANDS/ÂΜL (ref 0.6–4.47)
LYMPHOCYTES NFR BLD AUTO: 11 % (ref 14–44)
MCH RBC QN AUTO: 35.6 PG (ref 26.8–34.3)
MCHC RBC AUTO-ENTMCNC: 33.1 G/DL (ref 31.4–37.4)
MCV RBC AUTO: 108 FL (ref 82–98)
MONOCYTES # BLD AUTO: 0.61 THOUSAND/ÂΜL (ref 0.17–1.22)
MONOCYTES NFR BLD AUTO: 9 % (ref 4–12)
NEUTROPHILS # BLD AUTO: 5.42 THOUSANDS/ÂΜL (ref 1.85–7.62)
NEUTS SEG NFR BLD AUTO: 79 % (ref 43–75)
NITRITE UR QL STRIP: NEGATIVE
NON-SQ EPI CELLS URNS QL MICRO: ABNORMAL /HPF
NRBC BLD AUTO-RTO: 0 /100 WBCS
PH UR STRIP.AUTO: 8 [PH]
PLATELET # BLD AUTO: 234 THOUSANDS/UL (ref 149–390)
PMV BLD AUTO: 8.8 FL (ref 8.9–12.7)
POTASSIUM SERPL-SCNC: 3.8 MMOL/L (ref 3.5–5.3)
PROT SERPL-MCNC: 7 G/DL (ref 6.4–8.4)
PROT UR STRIP-MCNC: NEGATIVE MG/DL
RBC # BLD AUTO: 3.31 MILLION/UL (ref 3.81–5.12)
RBC #/AREA URNS AUTO: ABNORMAL /HPF
SODIUM SERPL-SCNC: 131 MMOL/L (ref 135–147)
SP GR UR STRIP.AUTO: 1.04 (ref 1–1.03)
UROBILINOGEN UR STRIP-ACNC: <2 MG/DL
WBC # BLD AUTO: 6.86 THOUSAND/UL (ref 4.31–10.16)
WBC #/AREA URNS AUTO: ABNORMAL /HPF

## 2023-09-19 PROCEDURE — 96376 TX/PRO/DX INJ SAME DRUG ADON: CPT

## 2023-09-19 PROCEDURE — 84484 ASSAY OF TROPONIN QUANT: CPT

## 2023-09-19 PROCEDURE — 96375 TX/PRO/DX INJ NEW DRUG ADDON: CPT

## 2023-09-19 PROCEDURE — 99284 EMERGENCY DEPT VISIT MOD MDM: CPT

## 2023-09-19 PROCEDURE — 96367 TX/PROPH/DG ADDL SEQ IV INF: CPT

## 2023-09-19 PROCEDURE — 36415 COLL VENOUS BLD VENIPUNCTURE: CPT

## 2023-09-19 PROCEDURE — 71045 X-RAY EXAM CHEST 1 VIEW: CPT

## 2023-09-19 PROCEDURE — G1004 CDSM NDSC: HCPCS

## 2023-09-19 PROCEDURE — 96365 THER/PROPH/DIAG IV INF INIT: CPT

## 2023-09-19 PROCEDURE — 93005 ELECTROCARDIOGRAM TRACING: CPT

## 2023-09-19 PROCEDURE — 85379 FIBRIN DEGRADATION QUANT: CPT

## 2023-09-19 PROCEDURE — 96368 THER/DIAG CONCURRENT INF: CPT

## 2023-09-19 PROCEDURE — 81001 URINALYSIS AUTO W/SCOPE: CPT | Performed by: EMERGENCY MEDICINE

## 2023-09-19 PROCEDURE — 85025 COMPLETE CBC W/AUTO DIFF WBC: CPT

## 2023-09-19 PROCEDURE — 74177 CT ABD & PELVIS W/CONTRAST: CPT

## 2023-09-19 PROCEDURE — 99285 EMERGENCY DEPT VISIT HI MDM: CPT | Performed by: EMERGENCY MEDICINE

## 2023-09-19 PROCEDURE — 80053 COMPREHEN METABOLIC PANEL: CPT

## 2023-09-19 RX ORDER — ACETAMINOPHEN 325 MG/1
650 TABLET ORAL ONCE
Status: COMPLETED | OUTPATIENT
Start: 2023-09-19 | End: 2023-09-19

## 2023-09-19 RX ORDER — ONDANSETRON 2 MG/ML
4 INJECTION INTRAMUSCULAR; INTRAVENOUS ONCE
Status: COMPLETED | OUTPATIENT
Start: 2023-09-19 | End: 2023-09-19

## 2023-09-19 RX ORDER — SODIUM CHLORIDE 9 MG/ML
3 INJECTION INTRAVENOUS
Status: DISCONTINUED | OUTPATIENT
Start: 2023-09-19 | End: 2023-09-19 | Stop reason: HOSPADM

## 2023-09-19 RX ADMIN — SODIUM CHLORIDE, SODIUM LACTATE, POTASSIUM CHLORIDE, AND CALCIUM CHLORIDE 500 ML: .6; .31; .03; .02 INJECTION, SOLUTION INTRAVENOUS at 10:29

## 2023-09-19 RX ADMIN — ONDANSETRON 4 MG: 2 INJECTION INTRAMUSCULAR; INTRAVENOUS at 10:29

## 2023-09-19 RX ADMIN — CEFTRIAXONE SODIUM 1000 MG: 10 INJECTION, POWDER, FOR SOLUTION INTRAVENOUS at 11:01

## 2023-09-19 RX ADMIN — AZITHROMYCIN MONOHYDRATE 500 MG: 500 INJECTION, POWDER, LYOPHILIZED, FOR SOLUTION INTRAVENOUS at 11:53

## 2023-09-19 RX ADMIN — IOHEXOL 100 ML: 350 INJECTION, SOLUTION INTRAVENOUS at 08:36

## 2023-09-19 RX ADMIN — ACETAMINOPHEN 650 MG: 325 TABLET, FILM COATED ORAL at 11:52

## 2023-09-19 RX ADMIN — ONDANSETRON 4 MG: 2 INJECTION INTRAMUSCULAR; INTRAVENOUS at 08:01

## 2023-09-19 NOTE — TELEPHONE ENCOUNTER
[phone] [address] [facility]// pt: Laith Headings : 1935// " Resident is Covid positive and I have questions about orders."    TC to on call provider. ..

## 2023-09-19 NOTE — DISCHARGE INSTRUCTIONS
Today we assessed you CT exam and found a small hiatal hernia as well as possible infectious process in the lungs. Given the increased tremors, report of chills and cough, we treated for likely pneumonia. Please follow up with outpatient primary care and pulmonology to follow up on test and CT results. If you start having increased fevers, chills, headaches, chest pain, difficulty breathing please return to the ED.

## 2023-09-19 NOTE — TELEPHONE ENCOUNTER
Denis Antoine with [facility] ([phone]; ext. [phone]) called to request order verification.

## 2023-09-19 NOTE — ED PROVIDER NOTES
History  Chief Complaint   Patient presents with   • Nausea   • Abdominal Pain     Patient c/o "upper left quadrant pain with nausea and shaking (that has been going on for a long time)" PT denies SOB and CP     80year old female, with dementia, presenting to the ED with a complaint of left sided abdominal pain. According to EMS, they were dispatched to the patient's home for shortness of breath. Upon arrival, they found the patient in a recliner chair having tremors and not short of breath, the patient has a chronic history of tremors. The patient's granddaughter had called 911 because the patient's tremors appeared to have worsened. EMS was able to assist the patient to their litter and transport her to the ED. Enroute to the ED, the patient was only complaining of left sided abdominal pain and nausea. The patient was denying worsening tremors and shortness of breath. Upon initial presentation, the patient states her left abdomen feels "weird" and denies pain currently, only stating she can tell something is wrong. The patient states her nausea has improved but is still present. Patient is currently denying shortness of breath. Patient is also denying chest pain, headache, blurred vision, dizziness, vomiting, diarrhea, numbness or tingling. Patient is aware of her current location, events leading up to the hospital, and time. It is also noted that patient is tachycardic and hypertensive on arrival with no history of these signs. Prior to Admission Medications   Prescriptions Last Dose Informant Patient Reported? Taking?    Asmanex  MCG/ACT AERO   Yes No   Sig: INHALE TWO PUFFS EVERY TWO TIMES A DAY   Patient not taking: Reported on 8/8/2023   Calcium Carb-Cholecalciferol 1000-800 MG-UNIT TABS  Self Yes No   Sig: Take by mouth   Multiple Vitamins-Minerals (CENTRUM SILVER 50+WOMEN PO)  Self Yes No   Sig: Take by mouth   acetaminophen (TYLENOL) 500 mg tablet   Yes No   Sig: Take 1,000 mg by mouth 2 (two) times a day as needed   benzonatate (TESSALON) 200 MG capsule   No No   Sig: Take 1 capsule (200 mg total) by mouth 3 (three) times a day as needed for cough   Patient not taking: Reported on 2023   busPIRone (BUSPAR) 5 mg tablet   No No   Sig: Take 1 tablet (5 mg total) by mouth 2 (two) times a day   famotidine (PEPCID) 40 MG tablet  Self No No   Sig: Take 1 tablet (40 mg total) by mouth daily at bedtime   Patient not taking: Reported on 2023   ipratropium (ATROVENT) 0.03 % nasal spray  Self No No   Si sprays into each nostril 3 (three) times a day Use up to three times a day with runny nose, before meals   linaCLOtide 72 MCG CAPS   No No   Sig: Take 72 mcg by mouth daily at least 30 minutes prior to the first meal of the day   Patient not taking: Reported on 2023   melatonin 3 mg   No No   Sig: Take 1 tablet (3 mg total) by mouth daily at bedtime   Patient not taking: Reported on 2023   omeprazole (PriLOSEC) 40 MG capsule   Yes No   Sig: TAKE 1 CAPSULE BY MOUTH EVERY DAY 30 MINUTES BEFORE BREAKFAST   simvastatin (ZOCOR) 5 MG tablet  Self No No   Sig: Take 1 tablet (5 mg total) by mouth daily at bedtime   traZODone (DESYREL) 50 mg tablet   No No   Sig: Take 0.5 tablets (25 mg total) by mouth daily at bedtime   valACYclovir (VALTREX) 1,000 mg tablet   No No   Sig: Take 1 tablet (1,000 mg total) by mouth 3 (three) times a day for 7 days      Facility-Administered Medications: None       Past Medical History:   Diagnosis Date   • Breast cancer ([address])    • Cancer ([address] )    • COVID-19    • Hyperlipidemia        Past Surgical History:   Procedure Laterality Date   • BREAST SURGERY      cancer (> 5 years)       Family History   Problem Relation Age of Onset   • Breast cancer Mother    • Heart disease Father         heart problem   • Heart disease Sister         heart problem   • Heart disease Brother         heart problem     I have reviewed and agree with the history as documented. E-Cigarette/Vaping   • E-Cigarette Use Never User      E-Cigarette/Vaping Substances   • Nicotine No    • THC No    • CBD No    • Flavoring No    • Other No    • Unknown No      Social History     Tobacco Use   • Smoking status: Never   • Smokeless tobacco: Never   Vaping Use   • Vaping Use: Never used   Substance Use Topics   • Alcohol use: Not Currently     Alcohol/week: 1.0 standard drink of alcohol     Types: 1 Glasses of wine per week   • Drug use: No        Review of Systems   Constitutional: Negative. HENT: Negative. Eyes: Negative. Respiratory: Negative. Cardiovascular: Negative. Gastrointestinal: Positive for abdominal pain. Endocrine: Negative. Genitourinary: Negative. Musculoskeletal: Negative. Skin: Negative. Allergic/Immunologic: Negative. Neurological: Negative. Hematological: Negative. Psychiatric/Behavioral: Negative. Physical Exam  ED Triage Vitals [09/19/23 0748]   Temperature Pulse Respirations Blood Pressure SpO2   98.4 °F (36.9 °C) (!) 107 20 (!) 185/80 97 %      Temp Source Heart Rate Source Patient Position - Orthostatic VS BP Location FiO2 (%)   Oral Monitor Lying Right arm --      Pain Score       No Pain             Orthostatic Vital Signs  Vitals:    09/19/23 1000 09/19/23 1100 09/19/23 1200 09/19/23 1400   BP: 142/69 149/67 155/66 130/61   Pulse: 100 98 90 95   Patient Position - Orthostatic VS: Lying Sitting Sitting Sitting       Physical Exam  Constitutional:       Appearance: She is well-developed. She is obese. HENT:      Head: Normocephalic and atraumatic. Mouth/Throat:      Mouth: Mucous membranes are moist.   Eyes:      Extraocular Movements: Extraocular movements intact. Cardiovascular:      Rate and Rhythm: Tachycardia present. Heart sounds: Normal heart sounds. Pulmonary:      Effort: Pulmonary effort is normal.      Breath sounds: Normal breath sounds.    Abdominal:      General: Bowel sounds are normal. There is no distension or abdominal bruit. There are no signs of injury. Palpations: Abdomen is soft. There is no shifting dullness, fluid wave, hepatomegaly, splenomegaly, mass or pulsatile mass. Tenderness: There is no abdominal tenderness. Hernia: No hernia is present. Comments: Patient denying pain, just stating her left abdomen "feels weird"   Genitourinary:     Uterus: Absent. Skin:     General: Skin is warm. Capillary Refill: Capillary refill takes less than 2 seconds. Neurological:      Mental Status: She is alert and oriented to person, place, and time.    Psychiatric:         Mood and Affect: Mood normal.         Behavior: Behavior normal.         ED Medications  Medications   sodium chloride (PF) 0.9 % injection 3 mL (has no administration in time range)   ondansetron (ZOFRAN) injection 4 mg (4 mg Intravenous Given 9/19/23 0801)   iohexol (OMNIPAQUE) 350 MG/ML injection (MULTI-DOSE) 100 mL (100 mL Intravenous Given 9/19/23 0836)   ondansetron (ZOFRAN) injection 4 mg (4 mg Intravenous Given 9/19/23 1029)   lactated ringers bolus 500 mL (0 mL Intravenous Stopped 9/19/23 1129)   ceftriaxone (ROCEPHIN) 1 g/50 mL in dextrose IVPB (0 mg Intravenous Stopped 9/19/23 1131)   azithromycin (ZITHROMAX) 500 mg in sodium chloride 0.9% 250mL IVPB 500 mg (0 mg Intravenous Stopped 9/19/23 1253)   acetaminophen (TYLENOL) tablet 650 mg (650 mg Oral Given 9/19/23 1152)       Diagnostic Studies  Results Reviewed     Procedure Component Value Units Date/Time    Urinalysis with microscopic [[id number]]  (Abnormal) Collected: 09/19/23 1142    Lab Status: Final result Specimen: Urine, Other Updated: 09/19/23 1156     Color, UA Colorless     Clarity, UA Clear     Specific Gravity, UA 1.040     pH, UA 8.0     Leukocytes, UA Trace     Nitrite, UA Negative     Protein, UA Negative mg/dl      Glucose, UA Negative mg/dl      Ketones, UA Negative mg/dl      Urobilinogen, UA <2.0 mg/dl      Bilirubin, UA Negative     Occult Blood, UA Negative     RBC, UA 1-2 /hpf      WBC, UA 1-2 /hpf      Epithelial Cells None Seen /hpf      Bacteria, UA None Seen /hpf     HS Troponin 0hr (reflex protocol) [[id number]]  (Normal) Collected: 09/19/23 0755    Lab Status: Final result Specimen: Blood from Arm, Left Updated: 09/19/23 0826     hs TnI 0hr 5 ng/L     Comprehensive metabolic panel [[id number]]  (Abnormal) Collected: 09/19/23 0755    Lab Status: Final result Specimen: Blood from Arm, Left Updated: 09/19/23 0818     Sodium 131 mmol/L      Potassium 3.8 mmol/L      Chloride 97 mmol/L      CO2 27 mmol/L      ANION GAP 7 mmol/L      BUN 12 mg/dL      Creatinine 0.99 mg/dL      Glucose 130 mg/dL      Calcium 8.9 mg/dL      AST 23 U/L      ALT 23 U/L      Alkaline Phosphatase 73 U/L      Total Protein 7.0 g/dL      Albumin 4.2 g/dL      Total Bilirubin 0.76 mg/dL      eGFR 51 ml/min/1.73sq m     Narrative:      [place] guidelines for Chronic Kidney Disease (CKD):   •  Stage 1 with normal or high GFR (GFR > 90 mL/min/1.73 square meters)  •  Stage 2 Mild CKD (GFR = 60-89 mL/min/1.73 square meters)  •  Stage 3A Moderate CKD (GFR = 45-59 mL/min/1.73 square meters)  •  Stage 3B Moderate CKD (GFR = 30-44 mL/min/1.73 square meters)  •  Stage 4 Severe CKD (GFR = 15-29 mL/min/1.73 square meters)  •  Stage 5 End Stage CKD (GFR <15 mL/min/1.73 square meters)  Note: GFR calculation is accurate only with a steady state creatinine    D-dimer, quantitative [[id number]]  (Normal) Collected: 09/19/23 0755    Lab Status: Final result Specimen: Blood from Arm, Left Updated: 09/19/23 0815     D-Dimer, Quant 0.37 ug/ml FEU     Narrative: In the evaluation for possible pulmonary embolism, in the appropriate (Well's Score of 4 or less) patient, the age adjusted d-dimer cutoff for this patient can be calculated as:    Age x 0.01 (in ug/mL) for Age-adjusted D-dimer exclusion threshold for a patient over 50 years.     CBC and differential [[id number]]  (Abnormal) Collected: 09/19/23 0755    Lab Status: Final result Specimen: Blood from Arm, Left Updated: 09/19/23 0800     WBC 6.86 Thousand/uL      RBC 3.31 Million/uL      Hemoglobin 11.8 g/dL      Hematocrit 35.6 %       fL      MCH 35.6 pg      MCHC 33.1 g/dL      RDW 14.6 %      MPV 8.8 fL      Platelets 342 Thousands/uL      nRBC 0 /100 WBCs      Neutrophils Relative 79 %      Immat GRANS % 0 %      Lymphocytes Relative 11 %      Monocytes Relative 9 %      Eosinophils Relative 0 %      Basophils Relative 1 %      Neutrophils Absolute 5.42 Thousands/µL      Immature Grans Absolute 0.01 Thousand/uL      Lymphocytes Absolute 0.76 Thousands/µL      Monocytes Absolute 0.61 Thousand/µL      Eosinophils Absolute 0.02 Thousand/µL      Basophils Absolute 0.04 Thousands/µL                  CT abdomen pelvis with contrast   Final Result by Meeta Snider MD (09/19 1430)      1. Right middle lobe bronchiectasis and mild tree-in-bud nodules in the lingula and left lower lobe may be the sequela of chronic infectious/inflammatory process such as atypical mycobacterium versus aspiration. 2.  No acute process in the abdomen or pelvis. Workstation performed: [id number]         X-ray chest 1 view portable   ED Interpretation by Louis Champagne MD (09/19 4932)   No effusions or infiltrate noted. Cardiac silhouette appears normal.  No changes noted when compared to previous chest x-ray         Final Result by Britt Renee DO (09/19 1007)      No acute cardiopulmonary disease. Resident: Odell Hughes, the attending radiologist, have reviewed the images and agree with the final report above.       Workstation performed: [id number]               Procedures  ECG 12 Lead Documentation Only    Date/Time: 9/19/2023 8:06 AM    Performed by: Louis Champagne MD  Authorized by: Louis Champagne MD    Indications / Diagnosis:  Abdominal pain in elderly  ECG reviewed by me, the ED Provider: yes    Patient location:  ED  Previous ECG:     Previous ECG:  Compared to current    Similarity:  Changes noted  Interpretation:     Interpretation: abnormal    Rate:     ECG rate:  101    ECG rate assessment: tachycardic    Rhythm:     Rhythm: sinus rhythm    Ectopy:     Ectopy: none    QRS:     QRS axis:  Normal  Conduction:     Conduction: normal    ST segments:     ST segments:  Normal  T waves:     T waves: normal            ED Course  ED Course as of 09/19/23 [facility] Sep 19, 2023   0800 Patient assessed and physical exam completed. Patient HPI has multiple possible complaints. Labs and EKG ordered. Will order CT following lab work. Zofran ordered for nausea   3167 Spoke with granddaughter over the phone, permission given by patient. Patient's son is in [place] so cydney Royal ([phone]) is the primary contact now. Granddaughter gives same report, no new information. 0830 hs TnI 0hr: 5  Normal EKG. Will reflex trop   0929 IMPRESSION:     1. Right middle lobe bronchiectasis and mild tree-in-bud nodules in the lingula and left lower lobe may be the sequela of chronic infectious/inflammatory process such as atypical mycobacterium versus aspiration. 2.  No acute process in the abdomen or pelvis.        Workstation performed: [id number]   2095 IMPRESSION:     No acute cardiopulmonary disease.                 Resident: Vonnie Mauro     I, the attending radiologist, have reviewed the images and agree with the final report above.     Workstation performed: [id number]   9485 Spoke with son over the phone.   Will update before dc vs admit             HEART Risk Score    Flowsheet Row Most Recent Value   Heart Score Risk Calculator    History 0 Filed at: 09/19/2023 0829   ECG 0 Filed at: 09/19/2023 6644   Age 2 Filed at: 09/19/2023 4776   Risk Factors 1 Filed at: 09/19/2023 0829   Troponin 0 Filed at: 09/19/2023 3654   HEART Score 3 Filed at: 09/19/2023 0829              PERC Rule for PE    Flowsheet Row Most Recent Value   PERC Rule for PE    Age >=50 1 Filed at: 09/19/2023 0830   HR >=100 1 Filed at: 09/19/2023 0830   O2 Sat on room air < 95% 0 Filed at: 09/19/2023 0830   History of PE or DVT 0 Filed at: 09/19/2023 0830   Recent trauma or surgery 0 Filed at: 09/19/2023 0830   Hemoptysis 0 Filed at: 09/19/2023 0830   Exogenous estrogen 0 Filed at: 09/19/2023 0830   Unilateral leg swelling 0 Filed at: 09/19/2023 0830   PERC Rule for PE Results 2 Filed at: 09/19/2023 0830                  Wells' Criteria for PE    Flowsheet Row Most Recent Value   Wells' Criteria for PE    Clinical signs and symptoms of DVT 0 Filed at: 09/19/2023 7626   PE is primary diagnosis or equally likely 0 Filed at: 09/19/2023 0829   HR >100 1.5 Filed at: 09/19/2023 0829   Immobilization at least 3 days or Surgery in the previous 4 weeks 0 Filed at: 09/19/2023 7611   Previous, objectively diagnosed PE or DVT 0 Filed at: 09/19/2023 0829   Hemoptysis 0 Filed at: 09/19/2023 8763   Malignancy with treatment within 6 months or palliative 0 Filed at: 09/19/2023 4503   Wells' Criteria Total 1.5 Filed at: 09/19/2023 4031            Medical Decision Making  80year old female presenting with complaints of tremors, chills, nausea, headache, and generalized left sided abdominal pain. DDx including but not limited to: appendicitis, gastroenteritis, gastritis, PUD, GERD, gastroparesis, hepatitis, pancreatitis, colitis, enteritis, diverticulitis, food poisoning, mesenteric adenitis, epiploic appendagitis, mesenteric panniculitis, mesenteric ischemia, IBD, IBS, ileus, bowel obstruction, volvulus, internal hernia, AAA, cholecystitis, biliary colic, choledocholithiasis, perforated viscus, tumor, splenic etiology, constipation, pelvic pathology, renal colic, pyelonephritis, UTI; doubt cardiac etiology. UA is negative for UTI, CT positive for possible pneumonia in the lungs and hiatal hernia.   EKG and cardiac workup negative for ACS or cardiac etiology. Patient Christian Ayala having symptoms secondary to unsepcified viral illness vs hiatal hernia. Patient treated with zofran, tylenol, fluids, Rocephin, and azithromycin. Prior to discharge, patient was feeling much better. With the patient's family on the phone, a conversation was had about sending the patient to [facility] as the family was not comfortable with the patient going home alone at her house. Patient agreed and [facility] accepted the patient. Patient transferred via wheel chair [place] to [facility]. Patient comfortable with discharge at that time. Amount and/or Complexity of Data Reviewed  Labs: ordered. Decision-making details documented in ED Course. Radiology: ordered and independent interpretation performed. Risk  OTC drugs. Prescription drug management. Disposition  Final diagnoses:   Nausea   Chills (without fever)   Abdominal discomfort   Pneumonia     Time reflects when diagnosis was documented in both MDM as applicable and the Disposition within this note     Time User Action Codes Description Comment    9/19/2023  1:28 PM DeFeo, [place] Holstein Add [R11.0] Nausea     9/19/2023  1:28 PM DeFeo, [place] Holstein Add [R68.83] Chills (without fever)     9/19/2023  1:28 PM DeFeo, [place] Holstein Add [R10.9] Abdominal discomfort     9/19/2023  1:33 PM DeFeo, [place] Holstein Add [J18.9] Pneumonia       ED Disposition     ED Disposition   Discharge    Condition   Stable    Date/Time   Tue Sep 19, 2023  1:28 PM    Comment   Daivd Masker discharge to [facility]. Family is aware and [facility] has had a  speak with the family and the patient. Patient will be transported via wheel chair van.                Follow-up Information     Follow up With Specialties Details Why Contact Info Additional Information    Nadira Deshpande MD Internal Medicine, Geriatric Medicine  Discuss your ED visit [address] [place] [address],[place] [address] Pulmonology  Discuss CT findings of infectious/inflammatory reaction in the left lung [address] [facility]  [place] [address]  [address] [place] [address] [place], [address] [facility], [place], [place], [address], [phone]          Discharge Medication List as of 9/19/2023  1:42 PM      CONTINUE these medications which have NOT CHANGED    Details   acetaminophen (TYLENOL) 500 mg tablet Take 1,000 mg by mouth 2 (two) times a day as needed, Historical Med      Asmanex  MCG/ACT AERO INHALE TWO PUFFS EVERY TWO TIMES A DAY, Historical Med      benzonatate (TESSALON) 200 MG capsule Take 1 capsule (200 mg total) by mouth 3 (three) times a day as needed for cough, Starting Tue 3/14/2023, Normal      busPIRone (BUSPAR) 5 mg tablet Take 1 tablet (5 mg total) by mouth 2 (two) times a day, Starting Wed 8/30/2023, Normal      Calcium Carb-Cholecalciferol 1000-800 MG-UNIT TABS Take by mouth, Historical Med      famotidine (PEPCID) 40 MG tablet Take 1 tablet (40 mg total) by mouth daily at bedtime, Starting Fri 11/18/2022, Normal      ipratropium (ATROVENT) 0.03 % nasal spray 2 sprays into each nostril 3 (three) times a day Use up to three times a day with runny nose, before meals, Starting Fri 11/18/2022, Normal      linaCLOtide 72 MCG CAPS Take 72 mcg by mouth daily at least 30 minutes prior to the first meal of the day, Starting Wed 7/19/2023, Normal      melatonin 3 mg Take 1 tablet (3 mg total) by mouth daily at bedtime, Starting Tue 3/14/2023, Normal      Multiple Vitamins-Minerals (CENTRUM SILVER 50+WOMEN PO) Take by mouth, Historical Med      omeprazole (PriLOSEC) 40 MG capsule TAKE 1 CAPSULE BY MOUTH EVERY DAY 30 MINUTES BEFORE BREAKFAST, Historical Med      simvastatin (ZOCOR) 5 MG tablet Take 1 tablet (5 mg total) by mouth daily at bedtime, Starting Fri 1/13/2023, Normal traZODone (DESYREL) 50 mg tablet Take 0.5 tablets (25 mg total) by mouth daily at bedtime, Starting Wed 8/30/2023, Normal      valACYclovir (VALTREX) 1,000 mg tablet Take 1 tablet (1,000 mg total) by mouth 3 (three) times a day for 7 days, Starting Thu 8/24/2023, Until Thu 8/31/2023, Normal           No discharge procedures on file. PDMP Review       Value Time User    PDMP Reviewed  Yes 8/8/2023  4:41 PM Cesar Blum PA-C           ED Provider  Attending physically available and evaluated Betzy Covington. I managed the patient along with the ED Attending.     Electronically Signed by         Louis Champagne MD  09/19/23 6716

## 2023-09-21 ENCOUNTER — NURSING HOME VISIT (OUTPATIENT)
Dept: GERIATRICS | Facility: OTHER | Age: 88
End: 2023-09-21
Payer: MEDICARE

## 2023-09-21 DIAGNOSIS — Z66 DNR (DO NOT RESUSCITATE): ICD-10-CM

## 2023-09-21 DIAGNOSIS — E87.1 HYPONATREMIA: ICD-10-CM

## 2023-09-21 DIAGNOSIS — K59.00 CONSTIPATION, UNSPECIFIED CONSTIPATION TYPE: ICD-10-CM

## 2023-09-21 DIAGNOSIS — N18.31 STAGE 3A CHRONIC KIDNEY DISEASE (HCC): ICD-10-CM

## 2023-09-21 DIAGNOSIS — E78.5 HYPERLIPIDEMIA, UNSPECIFIED HYPERLIPIDEMIA TYPE: ICD-10-CM

## 2023-09-21 DIAGNOSIS — U07.1 COVID-19: Primary | ICD-10-CM

## 2023-09-21 DIAGNOSIS — F51.01 PRIMARY INSOMNIA: ICD-10-CM

## 2023-09-21 PROCEDURE — 99306 1ST NF CARE HIGH MDM 50: CPT | Performed by: INTERNAL MEDICINE

## 2023-09-21 NOTE — PROGRESS NOTES
[address], [address] [place] [address]  Facility:  [facility]    HISTORY AND PHYSICAL    NAME: Sundar Richardson  AGE: 80 y.o. SEX: female    DATE OF ENCOUNTER: 9/21/2023    Code status:  DNR    Assessment and Plan     1. COVID-19  Assessment & Plan:  · She is reporting significant side effects to Paxlovid, will transition to molnupiravir 800 mg every 12 hours for 5 days  · We will continue with supportive care including, hydration and routine Tessalon Perles (which she reports are effective)  · We will continue with monitoring for change in her condition      2. Hyponatremia  Assessment & Plan:  · Question trazodone contributing to her report of chronic hyponatremia  · We will discontinue trazodone, as it has been ineffective per patient, and continue with periodic laboratory monitoring for change in her condition  · She will follow-up with her PCP upon discharge      3. Stage 3a chronic kidney disease [facility])  Assessment & Plan:  · September 19, 2023: Creatinine 0.99, EGFR 51  · We will continue with avoidance of nephrotoxic medications, as able  · We will continue with clinical and periodic laboratory monitoring for change in her condition  · She will follow-up with her PCP upon discharge      4. Constipation, unspecified constipation type  Assessment & Plan:  · We will continue her prior to admission Linzess  · We will continue with monitoring for change in her condition  · She will follow-up with her PCP upon discharge      5. Primary insomnia  Assessment & Plan:  · We will discontinue trazodone, she states that it has been ineffective  · We will continue with melatonin 3 mg daily  · We will continue with monitoring for change in her condition      6.  DNR (do not resuscitate)  Assessment & Plan:  · As discussed with her during my visit, she wishes for her resuscitation status to be "DO NOT RESUSCITATE"      7. Hyperlipidemia, unspecified hyperlipidemia type  Assessment & Plan:  · We will continue her prior to admission simvastatin  · She will follow-up with her PCP upon discharge      See recent emergency room visit and PCP office progress note for further information. All medications and routine orders were reviewed and updated as needed. Plan discussed with: Patient and nursing staff. CC: PCP    Chief Complaint     She is seen for a visit to perform a history and physical exam to be admitted to the nursing facility for a respite care stay. History of Present Illness     History obtained from patient review, electronic medical record, and from her son during a telephone call. She is a pleasant 26-year-old woman with the comorbidities of hyperlipidemia, chronic cough, chronic tremor, anxiety, constipation, and stage IIIa chronic kidney disease who is seen for a visit to perform a history and physical exam to be admitted to the nursing facility for a respite care stay. She presented to the emergency room on September 19, 2023 with complaint of left-sided abdominal pain and nausea. Per ER record, EMS was called by her granddaughter secondary to concern regarding shortness of breath. After evaluation in the emergency room, the opinion was that her symptoms were secondary to a viral illness versus hiatal hernia. As patient's family was uncomfortable with her returning home and patient was agreeable, she was transferred to the nursing facility for a respite care stay. Upon arrival to the nursing facility, she tested positive for the COVID-19 virus. She notes cold symptoms with head congestion and a hoarse voice. She has a cough. She does not have shortness of breath. She reports that the antiviral medication, Paxlovid, is causing dry mouth and nausea. She reports difficulty with insomnia. She notes trazodone has not been helpful with her insomnia.     HISTORY:  Past Medical History:   Diagnosis Date   • Abnormal laboratory test 8/1/2022   • Breast cancer ([address] [facility])    • Cancer ([address] [facility])    • COVID-19    • Hyperlipidemia      Past Surgical History:   Procedure Laterality Date   • BREAST SURGERY      cancer (> 5 years)     Family History   Problem Relation Age of Onset   • Breast cancer Mother    • Heart disease Father         heart problem   • Heart disease Sister         heart problem   • Heart disease Brother         heart problem     Social History     Socioeconomic History   • Marital status:      Spouse name: Not on file   • Number of children: Not on file   • Years of education: Not on file   • Highest education level: Not on file   Occupational History   • Not on file   Tobacco Use   • Smoking status: Never   • Smokeless tobacco: Never   Vaping Use   • Vaping Use: Never used   Substance and Sexual Activity   • Alcohol use: Not Currently     Alcohol/week: 1.0 standard drink of alcohol     Types: 1 Glasses of wine per week   • Drug use: No   • Sexual activity: Not Currently   Other Topics Concern   • Not on file   Social History Narrative   • Not on file     Social Determinants of Health     Financial Resource Strain: Not on file   Food Insecurity: Not on file   Transportation Needs: Not on file   Physical Activity: Not on file   Stress: Not on file   Social Connections: Not on file   Intimate Partner Violence: Not on file   Housing Stability: Not on file       Allergies: Allergies   Allergen Reactions   • Pravastatin    • Risedronate    • Paxlovid [Nirmatrelvir-Ritonavir] Nausea Only       Review of Systems     Review of Systems   HENT:        See HPI   Respiratory: Positive for cough. Negative for shortness of breath. Cardiovascular: Negative for chest pain. Gastrointestinal: Positive for nausea. Genitourinary: Negative for difficulty urinating. Psychiatric/Behavioral: Positive for sleep disturbance. See HPI       Medications and orders     All medications reviewed and updated in long term EMR.       Objective     Vitals: Weight 115.9 pounds, temperature 97.5 °F, pulse 81, blood pressure 129/68, pulse oximetry 96% on room air. Physical Exam  Vitals reviewed. Constitutional:       General: She is awake. She is not in acute distress. Appearance: She is well-developed. She is not toxic-appearing or diaphoretic. Comments: She appears comfortable lying in bed, her stated age, and healthy. Her voice is hoarse. Eyes:      General: No scleral icterus. Conjunctiva/sclera: Conjunctivae normal.   Cardiovascular:      Rate and Rhythm: Normal rate and regular rhythm. Heart sounds: Normal heart sounds. No murmur heard. No friction rub. No gallop. Comments: There is no edema in her legs. Pulmonary:      Effort: Pulmonary effort is normal. No respiratory distress. Breath sounds: Normal breath sounds. No stridor. No wheezing, rhonchi or rales. Neurological:      Mental Status: She is alert. Comments: Orientation grossly intact. Psychiatric:         Mood and Affect: Mood normal.         Behavior: Behavior normal. Behavior is cooperative. Pertinent Laboratory/Diagnostic Studies: The following labs/studies were reviewed please see chart or hospital paperwork for details. Lab Results   Component Value Date    WBC 6.86 09/19/2023    HGB 11.8 09/19/2023    HCT 35.6 09/19/2023     (H) 09/19/2023     09/19/2023     Lab Results   Component Value Date    SODIUM 131 (L) 09/19/2023    K 3.8 09/19/2023    CL 97 09/19/2023    CO2 27 09/19/2023    BUN 12 09/19/2023    CREATININE 0.99 09/19/2023    GLUC 130 09/19/2023    CALCIUM 8.9 09/19/2023 September 19, 2023:    Portable chest x-ray:    FINDINGS:  Monitoring leads and clips project over the chest.     Cardiomediastinal silhouette appears unremarkable.     The lungs are clear.   No pneumothorax or pleural effusion.     Unchanged thoracic spine dextroscoliosis.     Surgical clips projecting over the right breast.     IMPRESSION:     No acute cardiopulmonary disease. September 19, 2023:    CT of abdomen and pelvis with contrast:    FINDINGS:     ABDOMEN     LOWER CHEST: Bronchiectasis in the imaged right middle lobe with associated bronchial wall thickening and endoluminal opacities. Mild tree-in-bud nodules in the lingula and left lower lobe. Coronary artery and aortic calcifications. Small hiatal hernia.     LIVER/BILIARY TREE: 1 cm simple cyst in segment 4A.     GALLBLADDER:  No calcified gallstones. No pericholecystic inflammatory change.     SPLEEN:  Unremarkable.     PANCREAS:  Unremarkable.     ADRENAL GLANDS: Mild thickening of the left adrenal gland.     KIDNEYS/URETERS:  Unremarkable. No hydronephrosis.     STOMACH AND BOWEL: Small hiatal hernia. No dilated loops of bowel. Colonic diverticulosis without evidence of acute diverticulitis.     APPENDIX: A normal appendix was visualized.     ABDOMINOPELVIC CAVITY:  No ascites. No pneumoperitoneum. No lymphadenopathy.     VESSELS: Atherosclerotic changes are present. No evidence of aneurysm.     PELVIS     REPRODUCTIVE ORGANS: Surgical changes of prior hysterectomy.     URINARY BLADDER:  Unremarkable.     ABDOMINAL WALL/INGUINAL REGIONS:  Unremarkable.     OSSEOUS STRUCTURES:  No acute fracture or destructive osseous lesion. Degenerative changes in the lumbar spine on a background of scoliosis. There is prominent eccentric disc space narrowing and endplate sclerosis at C2-4 on the concave side of the   curvature. Facet arthropathy is most pronounced at L4-5 and L5-S1.     IMPRESSION:     1. Right middle lobe bronchiectasis and mild tree-in-bud nodules in the lingula and left lower lobe may be the sequela of chronic infectious/inflammatory process such as atypical mycobacterium versus aspiration. 2.  No acute process in the abdomen or pelvis.     September 19, 2023:    Twelve-lead ECG:    Narrative & Impression    Sinus tachycardia  Possible Left atrial enlargement  Borderline ECG  When compared with ECG of 26-AUG-2023 10:49,  Premature ventricular complexes are no longer Present  Confirmed by Alberta Glover ([id number]) on 9/22/2023 6:26:14 PM     - Her order summary was reviewed and signed. Portions of the record may have been created with voice recognition software. Occasional wrong word or "sound a like" substitutions may have occurred due to the inherent limitations of voice recognition software. Read the chart carefully and recognize, using context, where substitutions have occurred.     Bin Ernst M.D.  9/24/2023 10:10 PM

## 2023-09-22 LAB
ATRIAL RATE: 101 BPM
P AXIS: 72 DEGREES
PR INTERVAL: 204 MS
QRS AXIS: 18 DEGREES
QRSD INTERVAL: 78 MS
QT INTERVAL: 334 MS
QTC INTERVAL: 433 MS
T WAVE AXIS: 57 DEGREES
VENTRICULAR RATE: 101 BPM

## 2023-09-22 PROCEDURE — 93010 ELECTROCARDIOGRAM REPORT: CPT | Performed by: INTERNAL MEDICINE

## 2023-09-24 PROBLEM — Z66 DNR (DO NOT RESUSCITATE): Status: ACTIVE | Noted: 2023-09-21

## 2023-09-24 PROBLEM — N18.31 STAGE 3A CHRONIC KIDNEY DISEASE (HCC): Status: ACTIVE | Noted: 2022-09-07

## 2023-09-24 PROBLEM — H61.23 BILATERAL IMPACTED CERUMEN: Status: RESOLVED | Noted: 2019-03-07 | Resolved: 2023-09-24

## 2023-09-24 PROBLEM — R89.9 ABNORMAL LABORATORY TEST: Status: RESOLVED | Noted: 2022-08-01 | Resolved: 2023-09-24

## 2023-09-24 PROBLEM — J06.9 VIRAL URI: Status: RESOLVED | Noted: 2023-08-02 | Resolved: 2023-09-24

## 2023-09-25 NOTE — ASSESSMENT & PLAN NOTE
· We will continue her prior to admission Linzess  · We will continue with monitoring for change in her condition  · She will follow-up with her PCP upon discharge

## 2023-09-25 NOTE — ASSESSMENT & PLAN NOTE
· As discussed with her during my visit, she wishes for her resuscitation status to be "DO NOT RESUSCITATE"

## 2023-09-25 NOTE — ASSESSMENT & PLAN NOTE
· September 19, 2023: Creatinine 0.99, EGFR 51  · We will continue with avoidance of nephrotoxic medications, as able  · We will continue with clinical and periodic laboratory monitoring for change in her condition  · She will follow-up with her PCP upon discharge

## 2023-09-25 NOTE — ASSESSMENT & PLAN NOTE
· We will continue her prior to admission simvastatin  · She will follow-up with her PCP upon discharge

## 2023-09-25 NOTE — ASSESSMENT & PLAN NOTE
· Question trazodone contributing to her report of chronic hyponatremia  · We will discontinue trazodone, as it has been ineffective per patient, and continue with periodic laboratory monitoring for change in her condition  · She will follow-up with her PCP upon discharge

## 2023-09-25 NOTE — ASSESSMENT & PLAN NOTE
· She is reporting significant side effects to Paxlovid, will transition to molnupiravir 800 mg every 12 hours for 5 days  · We will continue with supportive care including, hydration and routine Tessalon Perles (which she reports are effective)  · We will continue with monitoring for change in her condition

## 2023-09-25 NOTE — ASSESSMENT & PLAN NOTE
· We will discontinue trazodone, she states that it has been ineffective  · We will continue with melatonin 3 mg daily  · We will continue with monitoring for change in her condition

## 2023-09-26 ENCOUNTER — NURSING HOME VISIT (OUTPATIENT)
Dept: GERIATRICS | Facility: OTHER | Age: 88
End: 2023-09-26
Payer: MEDICARE

## 2023-09-26 DIAGNOSIS — U07.1 COVID-19: Primary | ICD-10-CM

## 2023-09-26 DIAGNOSIS — K59.00 CONSTIPATION, UNSPECIFIED CONSTIPATION TYPE: ICD-10-CM

## 2023-09-26 DIAGNOSIS — N18.31 STAGE 3A CHRONIC KIDNEY DISEASE (HCC): ICD-10-CM

## 2023-09-26 DIAGNOSIS — Z66 DNR (DO NOT RESUSCITATE): ICD-10-CM

## 2023-09-26 DIAGNOSIS — E87.1 HYPONATREMIA: ICD-10-CM

## 2023-09-26 DIAGNOSIS — E78.5 HYPERLIPIDEMIA, UNSPECIFIED HYPERLIPIDEMIA TYPE: ICD-10-CM

## 2023-09-26 DIAGNOSIS — F51.01 PRIMARY INSOMNIA: ICD-10-CM

## 2023-09-26 PROCEDURE — 99316 NF DSCHRG MGMT 30 MIN+: CPT | Performed by: NURSE PRACTITIONER

## 2023-09-26 NOTE — PROGRESS NOTES
[address]  [phone]  JERAD SEPULVEDA [facility]  Pos [address]  Discharge Summary      NAME: Gilbert Tony  AGE: 80 y.o. SEX: female  :  1935  DATE OF ENCOUNTER: 2023    Chief Complaint   Patient seen and examined for discharge. History of Present Illness     Gilbert Tony is a patient of [facility] who was admitted for respite care with acute and chronic medical conditions of Covid 19 virus infection, hyponatremia, CKD 3a, Constipation, insomnia, HLD,and DNR status. The patient is being seen and examined today for follow-up on discharge. Upon examination, the patient is in her room packing her belongings, alert, cooperative, and in no acute distress. She denies pain, sob, chest pain, abdominal pain, fever, chills, nausea/vomiting, diarrhea, or dysuria. The patient reports she has a fair appetite and is drinking an adequate amount of fluids. The patient reports no concerns today and will be discharged home with her son. Upon admission, the patient was positive for Covid 19 virus infection. She has since had 2 negative tests. The following portions of the patient's history were reviewed and updated as appropriate: allergies, current medications, past family history, past medical history, past social history, past surgical history and problem list.    Review of Systems     A comprehensive 10-point review of systems was obtained with pertinent positives and negatives noted per HPI.     History     Past Medical History:   Diagnosis Date   • Abnormal laboratory test 2022   • Breast cancer ([facility])    • Cancer ([facility])    • COVID-19    • Hyperlipidemia      Past Surgical History:   Procedure Laterality Date   • BREAST SURGERY      cancer (> 5 years)     Family History   Problem Relation Age of Onset   • Breast cancer Mother    • Heart disease Father         heart problem   • Heart disease Sister         heart problem   • Heart disease Brother heart problem     Social History     Socioeconomic History   • Marital status:      Spouse name: Not on file   • Number of children: Not on file   • Years of education: Not on file   • Highest education level: Not on file   Occupational History   • Not on file   Tobacco Use   • Smoking status: Never   • Smokeless tobacco: Never   Vaping Use   • Vaping Use: Never used   Substance and Sexual Activity   • Alcohol use: Not Currently     Alcohol/week: 1.0 standard drink of alcohol     Types: 1 Glasses of wine per week   • Drug use: No   • Sexual activity: Not Currently   Other Topics Concern   • Not on file   Social History Narrative   • Not on file     Social Determinants of Health     Financial Resource Strain: Not on file   Food Insecurity: Not on file   Transportation Needs: Not on file   Physical Activity: Not on file   Stress: Not on file   Social Connections: Not on file   Intimate Partner Violence: Not on file   Housing Stability: Not on file     Allergies   Allergen Reactions   • Pravastatin    • Risedronate    • Paxlovid [Nirmatrelvir-Ritonavir] Nausea Only       Objective     Vital Signs  BP:150/70       HR:80 T:98    RR:18 O2Sat:96% W:115.9  Physical Exam  Vitals reviewed. Cardiovascular:      Rate and Rhythm: Normal rate and regular rhythm. Heart sounds: Normal heart sounds. Pulmonary:      Breath sounds: Normal breath sounds. No wheezing, rhonchi or rales. Abdominal:      General: Bowel sounds are normal.      Palpations: Abdomen is soft. Musculoskeletal:      Right lower leg: No edema. Left lower leg: No edema. Neurological:      Mental Status: She is alert. Psychiatric:         Attention and Perception: Attention normal.         Mood and Affect: Mood normal.         Behavior: Behavior normal. Behavior is cooperative. Pertinent Laboratory/Diagnostic Studies have been independently reviewed.       Current Medications     Current Medications Reviewed and updated in Nursing Home EMR. Assessment and Plan     COVID-19  · Patient initially positive with Covid 19 virus infection upon admission to [facility]  · Patient has since had 2 negative tests  · Patient denies any further symptoms of Covid 19 virus, remains afebrile, denies n/v/d  · Follow up with PCP upon discharge    Hyponatremia  · Trazodone discontinued during STR stay as thought to be contributing to hyponatremia  · Follow-up with PCP upon discharge    Stage 3a chronic kidney disease ([address])  Lab Results   Component Value Date    EGFR 51 09/19/2023    EGFR 71 08/26/2023    EGFR 47 08/14/2023    CREATININE 0.99 09/19/2023    CREATININE 0.75 08/26/2023    CREATININE 1.06 08/14/2023   · Continue avoidance of nephrotoxic medications  · Follow-up with PCP upon discharge    Constipation  · Continue Linzess  · Follow-up with PCP upon discharge    Primary insomnia  · Trazodone DC'd during STR stay is thought to be contributing to hyponatremia and patient reporting ineffective  · Continue melatonin 3 mg p.o. at bedtime  · Follow-up with PCP upon discharge    Hyperlipidemia  · Continue simvastatin  · Follow-up with PCP upon discharge    DNR (do not resuscitate)  · Patient's resuscitation status during STR stay DNR "DO NOT RESUSCITATE"      Discussion with patient/family and further instructions:  -Fall precautions  -Aspiration precautions  -Bleeding precautions  -Monitor for signs/symptoms of infection  -Medication list was reviewed and signed  -DME form was completed     Follow-up Recommendations: Please follow-up with your primary care physician within 7-10 days of discharge to review medication changes and current status. Problem List Follow-up Recommendations:  I have spent 40 minutes with Patient /Family today in which greater than 50% of this time was spent in counseling/coordination of care.     [address] [place] [address]  Geriatric Medicine  9/26/2023

## 2023-09-26 NOTE — ED ATTENDING ATTESTATION
9/19/2023  Betty Elam DO, saw and evaluated the patient. I have discussed the patient with the resident/non-physician practitioner and agree with the resident's/non-physician practitioner's findings, Plan of Care, and MDM as documented in the resident's/non-physician practitioner's note, except where noted. All available labs and Radiology studies were reviewed. I was present for key portions of any procedure(s) performed by the resident/non-physician practitioner and I was immediately available to provide assistance. At this point I agree with the current assessment done in the Emergency Department.   I have conducted an independent evaluation of this patient a history and physical is as follows:    ED Course         Critical Care Time  Procedures

## 2023-09-29 NOTE — ASSESSMENT & PLAN NOTE
· Trazodone discontinued during STR stay as thought to be contributing to hyponatremia  · Follow-up with PCP upon discharge

## 2023-09-29 NOTE — ASSESSMENT & PLAN NOTE
· Trazodone DC'd during STR stay is thought to be contributing to hyponatremia and patient reporting ineffective  · Continue melatonin 3 mg p.o. at bedtime  · Follow-up with PCP upon discharge

## 2023-09-29 NOTE — ASSESSMENT & PLAN NOTE
Lab Results   Component Value Date    EGFR 51 09/19/2023    EGFR 71 08/26/2023    EGFR 47 08/14/2023    CREATININE 0.99 09/19/2023    CREATININE 0.75 08/26/2023    CREATININE 1.06 08/14/2023   · Continue avoidance of nephrotoxic medications  · Follow-up with PCP upon discharge

## 2023-09-29 NOTE — ASSESSMENT & PLAN NOTE
· Patient initially positive with Covid 19 virus infection upon admission to [facility]  · Patient has since had 2 negative tests  · Patient denies any further symptoms of Covid 19 virus, remains afebrile, denies n/v/d  · Follow up with PCP upon discharge

## 2023-10-03 ENCOUNTER — TELEPHONE (OUTPATIENT)
Age: 88
End: 2023-10-03

## 2023-10-03 NOTE — TELEPHONE ENCOUNTER
Patient has question regarding follow up post hospital discharge 9/28 and ER visit on 10/2/23. Patient wants to know if she needs TCM visit and if she requires any additonal labs prior to OV. Please follow up with patient.

## 2023-10-03 NOTE — TELEPHONE ENCOUNTER
Patient called stating she was discharged from the hospital yesterday and wanted to know if she needs to get any lab work done prior to her follow up appointment. Please call patient back to advise.

## 2023-10-03 NOTE — TELEPHONE ENCOUNTER
Tried calling patient to confirm she needs TCM OV scheduled post discharge from hospital on 9/28. Patient in ED  on 10/2/23. Please follow up with patient to schedule TCM visit and also to make aware of any additional blood work to be completed prior to [address].  Thank you,

## 2023-10-05 NOTE — TELEPHONE ENCOUNTER
10/18 will be a hospital follow up the date it's scheduled for is out of the TCM time frame (7-14days from discharge). Hospital follow up would be fine but please review patient chart and determine if labs are needed for her appointment?

## 2023-10-06 ENCOUNTER — TRANSITIONAL CARE MANAGEMENT (OUTPATIENT)
Dept: INTERNAL MEDICINE CLINIC | Facility: CLINIC | Age: 88
End: 2023-10-06

## 2023-10-06 NOTE — TELEPHONE ENCOUNTER
No regular labs needed  We will review her TCM notes during visit to determine if any TCM labs needed

## 2023-10-09 ENCOUNTER — CONSULT (OUTPATIENT)
Dept: NEUROLOGY | Facility: CLINIC | Age: 88
End: 2023-10-09
Payer: MEDICARE

## 2023-10-09 VITALS
DIASTOLIC BLOOD PRESSURE: 84 MMHG | BODY MASS INDEX: 24.48 KG/M2 | HEIGHT: 58 IN | HEART RATE: 104 BPM | TEMPERATURE: 98.2 F | SYSTOLIC BLOOD PRESSURE: 150 MMHG | WEIGHT: 116.6 LBS

## 2023-10-09 DIAGNOSIS — R90.89 ABNORMAL BRAIN MRI: Primary | ICD-10-CM

## 2023-10-09 DIAGNOSIS — R51.9 NONINTRACTABLE HEADACHE, UNSPECIFIED CHRONICITY PATTERN, UNSPECIFIED HEADACHE TYPE: ICD-10-CM

## 2023-10-09 DIAGNOSIS — U07.1 COVID-19: ICD-10-CM

## 2023-10-09 DIAGNOSIS — R25.1 TREMOR: ICD-10-CM

## 2023-10-09 DIAGNOSIS — K11.7 XEROSTOMIA: ICD-10-CM

## 2023-10-09 PROCEDURE — 99205 OFFICE O/P NEW HI 60 MIN: CPT | Performed by: PSYCHIATRY & NEUROLOGY

## 2023-10-09 RX ORDER — PROPRANOLOL HYDROCHLORIDE 10 MG/1
TABLET ORAL
Qty: 60 TABLET | Refills: 2 | Status: SHIPPED | OUTPATIENT
Start: 2023-10-09

## 2023-10-09 NOTE — PATIENT INSTRUCTIONS
Headache: Cinthya Kennedy presents for an initial consultation with regard to a few months of relatively continuous headache along with some new right sided tinnitus and an MRI with evidence of bilateral facial nerve enhancement. Her neurologic exam is quite reassuring in the office today. The facial nerve enhancement could be incident to the relatively recent COVID infection she had, at this point we do not have obvious signs or symptoms of ongoing inflammation, infection, or encephalitis. -To help get the headache under control and to help improve tremors we would recommend starting propranolol. We will begin at an exceptionally low dose of 5 mg twice per day for the first week. She can increase by 1/2 tablet twice per day every 2 weeks thereafter with an initial maximum of 2 tablets twice a day. -She is currently under a fluid restriction. I would like for her to review this with her primary care team and ideally consideration could be given to liberalizing fluid restriction while we are titrating up propranolol to make sure she does not get lightheaded. If she does require the ongoing fluid restriction she should be particularly mindful to make sure she has enough sodium to help support her blood pressure  -She does not have any specific activity restrictions from a neurology standpoint  -She will use the propranolol along with the BuSpar on a daily basis to help prevent headache and to control her tremor.   If she feels well on this combination she should not take Tylenol however she can continue to use Tylenol on an as-needed basis at her current dose of 1000 mg at a time at most every 6 hours.  -To ensure resolution of the prior signal on her MRI we will request a repeat MRI of the brain 3 months from the prior scan which will be 1 month from her current visit.  -She you should consider using a calendar or day planner to keep track of head pressure days/more significant headache days.  -She did describe some dry mouth and that she has been transitioning her sleep medicines. I agree with the current plan to discontinue Unisom and transition to melatonin. Her current melatonin dose is 20 mg which is a reasonably high dose but it did work well for her last night. From a neurology standpoint that dose is fine although in my personal experience and not certain that she would get significantly more benefit at 20 mg as opposed to 10 mg. Regardless if she feels well and if her primary care doctor is fine with that dose then it is also reasonable from our standpoint. I will plan for her to return to the office to see me directly in 3 months time but we will be happy to see her sooner if the need should arise. I would like for her to contact us by phone or by message through [facility] Fernando Munoz in no more than 3 to 4 weeks to report on her progress.

## 2023-10-09 NOTE — PROGRESS NOTES
Patient ID: Heena Okeefe is a 80 y.o. female who presents to the [address] [place] [address]    Assessment/Plan:   Patient Instructions   Headache: Brigida Trimble presents for an initial consultation with regard to a few months of relatively continuous headache along with some new right sided tinnitus and an MRI with evidence of bilateral facial nerve enhancement. Her neurologic exam is quite reassuring in the office today. The facial nerve enhancement could be incident to the relatively recent COVID infection she had, at this point we do not have obvious signs or symptoms of ongoing inflammation, infection, or encephalitis. -To help get the headache under control and to help improve tremors we would recommend starting propranolol. We will begin at an exceptionally low dose of 5 mg twice per day for the first week. She can increase by 1/2 tablet twice per day every 2 weeks thereafter with an initial maximum of 2 tablets twice a day. -She is currently under a fluid restriction. I would like for her to review this with her primary care team and ideally consideration could be given to liberalizing fluid restriction while we are titrating up propranolol to make sure she does not get lightheaded. If she does require the ongoing fluid restriction she should be particularly mindful to make sure she has enough sodium to help support her blood pressure  -She does not have any specific activity restrictions from a neurology standpoint  -She will use the propranolol along with the BuSpar on a daily basis to help prevent headache and to control her tremor.   If she feels well on this combination she should not take Tylenol however she can continue to use Tylenol on an as-needed basis at her current dose of 1000 mg at a time at most every 6 hours.  -To ensure resolution of the prior signal on her MRI we will request a repeat MRI of the brain 3 months from the prior scan which will be 1 month from her current visit.  -She you should consider using a calendar or day planner to keep track of head pressure days/more significant headache days.  -She did describe some dry mouth and that she has been transitioning her sleep medicines. I agree with the current plan to discontinue Unisom and transition to melatonin. Her current melatonin dose is 20 mg which is a reasonably high dose but it did work well for her last night. From a neurology standpoint that dose is fine although in my personal experience and not certain that she would get significantly more benefit at 20 mg as opposed to 10 mg. Regardless if she feels well and if her primary care doctor is fine with that dose then it is also reasonable from our standpoint. I will plan for her to return to the office to see me directly in 3 months time but we will be happy to see her sooner if the need should arise. I would like for her to contact us by phone or by message through [facility] in no more than 3 to 4 weeks to report on her progress. Diagnoses and all orders for this visit:    Abnormal brain MRI  -     Sedimentation rate, automated; Future  -     C-reactive protein; Future  -     MRI brain IAC wo and w contrast; Future  -     BUN; Future  -     Creatinine, serum; Future    COVID-19    Tremor    Xerostomia  -     Sjogren's Antibodies; Future    Nonintractable headache, unspecified chronicity pattern, unspecified headache type  -     propranolol (INDERAL) 10 mg tablet; 1/2 tab BID for 1 week, then 1 tab BID for 2 weeks, if no change 1 1/2 tabs for 2 weeks, then 2 tabs for 2 weeks           Subjective:    CAREN Strange is a 80 y.o. female who presents for evaluation of headache and an abnormal MRI. She reports that her headaches began a few months ago. These are frontal headaches which are present daily and cause mild discomfort. She describes no accompanying symptoms that are migraine-like.   She treats the headaches on a regular basis with Tylenol taken at approximately 9 AM and again at 4-5 PM when she feels the pain started to return. The Tylenol is effective at relieving the headaches after about 15 minutes. She notes that intermittently she will have some more significant head pressure but that is not on a regular basis. She also reports that over the last month she has noted some tinnitus in her right ear. 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 She did have a rough patch for a few months with several visits to the emergency room and most recently had an ER visit after which she was discharged to a facility and at that time of admission they did a COVID test and found it to be positive. Relatively recently she also had an MRI of the brain and internal auditory canals with contrast which showed enhancement of the bilateral facial nerve in the region of the internal auditory canals. Considering that her symptoms are stable rather than progressive and without any evidence of clear facial nerve dysfunction per se (although tinnitus and hearing change could be related most frequently if this were facial nerve dysfunction we would expect hyperacusis on the affected side or potentially Bell's palsy) this is of unclear significance. She reports that for years that she took Unisom every night and recently that transitioned to melatonin. She took 20 mg last night and has found it beneficial    She describes a tremor for many years. She does not endorse symptoms of REM behavior disorder or constipation. She does note that she had a loss of sense of smell and taste quite a long time ago which then had returned and once again left when she got her COVID injections a year ago.       Past Medical History:   Diagnosis Date   • Abnormal laboratory test 8/1/2022   • Breast cancer [facility])    • Cancer [facility])    • COVID-19    • Hyperlipidemia Current Outpatient Medications:   •  acetaminophen (TYLENOL) 500 mg tablet, Take 1,000 mg by mouth 2 (two) times a day as needed, Disp: , Rfl:   •  Asmanex  MCG/ACT AERO, Inhale 2 puffs if needed, Disp: , Rfl:   •  benzonatate (TESSALON) 200 MG capsule, Take 1 capsule (200 mg total) by mouth 3 (three) times a day as needed for cough (Patient taking differently: Take 200 mg by mouth if needed for cough), Disp: 20 capsule, Rfl: 0  •  busPIRone (BUSPAR) 5 mg tablet, Take 1 tablet (5 mg total) by mouth 2 (two) times a day, Disp: 60 tablet, Rfl: 5  •  Calcium Carb-Cholecalciferol 1000-800 MG-UNIT TABS, Take by mouth, Disp: , Rfl:   •  ipratropium (ATROVENT) 0.03 % nasal spray, 2 sprays into each nostril 3 (three) times a day Use up to three times a day with runny nose, before meals (Patient taking differently: 2 sprays into each nostril if needed for rhinitis Use up to three times a day with runny nose, before meals), Disp: 30 mL, Rfl: 2  •  melatonin 3 mg, Take 1 tablet (3 mg total) by mouth daily at bedtime, Disp: 60 tablet, Rfl: 0  •  Multiple Vitamins-Minerals (CENTRUM SILVER 50+WOMEN PO), Take by mouth, Disp: , Rfl:   •  omeprazole (PriLOSEC) 40 MG capsule, TAKE 1 CAPSULE BY MOUTH EVERY DAY 30 MINUTES BEFORE BREAKFAST, Disp: , Rfl:   •  propranolol (INDERAL) 10 mg tablet, 1/2 tab BID for 1 week, then 1 tab BID for 2 weeks, if no change 1 1/2 tabs for 2 weeks, then 2 tabs for 2 weeks, Disp: 60 tablet, Rfl: 2  •  simvastatin (ZOCOR) 5 MG tablet, Take 1 tablet (5 mg total) by mouth daily at bedtime, Disp: 90 tablet, Rfl: 3  •  famotidine (PEPCID) 40 MG tablet, Take 1 tablet (40 mg total) by mouth daily at bedtime (Patient not taking: Reported on 8/8/2023), Disp: 90 tablet, Rfl: 2  •  linaCLOtide 72 MCG CAPS, Take 72 mcg by mouth daily at least 30 minutes prior to the first meal of the day (Patient not taking: Reported on 8/8/2023), Disp: 30 capsule, Rfl: 5  •  valACYclovir (VALTREX) 1,000 mg tablet, Take 1 tablet (1,000 mg total) by mouth 3 (three) times a day for 7 days, Disp: 21 tablet, Rfl: 0       Objective:    Blood pressure 150/84, pulse 104, temperature 98.2 °F (36.8 °C), temperature source Temporal, height 4' 10" (1.473 m), weight 52.9 kg (116 lb 9.6 oz). Physical Exam    Neurological Exam    At the time of my examination she is awake and alert and in no distress. She is a very good historian with no dysarthria or aphasia. Cranial nerves II through XII are symmetrically intact bilaterally with the exception of hearing which is reduced on the left compared with the right, Reza test is slightly offset to 1 side and left-sided hearing shows conductive hearing loss. Motor testing reveals symmetric strength throughout the bilateral upper and lower extremities. There is no drift and finger-nose reveals no ataxia and intact proprioceptive function. There is a resting tremor affecting the right leg and to a degree the left hand. This is rest predominant and increases with mental distraction, decreases with attention/purposeful use. There is postural reemergence in the left hand to a degree and minimal intention tremor. Tone is loose and normal in the bilateral upper and lower extremities. Deep tendon reflexes are symmetric. She is able to rise quickly and her gait is smooth and clearly not parkinsonian. Her face is not particularly masked and her voice is not hypophonic. Speech is not bradykinetic. There is bradykinesia of the left arm compared with the right. ROS:    Review of Systems   Constitutional: Negative for appetite change, fatigue and fever. HENT: Positive for tinnitus (both ears). Negative for hearing loss, trouble swallowing and voice change. Eyes: Positive for pain. Negative for photophobia and visual disturbance. Respiratory: Negative. Negative for shortness of breath. Cardiovascular: Negative. Negative for palpitations. Gastrointestinal: Negative.   Negative for nausea and vomiting. Endocrine: Negative. Negative for cold intolerance. Genitourinary: Negative. Negative for dysuria, frequency and urgency. Musculoskeletal: Negative for back pain, gait problem, myalgias and neck pain. Skin: Negative. Negative for rash. Allergic/Immunologic: Negative. Neurological: Positive for tremors and headaches. Negative for dizziness, seizures, syncope, facial asymmetry, speech difficulty, weakness, light-headedness and numbness. Hematological: Negative. Does not bruise/bleed easily. Psychiatric/Behavioral: Negative. Negative for confusion, hallucinations and sleep disturbance. I have spent a total time of 62 minutes on 10/09/23 in caring for this patient including Diagnostic results, Prognosis, Risks and benefits of tx options, Instructions for management, Patient and family education, Importance of tx compliance, Risk factor reductions, Impressions, Counseling / Coordination of care, Documenting in the medical record, Reviewing / ordering tests, medicine, procedures   and Obtaining or reviewing history  .

## 2023-10-11 ENCOUNTER — OFFICE VISIT (OUTPATIENT)
Dept: INTERNAL MEDICINE CLINIC | Facility: CLINIC | Age: 88
End: 2023-10-11
Payer: MEDICARE

## 2023-10-11 VITALS
HEART RATE: 83 BPM | SYSTOLIC BLOOD PRESSURE: 140 MMHG | WEIGHT: 115.4 LBS | BODY MASS INDEX: 24.22 KG/M2 | HEIGHT: 58 IN | DIASTOLIC BLOOD PRESSURE: 70 MMHG | TEMPERATURE: 97.1 F | OXYGEN SATURATION: 95 %

## 2023-10-11 DIAGNOSIS — F41.9 ANXIETY: ICD-10-CM

## 2023-10-11 DIAGNOSIS — R25.1 TREMOR: ICD-10-CM

## 2023-10-11 DIAGNOSIS — R51.9 NONINTRACTABLE HEADACHE, UNSPECIFIED CHRONICITY PATTERN, UNSPECIFIED HEADACHE TYPE: ICD-10-CM

## 2023-10-11 DIAGNOSIS — F51.01 PRIMARY INSOMNIA: Primary | ICD-10-CM

## 2023-10-11 DIAGNOSIS — Z12.31 ENCOUNTER FOR SCREENING MAMMOGRAM FOR BREAST CANCER: ICD-10-CM

## 2023-10-11 DIAGNOSIS — Z23 ENCOUNTER FOR IMMUNIZATION: ICD-10-CM

## 2023-10-11 DIAGNOSIS — E87.1 HYPONATREMIA: ICD-10-CM

## 2023-10-11 PROCEDURE — 99495 TRANSJ CARE MGMT MOD F2F 14D: CPT | Performed by: GENERAL ACUTE CARE HOSPITAL

## 2023-10-11 RX ORDER — MIRTAZAPINE 7.5 MG/1
7.5 TABLET, FILM COATED ORAL
Qty: 30 TABLET | Refills: 5 | Status: SHIPPED | OUTPATIENT
Start: 2023-10-11

## 2023-10-11 RX ORDER — BUSPIRONE HYDROCHLORIDE 5 MG/1
5 TABLET ORAL 3 TIMES DAILY
Qty: 60 TABLET | Refills: 5 | Status: SHIPPED | OUTPATIENT
Start: 2023-10-11

## 2023-10-11 NOTE — PATIENT INSTRUCTIONS
- increase buspar from twice a day to 3 times aday  - try this new sleep medicine mirtazapine 7.5mg, 20min before bedtime. After 2 days, if it's not working very well increase to 15mg at bedtime. If you still could not get any sleep after 2-3 days on 15mg, you could go back to [facility].

## 2023-10-11 NOTE — ASSESSMENT & PLAN NOTE
Monitor Na  As per discharge note likely siadh so put her on fluid restriction  She has been drinking more due to dry mouth  Will recheck Na

## 2023-10-11 NOTE — ASSESSMENT & PLAN NOTE
She stopped trazodone since it's not working   Back on [employer] now. Melatonin does not help  Will have pt try mirtazapine, 7.5mg qhs, can increase to 15mg if needed. If that does not work, will have pt go back to [facility] since that's the only med that helped.

## 2023-10-11 NOTE — PROGRESS NOTES
Assessment/Plan:    Primary insomnia  She stopped trazodone since it's not working   Back on unisom now. Melatonin does not help  Will have pt try mirtazapine, 7.5mg qhs, can increase to 15mg if needed. If that does not work, will have pt go back to unisom since that's the only med that helped. Tremor  Chronic for years. Buspar helps. But reports it does not last long, at 4pm shaking returns that's when she takes 2nd dose. Can increase to 5mg tid. Took a dose of propranolol from neurology, could not tolerate. I ask pt to hold off on that and try increasing buspar for now since it helps. If needed she could re-try propranolol. F/u with me in a week. Hyponatremia  Monitor Na  As per discharge note likely siadh so put her on fluid restriction  She has been drinking more due to dry mouth  Will recheck Na       Diagnoses and all orders for this visit:    Primary insomnia  -     mirtazapine (REMERON) 7.5 MG tablet; Take 1 tablet (7.5 mg total) by mouth daily at bedtime    Encounter for immunization    Encounter for screening mammogram for breast cancer    Anxiety  -     busPIRone (BUSPAR) 5 mg tablet; Take 1 tablet (5 mg total) by mouth 3 (three) times a day    Nonintractable headache, unspecified chronicity pattern, unspecified headache type  -     busPIRone (BUSPAR) 5 mg tablet; Take 1 tablet (5 mg total) by mouth 3 (three) times a day    Tremor    Hyponatremia          Subjective:        Patient ID: Jay Li is a 80 y.o. female. TCM Call       Date and time call was made  10/6/2023 10:40 AM    Hospital care reviewed  Records not available    Patient was hospitial[facility] at  [facility] [address]    Date of Admission  09/26/23    Date of discharge  10/02/23    Diagnosis  Chest pain    Disposition  Home    Were the patients medications reviewed and updated  No          TCM Call       Should patient be enrolled in anticoag monitoring?   No    Scheduled for follow up? Yes    Did you obtain your prescribed medications  Yes    Do you need help managing your prescriptions or medications  No    Is transportation to your appointment needed  No    I have advised the patient to call PCP with any new or worsening symptoms  Venus Cornejo - MR/MA    Are you recieving any outpatient services  No    Are you recieving home care services  No    Have you fallen in the last 12 months  No    Interperter language line needed  No            HPI  ER 9/19/23, abd pain, tremor, chill, headache, fatigue. Got rocephin in ER for possibel pna. Sent to rehab as famil did not feel comfortable sending her home. Home from rehab 9/26, went back to ER for nausea, fatigue, shaking, found low Na 128, d/c 9/28. Off trazodone. Fluid restriction.      Saw neuro, started propranolol  After first dose of propranolol, prescribed by neurology, pt felt weird in head, trouble breathing, trouble falling asleep and next morning felt very off and exhauseted     She felt buspar working very well for her shaking, but may not last long, she feels shaking around 4pm when she takes her second dose  The following portions of the patient's history were reviewed and updated as appropriate: allergies, current medications, past family history, past medical history, past social history, past surgical history, and problem list.    Review of Systems      Objective:      /70 (BP Location: Right arm, Patient Position: Sitting, Cuff Size: Adult)   Pulse 83   Temp (!) 97.1 °F (36.2 °C) (Probe)   Ht 4' 10" (1.473 m)   Wt 52.3 kg (115 lb 6.4 oz)   LMP  (LMP Unknown)   SpO2 95%   BMI 24.12 kg/m²          Physical Exam

## 2023-10-11 NOTE — ASSESSMENT & PLAN NOTE
Chronic for years. Buspar helps. But reports it does not last long, at 4pm shaking returns that's when she takes 2nd dose. Can increase to 5mg tid. Took a dose of propranolol from neurology, could not tolerate. I ask pt to hold off on that and try increasing buspar for now since it helps. If needed she could re-try propranolol. F/u with me in a week.

## 2023-10-13 ENCOUNTER — APPOINTMENT (OUTPATIENT)
Dept: LAB | Age: 88
End: 2023-10-13
Payer: MEDICARE

## 2023-10-13 DIAGNOSIS — R90.89 ABNORMAL BRAIN MRI: ICD-10-CM

## 2023-10-13 DIAGNOSIS — K11.7 XEROSTOMIA: ICD-10-CM

## 2023-10-13 LAB
BUN SERPL-MCNC: 15 MG/DL (ref 5–25)
CREAT SERPL-MCNC: 0.96 MG/DL (ref 0.6–1.3)
CRP SERPL QL: 9.7 MG/L
ERYTHROCYTE [SEDIMENTATION RATE] IN BLOOD: 28 MM/HOUR (ref 0–29)
GFR SERPL CREATININE-BSD FRML MDRD: 52 ML/MIN/1.73SQ M

## 2023-10-13 PROCEDURE — 84520 ASSAY OF UREA NITROGEN: CPT

## 2023-10-13 PROCEDURE — 36415 COLL VENOUS BLD VENIPUNCTURE: CPT

## 2023-10-13 PROCEDURE — 82565 ASSAY OF CREATININE: CPT

## 2023-10-13 PROCEDURE — 86140 C-REACTIVE PROTEIN: CPT

## 2023-10-13 PROCEDURE — 85652 RBC SED RATE AUTOMATED: CPT

## 2023-10-13 PROCEDURE — 86235 NUCLEAR ANTIGEN ANTIBODY: CPT

## 2023-10-16 ENCOUNTER — TELEPHONE (OUTPATIENT)
Age: 88
End: 2023-10-16

## 2023-10-16 LAB
ENA SS-A AB SER-ACNC: <0.2 AI (ref 0–0.9)
ENA SS-B AB SER-ACNC: <0.2 AI (ref 0–0.9)

## 2023-10-16 NOTE — TELEPHONE ENCOUNTER
Patient is calling regarding cancelling an appointment.     Date/Time:  10-17-23 / 3:00    Patient was rescheduled: YES [] NO [x]    Patient requesting call back to reschedule: YES [] NO [x]

## 2023-10-17 NOTE — RESULT ENCOUNTER NOTE
Prashanth Barbosa,    Ultimately the labs look quite good. There is a little evidence of nonspecific inflammation in the body, but nothing that would require a specific direct treatment. It would be a good idea to follow-up with your primary care doctor with regard to the lab results, but overall they look good. In particular, the test for Sjogren's which is an immune system problem that can lead to lots of dry mouth and dry eyes was negative, and that is what I wanted to make sure on in particular.

## 2023-10-30 NOTE — PROGRESS NOTES
Cardiology Follow Up    Chris Strange  1935  [id number]  [address] [facility]  [phone]    1. Palpitations  AMB extended holter monitor    Echo complete w/ contrast if indicated      2. Hyponatremia  Echo complete w/ contrast if indicated      3. Hypercholesterolemia  Echo complete w/ contrast if indicated      4. Nonrheumatic aortic valve insufficiency            Interval History:   Ms Luís Vasquez was admitted to [address] [place] [address] with fatigue, shakiness and nausea. 3 weeks prior COVID-19+. Troponin 16 sodium 128 she was admitted to the hospital with hyponatremia. She was treated and discharged home. Jose Stewart presented to [facility] on 10/02/23 with chest pain associated with shortness of breath. Chest pain had started in the a.m., left-sided lasting a few minutes after taking bites of food for breakfast.  This pain resolved after receiving a full dose of aspirin. EKG normal sinus rhythm 96 bpm first-degree AV block nonspecific ST-T wave abnormalities. Unchanged from prior. Troponins did not elevate. She was found to have bigeminy on rhythm strip and was instructed to follow-up with cardiology. Ms Luís Vasquez presents to our office for a follow up visit. She continues with a "Shake". Jose Stewart admits to palpitations and her heart racing daily after walking back from the bathroom or sitting watching TV,  associated with shortness of breath. She denies CP, lightheadedness or dizziness. Jose Stewart is restricting her fluids.         Medical History   Primary Cardiologist Dr Christel Weldon  Hyponatremia 9/19/23 sodium 131   Hypercholesterolemia 7/19/21 , , HDL 79,    Mild to Mod Aortic regurgitation   COVID-19 + 2/2021, 9/2023 +   Patient Active Problem List   Diagnosis    Hyperlipidemia    Pes anserinus bursitis of right knee    Nonrheumatic aortic valve insufficiency    Perforation of left tympanic membrane    Chronic cough    Breast cancer (HCC)    Dry mouth    Chronic maxillary sinusitis    COVID-19    Electrolyte abnormality    Chronic fatigue    Venous insufficiency    Stage 3a chronic kidney disease (HCC)    Esophageal dysphagia    Age related osteoporosis    Tremor    Hyponatremia    Constipation    Slow transit constipation    Primary insomnia    Nonintractable headache    Abnormal brain MRI    DNR (do not resuscitate)     Past Medical History:   Diagnosis Date    Abnormal laboratory test 8/1/2022    Breast cancer ([facility])     Cancer ([facility])     COVID-19     Hyperlipidemia      Social History     Socioeconomic History    Marital status:       Spouse name: Not on file    Number of children: Not on file    Years of education: Not on file    Highest education level: Not on file   Occupational History    Not on file   Tobacco Use    Smoking status: Never    Smokeless tobacco: Never   Vaping Use    Vaping Use: Never used   Substance and Sexual Activity    Alcohol use: Not Currently     Alcohol/week: 1.0 standard drink of alcohol     Types: 1 Glasses of wine per week    Drug use: No    Sexual activity: Not Currently   Other Topics Concern    Not on file   Social History Narrative    Not on file     Social Determinants of Health     Financial Resource Strain: Not on file   Food Insecurity: Not on file   Transportation Needs: Not on file   Physical Activity: Not on file   Stress: Not on file   Social Connections: Not on file   Intimate Partner Violence: Not on file   Housing Stability: Not on file      Family History   Problem Relation Age of Onset    Breast cancer Mother     Heart disease Father         heart problem    Heart disease Sister         heart problem    Heart disease Brother         heart problem     Past Surgical History:   Procedure Laterality Date    BREAST SURGERY      cancer (> 5 years)       Current Outpatient Medications:     acetaminophen (TYLENOL) 500 mg tablet, Take 1,000 mg by mouth 2 (two) times a day as needed, Disp: , Rfl:     Asmanex  MCG/ACT AERO, Inhale 2 puffs if needed (Patient not taking: Reported on 10/11/2023), Disp: , Rfl:     benzonatate (TESSALON) 200 MG capsule, Take 1 capsule (200 mg total) by mouth 3 (three) times a day as needed for cough (Patient not taking: Reported on 10/11/2023), Disp: 20 capsule, Rfl: 0    busPIRone (BUSPAR) 5 mg tablet, Take 1 tablet (5 mg total) by mouth 3 (three) times a day, Disp: 60 tablet, Rfl: 5    Calcium Carb-Cholecalciferol 1000-800 MG-UNIT TABS, Take by mouth, Disp: , Rfl:     famotidine (PEPCID) 40 MG tablet, Take 1 tablet (40 mg total) by mouth daily at bedtime (Patient not taking: Reported on 8/8/2023), Disp: 90 tablet, Rfl: 2    ipratropium (ATROVENT) 0.03 % nasal spray, 2 sprays into each nostril 3 (three) times a day Use up to three times a day with runny nose, before meals (Patient taking differently: 2 sprays into each nostril if needed for rhinitis Use up to three times a day with runny nose, before meals), Disp: 30 mL, Rfl: 2    linaCLOtide 72 MCG CAPS, Take 72 mcg by mouth daily at least 30 minutes prior to the first meal of the day (Patient not taking: Reported on 8/8/2023), Disp: 30 capsule, Rfl: 5    melatonin 3 mg, Take 1 tablet (3 mg total) by mouth daily at bedtime (Patient not taking: Reported on 10/11/2023), Disp: 60 tablet, Rfl: 0    mirtazapine (REMERON) 7.5 MG tablet, Take 1 tablet (7.5 mg total) by mouth daily at bedtime, Disp: 30 tablet, Rfl: 5    Multiple Vitamins-Minerals (CENTRUM SILVER 50+WOMEN PO), Take by mouth, Disp: , Rfl:     omeprazole (PriLOSEC) 40 MG capsule, TAKE 1 CAPSULE BY MOUTH EVERY DAY 30 MINUTES BEFORE BREAKFAST, Disp: , Rfl:     propranolol (INDERAL) 10 mg tablet, 1/2 tab BID for 1 week, then 1 tab BID for 2 weeks, if no change 1 1/2 tabs for 2 weeks, then 2 tabs for 2 weeks, Disp: 60 tablet, Rfl: 2    simvastatin (ZOCOR) 5 MG tablet, Take 1 tablet (5 mg total) by mouth daily at bedtime, Disp: 90 tablet, Rfl: 3    valACYclovir (VALTREX) 1,000 mg tablet, Take 1 tablet (1,000 mg total) by mouth 3 (three) times a day for 7 days, Disp: 21 tablet, Rfl: 0  Allergies   Allergen Reactions    Pravastatin     Risedronate     Paxlovid [Nirmatrelvir-Ritonavir] Nausea Only       Labs:  Lab on 10/13/2023   Component Date Value    Sed Rate 10/13/2023 28     CRP 10/13/2023 9.7 (H)     BUN 10/13/2023 15     Creatinine 10/13/2023 0.96     eGFR 10/13/2023 52     SS-A (RO) Ab 10/13/2023 <0.2     SS-B (LA) Ab 10/13/2023 <0.2    Admission on 09/19/2023, Discharged on 09/19/2023   Component Date Value    Ventricular Rate 09/19/2023 101     Atrial Rate 09/19/2023 101     ID Interval 09/19/2023 204     QRSD Interval 09/19/2023 78     QT Interval 09/19/2023 334     QTC Interval 09/19/2023 433     P Axis 09/19/2023 72     QRS Axis 09/19/2023 18     T Wave [place] 09/19/2023 57     WBC 09/19/2023 6.86     RBC 09/19/2023 3.31 (L)     Hemoglobin 09/19/2023 11.8     Hematocrit 09/19/2023 35.6     MCV 09/19/2023 108 (H)     MCH 09/19/2023 35.6 (H)     MCHC 09/19/2023 33.1     RDW 09/19/2023 14.6     MPV 09/19/2023 8.8 (L)     Platelets 47/81/8235 234     nRBC 09/19/2023 0     Neutrophils Relative 09/19/2023 79 (H)     Immat GRANS % 09/19/2023 0     Lymphocytes Relative 09/19/2023 11 (L)     Monocytes Relative 09/19/2023 9     Eosinophils Relative 09/19/2023 0     Basophils Relative 09/19/2023 1     Neutrophils Absolute 09/19/2023 5.42     Immature Grans Absolute 09/19/2023 0.01     Lymphocytes Absolute 09/19/2023 0.76     Monocytes Absolute 09/19/2023 0.61     Eosinophils Absolute 09/19/2023 0.02     Basophils Absolute 09/19/2023 0.04     hs TnI 0hr 09/19/2023 5     D-Dimer, Quant 09/19/2023 0.37     Sodium 09/19/2023 131 (L)     Potassium 09/19/2023 3.8     Chloride 09/19/2023 97     CO2 09/19/2023 27     ANION GAP 09/19/2023 7     BUN 09/19/2023 12     Creatinine 09/19/2023 0.99 Glucose 09/19/2023 130     Calcium 09/19/2023 8.9     AST 09/19/2023 23     ALT 09/19/2023 23     Alkaline Phosphatase 09/19/2023 73     Total Protein 09/19/2023 7.0     Albumin 09/19/2023 4.2     Total Bilirubin 09/19/2023 0.76     eGFR 09/19/2023 51     Color, UA 09/19/2023 Colorless     Clarity, UA 09/19/2023 Clear     Specific Gravity, UA 09/19/2023 1.040 (H)     pH, UA 09/19/2023 8.0     Leukocytes, UA 09/19/2023 Trace (A)     Nitrite, UA 09/19/2023 Negative     Protein, UA 09/19/2023 Negative     Glucose, UA 09/19/2023 Negative     Ketones, UA 09/19/2023 Negative     Urobilinogen, UA 09/19/2023 <2.0     Bilirubin, UA 09/19/2023 Negative     Occult Blood, UA 09/19/2023 Negative     RBC, UA 09/19/2023 1-2     WBC, UA 09/19/2023 1-2     Epithelial Cells 09/19/2023 None Seen     Bacteria, UA 09/19/2023 None Seen      Imaging: XR chest pa & lateral    Result Date: 10/2/2023  Narrative: EXAM: XR CHEST 2 VIEWS (PA AND LAT) INDICATION: 88 years patient, chest pain COMPARISON(S): Chest x-ray 9/26/2023 TECHNIQUE: Frontal and Lateral views           FINDINGS: Support devices: There are overlying EKG leads. Lungs/pleura: The lungs are expanded. Surgical clips overlying the right breast tissue. No mass or consolidation. There is no discernable pneumothorax on either side. Both costophrenic angles are sharp. Cardiac/mediastinum:  Normal size heart. Normal mediastinum and fatuma. Normal visualized pulmonary arteries. Normal visualized aortic arch and descending thoracic aorta. Osseous: There are diffuse degenerative changes of the visualized thoracic spine with a dextroconvex scoliotic deformity. Normal visualized ribs, clavicles, and shoulders. Impression: IMPRESSION: 1. No acute cardiopulmonary abnormality identified. Workstation:[id number]      Review of Systems:  Review of Systems   Constitutional:  Positive for fatigue. HENT:  Positive for hearing loss. Cardiovascular:  Positive for palpitations. Musculoskeletal:  Positive for arthralgias and myalgias. Neurological:         Shaking   All other systems reviewed and are negative. Physical Exam:  Physical Exam  Vitals reviewed. Constitutional:       Appearance: Normal appearance. Comments: Frail elderly woman    Cardiovascular:      Rate and Rhythm: Normal rate and regular rhythm. Pulses: Normal pulses. Heart sounds: Normal heart sounds. Pulmonary:      Effort: Pulmonary effort is normal.      Breath sounds: Normal breath sounds. Abdominal:      General: Bowel sounds are normal.      Palpations: Abdomen is soft. Musculoskeletal:         General: Normal range of motion. Cervical back: Normal range of motion and neck supple. Right lower leg: No edema. Left lower leg: No edema. Skin:     General: Skin is warm and dry. Capillary Refill: Capillary refill takes less than 2 seconds. Neurological:      General: No focal deficit present. Mental Status: She is alert and oriented to person, place, and time. Psychiatric:         Mood and Affect: Mood normal.         Behavior: Behavior normal.         Discussion/Summary:  # Palpitations occurring daily, 10/02/23 [facility] Prakash found bigeminy on a rhythm strip. One week Zio evaluate heart rate rhythm rule out arrhythmia, evaluate burden of ectopy  # Hyponatremia 9/19/23 sodium 131 continue with 1200 cc fluid restriction   Hypercholesterolemia 7/19/21 , , HDL 79,  - continue on Simvastatin 5mg daily, heart healthy diet   # Mild to Mod Aortic regurgitation follow up TTE in near future, evaluate wall function and AI.

## 2023-10-31 ENCOUNTER — OFFICE VISIT (OUTPATIENT)
Dept: CARDIOLOGY CLINIC | Facility: CLINIC | Age: 88
End: 2023-10-31
Payer: MEDICARE

## 2023-10-31 VITALS
WEIGHT: 116.5 LBS | HEIGHT: 58 IN | SYSTOLIC BLOOD PRESSURE: 138 MMHG | OXYGEN SATURATION: 99 % | DIASTOLIC BLOOD PRESSURE: 70 MMHG | BODY MASS INDEX: 24.45 KG/M2 | HEART RATE: 81 BPM

## 2023-10-31 DIAGNOSIS — E78.00 HYPERCHOLESTEROLEMIA: ICD-10-CM

## 2023-10-31 DIAGNOSIS — R00.2 PALPITATIONS: Primary | ICD-10-CM

## 2023-10-31 DIAGNOSIS — E87.1 HYPONATREMIA: ICD-10-CM

## 2023-10-31 DIAGNOSIS — I35.1 NONRHEUMATIC AORTIC VALVE INSUFFICIENCY: ICD-10-CM

## 2023-10-31 PROCEDURE — 93242 EXT ECG>48HR<7D RECORDING: CPT | Performed by: NURSE PRACTITIONER

## 2023-10-31 PROCEDURE — 99215 OFFICE O/P EST HI 40 MIN: CPT | Performed by: NURSE PRACTITIONER

## 2023-11-14 ENCOUNTER — CLINICAL SUPPORT (OUTPATIENT)
Dept: CARDIOLOGY CLINIC | Facility: CLINIC | Age: 88
End: 2023-11-14
Payer: MEDICARE

## 2023-11-14 DIAGNOSIS — R00.2 PALPITATIONS: ICD-10-CM

## 2023-11-14 PROCEDURE — 93248 EXT ECG>7D<15D REV&INTERPJ: CPT | Performed by: NURSE PRACTITIONER

## 2023-11-15 ENCOUNTER — TELEPHONE (OUTPATIENT)
Dept: CARDIOLOGY CLINIC | Facility: CLINIC | Age: 88
End: 2023-11-15

## 2023-11-15 NOTE — TELEPHONE ENCOUNTER
----- Message from Rafael Villarreal, [address] [facility] sent at 11/15/2023  4:20 PM EST -----  Please call Linh and inform her low amount of PVC's. No treatment needed. Thank you     Called pt and lft msg re: low amount of pvcs on Zio. No treatment needed.

## 2023-11-20 ENCOUNTER — HOSPITAL ENCOUNTER (OUTPATIENT)
Dept: RADIOLOGY | Age: 88
Discharge: HOME | End: 2023-11-20
Payer: MEDICARE

## 2023-11-20 DIAGNOSIS — R90.89 ABNORMAL BRAIN MRI: ICD-10-CM

## 2023-11-20 PROCEDURE — G1004 CDSM NDSC: HCPCS

## 2023-11-20 PROCEDURE — 70553 MRI BRAIN STEM W/O & W/DYE: CPT

## 2023-11-20 PROCEDURE — A9585 GADOBUTROL INJECTION: HCPCS | Performed by: PSYCHIATRY & NEUROLOGY

## 2023-11-20 RX ORDER — GADOBUTROL 604.72 MG/ML
5 INJECTION INTRAVENOUS
Status: COMPLETED | OUTPATIENT
Start: 2023-11-20 | End: 2023-11-20

## 2023-11-20 RX ADMIN — GADOBUTROL 5 ML: 604.72 INJECTION INTRAVENOUS at 15:14

## 2023-11-21 ENCOUNTER — HOSPITAL ENCOUNTER (OUTPATIENT)
Dept: NON INVASIVE DIAGNOSTICS | Facility: CLINIC | Age: 88
Discharge: HOME | End: 2023-11-21
Payer: MEDICARE

## 2023-11-21 ENCOUNTER — TELEPHONE (OUTPATIENT)
Dept: NEUROLOGY | Facility: CLINIC | Age: 88
End: 2023-11-21

## 2023-11-21 VITALS
WEIGHT: 116 LBS | DIASTOLIC BLOOD PRESSURE: 70 MMHG | BODY MASS INDEX: 24.35 KG/M2 | SYSTOLIC BLOOD PRESSURE: 138 MMHG | HEIGHT: 58 IN | HEART RATE: 70 BPM

## 2023-11-21 DIAGNOSIS — E78.00 HYPERCHOLESTEROLEMIA: ICD-10-CM

## 2023-11-21 DIAGNOSIS — R51.9 NONINTRACTABLE HEADACHE, UNSPECIFIED CHRONICITY PATTERN, UNSPECIFIED HEADACHE TYPE: Primary | ICD-10-CM

## 2023-11-21 DIAGNOSIS — E87.1 HYPONATREMIA: ICD-10-CM

## 2023-11-21 DIAGNOSIS — R00.2 PALPITATIONS: ICD-10-CM

## 2023-11-21 LAB
AORTIC ROOT: 2 CM
APICAL FOUR CHAMBER EJECTION FRACTION: 63 %
AV REGURGITATION PRESSURE HALF TIME: 398 MS
E WAVE DECELERATION TIME: 147 MS
E/A RATIO: 1
FRACTIONAL SHORTENING: 30 (ref 28–44)
INTERVENTRICULAR SEPTUM IN DIASTOLE (PARASTERNAL SHORT AXIS VIEW): 1 CM
INTERVENTRICULAR SEPTUM: 1 CM (ref 0.6–1.1)
LAAS-AP2: 6.8 CM2
LAAS-AP4: 10.2 CM2
LEFT ATRIUM AREA SYSTOLE SINGLE PLANE A4C: 10.5 CM2
LEFT ATRIUM SIZE: 2.7 CM
LEFT ATRIUM VOLUME (MOD BIPLANE): 18 ML
LEFT ATRIUM VOLUME INDEX (MOD BIPLANE): 12.4 ML/M2
LEFT INTERNAL DIMENSION IN SYSTOLE: 2.6 CM (ref 2.1–4)
LEFT VENTRICULAR INTERNAL DIMENSION IN DIASTOLE: 3.7 CM (ref 3.5–6)
LEFT VENTRICULAR POSTERIOR WALL IN END DIASTOLE: 1 CM
LEFT VENTRICULAR STROKE VOLUME: 34 ML
LVSV (TEICH): 34 ML
MV E'TISSUE VEL-SEP: 10 CM/S
MV PEAK A VEL: 0.82 M/S
MV PEAK E VEL: 82 CM/S
MV STENOSIS PRESSURE HALF TIME: 43 MS
MV VALVE AREA P 1/2 METHOD: 5.12
RIGHT ATRIUM AREA SYSTOLE A4C: 10.4 CM2
RIGHT VENTRICLE ID DIMENSION: 3.1 CM
SL CV AV DECELERATION TIME RETROGRADE: 1374 MS
SL CV AV PEAK GRADIENT RETROGRADE: 42 MMHG
SL CV LEFT ATRIUM LENGTH A2C: 2.9 CM
SL CV LV EF: 60
SL CV PED ECHO LEFT VENTRICLE DIASTOLIC VOLUME (MOD BIPLANE) 2D: 60 ML
SL CV PED ECHO LEFT VENTRICLE SYSTOLIC VOLUME (MOD BIPLANE) 2D: 26 ML
TR MAX PG: 26 MMHG
TR PEAK VELOCITY: 2.5 M/S
TRICUSPID ANNULAR PLANE SYSTOLIC EXCURSION: 1.8 CM
TRICUSPID VALVE PEAK REGURGITATION VELOCITY: 2.53 M/S

## 2023-11-21 PROCEDURE — 93306 TTE W/DOPPLER COMPLETE: CPT

## 2023-11-21 PROCEDURE — 93306 TTE W/DOPPLER COMPLETE: CPT | Performed by: INTERNAL MEDICINE

## 2023-11-21 NOTE — TELEPHONE ENCOUNTER
Patient LOV with Dr. Dyana Rangel 10-9-23    Patient was reading the AVS and noticed that the provider added a new medication     Propranolol HCl 10 mg     The patient never received the medication and was wondering why. After looking at medication list it looks as thou that  medication was Discontinued      Patient is asking if the provider wants them to be taking this because the tylenol is not helping and the patient is only getting relief from OTC Aleve for the HA     Please advise    Patient CB # [phone]     Thank you!

## 2023-11-27 ENCOUNTER — TELEPHONE (OUTPATIENT)
Dept: CARDIOLOGY CLINIC | Facility: CLINIC | Age: 88
End: 2023-11-27

## 2023-11-27 NOTE — TELEPHONE ENCOUNTER
----- Message from Liliane Blackburn, [address] [place] [address] sent at 11/22/2023  9:52 AM EST -----  Please call Sean Jones and inform her there is no significant change in her 2 D echocardiogram from previous. Thank you     Spoke with pt re: unchanged Echo rslts from previous.

## 2023-11-29 NOTE — TELEPHONE ENCOUNTER
Please resend script for propranolol if in agreement, it looks like it was discontinued by another office. I called patient to advise; reached vm, left message that it will be resent, thank you.

## 2023-11-30 NOTE — RESULT ENCOUNTER NOTE
Dennys Welsh,    The radiologists read out your MRI, it looks good. There is only mild enhancement in the facial nerves now and not in/near the brain. In particular what is left is within normal limits. Hope that you are feeling well.   Thanks!    -Dr Dawn Bring

## 2023-12-01 ENCOUNTER — TELEPHONE (OUTPATIENT)
Age: 88
End: 2023-12-01

## 2023-12-01 ENCOUNTER — NURSE TRIAGE (OUTPATIENT)
Age: 88
End: 2023-12-01

## 2023-12-01 RX ORDER — PROPRANOLOL HYDROCHLORIDE 10 MG/1
TABLET ORAL
Qty: 120 TABLET | Refills: 1 | Status: SHIPPED | OUTPATIENT
Start: 2023-12-01

## 2023-12-01 NOTE — TELEPHONE ENCOUNTER
Chandrika De Jesus MD  Neurology [facility] Clinical Team 431 minutes ago (7:57 AM)       Rx sent,    If she notes any dizziness or increased fatigue on this med please stop it and let me know and we will take a different approach.     Thanks

## 2023-12-01 NOTE — TELEPHONE ENCOUNTER
Reason for Disposition  • Headache is a chronic symptom (recurrent or ongoing AND lasting > 4 weeks)    Answer Assessment - Initial Assessment Questions  1. LOCATION: "Where does it hurt?"       front  2. ONSET: "When did the headache start?" (Minutes, hours or days)       A couple of months   3. PATTERN: "Does the pain come and go, or has it been constant since it started?"      Comes and goes   4. SEVERITY: "How bad is the pain?" and "What does it keep you from doing?"  (e.g., Scale 1-10; mild, moderate, or severe)    - MILD (1-3): doesn't interfere with normal activities     - MODERATE (4-7): interferes with normal activities or awakens from sleep     - SEVERE (8-10): excruciating pain, unable to do any normal activities         5 dull ache   5. RECURRENT SYMPTOM: "Have you ever had headaches before?" If Yes, ask: "When was the last time?" and "What happened that time?"       Yes ,patient stated she has tendinitis   6. CAUSE: "What do you think is causing the headache?"       Yes ,patient stated she has tendinitis   7. MIGRAINE: "Have you been diagnosed with migraine headaches?" If Yes, ask: "Is this headache similar?"       no  8. HEAD INJURY: "Has there been any recent injury to the head?"       no  9. OTHER SYMPTOMS: "Do you have any other symptoms?" (fever, stiff neck, eye pain, sore throat, cold symptoms)  No ,patient is taking Advil once a day  for the pain. patient very fatigued    Protocols used: Headache-ADULT-[place]

## 2023-12-01 NOTE — TELEPHONE ENCOUNTER
Regarding: headache  ----- Message from Suzanne Palacio sent at 12/1/2023 12:02 PM EST -----  Original message put in patient phone calls. Pt called in stating she's been having headaches and she would like a nurse to give her a call after 11am at [phone].

## 2023-12-01 NOTE — TELEPHONE ENCOUNTER
Pt called in stating she's been having headaches and she would like a nurse to give her a call after 11am at [phone].

## 2023-12-07 ENCOUNTER — TELEPHONE (OUTPATIENT)
Dept: NEUROLOGY | Facility: CLINIC | Age: 88
End: 2023-12-07

## 2023-12-07 NOTE — TELEPHONE ENCOUNTER
Patient was recently given a new medication       propranolol (INDERAL) 10 mg tablet [[id number]]    Order Details  Dose, Route, Frequency: As Directed   Dispense Quantity: 120 tablet Refills: 1          Si/2 tab BID for 1 week, then 1 tab BID for 2 weeks, if no change 1 1/2 tabs for 2 weeks, then 2 tabs for 2 weeks   Patient is not taking the medication due to difficulty cutting the tiny pill in half.     Patient stated they are also on Buspar 5 mg tid and this is working for the hand shaking     Patient asking if they still need to take the Inderal or if they can receive assistance with getting the pills cut in half       Best CB # [phone] [facility] Phone)

## 2023-12-11 ENCOUNTER — OFFICE VISIT (OUTPATIENT)
Dept: INTERNAL MEDICINE CLINIC | Facility: CLINIC | Age: 88
End: 2023-12-11
Payer: MEDICARE

## 2023-12-11 VITALS
HEART RATE: 99 BPM | DIASTOLIC BLOOD PRESSURE: 78 MMHG | OXYGEN SATURATION: 98 % | SYSTOLIC BLOOD PRESSURE: 118 MMHG | WEIGHT: 120.2 LBS | BODY MASS INDEX: 25.12 KG/M2

## 2023-12-11 DIAGNOSIS — R53.83 OTHER FATIGUE: Primary | ICD-10-CM

## 2023-12-11 DIAGNOSIS — M81.0 AGE RELATED OSTEOPOROSIS, UNSPECIFIED PATHOLOGICAL FRACTURE PRESENCE: ICD-10-CM

## 2023-12-11 DIAGNOSIS — R25.1 TREMOR: ICD-10-CM

## 2023-12-11 PROCEDURE — 99214 OFFICE O/P EST MOD 30 MIN: CPT | Performed by: GENERAL ACUTE CARE HOSPITAL

## 2023-12-11 NOTE — PATIENT INSTRUCTIONS
Fluid restriction 1200cc, that's no more than 5 standard cups or 40 oz. Eat more vegetables  Home exercise  If needed can increase buspar to 10mg 3 times a day depending on what time of the day you have more shakiness.

## 2023-12-11 NOTE — ASSESSMENT & PLAN NOTE
It's likely covid related. Started since her first covid infection 2y ago, worsened after her 2nd covid infection in 9/2023. She was also very hyponatremic at that time hospitalizaed. We checked her TSH, B12 this year, wnl. Iron panel was checked last year wnl. Offered to recheck but pt says she is tired of getting labs. She sleeps well and denies any muscle pain, so not suspecting PMR. Poor appetite from loss of taste but able to eat. Gained some wt. Diet needs to be healthier. Mainly eats TV diner. Limited source of vegetables and protein. We discussed getting more frozen green vegetables like brocolli and plain greek yogurt with fruits. She has home health aids now, [facility] 4hr/d. They take her to [employer] twice a week which she enjoys a lot. Denies any memory decline. Offered PT. She says she had it done before was given home exercise. She plans to start doing some home exercise.

## 2023-12-11 NOTE — PROGRESS NOTES
Name: Octaviano Castro      : 1935      MRN: [id number]  Encounter Provider: Marysol Zamorano MD  Encounter Date: 2023   Encounter department: [facility]    Assessment & Plan     1. Other fatigue  Assessment & Plan:  It's likely covid related. Started since her first covid infection 2y ago, worsened after her 2nd covid infection in 2023. She was also very hyponatremic at that time hospitalizaed. We checked her TSH, B12 this year, wnl. Iron panel was checked last year wnl. Offered to recheck but pt says she is tired of getting labs. She sleeps well and denies any muscle pain, so not suspecting PMR. Poor appetite from loss of taste but able to eat. Gained some wt. Diet needs to be healthier. Mainly eats TV diner. Limited source of vegetables and protein. We discussed getting more frozen green vegetables like brocolli and plain greek yogurt with fruits. She has home health aids now, [facility] 4hr/d. They take her to [email or web address] twice a week which she enjoys a lot. Denies any memory decline. Offered PT. She says she had it done before was given home exercise. She plans to start doing some home exercise. 2. Age related osteoporosis, unspecified pathological fracture presence    3. Tremor  Assessment & Plan:  She was started buspar for possible anxiety related symptoms and says it helps with her shaking. She takes 5mg 3times a day. Sometimes takes 1 extra pill in the afternoon. She was started by neurology half tablet of propranolol but never started because it was hard to cut the small tablet to half. Subjective      HPI  Ongoing fatigue  Ever since her second covid infection in 2023. Thinks it might be from her diet. She does not cook. Only eats TV dinner. Does not eat vegetables. Lost her taste from first covid infection 2 y ago. No appetite but forces herself to eat. Eats some fruits. Mainly banana and pineapple. Sleeps well.      Was started by neuro propranolol for shaking. Never started because she was told to start half tab and it's very hard to cut the pill to half. Thinks buspar helps with shaking.      Review of Systems    Current Outpatient Medications on File Prior to Visit   Medication Sig    Asmanex  MCG/ACT AERO Inhale 2 puffs if needed    busPIRone (BUSPAR) 5 mg tablet Take 1 tablet (5 mg total) by mouth 3 (three) times a day    Calcium Carb-Cholecalciferol 1000-800 MG-UNIT TABS Take by mouth    ibuprofen (MOTRIN) 100 mg/5 mL suspension Take 200 mg by mouth every 6 (six) hours as needed for mild pain    ipratropium (ATROVENT) 0.03 % nasal spray 2 sprays into each nostril 3 (three) times a day Use up to three times a day with runny nose, before meals (Patient taking differently: 2 sprays into each nostril if needed for rhinitis Use up to three times a day with runny nose, before meals)    Multiple Vitamins-Minerals (CENTRUM SILVER 50+WOMEN PO) Take by mouth    omeprazole (PriLOSEC) 40 MG capsule TAKE 1 CAPSULE BY MOUTH EVERY DAY 30 MINUTES BEFORE BREAKFAST    propranolol (INDERAL) 10 mg tablet 1/2 tab BID for 1 week, then 1 tab BID for 2 weeks, if no change 1 1/2 tabs for 2 weeks, then 2 tabs for 2 weeks    simvastatin (ZOCOR) 5 MG tablet Take 1 tablet (5 mg total) by mouth daily at bedtime    acetaminophen (TYLENOL) 500 mg tablet Take 1,000 mg by mouth 2 (two) times a day as needed (Patient not taking: Reported on 12/11/2023)       Objective     /78 (BP Location: Left arm, Patient Position: Sitting, Cuff Size: Standard)   Pulse 99   Wt 54.5 kg (120 lb 3.2 oz)   LMP  (LMP Unknown)   SpO2 98%   BMI 25.12 kg/m²     Physical Exam  Marcus Nice MD

## 2023-12-12 NOTE — ASSESSMENT & PLAN NOTE
She was started buspar for possible anxiety related symptoms and says it helps with her shaking. She takes 5mg 3times a day. Sometimes takes 1 extra pill in the afternoon. She was started by neurology half tablet of propranolol but never started because it was hard to cut the small tablet to half.

## 2023-12-12 NOTE — TELEPHONE ENCOUNTER
Spoke with pt. She never started the propranolol because the tablets are too tiny to split in half. Pt stated they just crumble apart. Pt went to her PCP yesterday. She said that her PCP advised that she take Buspar 5 mg TID. If the Buspar does not control the tremor, she can take 2 tabs instead of one. So far, pt reports that the Buspar is helping with her tremor, and is just going to continue taking this. Routed to Dr. Jennifer Barnes to make him aware.

## 2023-12-30 PROBLEM — H90.A21 SENSORINEURAL HEARING LOSS (SNHL) OF RIGHT EAR WITH RESTRICTED HEARING OF LEFT EAR: Status: ACTIVE | Noted: 2023-12-30

## 2023-12-30 PROBLEM — K11.7 XEROSTOMIA: Status: ACTIVE | Noted: 2023-12-30

## 2023-12-30 PROBLEM — H90.A32 MIXED CONDUCTIVE AND SENSORINEURAL HEARING LOSS OF LEFT EAR WITH RESTRICTED HEARING OF RIGHT EAR: Status: ACTIVE | Noted: 2023-12-30

## 2023-12-30 PROBLEM — H93.13 TINNITUS OF BOTH EARS: Status: ACTIVE | Noted: 2023-12-30

## 2024-02-13 ENCOUNTER — TELEPHONE (OUTPATIENT)
Age: 89
End: 2024-02-13

## 2024-03-16 ENCOUNTER — APPOINTMENT (EMERGENCY)
Dept: RADIOLOGY | Facility: HOSPITAL | Age: 89
DRG: 394 | End: 2024-03-16
Payer: MEDICARE

## 2024-03-16 ENCOUNTER — TELEPHONE (OUTPATIENT)
Dept: OTHER | Facility: OTHER | Age: 89
End: 2024-03-16

## 2024-03-16 ENCOUNTER — HOSPITAL ENCOUNTER (INPATIENT)
Facility: HOSPITAL | Age: 89
LOS: 10 days | Discharge: NURSING, REHAB OR LONG-TERM CARE | DRG: 394 | End: 2024-03-27
Attending: EMERGENCY MEDICINE | Admitting: FAMILY MEDICINE
Payer: MEDICARE

## 2024-03-16 DIAGNOSIS — R11.0 NAUSEA: ICD-10-CM

## 2024-03-16 DIAGNOSIS — K62.89 RECTAL PAIN: ICD-10-CM

## 2024-03-16 DIAGNOSIS — R05.3 CHRONIC COUGH: ICD-10-CM

## 2024-03-16 DIAGNOSIS — R26.2 AMBULATORY DYSFUNCTION: ICD-10-CM

## 2024-03-16 DIAGNOSIS — K59.00 CONSTIPATION: ICD-10-CM

## 2024-03-16 DIAGNOSIS — K59.01 SLOW TRANSIT CONSTIPATION: ICD-10-CM

## 2024-03-16 DIAGNOSIS — K52.89 STERCORAL COLITIS: Primary | ICD-10-CM

## 2024-03-16 LAB
ALBUMIN SERPL BCP-MCNC: 3.3 G/DL (ref 3.5–5)
ALP SERPL-CCNC: 69 U/L (ref 34–104)
ALT SERPL W P-5'-P-CCNC: 45 U/L (ref 7–52)
ANION GAP SERPL CALCULATED.3IONS-SCNC: 9 MMOL/L (ref 4–13)
AST SERPL W P-5'-P-CCNC: 32 U/L (ref 13–39)
ATRIAL RATE: 112 BPM
BACTERIA UR QL AUTO: ABNORMAL /HPF
BASOPHILS # BLD AUTO: 0.03 THOUSANDS/ÂΜL (ref 0–0.1)
BASOPHILS NFR BLD AUTO: 0 % (ref 0–1)
BILIRUB SERPL-MCNC: 0.54 MG/DL (ref 0.2–1)
BILIRUB UR QL STRIP: NEGATIVE
BUN SERPL-MCNC: 18 MG/DL (ref 5–25)
CALCIUM ALBUM COR SERPL-MCNC: 9.2 MG/DL (ref 8.3–10.1)
CALCIUM SERPL-MCNC: 8.6 MG/DL (ref 8.4–10.2)
CHLORIDE SERPL-SCNC: 96 MMOL/L (ref 96–108)
CLARITY UR: CLEAR
CO2 SERPL-SCNC: 25 MMOL/L (ref 21–32)
COLOR UR: ABNORMAL
CREAT SERPL-MCNC: 0.81 MG/DL (ref 0.6–1.3)
EOSINOPHIL # BLD AUTO: 0.07 THOUSAND/ÂΜL (ref 0–0.61)
EOSINOPHIL NFR BLD AUTO: 1 % (ref 0–6)
ERYTHROCYTE [DISTWIDTH] IN BLOOD BY AUTOMATED COUNT: 13.8 % (ref 11.6–15.1)
GFR SERPL CREATININE-BSD FRML MDRD: 65 ML/MIN/1.73SQ M
GLUCOSE SERPL-MCNC: 96 MG/DL (ref 65–140)
GLUCOSE UR STRIP-MCNC: NEGATIVE MG/DL
HCT VFR BLD AUTO: 31.6 % (ref 34.8–46.1)
HGB BLD-MCNC: 10.4 G/DL (ref 11.5–15.4)
HGB UR QL STRIP.AUTO: ABNORMAL
IMM GRANULOCYTES # BLD AUTO: 0.16 THOUSAND/UL (ref 0–0.2)
IMM GRANULOCYTES NFR BLD AUTO: 2 % (ref 0–2)
KETONES UR STRIP-MCNC: NEGATIVE MG/DL
LEUKOCYTE ESTERASE UR QL STRIP: NEGATIVE
LIPASE SERPL-CCNC: 16 U/L (ref 11–82)
LYMPHOCYTES # BLD AUTO: 1.5 THOUSANDS/ÂΜL (ref 0.6–4.47)
LYMPHOCYTES NFR BLD AUTO: 15 % (ref 14–44)
MCH RBC QN AUTO: 33.7 PG (ref 26.8–34.3)
MCHC RBC AUTO-ENTMCNC: 32.9 G/DL (ref 31.4–37.4)
MCV RBC AUTO: 102 FL (ref 82–98)
MONOCYTES # BLD AUTO: 0.88 THOUSAND/ÂΜL (ref 0.17–1.22)
MONOCYTES NFR BLD AUTO: 9 % (ref 4–12)
NEUTROPHILS # BLD AUTO: 7.39 THOUSANDS/ÂΜL (ref 1.85–7.62)
NEUTS SEG NFR BLD AUTO: 73 % (ref 43–75)
NITRITE UR QL STRIP: NEGATIVE
NON-SQ EPI CELLS URNS QL MICRO: ABNORMAL /HPF
NRBC BLD AUTO-RTO: 0 /100 WBCS
P AXIS: 84 DEGREES
PH UR STRIP.AUTO: 7.5 [PH]
PLATELET # BLD AUTO: 453 THOUSANDS/UL (ref 149–390)
PLATELET # BLD AUTO: 473 THOUSANDS/UL (ref 149–390)
PMV BLD AUTO: 8.5 FL (ref 8.9–12.7)
PMV BLD AUTO: 8.7 FL (ref 8.9–12.7)
POTASSIUM SERPL-SCNC: 4 MMOL/L (ref 3.5–5.3)
PR INTERVAL: 188 MS
PROT SERPL-MCNC: 6 G/DL (ref 6.4–8.4)
PROT UR STRIP-MCNC: ABNORMAL MG/DL
QRS AXIS: 60 DEGREES
QRSD INTERVAL: 68 MS
QT INTERVAL: 322 MS
QTC INTERVAL: 439 MS
RBC # BLD AUTO: 3.09 MILLION/UL (ref 3.81–5.12)
RBC #/AREA URNS AUTO: ABNORMAL /HPF
SODIUM SERPL-SCNC: 130 MMOL/L (ref 135–147)
SP GR UR STRIP.AUTO: 1.05 (ref 1–1.03)
T WAVE AXIS: 55 DEGREES
TSH SERPL DL<=0.05 MIU/L-ACNC: 2.51 UIU/ML (ref 0.45–4.5)
UROBILINOGEN UR STRIP-ACNC: <2 MG/DL
VENTRICULAR RATE: 112 BPM
WBC # BLD AUTO: 10.03 THOUSAND/UL (ref 4.31–10.16)
WBC #/AREA URNS AUTO: ABNORMAL /HPF

## 2024-03-16 PROCEDURE — 84443 ASSAY THYROID STIM HORMONE: CPT | Performed by: FAMILY MEDICINE

## 2024-03-16 PROCEDURE — 99285 EMERGENCY DEPT VISIT HI MDM: CPT | Performed by: EMERGENCY MEDICINE

## 2024-03-16 PROCEDURE — 93005 ELECTROCARDIOGRAM TRACING: CPT

## 2024-03-16 PROCEDURE — 99222 1ST HOSP IP/OBS MODERATE 55: CPT | Performed by: FAMILY MEDICINE

## 2024-03-16 PROCEDURE — 83690 ASSAY OF LIPASE: CPT

## 2024-03-16 PROCEDURE — 85025 COMPLETE CBC W/AUTO DIFF WBC: CPT

## 2024-03-16 PROCEDURE — 93010 ELECTROCARDIOGRAM REPORT: CPT | Performed by: INTERNAL MEDICINE

## 2024-03-16 PROCEDURE — 74177 CT ABD & PELVIS W/CONTRAST: CPT

## 2024-03-16 PROCEDURE — 80053 COMPREHEN METABOLIC PANEL: CPT

## 2024-03-16 PROCEDURE — 81001 URINALYSIS AUTO W/SCOPE: CPT | Performed by: EMERGENCY MEDICINE

## 2024-03-16 PROCEDURE — 96361 HYDRATE IV INFUSION ADD-ON: CPT

## 2024-03-16 PROCEDURE — 96374 THER/PROPH/DIAG INJ IV PUSH: CPT

## 2024-03-16 PROCEDURE — 99284 EMERGENCY DEPT VISIT MOD MDM: CPT

## 2024-03-16 PROCEDURE — 36415 COLL VENOUS BLD VENIPUNCTURE: CPT

## 2024-03-16 PROCEDURE — 85049 AUTOMATED PLATELET COUNT: CPT | Performed by: FAMILY MEDICINE

## 2024-03-16 RX ORDER — HEPARIN SODIUM 5000 [USP'U]/ML
5000 INJECTION, SOLUTION INTRAVENOUS; SUBCUTANEOUS EVERY 8 HOURS SCHEDULED
Status: DISCONTINUED | OUTPATIENT
Start: 2024-03-16 | End: 2024-03-27 | Stop reason: HOSPADM

## 2024-03-16 RX ORDER — BUSPIRONE HYDROCHLORIDE 5 MG/1
5 TABLET ORAL 3 TIMES DAILY
Status: DISCONTINUED | OUTPATIENT
Start: 2024-03-16 | End: 2024-03-27 | Stop reason: HOSPADM

## 2024-03-16 RX ORDER — DOCUSATE SODIUM 100 MG/1
100 CAPSULE, LIQUID FILLED ORAL 2 TIMES DAILY
Status: DISCONTINUED | OUTPATIENT
Start: 2024-03-16 | End: 2024-03-17

## 2024-03-16 RX ORDER — PANTOPRAZOLE SODIUM 40 MG/1
40 TABLET, DELAYED RELEASE ORAL
Status: DISCONTINUED | OUTPATIENT
Start: 2024-03-17 | End: 2024-03-18

## 2024-03-16 RX ORDER — MORPHINE SULFATE 4 MG/ML
4 INJECTION, SOLUTION INTRAMUSCULAR; INTRAVENOUS ONCE
Status: COMPLETED | OUTPATIENT
Start: 2024-03-16 | End: 2024-03-16

## 2024-03-16 RX ORDER — POLYETHYLENE GLYCOL 3350 17 G/17G
17 POWDER, FOR SOLUTION ORAL ONCE
Status: COMPLETED | OUTPATIENT
Start: 2024-03-16 | End: 2024-03-16

## 2024-03-16 RX ORDER — CALCIUM CARBONATE 500 MG/1
1000 TABLET, CHEWABLE ORAL 2 TIMES DAILY PRN
Status: DISCONTINUED | OUTPATIENT
Start: 2024-03-16 | End: 2024-03-18

## 2024-03-16 RX ORDER — GUAIFENESIN 100 MG/5ML
200 SOLUTION ORAL EVERY 4 HOURS PRN
Status: DISCONTINUED | OUTPATIENT
Start: 2024-03-16 | End: 2024-03-17

## 2024-03-16 RX ORDER — ACETAMINOPHEN 325 MG/1
650 TABLET ORAL EVERY 6 HOURS PRN
Status: DISCONTINUED | OUTPATIENT
Start: 2024-03-16 | End: 2024-03-17

## 2024-03-16 RX ORDER — SODIUM CHLORIDE, SODIUM GLUCONATE, SODIUM ACETATE, POTASSIUM CHLORIDE, MAGNESIUM CHLORIDE, SODIUM PHOSPHATE, DIBASIC, AND POTASSIUM PHOSPHATE .53; .5; .37; .037; .03; .012; .00082 G/100ML; G/100ML; G/100ML; G/100ML; G/100ML; G/100ML; G/100ML
75 INJECTION, SOLUTION INTRAVENOUS CONTINUOUS
Status: DISCONTINUED | OUTPATIENT
Start: 2024-03-16 | End: 2024-03-18

## 2024-03-16 RX ADMIN — CALCIUM CARBONATE (ANTACID) CHEW TAB 500 MG 1000 MG: 500 CHEW TAB at 20:24

## 2024-03-16 RX ADMIN — BUSPIRONE HYDROCHLORIDE 5 MG: 5 TABLET ORAL at 21:07

## 2024-03-16 RX ADMIN — BUSPIRONE HYDROCHLORIDE 5 MG: 5 TABLET ORAL at 16:39

## 2024-03-16 RX ADMIN — ACETAMINOPHEN 650 MG: 325 TABLET, FILM COATED ORAL at 17:18

## 2024-03-16 RX ADMIN — SODIUM CHLORIDE 1000 ML: 0.9 INJECTION, SOLUTION INTRAVENOUS at 12:03

## 2024-03-16 RX ADMIN — MORPHINE SULFATE 4 MG: 4 INJECTION INTRAVENOUS at 12:07

## 2024-03-16 RX ADMIN — IOHEXOL 85 ML: 350 INJECTION, SOLUTION INTRAVENOUS at 13:03

## 2024-03-16 RX ADMIN — GLYCERIN 1 SUPPOSITORY: 2 SUPPOSITORY RECTAL at 15:28

## 2024-03-16 RX ADMIN — HEPARIN SODIUM 5000 UNITS: 5000 INJECTION INTRAVENOUS; SUBCUTANEOUS at 16:28

## 2024-03-16 RX ADMIN — HEPARIN SODIUM 5000 UNITS: 5000 INJECTION INTRAVENOUS; SUBCUTANEOUS at 21:07

## 2024-03-16 RX ADMIN — SODIUM CHLORIDE, SODIUM GLUCONATE, SODIUM ACETATE, POTASSIUM CHLORIDE, MAGNESIUM CHLORIDE, SODIUM PHOSPHATE, DIBASIC, AND POTASSIUM PHOSPHATE 75 ML/HR: .53; .5; .37; .037; .03; .012; .00082 INJECTION, SOLUTION INTRAVENOUS at 16:27

## 2024-03-16 RX ADMIN — ACETAMINOPHEN 650 MG: 325 TABLET, FILM COATED ORAL at 22:38

## 2024-03-16 RX ADMIN — POLYETHYLENE GLYCOL 3350 17 G: 17 POWDER, FOR SOLUTION ORAL at 14:55

## 2024-03-16 RX ADMIN — DOCUSATE SODIUM 100 MG: 100 CAPSULE, LIQUID FILLED ORAL at 17:18

## 2024-03-16 NOTE — ASSESSMENT & PLAN NOTE
Patient has a chronic cough most likely cough variant asthma  Will continue PPI for acid reflux  Continue breathing treatment as needed

## 2024-03-16 NOTE — ED NOTES
"Pt is refusing soap floridalma enema stating \"she cannot tolerate it\". Dr. Hall made aware.      Deepti Monson RN  03/16/24 [id number]    "

## 2024-03-16 NOTE — ASSESSMENT & PLAN NOTE
Patient previously walked without cane or walker now is at a short-term rehab facility to help with ambulation  Patient found more difficult to ambulate the past few days  Will have PT OT evaluate patient   No abnormal movements

## 2024-03-16 NOTE — TELEPHONE ENCOUNTER
6/10-8/ ext [phone]/ Alejandra / [facility]/ Patient Chelsey Carson/  1935. Family is requesting pt be sent out to ER for rectal pain.     Via tc

## 2024-03-16 NOTE — H&P
[facility]  H&P  Name: Chelsey Carson 88 y.o. female I MRN: [id number]  Unit/Bed#: CW2 210-01 I Date of Admission: 3/16/2024   Date of Service: 3/16/2024 I Hospital Day: 0      Assessment/Plan   Ambulatory dysfunction  Assessment & Plan  Patient previously walked without cane or walker now is at a short-term rehab facility to help with ambulation  Patient found more difficult to ambulate the past few days  Will have PT OT evaluate patient    Constipation  Assessment & Plan  Evident on CT  Will encourage patient to increase p.o. hydration  Will start patient on bowel regimen    Proctitis  Assessment & Plan  Management per constipation    Chronic cough  Assessment & Plan  Patient has a chronic cough most likely cough variant asthma  Will continue PPI for acid reflux  Continue breathing treatment as needed    Hyperlipidemia  Assessment & Plan  Continue statin           VTE Pharmacologic Prophylaxis: VTE Score: 3 Moderate Risk (Score 3-4) - Pharmacological DVT Prophylaxis Ordered: heparin.  Code Status: Level 3 - DNAR and DNI   Discussion with family: Updated  (son) via phone.     Anticipated Length of Stay: Patient will be admitted on an observation basis with an anticipated length of stay of less than 2 midnights secondary to AMB Dysfunction,Rectal pain, Constipation .    Total Time Spent on Date of Encounter in care of patient: 55 mins. This time was spent on one or more of the following: performing physical exam; counseling and coordination of care; obtaining or reviewing history; documenting in the medical record; reviewing/ordering tests, medications or procedures; communicating with other healthcare professionals and discussing with patient's family/caregivers.    Chief Complaint: Ambulatory dysfunction worsened with rectal pain    History of Present Illness:  Chesley Carson is a 88 y.o. female with a PMH of ambulatory dysfunction, chronic cough,  constipation, hyperlipidemia who presents with worsening ambulatory dysfunction secondary to rectal pain.  Patient was found to be severely constipated.  Patient is currently in acute rehab for ambulatory dysfunction which was significantly impaired with patient's severe constipation as well as rectal pain.    Review of Systems:  Review of Systems   Constitutional:  Negative for chills and fever.   HENT:  Negative for ear pain and sore throat.    Eyes:  Negative for pain and visual disturbance.   Respiratory:  Negative for cough and shortness of breath.    Cardiovascular:  Negative for chest pain and palpitations.   Gastrointestinal:  Positive for constipation and rectal pain. Negative for abdominal pain and vomiting.   Genitourinary:  Negative for dysuria and hematuria.   Musculoskeletal:  Negative for arthralgias and back pain.   Skin:  Negative for color change and rash.   Neurological:  Positive for weakness. Negative for seizures and syncope.   All other systems reviewed and are negative.      Past Medical and Surgical History:   Past Medical History:   Diagnosis Date    Abnormal laboratory test 8/1/2022    Breast cancer (HCC)     Cancer (HCC)     COVID-19     Hyperlipidemia        Past Surgical History:   Procedure Laterality Date    BREAST SURGERY      cancer (> 5 years)    HYSTERECTOMY         Meds/Allergies:  Prior to Admission medications    Medication Sig Start Date End Date Taking? Authorizing Provider   acetaminophen (TYLENOL) 500 mg tablet Take 1,000 mg by mouth 2 (two) times a day as needed  Patient not taking: Reported on 12/11/2023    Historical Provider, MD   Asmanex  MCG/ACT AERO Inhale 2 puffs if needed 6/9/23   Historical Provider, MD   busPIRone (BUSPAR) 5 mg tablet Take 1 tablet (5 mg total) by mouth 3 (three) times a day 10/11/23   Mariely Fan MD   Calcium Carb-Cholecalciferol 1000-800 MG-UNIT TABS Take by mouth    Historical Provider, MD   ibuprofen (MOTRIN) 100 mg/5 mL suspension Take  200 mg by mouth every 6 (six) hours as needed for mild pain    Historical Provider, MD   ipratropium (ATROVENT) 0.03 % nasal spray 2 sprays into each nostril 3 (three) times a day Use up to three times a day with runny nose, before meals  Patient taking differently: 2 sprays into each nostril if needed for rhinitis Use up to three times a day with runny nose, before meals 11/18/22   Rianna Magaña MD   Multiple Vitamins-Minerals (CENTRUM SILVER 50+WOMEN PO) Take by mouth    Historical Provider, MD   omeprazole (PriLOSEC) 40 MG capsule TAKE 1 CAPSULE BY MOUTH EVERY DAY 30 MINUTES BEFORE BREAKFAST 3/8/23   Historical Provider, MD   simvastatin (ZOCOR) 5 MG tablet Take 1 tablet (5 mg total) by mouth daily at bedtime 1/13/23   Mariely Fan MD     I have reveiwed home medications using records provided by [facility].    Allergies:   Allergies   Allergen Reactions    Pravastatin     Risedronate     Paxlovid [Nirmatrelvir-Ritonavir] Nausea Only       Social History:  Marital Status:    Occupation: retired  Patient Pre-hospital Living Situation: Assisted Living  Patient Pre-hospital Level of Mobility: walks with walker  Patient Pre-hospital Diet Restrictions: None  Substance Use History:   Social History     Substance and Sexual Activity   Alcohol Use Not Currently    Alcohol/week: 1.0 standard drink of alcohol    Types: 1 Glasses of wine per week     Social History     Tobacco Use   Smoking Status Never   Smokeless Tobacco Never     Social History     Substance and Sexual Activity   Drug Use No       Family History:  Family History   Problem Relation Age of Onset    Breast cancer Mother     Heart disease Father         heart problem    Heart disease Sister         heart problem    Heart disease Brother         heart problem       Physical Exam:     Vitals:   Blood Pressure: 152/73 (03/16/24 1600)  Pulse: (!) 110 (03/16/24 1600)  Temperature: 98.6 °F (37 °C) (03/16/24 1119)  Temp Source: Oral (03/16/24 1119)  Respirations:  "20 (03/16/24 1600)  Height: 4' 10\" (147.3 cm) (03/16/24 1110)  Weight - Scale: 51.7 kg (114 lb) (03/16/24 1110)  SpO2: 95 % (03/16/24 1600)    Physical Exam  Vitals reviewed.   Constitutional:       General: She is not in acute distress.     Appearance: She is not ill-appearing.   HENT:      Head: Normocephalic.   Eyes:      Conjunctiva/sclera: Conjunctivae normal.   Cardiovascular:      Rate and Rhythm: Normal rate and regular rhythm.   Pulmonary:      Effort: Pulmonary effort is normal.   Abdominal:      General: Abdomen is flat.      Palpations: Abdomen is soft.      Tenderness: There is no abdominal tenderness.   Skin:     General: Skin is warm and dry.   Neurological:      Mental Status: She is alert. Mental status is at baseline.          Additional Data:     Lab Results:  Results from last 7 days   Lab Units 03/16/24  1606 03/16/24  1209   WBC Thousand/uL  --  10.03   HEMOGLOBIN g/dL  --  10.4*   HEMATOCRIT %  --  31.6*   PLATELETS Thousands/uL 453* 473*   NEUTROS PCT %  --  73   LYMPHS PCT %  --  15   MONOS PCT %  --  9   EOS PCT %  --  1     Results from last 7 days   Lab Units 03/16/24  1134   SODIUM mmol/L 130*   POTASSIUM mmol/L 4.0   CHLORIDE mmol/L 96   CO2 mmol/L 25   BUN mg/dL 18   CREATININE mg/dL 0.81   ANION GAP mmol/L 9   CALCIUM mg/dL 8.6   ALBUMIN g/dL 3.3*   TOTAL BILIRUBIN mg/dL 0.54   ALK PHOS U/L 69   ALT U/L 45   AST U/L 32   GLUCOSE RANDOM mg/dL 96                       Lines/Drains:  Invasive Devices       Peripheral Intravenous Line  Duration             Peripheral IV 03/16/24 Dorsal (posterior);Left Hand <1 day    Peripheral IV 03/16/24 Left;Ventral (anterior) Forearm <1 day              Drain  Duration             External Urinary Catheter <1 day                        Imaging: Reviewed radiology reports from this admission including: abdominal/pelvic CT  CT abdomen pelvis with contrast   Final Result by Magen Mcmullen MD (03/16 6042)      1. Mild stercoral or infectious " proctitis. Large colonic stool burden. No obstruction.      2. Bibasilar tree-in-bud infiltrates compatible with infectious bronchiolitis.         Resident: DEYANIRA KIMBLE I, the attending radiologist, have reviewed the images and agree with the final report above.      Workstation performed: [id number]             EKG and Other Studies Reviewed on Admission:   EKG: NSR. .  I personally reviewed the EKG.    ** Please Note: This note has been constructed using a voice recognition system. **

## 2024-03-16 NOTE — ASSESSMENT & PLAN NOTE
Evident on CT  Will encourage patient to increase p.o. hydration  Will start patient on bowel regimen

## 2024-03-17 ENCOUNTER — APPOINTMENT (OUTPATIENT)
Dept: RADIOLOGY | Facility: HOSPITAL | Age: 89
DRG: 394 | End: 2024-03-17
Payer: MEDICARE

## 2024-03-17 PROBLEM — D75.839 THROMBOCYTOSIS: Status: ACTIVE | Noted: 2024-03-17

## 2024-03-17 PROBLEM — D53.9 MACROCYTIC ANEMIA: Status: ACTIVE | Noted: 2024-03-17

## 2024-03-17 LAB
ALBUMIN SERPL BCP-MCNC: 2.7 G/DL (ref 3.5–5)
ALP SERPL-CCNC: 56 U/L (ref 34–104)
ALT SERPL W P-5'-P-CCNC: 23 U/L (ref 7–52)
ANION GAP SERPL CALCULATED.3IONS-SCNC: 11 MMOL/L (ref 4–13)
AST SERPL W P-5'-P-CCNC: 23 U/L (ref 13–39)
BASOPHILS # BLD AUTO: 0.05 THOUSANDS/ÂΜL (ref 0–0.1)
BASOPHILS NFR BLD AUTO: 1 % (ref 0–1)
BILIRUB SERPL-MCNC: 0.39 MG/DL (ref 0.2–1)
BUN SERPL-MCNC: 14 MG/DL (ref 5–25)
CALCIUM ALBUM COR SERPL-MCNC: 8.5 MG/DL (ref 8.3–10.1)
CALCIUM SERPL-MCNC: 7.5 MG/DL (ref 8.4–10.2)
CHLORIDE SERPL-SCNC: 97 MMOL/L (ref 96–108)
CO2 SERPL-SCNC: 26 MMOL/L (ref 21–32)
CREAT SERPL-MCNC: 0.67 MG/DL (ref 0.6–1.3)
EOSINOPHIL # BLD AUTO: 0.09 THOUSAND/ÂΜL (ref 0–0.61)
EOSINOPHIL NFR BLD AUTO: 1 % (ref 0–6)
ERYTHROCYTE [DISTWIDTH] IN BLOOD BY AUTOMATED COUNT: 14.1 % (ref 11.6–15.1)
FLUAV RNA RESP QL NAA+PROBE: NEGATIVE
FLUBV RNA RESP QL NAA+PROBE: NEGATIVE
FOLATE SERPL-MCNC: >22.3 NG/ML
GFR SERPL CREATININE-BSD FRML MDRD: 78 ML/MIN/1.73SQ M
GLUCOSE P FAST SERPL-MCNC: 88 MG/DL (ref 65–99)
GLUCOSE SERPL-MCNC: 88 MG/DL (ref 65–140)
HCT VFR BLD AUTO: 27 % (ref 34.8–46.1)
HGB BLD-MCNC: 9 G/DL (ref 11.5–15.4)
IMM GRANULOCYTES # BLD AUTO: 0.12 THOUSAND/UL (ref 0–0.2)
IMM GRANULOCYTES NFR BLD AUTO: 2 % (ref 0–2)
LYMPHOCYTES # BLD AUTO: 1.5 THOUSANDS/ÂΜL (ref 0.6–4.47)
LYMPHOCYTES NFR BLD AUTO: 20 % (ref 14–44)
MCH RBC QN AUTO: 34.2 PG (ref 26.8–34.3)
MCHC RBC AUTO-ENTMCNC: 33.3 G/DL (ref 31.4–37.4)
MCV RBC AUTO: 103 FL (ref 82–98)
MONOCYTES # BLD AUTO: 0.62 THOUSAND/ÂΜL (ref 0.17–1.22)
MONOCYTES NFR BLD AUTO: 8 % (ref 4–12)
NEUTROPHILS # BLD AUTO: 5.3 THOUSANDS/ÂΜL (ref 1.85–7.62)
NEUTS SEG NFR BLD AUTO: 68 % (ref 43–75)
NRBC BLD AUTO-RTO: 0 /100 WBCS
PLATELET # BLD AUTO: 417 THOUSANDS/UL (ref 149–390)
PMV BLD AUTO: 8.3 FL (ref 8.9–12.7)
POTASSIUM SERPL-SCNC: 4.1 MMOL/L (ref 3.5–5.3)
PROT SERPL-MCNC: 5.2 G/DL (ref 6.4–8.4)
RBC # BLD AUTO: 2.63 MILLION/UL (ref 3.81–5.12)
RSV RNA RESP QL NAA+PROBE: NEGATIVE
SARS-COV-2 RNA RESP QL NAA+PROBE: NEGATIVE
SODIUM SERPL-SCNC: 134 MMOL/L (ref 135–147)
VIT B12 SERPL-MCNC: 1275 PG/ML (ref 180–914)
WBC # BLD AUTO: 7.68 THOUSAND/UL (ref 4.31–10.16)

## 2024-03-17 PROCEDURE — 85025 COMPLETE CBC W/AUTO DIFF WBC: CPT | Performed by: FAMILY MEDICINE

## 2024-03-17 PROCEDURE — 80053 COMPREHEN METABOLIC PANEL: CPT | Performed by: FAMILY MEDICINE

## 2024-03-17 PROCEDURE — 82607 VITAMIN B-12: CPT | Performed by: INTERNAL MEDICINE

## 2024-03-17 PROCEDURE — 0241U HB NFCT DS VIR RESP RNA 4 TRGT: CPT | Performed by: INTERNAL MEDICINE

## 2024-03-17 PROCEDURE — 99232 SBSQ HOSP IP/OBS MODERATE 35: CPT | Performed by: INTERNAL MEDICINE

## 2024-03-17 PROCEDURE — 99222 1ST HOSP IP/OBS MODERATE 55: CPT | Performed by: INTERNAL MEDICINE

## 2024-03-17 PROCEDURE — 82746 ASSAY OF FOLIC ACID SERUM: CPT | Performed by: INTERNAL MEDICINE

## 2024-03-17 PROCEDURE — 71045 X-RAY EXAM CHEST 1 VIEW: CPT

## 2024-03-17 RX ORDER — HYDROMORPHONE HCL IN WATER/PF 6 MG/30 ML
0.2 PATIENT CONTROLLED ANALGESIA SYRINGE INTRAVENOUS EVERY 4 HOURS PRN
Status: COMPLETED | OUTPATIENT
Start: 2024-03-17 | End: 2024-03-18

## 2024-03-17 RX ORDER — GUAIFENESIN/DEXTROMETHORPHAN 100-10MG/5
10 SYRUP ORAL EVERY 4 HOURS
Status: DISCONTINUED | OUTPATIENT
Start: 2024-03-17 | End: 2024-03-18

## 2024-03-17 RX ORDER — AMOXICILLIN 250 MG
2 CAPSULE ORAL 2 TIMES DAILY
Status: DISCONTINUED | OUTPATIENT
Start: 2024-03-17 | End: 2024-03-27 | Stop reason: HOSPADM

## 2024-03-17 RX ORDER — POLYETHYLENE GLYCOL 3350 17 G/17G
238 POWDER, FOR SOLUTION ORAL ONCE
Status: COMPLETED | OUTPATIENT
Start: 2024-03-17 | End: 2024-03-17

## 2024-03-17 RX ORDER — HYDROCORTISONE 25 MG/G
CREAM TOPICAL 4 TIMES DAILY PRN
Status: DISCONTINUED | OUTPATIENT
Start: 2024-03-17 | End: 2024-03-27 | Stop reason: HOSPADM

## 2024-03-17 RX ORDER — KETOROLAC TROMETHAMINE 30 MG/ML
15 INJECTION, SOLUTION INTRAMUSCULAR; INTRAVENOUS EVERY 6 HOURS PRN
Status: DISCONTINUED | OUTPATIENT
Start: 2024-03-17 | End: 2024-03-18

## 2024-03-17 RX ORDER — GUAIFENESIN/DEXTROMETHORPHAN 100-10MG/5
10 SYRUP ORAL EVERY 4 HOURS PRN
Status: DISCONTINUED | OUTPATIENT
Start: 2024-03-18 | End: 2024-03-18

## 2024-03-17 RX ORDER — POLYETHYLENE GLYCOL 3350 17 G/17G
17 POWDER, FOR SOLUTION ORAL 2 TIMES DAILY
Status: DISCONTINUED | OUTPATIENT
Start: 2024-03-17 | End: 2024-03-17

## 2024-03-17 RX ORDER — ALBUTEROL SULFATE 90 UG/1
2 AEROSOL, METERED RESPIRATORY (INHALATION) EVERY 4 HOURS PRN
Status: DISCONTINUED | OUTPATIENT
Start: 2024-03-17 | End: 2024-03-27 | Stop reason: HOSPADM

## 2024-03-17 RX ORDER — HYDROMORPHONE HCL IN WATER/PF 6 MG/30 ML
0.2 PATIENT CONTROLLED ANALGESIA SYRINGE INTRAVENOUS ONCE
Status: COMPLETED | OUTPATIENT
Start: 2024-03-17 | End: 2024-03-17

## 2024-03-17 RX ORDER — HYDROMORPHONE HCL IN WATER/PF 6 MG/30 ML
0.2 PATIENT CONTROLLED ANALGESIA SYRINGE INTRAVENOUS EVERY 4 HOURS PRN
Status: DISCONTINUED | OUTPATIENT
Start: 2024-03-17 | End: 2024-03-17

## 2024-03-17 RX ORDER — ACETAMINOPHEN 325 MG/1
650 TABLET ORAL EVERY 6 HOURS PRN
Status: DISCONTINUED | OUTPATIENT
Start: 2024-03-17 | End: 2024-03-27 | Stop reason: HOSPADM

## 2024-03-17 RX ADMIN — HEPARIN SODIUM 5000 UNITS: 5000 INJECTION INTRAVENOUS; SUBCUTANEOUS at 14:26

## 2024-03-17 RX ADMIN — BUSPIRONE HYDROCHLORIDE 5 MG: 5 TABLET ORAL at 08:04

## 2024-03-17 RX ADMIN — PANTOPRAZOLE SODIUM 40 MG: 40 TABLET, DELAYED RELEASE ORAL at 05:58

## 2024-03-17 RX ADMIN — CALCIUM CARBONATE (ANTACID) CHEW TAB 500 MG 1000 MG: 500 CHEW TAB at 16:32

## 2024-03-17 RX ADMIN — SENNOSIDES, DOCUSATE SODIUM 2 TABLET: 8.6; 5 TABLET ORAL at 17:00

## 2024-03-17 RX ADMIN — HEPARIN SODIUM 5000 UNITS: 5000 INJECTION INTRAVENOUS; SUBCUTANEOUS at 05:58

## 2024-03-17 RX ADMIN — KETOROLAC TROMETHAMINE 15 MG: 30 INJECTION, SOLUTION INTRAMUSCULAR; INTRAVENOUS at 07:58

## 2024-03-17 RX ADMIN — POLYETHYLENE GLYCOL 3350 238 G: 17 POWDER, FOR SOLUTION ORAL at 14:53

## 2024-03-17 RX ADMIN — HEPARIN SODIUM 5000 UNITS: 5000 INJECTION INTRAVENOUS; SUBCUTANEOUS at 21:09

## 2024-03-17 RX ADMIN — SODIUM CHLORIDE, SODIUM GLUCONATE, SODIUM ACETATE, POTASSIUM CHLORIDE, MAGNESIUM CHLORIDE, SODIUM PHOSPHATE, DIBASIC, AND POTASSIUM PHOSPHATE 75 ML/HR: .53; .5; .37; .037; .03; .012; .00082 INJECTION, SOLUTION INTRAVENOUS at 06:03

## 2024-03-17 RX ADMIN — GUAIFENESIN AND DEXTROMETHORPHAN 10 ML: 100; 10 SYRUP ORAL at 16:38

## 2024-03-17 RX ADMIN — BUSPIRONE HYDROCHLORIDE 5 MG: 5 TABLET ORAL at 16:33

## 2024-03-17 RX ADMIN — GUAIFENESIN AND DEXTROMETHORPHAN 10 ML: 100; 10 SYRUP ORAL at 21:09

## 2024-03-17 RX ADMIN — SODIUM CHLORIDE, SODIUM GLUCONATE, SODIUM ACETATE, POTASSIUM CHLORIDE, MAGNESIUM CHLORIDE, SODIUM PHOSPHATE, DIBASIC, AND POTASSIUM PHOSPHATE 75 ML/HR: .53; .5; .37; .037; .03; .012; .00082 INJECTION, SOLUTION INTRAVENOUS at 21:09

## 2024-03-17 RX ADMIN — Medication: at 06:00

## 2024-03-17 RX ADMIN — HYDROMORPHONE HYDROCHLORIDE 0.2 MG: 0.2 INJECTION, SOLUTION INTRAMUSCULAR; INTRAVENOUS; SUBCUTANEOUS at 14:26

## 2024-03-17 RX ADMIN — GUAIFENESIN AND DEXTROMETHORPHAN 10 ML: 100; 10 SYRUP ORAL at 12:40

## 2024-03-17 RX ADMIN — DOCUSATE SODIUM 100 MG: 100 CAPSULE, LIQUID FILLED ORAL at 08:04

## 2024-03-17 RX ADMIN — HYDROMORPHONE HYDROCHLORIDE 0.2 MG: 0.2 INJECTION, SOLUTION INTRAMUSCULAR; INTRAVENOUS; SUBCUTANEOUS at 13:05

## 2024-03-17 RX ADMIN — CALCIUM CARBONATE (ANTACID) CHEW TAB 500 MG 1000 MG: 500 CHEW TAB at 07:56

## 2024-03-17 RX ADMIN — Medication: at 13:10

## 2024-03-17 RX ADMIN — ALBUTEROL SULFATE 2 PUFF: 90 AEROSOL, METERED RESPIRATORY (INHALATION) at 13:18

## 2024-03-17 RX ADMIN — BUSPIRONE HYDROCHLORIDE 5 MG: 5 TABLET ORAL at 21:09

## 2024-03-17 RX ADMIN — HYDROMORPHONE HYDROCHLORIDE 0.2 MG: 0.2 INJECTION, SOLUTION INTRAMUSCULAR; INTRAVENOUS; SUBCUTANEOUS at 00:14

## 2024-03-17 NOTE — ASSESSMENT & PLAN NOTE
"Presented w/ constipation and rectal pain/spasms - noted h/o waxing/waning constipation and hemorrhoids per patient  PRN Anusol on board for hemorrhoids  PRN Nifedipine/Lidocaine ointment on board for rectal spasms   S/p a dose of glycerin suppository w/ a mild BM - c/w Senokot/Miralax  CT imaging noted: \"Mild stercoral or infectious proctitis. Large colonic stool burden. No obstruction.\"  Gastroenterology input to be appreciated  "

## 2024-03-17 NOTE — ED PROVIDER NOTES
History  Chief Complaint   Patient presents with    Abdominal Pain     Per ems pt has been having severe abdominal pain and rectal pain, LBM was yesterday and pt feel constipated.      HPI    Patient is an 88-year-old female with past medical history of Breast cancer, hyperlipidemia, ambulatory dysfunction, presenting to the ED from nursing facility for evaluation of worsening ambulatory dysfunction, as well as several day history of of difficulty passing stools, with intermittent rectal spasms.  Patient states that she has had a hospital admission recently for COVID, but is doing well at her facility.  She notes several day history of worsening constipation despite using stool softeners.  Patient states that she is having intermittent rectal spasm with waves of pain in her rectum as she tries to pass stool.  Patient states she was recently in a different hospital for the same thing, and they attempted an enema, but the patient could not tolerate this.  Patient states that she has the urge to defecate, but severe pain with trying.  She is able to pass flatus.  She denies any abdominal pain, but does endorse some mildly decreased appetite, but no vomiting or nausea.  Patient denies fevers, chills, chest pain, urinary complaints, back pain, lower extremity pain.  She feels that it is increasingly difficult for her to ambulate secondary to the severe pain in her rectum.  Patient does not seem any blood in the toilet bowl.    Prior to Admission Medications   Prescriptions Last Dose Informant Patient Reported? Taking?   Asmanex  MCG/ACT AERO 3/15/2024 Self Yes Yes   Sig: Inhale 2 puffs if needed   Calcium Carb-Cholecalciferol 1000-800 MG-UNIT TABS 3/15/2024 Self Yes Yes   Sig: Take by mouth   Multiple Vitamins-Minerals (CENTRUM SILVER 50+WOMEN PO) 3/15/2024 Self Yes Yes   Sig: Take by mouth   acetaminophen (TYLENOL) 500 mg tablet Not Taking Self Yes No   Sig: Take 1,000 mg by mouth 2 (two) times a day as needed    Patient not taking: Reported on 2023   busPIRone (BUSPAR) 5 mg tablet 3/15/2024 Self No Yes   Sig: Take 1 tablet (5 mg total) by mouth 3 (three) times a day   ibuprofen (MOTRIN) 100 mg/5 mL suspension Past Week  Yes Yes   Sig: Take 200 mg by mouth every 6 (six) hours as needed for mild pain   ipratropium (ATROVENT) 0.03 % nasal spray Past Week Self No Yes   Si sprays into each nostril 3 (three) times a day Use up to three times a day with runny nose, before meals   Patient taking differently: 2 sprays into each nostril if needed for rhinitis Use up to three times a day with runny nose, before meals   omeprazole (PriLOSEC) 40 MG capsule 3/15/2024 Self Yes Yes   Sig: TAKE 1 CAPSULE BY MOUTH EVERY DAY 30 MINUTES BEFORE BREAKFAST   simvastatin (ZOCOR) 5 MG tablet 3/15/2024 Self No Yes   Sig: Take 1 tablet (5 mg total) by mouth daily at bedtime      Facility-Administered Medications: None       Past Medical History:   Diagnosis Date    Abnormal laboratory test 2022    Breast cancer (HCC)     Cancer (HCC)     COVID-19     Hyperlipidemia        Past Surgical History:   Procedure Laterality Date    BREAST SURGERY      cancer (> 5 years)    HYSTERECTOMY         Family History   Problem Relation Age of Onset    Breast cancer Mother     Heart disease Father         heart problem    Heart disease Sister         heart problem    Heart disease Brother         heart problem     I have reviewed and agree with the history as documented.    E-Cigarette/Vaping    E-Cigarette Use Never User      E-Cigarette/Vaping Substances    Nicotine No     THC No     CBD No     Flavoring No      Social History     Tobacco Use    Smoking status: Never    Smokeless tobacco: Never   Vaping Use    Vaping status: Never Used   Substance Use Topics    Alcohol use: Not Currently     Alcohol/week: 1.0 standard drink of alcohol     Types: 1 Glasses of wine per week    Drug use: No        Review of Systems   All other systems reviewed and are  negative.      Physical Exam  ED Triage Vitals   Temperature Pulse Respirations Blood Pressure SpO2   03/16/24 1119 03/16/24 1110 03/16/24 1110 03/16/24 1110 03/16/24 1110   98.6 °F (37 °C) (!) 107 20 (!) 175/95 96 %      Temp Source Heart Rate Source Patient Position - Orthostatic VS BP Location FiO2 (%)   03/16/24 1119 03/16/24 1110 03/16/24 1110 03/16/24 1110 --   Oral Monitor Lying Left arm       Pain Score       03/16/24 1110       10 - Worst Possible Pain             Orthostatic Vital Signs  Vitals:    03/16/24 1500 03/16/24 1600 03/16/24 1702 03/16/24 2114   BP: 169/76 152/73 165/88 (!) 157/122   Pulse: 104 (!) 110 (!) 112 (!) 113   Patient Position - Orthostatic VS: Lying Lying Lying Lying       Physical Exam  Vitals and nursing note reviewed.   Constitutional:       General: She is not in acute distress.     Appearance: She is well-developed and normal weight. She is not ill-appearing or toxic-appearing.   HENT:      Head: Normocephalic and atraumatic.      Mouth/Throat:      Mouth: Mucous membranes are moist.      Pharynx: Oropharynx is clear.   Eyes:      Conjunctiva/sclera: Conjunctivae normal.      Pupils: Pupils are equal, round, and reactive to light.   Cardiovascular:      Rate and Rhythm: Regular rhythm. Tachycardia present.      Heart sounds: Normal heart sounds.   Pulmonary:      Effort: Pulmonary effort is normal. No respiratory distress.      Breath sounds: Normal breath sounds. No wheezing, rhonchi or rales.   Chest:      Chest wall: No tenderness.   Abdominal:      General: Abdomen is flat. Bowel sounds are decreased. There is no distension.      Palpations: Abdomen is soft. There is no mass.      Tenderness: There is no abdominal tenderness. There is no guarding or rebound. Negative signs include Madden's sign and McBurney's sign.      Hernia: No hernia is present.   Genitourinary:     Comments: Chaperoned by RYAN Bustillos. There is nonbleeding external hemorrhoids and no rectal prolapse. There is  hard stool in the rectal vault. No blood. Patient unable to tolerate exam secondary to rectal discomfort.    Musculoskeletal:         General: No swelling.      Cervical back: Neck supple.   Skin:     General: Skin is warm and dry.      Capillary Refill: Capillary refill takes less than 2 seconds.      Coloration: Skin is not pale.   Neurological:      General: No focal deficit present.      Mental Status: She is alert and oriented to person, place, and time.      Cranial Nerves: No cranial nerve deficit.   Psychiatric:         Mood and Affect: Mood is anxious.         ED Medications  Medications   busPIRone (BUSPAR) tablet 5 mg (5 mg Oral Given 3/16/24 2107)   pantoprazole (PROTONIX) EC tablet 40 mg (has no administration in time range)   multi-electrolyte (PLASMALYTE-A/ISOLYTE-S PH 7.4) IV solution (75 mL/hr Intravenous New Bag 3/16/24 1627)   docusate sodium (COLACE) capsule 100 mg (100 mg Oral Given 3/16/24 1718)   heparin (porcine) subcutaneous injection 5,000 Units (5,000 Units Subcutaneous Given 3/16/24 2107)   guaiFENesin (ROBITUSSIN) oral liquid 200 mg (has no administration in time range)   acetaminophen (TYLENOL) tablet 650 mg (650 mg Oral Given 3/16/24 1718)   calcium carbonate (TUMS) chewable tablet 1,000 mg (1,000 mg Oral Given 3/16/24 2024)   morphine injection 4 mg (4 mg Intravenous Given 3/16/24 1207)   sodium chloride 0.9 % bolus 1,000 mL (0 mL Intravenous Stopped 3/16/24 1303)   iohexol (OMNIPAQUE) 350 MG/ML injection (MULTI-DOSE) 85 mL (85 mL Intravenous Given 3/16/24 1303)   polyethylene glycol (MIRALAX) packet 17 g (17 g Oral Given 3/16/24 1455)   glycerin (adult) rectal suppository 1 suppository (1 suppository Rectal Given 3/16/24 1528)       Diagnostic Studies  Results Reviewed       Procedure Component Value Units Date/Time    TSH, 3rd generation [[id number]]  (Normal) Collected: 03/16/24 1606    Lab Status: Final result Specimen: Blood from Line, Venous Updated: 03/16/24 8516     TSH 3RD  GENERATON 2.507 uIU/mL     Platelet count [[id number]]  (Abnormal) Collected: 03/16/24 1606    Lab Status: Final result Specimen: Blood from Line, Venous Updated: 03/16/24 1615     Platelets 453 Thousands/uL      MPV 8.5 fL     Urine Microscopic [[id number]]  (Abnormal) Collected: 03/16/24 1533    Lab Status: Final result Specimen: Urine, Clean Catch Updated: 03/16/24 1553     RBC, UA 4-10 /hpf      WBC, UA 1-2 /hpf      Epithelial Cells Occasional /hpf      Bacteria, UA Occasional /hpf     UA w Reflex to Microscopic w Reflex to Culture [[id number]]  (Abnormal) Collected: 03/16/24 1533    Lab Status: Final result Specimen: Urine, Clean Catch Updated: 03/16/24 1540     Color, UA Light Yellow     Clarity, UA Clear     Specific Gravity, UA 1.047     pH, UA 7.5     Leukocytes, UA Negative     Nitrite, UA Negative     Protein, UA 30 (1+) mg/dl      Glucose, UA Negative mg/dl      Ketones, UA Negative mg/dl      Urobilinogen, UA <2.0 mg/dl      Bilirubin, UA Negative     Occult Blood, UA Small    CBC and differential [[id number]]  (Abnormal) Collected: 03/16/24 1209    Lab Status: Final result Specimen: Blood from Line, Venous Updated: 03/16/24 1224     WBC 10.03 Thousand/uL      RBC 3.09 Million/uL      Hemoglobin 10.4 g/dL      Hematocrit 31.6 %       fL      MCH 33.7 pg      MCHC 32.9 g/dL      RDW 13.8 %      MPV 8.7 fL      Platelets 473 Thousands/uL      nRBC 0 /100 WBCs      Neutrophils Relative 73 %      Immature Grans % 2 %      Lymphocytes Relative 15 %      Monocytes Relative 9 %      Eosinophils Relative 1 %      Basophils Relative 0 %      Neutrophils Absolute 7.39 Thousands/µL      Absolute Immature Grans 0.16 Thousand/uL      Absolute Lymphocytes 1.50 Thousands/µL      Absolute Monocytes 0.88 Thousand/µL      Eosinophils Absolute 0.07 Thousand/µL      Basophils Absolute 0.03 Thousands/µL     Lipase [[id number]]  (Normal) Collected: 03/16/24 1134    Lab Status: Final result Specimen: Blood from Line,  Venous Updated: 03/16/24 1203     Lipase 16 u/L     Comprehensive metabolic panel [[id number]]  (Abnormal) Collected: 03/16/24 1134    Lab Status: Final result Specimen: Blood from Line, Venous Updated: 03/16/24 1203     Sodium 130 mmol/L      Potassium 4.0 mmol/L      Chloride 96 mmol/L      CO2 25 mmol/L      ANION GAP 9 mmol/L      BUN 18 mg/dL      Creatinine 0.81 mg/dL      Glucose 96 mg/dL      Calcium 8.6 mg/dL      Corrected Calcium 9.2 mg/dL      AST 32 U/L      ALT 45 U/L      Alkaline Phosphatase 69 U/L      Total Protein 6.0 g/dL      Albumin 3.3 g/dL      Total Bilirubin 0.54 mg/dL      eGFR 65 ml/min/1.73sq m     Narrative:      National Kid[employer] guidelines for Chronic Kidney Disease (CKD):     Stage 1 with normal or high GFR (GFR > 90 mL/min/1.73 square meters)    Stage 2 Mild CKD (GFR = 60-89 mL/min/1.73 square meters)    Stage 3A Moderate CKD (GFR = 45-59 mL/min/1.73 square meters)    Stage 3B Moderate CKD (GFR = 30-44 mL/min/1.73 square meters)    Stage 4 Severe CKD (GFR = 15-29 mL/min/1.73 square meters)    Stage 5 End Stage CKD (GFR <15 mL/min/1.73 square meters)  Note: GFR calculation is accurate only with a steady state creatinine                   CT abdomen pelvis with contrast   Final Result by Magen Mcmullen MD (03/16 4728)      1. Mild stercoral or infectious proctitis. Large colonic stool burden. No obstruction.      2. Bibasilar tree-in-bud infiltrates compatible with infectious bronchiolitis.         Resident: DEYANIRA KIMBLE I, the attending radiologist, have reviewed the images and agree with the final report above.      Workstation performed: [id number]               Procedures  Procedures      ED Course  ED Course as of 03/16/24 2131   Sat Mar 16, 2024   1430 EKG:ST at 112 bpm, normal axis, normal intervals, occasional PVC, no STEMI as interpreted by me       Patient is an 88-year-old female presenting to the ED for evaluation of severe constipation,  rectal spasm, and worsening amatory dysfunction.  History and clinical exam documented above.  Differential includes constipation, bowel obstruction, electrolyte disturbance, dehydration.  Labs ordered, as well as a CTAP to assess for intra-abdominal pathology.  Patient was given morphine for pain control with intermittent rectal spasms, as well as IV fluids.    Diagnostics reviewed.  No leukocytosis, or electrolyte disturbance.  She does have severe stool burden on CT, as well as stercoral proctitis.  On reassessment, patient is hemodynamically stable.  Her pain is well-controlled this time.  Attempted to do a soapsuds enema, but patient unable to tolerate and she refused.  A glycerin suppository was used, but patient does not have a successful bowel movement.  Due to concerns for ambulatory dysfunction secondary to constipation, and her CT findings, decision made to admit the patient for observation, with plan to do a bowel regimen.  Patient is agreeable with the plan for admission as well as her family.  Case was discussed with Brock; arrangements made for admission.                      SBIRT 22yo+      Flowsheet Row Most Recent Value   Initial Alcohol Screen: US AUDIT-C     1. How often do you have a drink containing alcohol? 0 Filed at: 03/16/2024 1109   Audit-C Score 0 Filed at: 03/16/2024 1109   FOSTER: How many times in the past year have you...    Used an illegal drug or used a prescription medication for non-medical reasons? Never Filed at: 03/16/2024 1109                  Medical Decision Making  Amount and/or Complexity of Data Reviewed  Labs: ordered.  Radiology: ordered.    Risk  OTC drugs.  Prescription drug management.  Decision regarding hospitalization.          Disposition  Final diagnoses:   Stercoral colitis   Constipation   Ambulatory dysfunction     Time reflects when diagnosis was documented in both MDM as applicable and the Disposition within this note       Time User Action Codes Description  Comment    3/16/2024  2:47 PM Kenji Hall [K52.89] Stercoral colitis     3/16/2024  2:47 PM Kenji Hall [K59.00] Constipation     3/16/2024  3:16 PM Kenji Hall [R26.2] Ambulatory dysfunction           ED Disposition       ED Disposition   Admit    Condition   Stable    Date/Time   Sat Mar 16, 2024 1516    Comment   Admit to [facility]               Follow-up Information    None         Current Discharge Medication List        CONTINUE these medications which have NOT CHANGED    Details   Asmanex  MCG/ACT AERO Inhale 2 puffs if needed      busPIRone (BUSPAR) 5 mg tablet Take 1 tablet (5 mg total) by mouth 3 (three) times a day  Qty: 60 tablet, Refills: 5    Associated Diagnoses: Anxiety; Nonintractable headache, unspecified chronicity pattern, unspecified headache type      Calcium Carb-Cholecalciferol 1000-800 MG-UNIT TABS Take by mouth      ibuprofen (MOTRIN) 100 mg/5 mL suspension Take 200 mg by mouth every 6 (six) hours as needed for mild pain      ipratropium (ATROVENT) 0.03 % nasal spray 2 sprays into each nostril 3 (three) times a day Use up to three times a day with runny nose, before meals  Qty: 30 mL, Refills: 2    Associated Diagnoses: Nonallergic rhinitis      Multiple Vitamins-Minerals (CENTRUM SILVER 50+WOMEN PO) Take by mouth      omeprazole (PriLOSEC) 40 MG capsule TAKE 1 CAPSULE BY MOUTH EVERY DAY 30 MINUTES BEFORE BREAKFAST      simvastatin (ZOCOR) 5 MG tablet Take 1 tablet (5 mg total) by mouth daily at bedtime  Qty: 90 tablet, Refills: 3    Associated Diagnoses: Hyperlipidemia, unspecified hyperlipidemia type      acetaminophen (TYLENOL) 500 mg tablet Take 1,000 mg by mouth 2 (two) times a day as needed           No discharge procedures on file.    PDMP Review         Value Time User    PDMP Reviewed  Yes 8/8/2023  4:41 PM Babs Han PA-C             ED Provider  Attending physically available and evaluated Chelsey Carson. I managed the patient along with the ED  Attending.    Electronically Signed by           Kejni Hall,   03/16/24 8345

## 2024-03-17 NOTE — PHYSICAL THERAPY NOTE
Physical Therapy Cancellation Note             03/17/24 7798   Note Type   Note type Cancelled Session   Cancel Reasons Refusal       Chart reviewed; attempted to see pt in AM; pt is observed in bed; reports not feeling well w/ ongoing nausea and pain -> declines mobilization at this time; also expresses a concern about not having her inhaler; RN informed; will follow.    Rey Gibson

## 2024-03-17 NOTE — ASSESSMENT & PLAN NOTE
Chronic cough most likely cough variant asthma with possible underlying GERD  C/w PPI and PRN Albuterol  Await portable CXR  PRN cough suppression/expectorant therapy on board   Influenza/COVID/RSV negative

## 2024-03-17 NOTE — PROGRESS NOTES
"[facility]  Progress Note  Name: Chelsey Carson I  MRN: [id number]  Unit/Bed#: CW2 210-01 I Date of Admission: 3/16/2024   Date of Service: 3/17/2024 I Hospital Day: 0      Assessment & Plan:    * Proctitis  Assessment & Plan  Presented w/ constipation and rectal pain/spasms - noted h/o waxing/waning constipation and hemorrhoids per patient  PRN Anusol on board for hemorrhoids  PRN Nifedipine/Lidocaine ointment on board for rectal spasms   S/p a dose of glycerin suppository w/ a mild BM - c/w Senokot/Miralax  CT imaging noted: \"Mild stercoral or infectious proctitis. Large colonic stool burden. No obstruction.\"  Gastroenterology input to be appreciated    Constipation  Assessment & Plan  Optimize bowel regimen   See plan for associated proctitis above     Chronic cough  Assessment & Plan  Chronic cough most likely cough variant asthma with possible underlying GERD  C/w PPI and PRN Albuterol  Await portable CXR  PRN cough suppression/expectorant therapy on board   Influenza/COVID/RSV negative    Ambulatory dysfunction  Assessment & Plan  Previously able to walk without a cane or walker  PT/OT input to be appreciated    Macrocytic anemia  Assessment & Plan  B12/folate levels noted and acceptable  Monitor H/H    Hyperlipidemia  Assessment & Plan  Continue statin    Hyponatremia  Assessment & Plan  Serum sodium improved/stable at 134    Thrombocytosis  Assessment & Plan  Likely reactive due to acute medical issue(s)  Monitor platelet count      DVT Prophylaxis:  Heparin SC    AM-PAC Basic Mobility:     JH-HLM Achieved: 1: Laying in bed    Patient Centered Rounds:  I have performed bedside rounds and discussed plan of care with nursing today.  Discussions with Specialists or Other Care Team Provider:  see above assessments if applicable    Education and Discussions with Family / Patient:  Patient, along with son, at bedside today    Time Spent for Care:  35 minutes. More than 50% " of total time spent on counseling and coordination of care, on one or more of the following: performing physical exam; counseling and coordination of care, obtaining or reviewing history, documenting in the medical record, reviewing/ordering tests/medications/procedures, and communicating with other healthcare professionals.    Current Length of Stay: 0 day(s)  Current Patient Status: Observation   Certification Statement:  Patient will continue to require additional hospital stay due to assessments as noted above.    Code Status: Level 3 - DNAR and DNI        Subjective:     Encountered earlier in the day.  Son present at bedside.  Still reports intermittent rectal spasm, however, notes some improvement in frequency.  Also complains of an intermittent cough.        Objective:     Vitals:   Temp (24hrs), Av.6 °F (37 °C), Min:98.4 °F (36.9 °C), Max:98.7 °F (37.1 °C)    Temp:  [98.4 °F (36.9 °C)-98.7 °F (37.1 °C)] 98.4 °F (36.9 °C)  HR:  [] 98  Resp:  [20-28] 20  BP: (152-172)/() 172/93  SpO2:  [94 %-95 %] 95 %  Body mass index is 23.83 kg/m².     Input and Output Summary (last 24 hours):       Intake/Output Summary (Last 24 hours) at 3/17/2024 1345  Last data filed at 3/17/2024 1100  Gross per 24 hour   Intake 398 ml   Output 250 ml   Net 148 ml       Physical Exam:     GENERAL Weak/fatigued   HEAD   Normocephalic - atraumatic   EYES Nonicteric   MOUTH   Mucosa moist   NECK   Supple - full range of motion   CARDIAC Rate controlled   PULMONARY Mildly diminished/coarse bilaterally but respirations nonlabored   ABDOMEN   Soft - nontender/nondistended    MUSCULOSKELETAL   Motor strength/range of motion deconditioned   NEUROLOGIC   Alert/oriented at baseline   SKIN   Chronic wrinkles/blemishes    PSYCHIATRIC   Mood/affect stable         Additional Data:     Labs & Recent Cultures:    Results from last 7 days   Lab Units 24  0549   WBC Thousand/uL 7.68   HEMOGLOBIN g/dL 9.0*   HEMATOCRIT % 27.0*    PLATELETS Thousands/uL 417*   NEUTROS PCT % 68   LYMPHS PCT % 20   MONOS PCT % 8   EOS PCT % 1     Results from last 7 days   Lab Units 03/17/24  0549   POTASSIUM mmol/L 4.1   CHLORIDE mmol/L 97   CO2 mmol/L 26   BUN mg/dL 14   CREATININE mg/dL 0.67   CALCIUM mg/dL 7.5*   ALK PHOS U/L 56   ALT U/L 23   AST U/L 23                                 Lines/Drains:  Invasive Devices       Peripheral Intravenous Line  Duration             Peripheral IV 03/16/24 Dorsal (posterior);Left Hand 1 day    Peripheral IV 03/16/24 Left;Ventral (anterior) Forearm 1 day              Drain  Duration             External Urinary Catheter <1 day                      Last 24 Hours Medication List:   Current Facility-Administered Medications   Medication Dose Route Frequency Provider Last Rate    acetaminophen  650 mg Oral Q6H PRN Anne Frost MD      albuterol  2 puff Inhalation Q4H PRN Anne Frost MD      busPIRone  5 mg Oral TID Kenji Fitzgerald MD      calcium carbonate  1,000 mg Oral BID PRN Marilyn Hogan PA-C      dextromethorphan-guaiFENesin  10 mL Oral Q4H Anne Frost MD      [START ON 3/18/2024] dextromethorphan-guaiFENesin  10 mL Oral Q4H PRN Anne Frost MD      heparin (porcine)  5,000 Units Subcutaneous Q8H [facility] Kenji Fitzgerald MD      hydrocortisone   Topical 4x Daily PRN Anne Frost MD      HYDROmorphone  0.2 mg Intravenous Q4H PRN Marilyn Hogan PA-C      HYDROmorphone  0.2 mg Intravenous Once Jeff Hennessy MD      ketorolac  15 mg Intravenous Q6H PRN Marilyn Hogan PA-C      multi-electrolyte  75 mL/hr Intravenous Continuous Kenji Fitzgerald MD 75 mL/hr (03/17/24 0603)    NIFEdipine 0.3%-lidocaine 5%   Topical Q6H PRN Marilyn Hogan PA-C      pantoprazole  40 mg Oral Early Morning Kenji Fitzgerald MD      polyethylene glycol  17 g Oral BID Jeff Hennessy MD      senna-docusate sodium  2 tablet Oral BID MD ANNE Vivar MD   Hospitalist - [facility] Internal  Medicine        ** Please Note: This note is constructed using a voice recognition dictation system.  An occasional wrong word/phrase or “sound-a-like” substitution may have been picked up by dictation device due to the inherent limitations of voice recognition software.  Read the chart carefully and recognize, using reasonable context, where substitutions may have occurred.**

## 2024-03-17 NOTE — PLAN OF CARE
Problem: PAIN - ADULT  Goal: Verbalizes/displays adequate comfort level or baseline comfort level  Description: Interventions:  - Encourage patient to monitor pain and request assistance  - Assess pain using appropriate pain scale  - Administer analgesics based on type and severity of pain and evaluate response  - Implement non-pharmacological measures as appropriate and evaluate response  - Consider cultural and social influences on pain and pain management  - Notify physician/advanced practitioner if interventions unsuccessful or patient reports new pain  Outcome: Progressing     Problem: GASTROINTESTINAL - ADULT  Goal: Minimal or absence of nausea and/or vomiting  Description: INTERVENTIONS:  - Administer IV fluids if ordered to ensure adequate hydration  - Maintain NPO status until nausea and vomiting are resolved  - Nasogastric tube if ordered  - Administer ordered antiemetic medications as needed  - Provide nonpharmacologic comfort measures as appropriate  - Advance diet as tolerated, if ordered  - Consider nutrition services referral to assist patient with adequate nutrition and appropriate food choices  Outcome: Progressing  Goal: Maintains or returns to baseline bowel function  Description: INTERVENTIONS:  - Assess bowel function  - Encourage oral fluids to ensure adequate hydration  - Administer IV fluids if ordered to ensure adequate hydration  - Administer ordered medications as needed  - Encourage mobilization and activity  - Consider nutritional services referral to assist patient with adequate nutrition and appropriate food choices  Outcome: Progressing

## 2024-03-18 ENCOUNTER — APPOINTMENT (INPATIENT)
Dept: RADIOLOGY | Facility: HOSPITAL | Age: 89
DRG: 394 | End: 2024-03-18
Payer: MEDICARE

## 2024-03-18 ENCOUNTER — TELEPHONE (OUTPATIENT)
Dept: GASTROENTEROLOGY | Facility: CLINIC | Age: 89
End: 2024-03-18

## 2024-03-18 LAB
ANION GAP SERPL CALCULATED.3IONS-SCNC: 9 MMOL/L (ref 4–13)
ANISOCYTOSIS BLD QL SMEAR: PRESENT
BASOPHILS # BLD MANUAL: 0.06 THOUSAND/UL (ref 0–0.1)
BASOPHILS NFR MAR MANUAL: 1 % (ref 0–1)
BUN SERPL-MCNC: 11 MG/DL (ref 5–25)
CALCIUM SERPL-MCNC: 7.6 MG/DL (ref 8.4–10.2)
CHLORIDE SERPL-SCNC: 96 MMOL/L (ref 96–108)
CO2 SERPL-SCNC: 26 MMOL/L (ref 21–32)
CREAT SERPL-MCNC: 0.81 MG/DL (ref 0.6–1.3)
EOSINOPHIL # BLD MANUAL: 0.06 THOUSAND/UL (ref 0–0.4)
EOSINOPHIL NFR BLD MANUAL: 1 % (ref 0–6)
ERYTHROCYTE [DISTWIDTH] IN BLOOD BY AUTOMATED COUNT: 13.9 % (ref 11.6–15.1)
GFR SERPL CREATININE-BSD FRML MDRD: 65 ML/MIN/1.73SQ M
GLUCOSE SERPL-MCNC: 104 MG/DL (ref 65–140)
HCT VFR BLD AUTO: 27 % (ref 34.8–46.1)
HGB BLD-MCNC: 8.8 G/DL (ref 11.5–15.4)
LYMPHOCYTES # BLD AUTO: 1.41 THOUSAND/UL (ref 0.6–4.47)
LYMPHOCYTES # BLD AUTO: 16 % (ref 14–44)
MACROCYTES BLD QL AUTO: PRESENT
MCH RBC QN AUTO: 33.8 PG (ref 26.8–34.3)
MCHC RBC AUTO-ENTMCNC: 32.6 G/DL (ref 31.4–37.4)
MCV RBC AUTO: 104 FL (ref 82–98)
METAMYELOCYTES NFR BLD MANUAL: 1 % (ref 0–1)
MONOCYTES # BLD AUTO: 0.12 THOUSAND/UL (ref 0–1.22)
MONOCYTES NFR BLD: 2 % (ref 4–12)
NEUTROPHILS # BLD MANUAL: 4.34 THOUSAND/UL (ref 1.85–7.62)
NEUTS BAND NFR BLD MANUAL: 4 % (ref 0–8)
NEUTS SEG NFR BLD AUTO: 67 % (ref 43–75)
OVALOCYTES BLD QL SMEAR: PRESENT
PLASMA CELLS NFR BLD: 1 % (ref 0–0)
PLATELET # BLD AUTO: 397 THOUSANDS/UL (ref 149–390)
PLATELET BLD QL SMEAR: ADEQUATE
PMV BLD AUTO: 8.4 FL (ref 8.9–12.7)
POIKILOCYTOSIS BLD QL SMEAR: PRESENT
POTASSIUM SERPL-SCNC: 3.8 MMOL/L (ref 3.5–5.3)
RBC # BLD AUTO: 2.6 MILLION/UL (ref 3.81–5.12)
RBC MORPH BLD: PRESENT
SODIUM SERPL-SCNC: 131 MMOL/L (ref 135–147)
VARIANT LYMPHS # BLD AUTO: 7 %
WBC # BLD AUTO: 6.11 THOUSAND/UL (ref 4.31–10.16)

## 2024-03-18 PROCEDURE — C9113 INJ PANTOPRAZOLE SODIUM, VIA: HCPCS | Performed by: INTERNAL MEDICINE

## 2024-03-18 PROCEDURE — 85007 BL SMEAR W/DIFF WBC COUNT: CPT | Performed by: INTERNAL MEDICINE

## 2024-03-18 PROCEDURE — 97167 OT EVAL HIGH COMPLEX 60 MIN: CPT

## 2024-03-18 PROCEDURE — 80048 BASIC METABOLIC PNL TOTAL CA: CPT | Performed by: INTERNAL MEDICINE

## 2024-03-18 PROCEDURE — 74018 RADEX ABDOMEN 1 VIEW: CPT

## 2024-03-18 PROCEDURE — 97163 PT EVAL HIGH COMPLEX 45 MIN: CPT

## 2024-03-18 PROCEDURE — 85027 COMPLETE CBC AUTOMATED: CPT | Performed by: INTERNAL MEDICINE

## 2024-03-18 PROCEDURE — 99232 SBSQ HOSP IP/OBS MODERATE 35: CPT | Performed by: INTERNAL MEDICINE

## 2024-03-18 RX ORDER — BENZONATATE 100 MG/1
100 CAPSULE ORAL 3 TIMES DAILY PRN
Status: DISCONTINUED | OUTPATIENT
Start: 2024-03-18 | End: 2024-03-27 | Stop reason: HOSPADM

## 2024-03-18 RX ORDER — METOCLOPRAMIDE HYDROCHLORIDE 5 MG/ML
10 INJECTION INTRAMUSCULAR; INTRAVENOUS EVERY 6 HOURS PRN
Status: DISCONTINUED | OUTPATIENT
Start: 2024-03-18 | End: 2024-03-27 | Stop reason: HOSPADM

## 2024-03-18 RX ORDER — ONDANSETRON 2 MG/ML
4 INJECTION INTRAMUSCULAR; INTRAVENOUS EVERY 4 HOURS PRN
Status: DISCONTINUED | OUTPATIENT
Start: 2024-03-18 | End: 2024-03-27 | Stop reason: HOSPADM

## 2024-03-18 RX ORDER — CALCIUM CARBONATE 500 MG/1
500 TABLET, CHEWABLE ORAL 3 TIMES DAILY PRN
Status: DISCONTINUED | OUTPATIENT
Start: 2024-03-18 | End: 2024-03-27 | Stop reason: HOSPADM

## 2024-03-18 RX ORDER — PANTOPRAZOLE SODIUM 40 MG/10ML
40 INJECTION, POWDER, LYOPHILIZED, FOR SOLUTION INTRAVENOUS EVERY 12 HOURS SCHEDULED
Status: DISCONTINUED | OUTPATIENT
Start: 2024-03-18 | End: 2024-03-25

## 2024-03-18 RX ORDER — DOXYLAMINE SUCCINATE 25 MG/1
25 TABLET ORAL
COMMUNITY

## 2024-03-18 RX ORDER — LABETALOL HYDROCHLORIDE 5 MG/ML
10 INJECTION, SOLUTION INTRAVENOUS EVERY 4 HOURS PRN
Status: DISCONTINUED | OUTPATIENT
Start: 2024-03-18 | End: 2024-03-27 | Stop reason: HOSPADM

## 2024-03-18 RX ORDER — LANOLIN ALCOHOL/MO/W.PET/CERES
3 CREAM (GRAM) TOPICAL
Status: DISCONTINUED | OUTPATIENT
Start: 2024-03-18 | End: 2024-03-27 | Stop reason: HOSPADM

## 2024-03-18 RX ORDER — LORAZEPAM 2 MG/ML
0.5 INJECTION INTRAMUSCULAR ONCE
Status: COMPLETED | OUTPATIENT
Start: 2024-03-18 | End: 2024-03-18

## 2024-03-18 RX ORDER — LABETALOL HYDROCHLORIDE 5 MG/ML
20 INJECTION, SOLUTION INTRAVENOUS ONCE
Status: COMPLETED | OUTPATIENT
Start: 2024-03-18 | End: 2024-03-18

## 2024-03-18 RX ADMIN — ACETAMINOPHEN 650 MG: 325 TABLET, FILM COATED ORAL at 23:57

## 2024-03-18 RX ADMIN — ONDANSETRON 4 MG: 2 INJECTION INTRAMUSCULAR; INTRAVENOUS at 08:24

## 2024-03-18 RX ADMIN — ONDANSETRON 4 MG: 2 INJECTION INTRAMUSCULAR; INTRAVENOUS at 19:30

## 2024-03-18 RX ADMIN — DOXYLAMINE SUCCINATE 25 MG: 25 TABLET ORAL at 20:45

## 2024-03-18 RX ADMIN — LABETALOL HYDROCHLORIDE 20 MG: 5 INJECTION, SOLUTION INTRAVENOUS at 16:58

## 2024-03-18 RX ADMIN — GUAIFENESIN AND DEXTROMETHORPHAN 10 ML: 100; 10 SYRUP ORAL at 00:17

## 2024-03-18 RX ADMIN — HEPARIN SODIUM 5000 UNITS: 5000 INJECTION INTRAVENOUS; SUBCUTANEOUS at 05:11

## 2024-03-18 RX ADMIN — BUSPIRONE HYDROCHLORIDE 5 MG: 5 TABLET ORAL at 08:24

## 2024-03-18 RX ADMIN — HYDROCORTISONE: 25 CREAM TOPICAL at 23:57

## 2024-03-18 RX ADMIN — SODIUM CHLORIDE, SODIUM GLUCONATE, SODIUM ACETATE, POTASSIUM CHLORIDE, MAGNESIUM CHLORIDE, SODIUM PHOSPHATE, DIBASIC, AND POTASSIUM PHOSPHATE 75 ML/HR: .53; .5; .37; .037; .03; .012; .00082 INJECTION, SOLUTION INTRAVENOUS at 10:11

## 2024-03-18 RX ADMIN — SENNOSIDES, DOCUSATE SODIUM 2 TABLET: 8.6; 5 TABLET ORAL at 17:00

## 2024-03-18 RX ADMIN — BUSPIRONE HYDROCHLORIDE 5 MG: 5 TABLET ORAL at 20:45

## 2024-03-18 RX ADMIN — ACETAMINOPHEN 650 MG: 325 TABLET, FILM COATED ORAL at 12:24

## 2024-03-18 RX ADMIN — GUAIFENESIN AND DEXTROMETHORPHAN 10 ML: 100; 10 SYRUP ORAL at 05:11

## 2024-03-18 RX ADMIN — ONDANSETRON 4 MG: 2 INJECTION INTRAMUSCULAR; INTRAVENOUS at 14:26

## 2024-03-18 RX ADMIN — PANTOPRAZOLE SODIUM 40 MG: 40 TABLET, DELAYED RELEASE ORAL at 05:11

## 2024-03-18 RX ADMIN — LORAZEPAM 0.5 MG: 2 INJECTION INTRAMUSCULAR; INTRAVENOUS at 16:58

## 2024-03-18 RX ADMIN — HEPARIN SODIUM 5000 UNITS: 5000 INJECTION INTRAVENOUS; SUBCUTANEOUS at 13:42

## 2024-03-18 RX ADMIN — METOCLOPRAMIDE HYDROCHLORIDE 10 MG: 5 INJECTION INTRAMUSCULAR; INTRAVENOUS at 12:24

## 2024-03-18 RX ADMIN — PANTOPRAZOLE SODIUM 40 MG: 40 INJECTION, POWDER, FOR SOLUTION INTRAVENOUS at 20:45

## 2024-03-18 RX ADMIN — Medication: at 19:30

## 2024-03-18 RX ADMIN — HYDROMORPHONE HYDROCHLORIDE 0.2 MG: 0.2 INJECTION, SOLUTION INTRAMUSCULAR; INTRAVENOUS; SUBCUTANEOUS at 15:29

## 2024-03-18 RX ADMIN — BUSPIRONE HYDROCHLORIDE 5 MG: 5 TABLET ORAL at 15:30

## 2024-03-18 RX ADMIN — MELATONIN 3 MG: at 00:17

## 2024-03-18 RX ADMIN — CALCIUM CARBONATE (ANTACID) CHEW TAB 500 MG 500 MG: 500 CHEW TAB at 13:46

## 2024-03-18 RX ADMIN — SENNOSIDES, DOCUSATE SODIUM 2 TABLET: 8.6; 5 TABLET ORAL at 08:24

## 2024-03-18 RX ADMIN — HEPARIN SODIUM 5000 UNITS: 5000 INJECTION INTRAVENOUS; SUBCUTANEOUS at 20:45

## 2024-03-18 RX ADMIN — BENZONATATE 100 MG: 100 CAPSULE ORAL at 12:24

## 2024-03-18 NOTE — PLAN OF CARE
Problem: OCCUPATIONAL THERAPY ADULT  Goal: Performs self-care activities at highest level of function for planned discharge setting.  See evaluation for individualized goals.  Description: Treatment Interventions: ADL retraining, Functional transfer training, UE strengthening/ROM, Endurance training, Cognitive reorientation, Patient/family training, Equipment evaluation/education, Compensatory technique education, Continued evaluation, Energy conservation, Activityengagement          See flowsheet documentation for full assessment, interventions and recommendations.   Note: Limitation: Decreased ADL status, Decreased Safe judgement during ADL, Decreased endurance, Decreased UE strength, Decreased self-care trans, Decreased high-level ADLs  Prognosis: Fair  Assessment: Pt is a 87 y/o female seen for OT eval s/p adm to [facility] w/ worsening ambulatory dysfunction 2/2 rectal pain. Pt is dx'd w/ proctitis. Pt  has a past medical history of Abnormal laboratory test (8/1/2022), Breast cancer (HCC), Cancer (HCC), COVID-19, and Hyperlipidemia. Pt with active OT orders and activity as tolerated orders. Pt admitted from rehab, prior lived alone in 2 , 1 [facility], bed/bath 2nd floor. Pt was I w/  ADLS and IADLS (has aide 4x/wk for grocery shopping), hasn't driven since August, & required no use of DME PTA but has RW if needed. Pt is currently demonstrating the following occupational deficits: Min A UB ADLS, Max A LB ADLS, Min-Mod A bed mobility, Mod A x2 transfers w/ B/L HHA. These deficits that are impacting pt's baseline areas of occupation are a result of the following impairments: pain, endurance, activity tolerance, functional mobility, forward functional reach, balance, trunk control, functional standing tolerance, unsupportive home environment, decreased I w/ ADLS/IADLS, and decreased safety awareness.The following Occupational Performance Areas to address include: bathing/shower, toilet hygiene, dressing, medication  management, socialization, health maintenance, functional mobility, community mobility, clothing management, cleaning, meal prep, and household maintenance. Recommend inpatient rehab  upon D/C. Pt to continue to benefit from acute immediate OT services to address the following goals 2-3x/week to  w/in 10-14 days:     Rehab Resource Intensity Level, OT: II (Moderate Resource Intensity)        Christiane Collazo MS, OTR/L

## 2024-03-18 NOTE — ASSESSMENT & PLAN NOTE
Optimize bowel regimen   See plan for associated proctitis above   Evaluated by gastroenterology yesterday with attempts at bedside disimpaction (stopped mid-process due to patient complaints of pain)  Await KUB today

## 2024-03-18 NOTE — ASSESSMENT & PLAN NOTE
Serum sodium decreased at 131 today  Consider a SIADH type picture in the setting of uncontrolled pain coupled with possible solute loss from decreased oral intake  Trial fluid restriction as paradoxic decrease noted on IV fluids

## 2024-03-18 NOTE — PLAN OF CARE
Problem: PAIN - ADULT  Goal: Verbalizes/displays adequate comfort level or baseline comfort level  Description: Interventions:  - Encourage patient to monitor pain and request assistance  - Assess pain using appropriate pain scale  - Administer analgesics based on type and severity of pain and evaluate response  - Implement non-pharmacological measures as appropriate and evaluate response  - Consider cultural and social influences on pain and pain management  - Notify physician/advanced practitioner if interventions unsuccessful or patient reports new pain  Outcome: Not Progressing     Problem: GASTROINTESTINAL - ADULT  Goal: Minimal or absence of nausea and/or vomiting  Description: INTERVENTIONS:  - Administer IV fluids if ordered to ensure adequate hydration  - Maintain NPO status until nausea and vomiting are resolved  - Nasogastric tube if ordered  - Administer ordered antiemetic medications as needed  - Provide nonpharmacologic comfort measures as appropriate  - Advance diet as tolerated, if ordered  - Consider nutrition services referral to assist patient with adequate nutrition and appropriate food choices  Outcome: Not Progressing

## 2024-03-18 NOTE — ASSESSMENT & PLAN NOTE
Previously able to walk without a cane or walker  Appreciate PT/OT input -> recommending skilled rehab once medically stable

## 2024-03-18 NOTE — ED ATTENDING ATTESTATION
3/16/2024  I, Brooke Noonan MD, saw and evaluated the patient. I have discussed the patient with the resident/non-physician practitioner and agree with the resident's/non-physician practitioner's findings, Plan of Care, and MDM as documented in the resident's/non-physician practitioner's note, except where noted. All available labs and Radiology studies were reviewed.  I was present for key portions of any procedure(s) performed by the resident/non-physician practitioner and I was immediately available to provide assistance.       At this point I agree with the current assessment done in the Emergency Department.  I have conducted an independent evaluation of this patient a history and physical is as follows:    OA: 89 y/o f with h/o breast cancer, HLD and recent admission to [facility] who presents to the ED from nursing facility for rectal pain and constipation like sxms. No relief with attempts to resolve pain and constipation with enemas prior to ED eval. PT does also c/o feeling generally weak with decreased oral intake and nausea without vomiting.  No cp/sob. No headache. No fevers/chills. PE, NAD but uncomfortable, VSS, NC/AT, MM, neck supple/FROM, RR, lungs CTAB, abd soft, decreased BS, nonttp, - edema, - calf ttp, intact distal pulses, capillary refill < 2 sec, AAO. A/p 89 y/o f with abd/rectal pain and constipation. Eval with labs, imaging, u/a, sxm control. Re-eval and treat accordingly.     ED Course         Critical Care Time  Procedures

## 2024-03-18 NOTE — TELEPHONE ENCOUNTER
Left vm message today for Pt to call back office in order to schedule an appt with a provider within 4-6 weeks per Dr. Jeff Hennessy's request.

## 2024-03-18 NOTE — OCCUPATIONAL THERAPY NOTE
Occupational Therapy Evaluation     Patient Name: Chelsey Carson  Today's Date: 3/18/2024  Problem List  Principal Problem:    Proctitis  Active Problems:    Hyperlipidemia    Chronic cough    Hyponatremia    Constipation    Ambulatory dysfunction    Macrocytic anemia    Thrombocytosis    Past Medical History  Past Medical History:   Diagnosis Date    Abnormal laboratory test 8/1/2022    Breast cancer (HCC)     Cancer (HCC)     COVID-19     Hyperlipidemia      Past Surgical History  Past Surgical History:   Procedure Laterality Date    BREAST SURGERY      cancer (> 5 years)    HYSTERECTOMY           03/18/24 0926   OT Last Visit   OT Visit Date 03/18/24   Note Type   Note type Evaluation   Pain Assessment   Pain Assessment Tool 0-10   Pain Score 7   Pain Location/Orientation Location: Buttocks   Pain Onset/Description Onset: Ongoing;Descriptor: Aching;Descriptor: Discomfort;Other (Comment)  (nausea)   Patient's Stated Pain Goal No pain   Hospital Pain Intervention(s) Repositioned;Ambulation/increased activity;Emotional support   Restrictions/Precautions   Weight Bearing Precautions Per Order No   Other Precautions Multiple lines;Fall Risk;Pain   Home Living   Type of Home House   Home Layout Two level;Bed/bath upstairs  (1 ANALISA)   Bathroom Shower/Tub Walk-in shower   Bathroom Toilet Standard   Bathroom Equipment Grab bars in shower   Home Equipment Walker;Other (Comment)  (unused PTA)   Additional Comments Pt reports living in 2 SH, 1 ANALISA, bed/bath 2nd floor.Pt however admitted from [facility] for rehab; was only there for about a day prior to admission. Prior to [facility] she was at a different rehab but transferred to [facility] because she didn't like the 1st rehab she was at.   Prior Function   Level of [place] Independent with ADLs;Independent with functional mobility;Independent with IADLS   Lives With (S)  Alone   Receives Help From Other (Comment)  (aide 4x/wk)   IADLs Family/Friend/Other provides  "transportation;Independent with meal prep;Independent with medication management  (hasn't driven since August; hopes to resume driving in the future)   Falls in the last 6 months 0   Vocational Retired   Lifestyle   Autonomy I w/ ADLS, I w/ household IADLS, has aides 4x/wk to take her grocery shopping and to the [place] 2x/wk; I w/ transfers and functional mobility PTA   Reciprocal Relationships pt lives alone   Service to Others 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   General   Family/Caregiver Present   (son came after session)   Subjective   Subjective \" I can't do this anymore\"   ADL   Eating Assistance 7  Independent   Grooming Assistance 7  Independent   UB Bathing Assistance 4  Minimal Assistance   LB Bathing Assistance 2  Maximal Assistance   UB Dressing Assistance 4  Minimal Assistance   LB Dressing Assistance 2  Maximal Assistance   Toileting Assistance  2  Maximal Assistance   Toileting Deficit Perineal hygiene   Functional Assistance 3  Moderate Assistance   Functional Deficit Steadying;Verbal cueing;Supervision/safety;Increased time to complete  (Ax2)   Bed Mobility   Rolling R 4  Minimal assistance   Additional items Assist x 1;Increased time required;Verbal cues;LE management   Rolling L 4  Minimal assistance   Additional items Assist x 1;Increased time required;Verbal cues;LE management   Supine to Sit 3  Moderate assistance   Additional items Assist x 1;Increased time required;Verbal cues;LE management   Sit to Supine 3  Moderate assistance   Additional items Assist x 1;HOB elevated;Increased time required;Verbal cues;LE management   Additional Comments pt sat EOB w/ Min A for sitting balance/trunk control   Transfers   Sit to Stand 3  Moderate assistance   Additional items Assist x 2;Increased time required;Verbal cues   Stand to Sit 3  Moderate assistance   Additional items Assist x 2;Increased time required;Verbal cues   Additional Comments w/  HHA; B/L Knee block; VC/TC for upright " posture. Upon standing, pt incontinent of bowels. Reports ongoing nausea, c/o of pain. Unable to tolerate further OOB activity and returned to supine   Functional Mobility   Additional Comments Unable to assess   Balance   Static Sitting Fair -   Dynamic Sitting Poor +   Static Standing Poor   Dynamic Standing Poor   Ambulatory Zero   Activity Tolerance   Activity Tolerance Patient limited by pain;Patient limited by fatigue   Medical Staff Made Aware PT, [place]   Nurse Made Aware yes   RUE Assessment   RUE Assessment WFL   LUE Assessment   LUE Assessment WFL   Hand Function   Gross Motor Coordination Functional   Fine Motor Coordination Functional   Cognition   Overall Cognitive Status WFL   Arousal/Participation Responsive;Cooperative   Attention Attends with cues to redirect   Orientation Level Oriented X4   Memory Decreased recall of precautions   Following Commands Follows one step commands without difficulty   Comments pt is pleasant and cooperative; limited by pain/fatigue   Assessment   Limitation Decreased ADL status;Decreased Safe judgement during ADL;Decreased endurance;Decreased UE strength;Decreased self-care trans;Decreased high-level ADLs   Prognosis Fair   Assessment Pt is a 89 y/o female seen for OT eval s/p adm to [facility] w/ worsening ambulatory dysfunction 2/2 rectal pain. Pt is dx'd w/ proctitis. Pt  has a past medical history of Abnormal laboratory test (8/1/2022), Breast cancer (HCC), Cancer (HCC), COVID-19, and Hyperlipidemia. Pt with active OT orders and activity as tolerated orders. Pt admitted from rehab, prior lived alone in 2 , 1 [facility], bed/bath 2nd floor. Pt was I w/  ADLS and IADLS (has aide 4x/wk for grocery shopping), hasn't driven since August, & required no use of DME PTA but has RW if needed. Pt is currently demonstrating the following occupational deficits: Min A UB ADLS, Max A LB ADLS, Min-Mod A bed mobility, Mod A x2 transfers w/ B/L HHA. These deficits that are impacting pt's baseline  areas of occupation are a result of the following impairments: pain, endurance, activity tolerance, functional mobility, forward functional reach, balance, trunk control, functional standing tolerance, unsupportive home environment, decreased I w/ ADLS/IADLS, and decreased safety awareness.The following Occupational Performance Areas to address include: bathing/shower, toilet hygiene, dressing, medication management, socialization, health maintenance, functional mobility, community mobility, clothing management, cleaning, meal prep, and household maintenance. Recommend inpatient rehab  upon D/C. Pt to continue to benefit from acute immediate OT services to address the following goals 2-3x/week to  w/in 10-14 days:   Goals   Patient Goals to have less pain and feel better   LTG Time Frame 10-   Long Term Goal #1 see below listed goals   Plan   Treatment Interventions ADL retraining;Functional transfer training;UE strengthening/ROM;Endurance training;Cognitive reorientation;Patient/family training;Equipment evaluation/education;Compensatory technique education;Continued evaluation;Energy conservation;Activityengagement   Goal Expiration Date 24   OT Frequency 2-3x/wk   Discharge Recommendation   Rehab Resource Intensity Level, OT II (Moderate Resource Intensity)   Additional Comments  The patient's raw score on the -PAC Daily Activity Inpatient Short Form is 17. A raw score of less than 19 suggests the patient may benefit from discharge to post-acute rehabilitation services. Please refer to the recommendation of the Occupational Therapist for safe discharge planning.   Additional Comments 2 Pt seen as a co-session due to the patient's co-morbidities, clinically unstable presentation, and present impairments which are a regression from the patient's baseline   -[facility] Daily Activity Inpatient   Lower Body Dressing 2   Bathing 2   Toileting 2   Upper Body Dressing 3   Grooming 4   Eating 4   Daily  Activity Raw Score 17   Daily Activity Standardized Score (Calc for Raw Score >=11) 37.26   AM-PAC Applied Cognition Inpatient   Following a Speech/Presentation 3   Understanding Ordinary Conversation 4   Taking Medications 4   Remembering Where Things Are Placed or Put Away 4   Remembering List of 4-5 Errands 4   Taking Care of Complicated Tasks 3   Applied Cognition Raw Score 22   Applied Cognition Standardized Score 47.83   End of Consult   Education Provided Yes   Patient Position at End of Consult Supine;All needs within reach;Bed/Chair alarm activated   Nurse Communication Nurse aware of consult     GOALS    1) Pt will complete rolling left/right in bed with S assist, as prerequisite for further engagement in ADLS   2) Pt will complete supine to sit transfer with S using B/L UEs to initiate bed mobility   3) Pt will tolerate sitting at EOB 20 minutes with Mod I and stable vital signs, as prerequisite for further engagement in ADLS   4) Pt will complete grooming task with Mod I  and increased time to increase independence in functional tasks  5) Pt will increase B/L UE ROM 1/2 MMT to increase functional UB use during ADLS   6) Pt will complete UB ADLS with S and use of AD/DME as needed to increase independence in functional tasks  7) Pt will complete LB ADLS with S and use of AD/DME as needed to increase independence in functional tasks  8) Pt will complete sit to stand transfer / sit pivot transfer / stand pivot transfer with S assist on/off all ADL surfaces   9) Pt will follow 100% simple one step verbal commands and be A/Ox4 consistently t/o use of external environmental cues to increase awareness for functional tasks  10) Pt will demonstrate 100% carryover of E.C. techniques t/o fx'l I/ADL/ leisure tasks w/o cues s/p skilled education  11)Pt will complete toileting w/ S to increase I w/ ADLS    ** OTR to assess functional mobility when appropriate      Christiane Collazo MS, OTR/L

## 2024-03-18 NOTE — TELEPHONE ENCOUNTER
----- Message from Jeff Hennessy MD sent at 3/17/2024  1:44 PM EDT -----  Hi,    Can you please help the patient set up a follow up appointment with any provider in 4-6 weeks.       Thank you.    Jeff

## 2024-03-18 NOTE — PROGRESS NOTES
"[facility]  Progress Note  Name: Chelsey Carson I  MRN: [id number]  Unit/Bed#: NW8 862-02 I Date of Admission: 3/16/2024   Date of Service: 3/18/2024 I Hospital Day: 1      Assessment & Plan:    * Proctitis  Assessment & Plan  Presented w/ constipation and rectal pain/spasms - noted h/o waxing/waning constipation and hemorrhoids per patient  PRN Anusol on board for hemorrhoids  PRN Nifedipine/Lidocaine ointment on board for rectal spasms   S/p a dose of glycerin suppository w/ a mild BM - c/w Senokot/Miralax  CT imaging noted: \"Mild stercoral or infectious proctitis. Large colonic stool burden. No obstruction.\"  Gastroenterology following    Constipation  Assessment & Plan  Optimize bowel regimen   See plan for associated proctitis above   Evaluated by gastroenterology yesterday with attempts at bedside disimpaction (stopped mid-process due to patient complaints of pain)  Await KUB today    Chronic cough  Assessment & Plan  Chronic cough most likely cough variant asthma with possible underlying GERD  C/w PPI and PRN Albuterol  CT imaging noted lower lung tree-in-bud infiltrates suspicious for possible infectious bronchiolitis - follow-up portable CXR negative for acute cardiopulmonary disease  Remains afebrile without leukocytosis -> less likely suspicion for infectious etiology, hence, continue to observe off antibiotics  PRN cough suppression/expectorant therapy on board   Influenza/COVID/RSV negative    Ambulatory dysfunction  Assessment & Plan  Previously able to walk without a cane or walker  Appreciate PT/OT input -> recommending skilled rehab once medically stable    Macrocytic anemia  Assessment & Plan  B12/folate levels noted and acceptable  Monitor H/H    Hyperlipidemia  Assessment & Plan  Continue statin    Hyponatremia  Assessment & Plan  Serum sodium decreased at 131 today  Consider a SIADH type picture in the setting of uncontrolled pain coupled with possible " solute loss from decreased oral intake  Trial fluid restriction as paradoxic decrease noted on IV fluids     Thrombocytosis  Assessment & Plan  Likely reactive due to acute medical issue(s)  Monitor platelet count      DVT Prophylaxis:  Heparin SC    AM-PAC Basic Mobility:  Basic Mobility Inpatient Raw Score: 12  -[facility] Achieved: 5: Stand (1 or more minutes)  -[facility] Goal: 4: Move to chair/commode    Patient Centered Rounds:  I have performed bedside rounds and discussed plan of care with nursing today.  Discussions with Specialists or Other Care Team Provider:  see above assessments if applicable    Education and Discussions with Family / Patient:  Patient at bedside - d/w son, in depth, at bedside yesterday    Time Spent for Care:  35 minutes. More than 50% of total time spent on counseling and coordination of care, on one or more of the following: performing physical exam; counseling and coordination of care, obtaining or reviewing history, documenting in the medical record, reviewing/ordering tests/medications/procedures, and communicating with other healthcare professionals.    Current Length of Stay: 1 day(s)  Current Patient Status: Inpatient   Certification Statement:  Patient will continue to require additional hospital stay due to assessments as noted above.    Code Status: Level 3 - DNAR and DNI        Subjective:     Encountered earlier in the day.  Complains of some increase in nausea today.  States her rectal spasms have improved in frequency, however.        Objective:     Vitals:   Temp (24hrs), Av.3 °F (36.8 °C), Min:98.1 °F (36.7 °C), Max:98.6 °F (37 °C)    Temp:  [98.1 °F (36.7 °C)-98.6 °F (37 °C)] 98.1 °F (36.7 °C)  HR:  [] 107  Resp:  [17-18] 17  BP: (154-197)/(78-89) 188/89  SpO2:  [99 %] 99 %  Body mass index is 23.83 kg/m².     Input and Output Summary (last 24 hours):       Intake/Output Summary (Last 24 hours) at 3/18/2024 1726  Last data filed at 3/18/2024 1300  Gross per 24 hour    Intake --   Output 850 ml   Net -850 ml       Physical Exam:     GENERAL Mildly weak/fatigued - waxing/waning distress from pain   HEAD   Normocephalic - atraumatic   EYES Nonicteric   MOUTH   Mucosa moist   NECK   Supple - full range of motion   CARDIAC Intermittently tachycardic   PULMONARY Improved bibasilar breath sounds   ABDOMEN Currently nontender but mild epigastric tenderness   MUSCULOSKELETAL   Motor strength/range of motion deconditioned   NEUROLOGIC   Alert/oriented at baseline   SKIN   Chronic wrinkles/blemishes    PSYCHIATRIC   Mood/affect stable         Additional Data:     Labs & Recent Cultures:    Results from last 7 days   Lab Units 03/18/24  0456 03/17/24  0549   WBC Thousand/uL 6.11 7.68   HEMOGLOBIN g/dL 8.8* 9.0*   HEMATOCRIT % 27.0* 27.0*   PLATELETS Thousands/uL 397* 417*   BANDS PCT % 4  --    NEUTROS PCT %  --  68   LYMPHS PCT %  --  20   LYMPHO PCT % 16  --    MONOS PCT %  --  8   MONO PCT % 2*  --    EOS PCT % 1 1     Results from last 7 days   Lab Units 03/18/24  0456 03/17/24  0549   POTASSIUM mmol/L 3.8 4.1   CHLORIDE mmol/L 96 97   CO2 mmol/L 26 26   BUN mg/dL 11 14   CREATININE mg/dL 0.81 0.67   CALCIUM mg/dL 7.6* 7.5*   ALK PHOS U/L  --  56   ALT U/L  --  23   AST U/L  --  23                                 Lines/Drains:  Invasive Devices       Peripheral Intravenous Line  Duration             Peripheral IV 03/16/24 Dorsal (posterior);Left Hand 2 days    Peripheral IV 03/16/24 Left;Ventral (anterior) Forearm 2 days              Drain  Duration             External Urinary Catheter 2 days                      Last 24 Hours Medication List:   Current Facility-Administered Medications   Medication Dose Route Frequency Provider Last Rate    acetaminophen  650 mg Oral Q6H PRN Anne Frost MD      albuterol  2 puff Inhalation Q4H PRN Anne Frost MD      benzonatate  100 mg Oral TID PRN Anne Frost MD      busPIRone  5 mg Oral TID Kenji Fitzgerald MD      calcium carbonate  500 mg  Oral TID PRN Anne Frost MD      doxylamine  25 mg Oral HS PRN Anne Frost MD      heparin (porcine)  5,000 Units Subcutaneous Q8H UMAIR Kenji Fitzgerald MD      hydrocortisone   Topical 4x Daily PRN Anne Frost MD      labetalol  10 mg Intravenous Q4H PRN Anne Frost MD      melatonin  3 mg Oral HS PRN Marilyn Hogan PA-C      metoclopramide  10 mg Intravenous Q6H PRN Anne Frost MD      NIFEdipine 0.3%-lidocaine 5%   Topical Q6H PRN Marilyn Hogan PA-C      ondansetron  4 mg Intravenous Q4H PRN Marilyn Hogan PA-C      pantoprazole  40 mg Intravenous Q12H [facility] Anne Frost MD      senna-docusate sodium  2 tablet Oral BID MD ANNE Vivar MD   Hospitalist - [facility] Internal Medicine        ** Please Note: This note is constructed using a voice recognition dictation system.  An occasional wrong word/phrase or “sound-a-like” substitution may have been picked up by dictation device due to the inherent limitations of voice recognition software.  Read the chart carefully and recognize, using reasonable context, where substitutions may have occurred.**

## 2024-03-18 NOTE — PLAN OF CARE
Problem: PHYSICAL THERAPY ADULT  Goal: Performs mobility at highest level of function for planned discharge setting.  See evaluation for individualized goals.  Description: Treatment/Interventions: ADL retraining, Functional transfer training, LE strengthening/ROM, Elevations, Therapeutic exercise, Endurance training, Bed mobility, Gait training, Spoke to nursing, OT          See flowsheet documentation for full assessment, interventions and recommendations.  Note: Prognosis: Fair  Problem List: Decreased strength, Decreased range of motion, Decreased endurance, Impaired balance, Decreased mobility, Pain  Assessment: PT orders received and acknowledged. Patient was seen today for high complexity PT evaluation. High complexity evaluation due to Ongoing medical management for primary dx, Decreased activity tolerance compared to baseline, Fall risk, Continuous pulse oximetry monitoring  Patient is a 88 y.o. female  who was admitted to [facility] on 3/16/2024  with Proctitis . Pt presents to [facility] with rectal pain and constipation . Patient performed functional mobility as described above.  At baseline, pt resides alone in house and was independent with ADLs and functional mobility prior to hospital admission. Currently, upon initial examination, pt  is requiring mod assist x1 for bed mobility skills;  mod assist x2 for functional transfers.  Patient currently presents below baseline with limitations in gait, balance, and transfers. Patient will benefit from continued PT services while in hospital in order to address remaining limitations. The patients AM-PAC Basic Mobility Inpatient Short From Raw Score is 12 . Based on AM-PAC scoring and patient presentation, PT currently recommending Level II (Moderate Resource Intensity). Please also refer to the recommendation of the Physical Therapist for safe discharge planning.  Barriers to Discharge: Inaccessible home environment, Decreased caregiver support     Rehab  Resource Intensity Level, PT: II (Moderate Resource Intensity)    See flowsheet documentation for full assessment.

## 2024-03-18 NOTE — ASSESSMENT & PLAN NOTE
"Presented w/ constipation and rectal pain/spasms - noted h/o waxing/waning constipation and hemorrhoids per patient  PRN Anusol on board for hemorrhoids  PRN Nifedipine/Lidocaine ointment on board for rectal spasms   S/p a dose of glycerin suppository w/ a mild BM - c/w Senokot/Miralax  CT imaging noted: \"Mild stercoral or infectious proctitis. Large colonic stool burden. No obstruction.\"  Gastroenterology following  "

## 2024-03-18 NOTE — PHYSICAL THERAPY NOTE
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]    Problem List  Patient Active Problem List   Diagnosis    Hyperlipidemia    Pes anserinus bursitis of right knee    Nonrheumatic aortic valve insufficiency    Perforation of left tympanic membrane    Chronic cough    Breast cancer (HCC)    Dry mouth    Chronic maxillary sinusitis    COVID-19    Electrolyte abnormality    Other fatigue    Venous insufficiency    Stage 3a chronic kidney disease (HCC)    Esophageal dysphagia    Age related osteoporosis    Tremor    Hyponatremia    Constipation    Slow transit constipation    Primary insomnia    Nonintractable headache    Abnormal brain MRI    DNR (do not resuscitate)    Tinnitus of both ears    Xerostomia    Mixed conductive and sensorineural hearing loss of left ear with restricted hearing of right ear    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    Ambulatory dysfunction    Proctitis    Macrocytic anemia    Thrombocytosis       Past Medical History  Past Medical History:   Diagnosis Date    Abnormal laboratory test 8/1/2022    Breast cancer (HCC)     Cancer (HCC)     COVID-19     Hyperlipidemia        Past Surgical History  Past Surgical History:   Procedure Laterality Date    BREAST SURGERY      cancer (> 5 years)    HYSTERECTOMY            03/18/24 0925   PT Last Visit   PT Visit Date 03/18/24   Note Type   Note type Evaluation   Pain Assessment   Pain Assessment Tool 0-10   Pain Score 7   Pain Location/Orientation Location: Buttocks   Patient's Stated Pain Goal No pain   Hospital Pain Intervention(s) Repositioned;Ambulation/increased activity   Restrictions/Precautions   Weight Bearing Precautions Per Order No   Other Precautions Multiple lines;Fall Risk;Pain;Hard of hearing   Home Living   Type of Home House   Home Layout Two level;Stairs to enter  with rails  (1STE)   Bathroom Shower/Tub Walk-in shower   Bathroom Toilet Standard   Bathroom Equipment Grab bars in shower   Bathroom Accessibility Accessible   Home Equipment Walker  (does not use PTA)   Prior Function   Level of [place] Independent with ADLs;Independent with functional mobility;Needs assistance with IADLS   Lives With (S)  Alone   Receives Help From Personal care attendant  (4 days per week)   IADLs Independent with meal prep;Independent with medication management;Family/Friend/Other provides transportation   Falls in the last 6 months 0   Vocational Retired   General   Family/Caregiver Present No   Cognition   Overall Cognitive Status WFL   Arousal/Participation Alert   Orientation Level Oriented X4   Memory Within functional limits   Following Commands Follows all commands and directions without difficulty   Subjective   Subjective pt pleasant and cooperative throughout therapy session. pt received supine in bed   RLE Assessment   RLE Assessment X  (3+/5 grossly)   LLE Assessment   LLE Assessment X  (3+/5 grossly)   Bed Mobility   Rolling R 4  Minimal assistance   Additional items Assist x 1;Increased time required;Verbal cues;LE management   Rolling L 4  Minimal assistance   Additional items Assist x 1;Increased time required;Verbal cues;LE management   Supine to Sit 3  Moderate assistance   Additional items Assist x 1;Increased time required;Verbal cues;LE management   Sit to Supine 3  Moderate assistance   Additional items Assist x 1;Increased time required;Verbal cues;LE management   Transfers   Sit to Stand 3  Moderate assistance   Additional items Assist x 2;Increased time required;Verbal cues   Stand to Sit 3  Moderate assistance   Additional items Assist x 2;Increased time required;Verbal cues   Additional Comments transfers w BL HHA   Ambulation/Elevation   Gait pattern Not appropriate;Not tested   Balance   Static Sitting Fair +   Dynamic Sitting Fair   Static Standing Poor    Endurance Deficit   Endurance Deficit Yes   Endurance Deficit Description generalized weakness, decreased exercise tolerance   Activity Tolerance   Activity Tolerance Patient limited by fatigue   Medical Staff Made Aware alia Morse due to medical complexity and multiple comorbidities   Nurse Made Aware RN cleared and updated   Assessment   Prognosis Fair   Problem List Decreased strength;Decreased range of motion;Decreased endurance;Impaired balance;Decreased mobility;Pain   Assessment PT orders received and acknowledged. Patient was seen today for high complexity PT evaluation. High complexity evaluation due to Ongoing medical management for primary dx, Decreased activity tolerance compared to baseline, Fall risk, Continuous pulse oximetry monitoring  Patient is a 88 y.o. female  who was admitted to [facility] on 3/16/2024  with Proctitis . Pt presents to [facility] with rectal pain and constipation . Patient performed functional mobility as described above.  At baseline, pt resides alone in house and was independent with ADLs and functional mobility prior to hospital admission. Currently, upon initial examination, pt  is requiring mod assist x1 for bed mobility skills;  mod assist x2 for functional transfers.  Patient currently presents below baseline with limitations in gait, balance, and transfers. Patient will benefit from continued PT services while in hospital in order to address remaining limitations. The patients AM-PAC Basic Mobility Inpatient Short From Raw Score is 12 . Based on AM-PAC scoring and patient presentation, PT currently recommending Level II (Moderate Resource Intensity). Please also refer to the recommendation of the Physical Therapist for safe discharge planning.   Barriers to Discharge Inaccessible home environment;Decreased caregiver support   Goals   Patient Goals to stop feeling sick   STG Expiration Date 04/01/24   Short Term Goal #1 Patient will be able to perform bed  mobility tasks with  modified independent   in order to improve overall functional mobility and assist in safe d/c. STG 2 Patient will sit EOB for at least 25 minutes at  modified independent   level in order to strengthen abdominal musculature and assist in future transfers and ambulation. STG 3 Patient will be able to perform functional transfer with  modified independent   in order to improve overall functional mobility and assist in safe discharge. STG 4 Patient will be able to ambulate at least 50 feet with least restrictive device with  modified independent   assist in order to improve overall functional mobility and assist in safe discharge. STG 5 Patient will improve sitting/standing static/dynamic balance 1/2 grade in order to improve functional mobility and assist in safe discharge. STG 6 Patient will improve LE strength by 1/2 grade in order to improve functional mobility and assist in safe discharge. STG 7 Patient will be able to negotiate at least 1 stairs with least restrictive device with  modified independent   A in order to improve overall functional mobility and assist in safe discharge   PT Treatment Day 0   Plan   Treatment/Interventions ADL retraining;Functional transfer training;LE strengthening/ROM;Elevations;Therapeutic exercise;Endurance training;Bed mobility;Gait training;Spoke to nursing;OT   PT Frequency 2-3x/wk   Discharge Recommendation   Rehab Resource Intensity Level, PT II (Moderate Resource Intensity)   AM-PAC Basic Mobility Inpatient   Turning in Flat Bed Without Bedrails 3   Lying on Back to Sitting on Edge of Flat Bed Without Bedrails 3   Moving Bed to Chair 2   Standing Up From Chair Using Arms 2   Walk in Room 1   Climb 3-5 Stairs With Railing 1   Basic Mobility Inpatient Raw Score 12   Basic Mobility Standardized Score 32.23   [facility] Highest Level Of Mobility   -[facility] Goal 4: Move to chair/commode   -[facility] Achieved 5: Stand (1 or more minutes)   End of Consult   Patient  Position at End of Consult Supine;All needs within reach         Eugene Pruett, PT

## 2024-03-19 LAB
ANION GAP SERPL CALCULATED.3IONS-SCNC: 7 MMOL/L (ref 4–13)
BUN SERPL-MCNC: 12 MG/DL (ref 5–25)
CALCIUM SERPL-MCNC: 8.2 MG/DL (ref 8.4–10.2)
CHLORIDE SERPL-SCNC: 97 MMOL/L (ref 96–108)
CO2 SERPL-SCNC: 27 MMOL/L (ref 21–32)
CREAT SERPL-MCNC: 0.85 MG/DL (ref 0.6–1.3)
ERYTHROCYTE [DISTWIDTH] IN BLOOD BY AUTOMATED COUNT: 13.9 % (ref 11.6–15.1)
GFR SERPL CREATININE-BSD FRML MDRD: 61 ML/MIN/1.73SQ M
GLUCOSE SERPL-MCNC: 94 MG/DL (ref 65–140)
HCT VFR BLD AUTO: 28.2 % (ref 34.8–46.1)
HGB BLD-MCNC: 9.5 G/DL (ref 11.5–15.4)
MCH RBC QN AUTO: 34.2 PG (ref 26.8–34.3)
MCHC RBC AUTO-ENTMCNC: 33.7 G/DL (ref 31.4–37.4)
MCV RBC AUTO: 101 FL (ref 82–98)
PLATELET # BLD AUTO: 431 THOUSANDS/UL (ref 149–390)
PMV BLD AUTO: 8.4 FL (ref 8.9–12.7)
POTASSIUM SERPL-SCNC: 3.7 MMOL/L (ref 3.5–5.3)
RBC # BLD AUTO: 2.78 MILLION/UL (ref 3.81–5.12)
SODIUM SERPL-SCNC: 131 MMOL/L (ref 135–147)
WBC # BLD AUTO: 5.97 THOUSAND/UL (ref 4.31–10.16)

## 2024-03-19 PROCEDURE — 80048 BASIC METABOLIC PNL TOTAL CA: CPT | Performed by: INTERNAL MEDICINE

## 2024-03-19 PROCEDURE — 85027 COMPLETE CBC AUTOMATED: CPT | Performed by: INTERNAL MEDICINE

## 2024-03-19 PROCEDURE — C9113 INJ PANTOPRAZOLE SODIUM, VIA: HCPCS | Performed by: INTERNAL MEDICINE

## 2024-03-19 PROCEDURE — 99232 SBSQ HOSP IP/OBS MODERATE 35: CPT | Performed by: PHYSICIAN ASSISTANT

## 2024-03-19 RX ORDER — POLYETHYLENE GLYCOL 3350 17 G/17G
17 POWDER, FOR SOLUTION ORAL 2 TIMES DAILY
Status: DISCONTINUED | OUTPATIENT
Start: 2024-03-19 | End: 2024-03-27 | Stop reason: HOSPADM

## 2024-03-19 RX ADMIN — Medication: at 10:43

## 2024-03-19 RX ADMIN — POLYETHYLENE GLYCOL 3350 17 G: 17 POWDER, FOR SOLUTION ORAL at 15:08

## 2024-03-19 RX ADMIN — BUSPIRONE HYDROCHLORIDE 5 MG: 5 TABLET ORAL at 08:57

## 2024-03-19 RX ADMIN — ACETAMINOPHEN 650 MG: 325 TABLET, FILM COATED ORAL at 09:07

## 2024-03-19 RX ADMIN — Medication: at 17:31

## 2024-03-19 RX ADMIN — ONDANSETRON 4 MG: 2 INJECTION INTRAMUSCULAR; INTRAVENOUS at 09:15

## 2024-03-19 RX ADMIN — CALCIUM CARBONATE (ANTACID) CHEW TAB 500 MG 500 MG: 500 CHEW TAB at 13:30

## 2024-03-19 RX ADMIN — BENZONATATE 100 MG: 100 CAPSULE ORAL at 09:07

## 2024-03-19 RX ADMIN — ACETAMINOPHEN 650 MG: 325 TABLET, FILM COATED ORAL at 20:56

## 2024-03-19 RX ADMIN — HYDROCORTISONE: 25 CREAM TOPICAL at 10:02

## 2024-03-19 RX ADMIN — POLYETHYLENE GLYCOL 3350 17 G: 17 POWDER, FOR SOLUTION ORAL at 21:04

## 2024-03-19 RX ADMIN — HEPARIN SODIUM 5000 UNITS: 5000 INJECTION INTRAVENOUS; SUBCUTANEOUS at 05:52

## 2024-03-19 RX ADMIN — BUSPIRONE HYDROCHLORIDE 5 MG: 5 TABLET ORAL at 20:56

## 2024-03-19 RX ADMIN — ONDANSETRON 4 MG: 2 INJECTION INTRAMUSCULAR; INTRAVENOUS at 13:30

## 2024-03-19 RX ADMIN — HEPARIN SODIUM 5000 UNITS: 5000 INJECTION INTRAVENOUS; SUBCUTANEOUS at 13:24

## 2024-03-19 RX ADMIN — PANTOPRAZOLE SODIUM 40 MG: 40 INJECTION, POWDER, FOR SOLUTION INTRAVENOUS at 20:56

## 2024-03-19 RX ADMIN — METOCLOPRAMIDE HYDROCHLORIDE 10 MG: 5 INJECTION INTRAMUSCULAR; INTRAVENOUS at 12:15

## 2024-03-19 RX ADMIN — BENZONATATE 100 MG: 100 CAPSULE ORAL at 16:01

## 2024-03-19 RX ADMIN — BUSPIRONE HYDROCHLORIDE 5 MG: 5 TABLET ORAL at 15:09

## 2024-03-19 RX ADMIN — SENNOSIDES, DOCUSATE SODIUM 2 TABLET: 8.6; 5 TABLET ORAL at 17:24

## 2024-03-19 RX ADMIN — HEPARIN SODIUM 5000 UNITS: 5000 INJECTION INTRAVENOUS; SUBCUTANEOUS at 21:05

## 2024-03-19 RX ADMIN — PANTOPRAZOLE SODIUM 40 MG: 40 INJECTION, POWDER, FOR SOLUTION INTRAVENOUS at 08:57

## 2024-03-19 RX ADMIN — HYDROCORTISONE 1 APPLICATION: 25 CREAM TOPICAL at 20:55

## 2024-03-19 RX ADMIN — HYDROCORTISONE: 25 CREAM TOPICAL at 16:13

## 2024-03-19 NOTE — PLAN OF CARE
Patient continues to complain about left lower abdominal pain, nausea, and denies SOB on room air. Vitals are within normal range, and RN will continue to assess/monitor.          Problem: PAIN - ADULT  Goal: Verbalizes/displays adequate comfort level or baseline comfort level  Description: Interventions:  - Encourage patient to monitor pain and request assistance  - Assess pain using appropriate pain scale  - Administer analgesics based on type and severity of pain and evaluate response  - Implement non-pharmacological measures as appropriate and evaluate response  - Consider cultural and social influences on pain and pain management  - Notify physician/advanced practitioner if interventions unsuccessful or patient reports new pain  Outcome: Progressing     Problem: GASTROINTESTINAL - ADULT  Goal: Minimal or absence of nausea and/or vomiting  Description: INTERVENTIONS:  - Administer IV fluids if ordered to ensure adequate hydration  - Maintain NPO status until nausea and vomiting are resolved  - Nasogastric tube if ordered  - Administer ordered antiemetic medications as needed  - Provide nonpharmacologic comfort measures as appropriate  - Advance diet as tolerated, if ordered  - Consider nutrition services referral to assist patient with adequate nutrition and appropriate food choices  Outcome: Progressing     Problem: Prexisting or High Potential for Compromised Skin Integrity  Goal: Skin integrity is maintained or improved  Description: INTERVENTIONS:  - Identify patients at risk for skin breakdown  - Assess and monitor skin integrity  - Assess and monitor nutrition and hydration status  - Monitor labs   - Assess for incontinence   - Turn and reposition patient  - Assist with mobility/ambulation  - Relieve pressure over bony prominences  - Avoid friction and shearing  - Provide appropriate hygiene as needed including keeping skin clean and dry  - Evaluate need for skin moisturizer/barrier cream  - Collaborate  with interdisciplinary team   - Patient/family teaching  - Consider wound care consult   Outcome: Progressing

## 2024-03-19 NOTE — PROGRESS NOTES
"[facility]  Progress Note  Name: Chelsey Carson I  MRN: [id number]  Unit/Bed#: NW8 862-02 I Date of Admission: 3/16/2024   Date of Service: 3/19/2024 I Hospital Day: 2    Assessment/Plan   * Proctitis  Assessment & Plan  Presented w/ constipation and rectal pain/spasms - noted h/o waxing/waning constipation and hemorrhoids per patient  PRN Anusol on board for hemorrhoids  PRN Nifedipine/Lidocaine ointment on board for rectal spasms   S/p a dose of glycerin suppository and bowel prep  Continue senna, miralax BID   CT imaging noted: \"Mild stercoral or infectious proctitis. Large colonic stool burden. No obstruction.\"  Gastroenterology consulted, appreciate recs     Constipation  Assessment & Plan  Optimize bowel regimen   See plan for associated proctitis above   Evaluated by gastroenterology with attempts at bedside disimpaction (stopped mid-process due to patient complaints of pain)  KUB on 3/18 again demonstrated constipation     Thrombocytosis  Assessment & Plan  Likely reactive due to acute medical issue(s)  Monitor platelet count    Macrocytic anemia  Assessment & Plan  B12/folate levels noted and acceptable  Monitor H/H    Ambulatory dysfunction  Assessment & Plan  Previously able to walk without a cane or walker  Appreciate PT/OT input -> recommending skilled rehab once medically stable    Hyponatremia  Assessment & Plan  Serum sodium decreased at 131 today  Consider a SIADH type picture in the setting of uncontrolled pain coupled with possible solute loss from decreased oral intake  Trial fluid restriction as paradoxic decrease noted on IV fluids     Chronic cough  Assessment & Plan  Chronic cough most likely cough variant asthma with possible underlying GERD  C/w PPI and PRN Albuterol  CT imaging noted lower lung tree-in-bud infiltrates suspicious for possible infectious bronchiolitis - follow-up portable CXR negative for acute cardiopulmonary disease  Remains " afebrile without leukocytosis -> less likely suspicion for infectious etiology, hence, continue to observe off antibiotics  PRN cough suppression/expectorant therapy on board   Influenza/COVID/RSV negative    Hyperlipidemia  Assessment & Plan  Continue statin         VTE Pharmacologic Prophylaxis: VTE Score: 3 Moderate Risk (Score 3-4) - Pharmacological DVT Prophylaxis Ordered: heparin.    Mobility:   Basic Mobility Inpatient Raw Score: 12  JH-HLM Goal: 4: Move to chair/commode  JH-HLM Achieved: 4: Move to chair/commode  JH-HLM Goal achieved. Continue to encourage appropriate mobility.    Patient Centered Rounds: I performed bedside rounds with nursing staff today.   Discussions with Specialists or Other Care Team Provider:     Education and Discussions with Family / Patient: Patient declined call to .     Total Time Spent on Date of Encounter in care of patient: 20 mins. This time was spent on one or more of the following: performing physical exam; counseling and coordination of care; obtaining or reviewing history; documenting in the medical record; reviewing/ordering tests, medications or procedures; communicating with other healthcare professionals and discussing with patient's family/caregivers.    Current Length of Stay: 2 day(s)  Current Patient Status: Inpatient   Certification Statement: The patient will continue to require additional inpatient hospital stay due to constipation, rehab planning  Discharge Plan: Anticipate discharge in 24-48 hrs to rehab facility.    Code Status: Level 3 - DNAR and DNI    Subjective:   Reports overall nausea, abd pain, gas pain has improved but still with rectal pain     Objective:     Vitals:   Temp (24hrs), Av.1 °F (36.7 °C), Min:97.6 °F (36.4 °C), Max:98.7 °F (37.1 °C)    Temp:  [97.6 °F (36.4 °C)-98.7 °F (37.1 °C)] 98.7 °F (37.1 °C)  HR:  [] 110  Resp:  [18-19] 19  BP: (139-158)/(60-73) 139/60  SpO2:  [95 %] 95 %  Body mass index is 23.83 kg/m².      Input and Output Summary (last 24 hours):   No intake or output data in the 24 hours ending 03/19/24 1602    Physical Exam:   Physical Exam  Vitals reviewed.   Constitutional:       General: She is not in acute distress.     Appearance: She is not toxic-appearing.   HENT:      Head: Normocephalic and atraumatic.   Eyes:      Extraocular Movements: Extraocular movements intact.   Cardiovascular:      Rate and Rhythm: Normal rate and regular rhythm.   Pulmonary:      Effort: Pulmonary effort is normal.      Breath sounds: Normal breath sounds.   Abdominal:      General: Bowel sounds are normal. There is no distension.      Palpations: Abdomen is soft.   Musculoskeletal:         General: Normal range of motion.   Neurological:      General: No focal deficit present.      Mental Status: She is alert and oriented to person, place, and time.   Psychiatric:         Mood and Affect: Mood normal.         Behavior: Behavior normal.         Thought Content: Thought content normal.          Additional Data:     Labs:  Results from last 7 days   Lab Units 03/19/24  0642 03/18/24  0456 03/17/24  0549   WBC Thousand/uL 5.97 6.11 7.68   HEMOGLOBIN g/dL 9.5* 8.8* 9.0*   HEMATOCRIT % 28.2* 27.0* 27.0*   PLATELETS Thousands/uL 431* 397* 417*   BANDS PCT %  --  4  --    NEUTROS PCT %  --   --  68   LYMPHS PCT %  --   --  20   LYMPHO PCT %  --  16  --    MONOS PCT %  --   --  8   MONO PCT %  --  2*  --    EOS PCT %  --  1 1     Results from last 7 days   Lab Units 03/19/24  0642 03/18/24  0456 03/17/24  0549   SODIUM mmol/L 131*   < > 134*   POTASSIUM mmol/L 3.7   < > 4.1   CHLORIDE mmol/L 97   < > 97   CO2 mmol/L 27   < > 26   BUN mg/dL 12   < > 14   CREATININE mg/dL 0.85   < > 0.67   ANION GAP mmol/L 7   < > 11   CALCIUM mg/dL 8.2*   < > 7.5*   ALBUMIN g/dL  --   --  2.7*   TOTAL BILIRUBIN mg/dL  --   --  0.39   ALK PHOS U/L  --   --  56   ALT U/L  --   --  23   AST U/L  --   --  23   GLUCOSE RANDOM mg/dL 94   < > 88    < > =  values in this interval not displayed.                       Lines/Drains:  Invasive Devices       Peripheral Intravenous Line  Duration             Peripheral IV 03/16/24 Dorsal (posterior);Left Hand 3 days    Peripheral IV 03/16/24 Left;Ventral (anterior) Forearm 3 days              Drain  Duration             External Urinary Catheter 2 days                          Imaging: No pertinent imaging reviewed.    Recent Cultures (last 7 days):         Last 24 Hours Medication List:   Current Facility-Administered Medications   Medication Dose Route Frequency Provider Last Rate    acetaminophen  650 mg Oral Q6H PRN Anne Frost MD      albuterol  2 puff Inhalation Q4H PRN Anne Frost MD      benzonatate  100 mg Oral TID PRN Anne Frost MD      busPIRone  5 mg Oral TID Kenji Fitzgerald MD      calcium carbonate  500 mg Oral TID PRN Anne Frost MD      doxylamine  25 mg Oral HS PRN Anne Frost MD      heparin (porcine)  5,000 Units Subcutaneous Q8H [facility] Kenji Fitzgerald MD      hydrocortisone   Topical 4x Daily PRN Anne Frsot MD      labetalol  10 mg Intravenous Q4H PRN Anne Frost MD      melatonin  3 mg Oral HS PRN Marilyn Hogan PA-C      metoclopramide  10 mg Intravenous Q6H PRN Anne Frost MD      NIFEdipine 0.3%-lidocaine 5%   Topical Q6H PRN Flora Jasso PA-C      ondansetron  4 mg Intravenous Q4H PRN Marilyn Hogan PA-C      pantoprazole  40 mg Intravenous Q12H UMAIR Frost MD      polyethylene glycol  17 g Oral BID Flora Jasso PA-C      senna-docusate sodium  2 tablet Oral BID Jeff Hennessy MD          Today, Patient Was Seen By: Flora Jasso PA-C    **Please Note: This note may have been constructed using a voice recognition system.**

## 2024-03-19 NOTE — PLAN OF CARE
Problem: PAIN - ADULT  Goal: Verbalizes/displays adequate comfort level or baseline comfort level  Description: Interventions:  - Encourage patient to monitor pain and request assistance  - Assess pain using appropriate pain scale  - Administer analgesics based on type and severity of pain and evaluate response  - Implement non-pharmacological measures as appropriate and evaluate response  - Consider cultural and social influences on pain and pain management  - Notify physician/advanced practitioner if interventions unsuccessful or patient reports new pain  Outcome: Progressing     Problem: DISCHARGE PLANNING  Goal: Discharge to home or other facility with appropriate resources  Description: INTERVENTIONS:  - Identify barriers to discharge w/patient and caregiver  - Arrange for needed discharge resources and transportation as appropriate  - Identify discharge learning needs (meds, wound care, etc.)  - Arrange for interpretive services to assist at discharge as needed  - Refer to Case Management Department for coordinating discharge planning if the patient needs post-hospital services based on physician/advanced practitioner order or complex needs related to functional status, cognitive ability, or social support system  Outcome: Progressing     Problem: Knowledge Deficit  Goal: Patient/family/caregiver demonstrates understanding of disease process, treatment plan, medications, and discharge instructions  Description: Complete learning assessment and assess knowledge base.  Interventions:  - Provide teaching at level of understanding  - Provide teaching via preferred learning methods  Outcome: Progressing     Problem: GASTROINTESTINAL - ADULT  Goal: Minimal or absence of nausea and/or vomiting  Description: INTERVENTIONS:  - Administer IV fluids if ordered to ensure adequate hydration  - Maintain NPO status until nausea and vomiting are resolved  - Nasogastric tube if ordered  - Administer ordered antiemetic  medications as needed  - Provide nonpharmacologic comfort measures as appropriate  - Advance diet as tolerated, if ordered  - Consider nutrition services referral to assist patient with adequate nutrition and appropriate food choices  Outcome: Not Progressing  Goal: Maintains or returns to baseline bowel function  Description: INTERVENTIONS:  - Assess bowel function  - Encourage oral fluids to ensure adequate hydration  - Administer IV fluids if ordered to ensure adequate hydration  - Administer ordered medications as needed  - Encourage mobilization and activity  - Consider nutritional services referral to assist patient with adequate nutrition and appropriate food choices  Outcome: Not Progressing     Problem: Prexisting or High Potential for Compromised Skin Integrity  Goal: Skin integrity is maintained or improved  Description: INTERVENTIONS:  - Identify patients at risk for skin breakdown  - Assess and monitor skin integrity  - Assess and monitor nutrition and hydration status  - Monitor labs   - Assess for incontinence   - Turn and reposition patient  - Assist with mobility/ambulation  - Relieve pressure over bony prominences  - Avoid friction and shearing  - Provide appropriate hygiene as needed including keeping skin clean and dry  - Evaluate need for skin moisturizer/barrier cream  - Collaborate with interdisciplinary team   - Patient/family teaching  - Consider wound care consult   Outcome: Progressing     Problem: Nutrition/Hydration-ADULT  Goal: Nutrient/Hydration intake appropriate for improving, restoring or maintaining nutritional needs  Description: Monitor and assess patient's nutrition/hydration status for malnutrition. Collaborate with interdisciplinary team and initiate plan and interventions as ordered.  Monitor patient's weight and dietary intake as ordered or per policy. Utilize nutrition screening tool and intervene as necessary. Determine patient's food preferences and provide high-protein,  high-caloric foods as appropriate.     INTERVENTIONS:  - Monitor oral intake, urinary output, labs, and treatment plans  - Assess nutrition and hydration status and recommend course of action  - Evaluate amount of meals eaten  - Assist patient with eating if necessary   - Allow adequate time for meals  - Recommend/ encourage appropriate diets, oral nutritional supplements, and vitamin/mineral supplements  - Order, calculate, and assess calorie counts as needed  - Recommend, monitor, and adjust tube feedings and TPN/PPN based on assessed needs  - Assess need for intravenous fluids  - Provide specific nutrition/hydration education as appropriate  - Include patient/family/caregiver in decisions related to nutrition  Outcome: Progressing

## 2024-03-19 NOTE — ASSESSMENT & PLAN NOTE
Chronic cough most likely cough variant asthma with possible underlying GERD  C/w PPI and PRN Albuterol  CT imaging noted lower lung tree-in-bud infiltrates suspicious for possible infectious bronchiolitis - follow-up portable CXR negative for acute cardiopulmonary disease  Remains afebrile without leukocytosis -> less likely suspicion for infectious etiology, hence, continue to observe off antibiotics  PRN cough suppression/expectorant therapy on board   Influenza/COVID/RSV negative

## 2024-03-19 NOTE — CASE MANAGEMENT
Case Management Assessment & Discharge Planning Note    Patient name Chelsey Carson  Location 8 862/8 862-02 MRN [id number]  : 1935 Date 3/19/2024       Current Admission Date: 3/16/2024  Current Admission Diagnosis:Proctitis   Patient Active Problem List    Diagnosis Date Noted    Macrocytic anemia 2024    Thrombocytosis 2024    Ambulatory dysfunction 2024    Proctitis 2024    Tinnitus of both ears 2023    Xerostomia 2023    Mixed conductive and sensorineural hearing loss of left ear with restricted hearing of right ear 2023    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 2023    DNR (do not resuscitate) 2023    Nonintractable headache 2023    Abnormal brain MRI 2023    Slow transit constipation 2023    Primary insomnia 2023    Constipation 2023    Tremor 2023    Hyponatremia 2023    Age related osteoporosis 2022    Esophageal dysphagia 10/10/2022    Stage 3a chronic kidney disease (HCC) 2022    Venous insufficiency 08/10/2022    Other fatigue 2022    COVID-19 2021    Electrolyte abnormality 2021    Dry mouth 2019    Chronic maxillary sinusitis 2019    Breast cancer (HCC) 2019    Chronic cough 2019    Perforation of left tympanic membrane 2019    Nonrheumatic aortic valve insufficiency 11/15/2018    Pes anserinus bursitis of right knee 2018    Hyperlipidemia 2012      LOS (days): 2  Geometric Mean LOS (GMLOS) (days): 3  Days to GMLOS:1     OBJECTIVE:    Risk of Unplanned Readmission Score: 14.19         Current admission status: Inpatient       Preferred Pharmacy:   [id number] - [place], [place] - [address]  [address]  [place] [address]  Phone: [phone] Fax: [phone]    Primary Care Provider: Mariely Fan MD    Primary Insurance:  MEDICARE  Secondary Insurance: COMMERCIAL MISCELLANEOUS    ASSESSMENT:  Active Health Care Proxies    There are no active Health Care Proxies on file.                 Readmission Root Cause  30 Day Readmission: No    Patient Information  Admitted from:: Facility ([facility] for STR.)  Mental Status: Alert  During Assessment patient was accompanied by: Not accompanied during assessment  Assessment information provided by:: Patient  Primary Caregiver: Self  Support Systems: Self, Family members  County of Residence: [place]  What city do you live in?: [place]  Home entry access options. Select all that apply.: Stairs  Number of steps to enter home.: 1  Do the steps have railings?: No  Type of Current Residence: 2 story home  Upon entering residence, is there a bedroom on the main floor (no further steps)?: No  A bedroom is located on the following floor levels of residence (select all that apply):: 2nd Floor  Upon entering residence, is there a bathroom on the main floor (no further steps)?: No  Indicate which floors of current residence have a bathroom (select all the apply):: 2nd Floor  Number of steps to 2nd floor from main floor: One Flight  Living Arrangements: Lives Alone  Is patient a ?: No    Activities of Daily Living Prior to Admission  Functional Status: Independent  Completes ADLs independently?: Yes  Ambulates independently?: Yes  Does patient use assisted devices?: No  Does patient currently own DME?: Yes  What DME does the patient currently own?: Walker  Does patient have a history of Outpatient Therapy (PT/OT)?: No  Does the patient have a history of Short-Term Rehab?: Yes (Pt was at [facility] prior to this admission.)  Does patient have a history of HHC?: No  Does patient currently have HHC?: Yes    Current Home Health Care  Type of Current Home Care Services: Home health aide  Current Home Health Agency:: Other (please enter name in comment) ([facility])  Current Home Health Follow-Up  Provider:: PCP    Patient Information Continued  Income Source: SSI/SSD  Does patient have prescription coverage?: Yes  Does patient receive dialysis treatments?: No  Does patient have a history of substance abuse?: No  Does patient have a history of Mental Health Diagnosis?: No         Means of Transportation  Means of Transport to [facility]:: Other (Comment) (Private pay caregivers transport.)      Social Determinants of Health (SDOH)      Flowsheet Row Most Recent Value   Housing Stability    In the last 12 months, was there a time when you were not able to pay the mortgage or rent on time? N   In the last 12 months, how many places have you lived? 1   In the last 12 months, was there a time when you did not have a steady place to sleep or slept in a shelter (including now)? N   Transportation Needs    In the past 12 months, has lack of transportation kept you from medical appointments or from getting medications? no   In the past 12 months, has lack of transportation kept you from meetings, work, or from getting things needed for daily living? No   Food Insecurity    Within the past 12 months, you worried that your food would run out before you got the money to buy more. Never true   Within the past 12 months, the food you bought just didn't last and you didn't have money to get more. Never true   Utilities    In the past 12 months has the electric, gas, oil, or water company threatened to shut off services in your home? No            DISCHARGE DETAILS:    Discharge planning discussed with:: Patient at bedside and admissions at [facility]  [place] of Choice: Yes  Comments - Freedom of Choice: Pt would like to return to [facility] on discharge for completion of STR.  CM contacted family/caregiver?: Yes  Were Treatment Team discharge recommendations reviewed with patient/caregiver?: Yes  Did patient/caregiver verbalize understanding of patient care needs?: Yes  Were patient/caregiver advised of the risks associated with not  following Treatment Team discharge recommendations?: Yes    Contacts  Patient Contacts: Julio-pt's son.  Relationship to Patient:: Family  Contact Method: Phone  Phone Number: [phone]  Reason/Outcome: Emergency Contact, Continuity of Care, Discharge Planning    Requested Home Health Care         Is the patient interested in [facility] at discharge?: No    DME Referral Provided  Referral made for DME?: No    Other Referral/Resources/Interventions Provided:  Interventions: Short Term Rehab  Referral Comments: Referral sent to [facility] per pt's request. [facility] able to accept back on discharge and is requesting a covid swab be completed prior to discharge.    Treatment Team Recommendation: Short Term Rehab  Discharge Destination Plan:: Short Term Rehab     Additional Comments: CM met with patient at bedside and introduced self and role. Pt reports she was at [facility] prior to admission and would like to return to complete her STR. Pt reports prior to [facility] she was living at home alone and was independent with ADL's. Pt resides in a 2-story home with 1 ANALISA. Pt reports her bedroom and bathroom being on the 2nd floor with 1 flight to reach 2nd floor. Pt reports owning a rolling walker, but does not use it. Cm contacted pt's son, Julio, via phone call to discuss discharge planning. Julio reports pt was at [facility] and discharged to [facility] after 1 day and then transferred from [facility] to [facility] where she was for 1 day prior to admission to [facility]. Pt and pt's family's wishes are for pt to return to [facility] to complete STR. [facility] reports they are able to accept pt back once medically stable for discharge. CM department to continue to follow for discharge planning needs througout this admission.

## 2024-03-19 NOTE — ASSESSMENT & PLAN NOTE
"Presented w/ constipation and rectal pain/spasms - noted h/o waxing/waning constipation and hemorrhoids per patient  PRN Anusol on board for hemorrhoids  PRN Nifedipine/Lidocaine ointment on board for rectal spasms   S/p a dose of glycerin suppository and bowel prep  Continue senna, miralax BID   CT imaging noted: \"Mild stercoral or infectious proctitis. Large colonic stool burden. No obstruction.\"  Gastroenterology consulted, appreciate recs   "

## 2024-03-19 NOTE — PROGRESS NOTES
Patient:    MRN:  [id number]    Aidin Request ID:  [id number]    Level of care reserved:  Skilled Nursing Facility    Partner Reserved:  [facility], [place] [address] [phone]    Clinical needs requested:    Geography searched:  10 miles around [id number]    Start of Service:    Request sent:  10:13am EDT on 3/19/2024 by Rehana Agarwal    Partner reserved:  1:00pm EDT on 3/19/2024 by Rehana Agarwal    Choice list shared:  1:00pm EDT on 3/19/2024 by Rehana Agarwal

## 2024-03-19 NOTE — ASSESSMENT & PLAN NOTE
Optimize bowel regimen   See plan for associated proctitis above   Evaluated by gastroenterology with attempts at bedside disimpaction (stopped mid-process due to patient complaints of pain)  KUB on 3/18 again demonstrated constipation

## 2024-03-20 PROBLEM — R11.0 NAUSEA: Status: ACTIVE | Noted: 2024-03-20

## 2024-03-20 LAB
ANION GAP SERPL CALCULATED.3IONS-SCNC: 11 MMOL/L (ref 4–13)
BUN SERPL-MCNC: 12 MG/DL (ref 5–25)
CALCIUM SERPL-MCNC: 8.3 MG/DL (ref 8.4–10.2)
CHLORIDE SERPL-SCNC: 98 MMOL/L (ref 96–108)
CO2 SERPL-SCNC: 25 MMOL/L (ref 21–32)
CREAT SERPL-MCNC: 0.86 MG/DL (ref 0.6–1.3)
ERYTHROCYTE [DISTWIDTH] IN BLOOD BY AUTOMATED COUNT: 14 % (ref 11.6–15.1)
GFR SERPL CREATININE-BSD FRML MDRD: 60 ML/MIN/1.73SQ M
GLUCOSE SERPL-MCNC: 96 MG/DL (ref 65–140)
HCT VFR BLD AUTO: 31.2 % (ref 34.8–46.1)
HGB BLD-MCNC: 10 G/DL (ref 11.5–15.4)
MCH RBC QN AUTO: 33.8 PG (ref 26.8–34.3)
MCHC RBC AUTO-ENTMCNC: 32.1 G/DL (ref 31.4–37.4)
MCV RBC AUTO: 105 FL (ref 82–98)
PLATELET # BLD AUTO: 404 THOUSANDS/UL (ref 149–390)
PMV BLD AUTO: 8.6 FL (ref 8.9–12.7)
POTASSIUM SERPL-SCNC: 3.7 MMOL/L (ref 3.5–5.3)
RBC # BLD AUTO: 2.96 MILLION/UL (ref 3.81–5.12)
SODIUM SERPL-SCNC: 134 MMOL/L (ref 135–147)
WBC # BLD AUTO: 6.33 THOUSAND/UL (ref 4.31–10.16)

## 2024-03-20 PROCEDURE — 97112 NEUROMUSCULAR REEDUCATION: CPT

## 2024-03-20 PROCEDURE — 97530 THERAPEUTIC ACTIVITIES: CPT

## 2024-03-20 PROCEDURE — 99232 SBSQ HOSP IP/OBS MODERATE 35: CPT | Performed by: PHYSICIAN ASSISTANT

## 2024-03-20 PROCEDURE — C9113 INJ PANTOPRAZOLE SODIUM, VIA: HCPCS | Performed by: INTERNAL MEDICINE

## 2024-03-20 PROCEDURE — 80048 BASIC METABOLIC PNL TOTAL CA: CPT | Performed by: INTERNAL MEDICINE

## 2024-03-20 PROCEDURE — 85027 COMPLETE CBC AUTOMATED: CPT | Performed by: INTERNAL MEDICINE

## 2024-03-20 RX ORDER — SCOLOPAMINE TRANSDERMAL SYSTEM 1 MG/1
1 PATCH, EXTENDED RELEASE TRANSDERMAL
Status: DISCONTINUED | OUTPATIENT
Start: 2024-03-20 | End: 2024-03-25

## 2024-03-20 RX ADMIN — METOCLOPRAMIDE HYDROCHLORIDE 10 MG: 5 INJECTION INTRAMUSCULAR; INTRAVENOUS at 21:32

## 2024-03-20 RX ADMIN — BUSPIRONE HYDROCHLORIDE 5 MG: 5 TABLET ORAL at 09:40

## 2024-03-20 RX ADMIN — BUSPIRONE HYDROCHLORIDE 5 MG: 5 TABLET ORAL at 21:26

## 2024-03-20 RX ADMIN — BUSPIRONE HYDROCHLORIDE 5 MG: 5 TABLET ORAL at 16:05

## 2024-03-20 RX ADMIN — SCOPALAMINE 1 PATCH: 1 PATCH, EXTENDED RELEASE TRANSDERMAL at 13:00

## 2024-03-20 RX ADMIN — ONDANSETRON 4 MG: 2 INJECTION INTRAMUSCULAR; INTRAVENOUS at 04:03

## 2024-03-20 RX ADMIN — SENNOSIDES, DOCUSATE SODIUM 2 TABLET: 8.6; 5 TABLET ORAL at 09:40

## 2024-03-20 RX ADMIN — PANTOPRAZOLE SODIUM 40 MG: 40 INJECTION, POWDER, FOR SOLUTION INTRAVENOUS at 21:26

## 2024-03-20 RX ADMIN — ONDANSETRON 4 MG: 2 INJECTION INTRAMUSCULAR; INTRAVENOUS at 16:05

## 2024-03-20 RX ADMIN — HEPARIN SODIUM 5000 UNITS: 5000 INJECTION INTRAVENOUS; SUBCUTANEOUS at 21:26

## 2024-03-20 RX ADMIN — DOXYLAMINE SUCCINATE 25 MG: 25 TABLET ORAL at 22:15

## 2024-03-20 RX ADMIN — ACETAMINOPHEN 650 MG: 325 TABLET, FILM COATED ORAL at 05:53

## 2024-03-20 RX ADMIN — POLYETHYLENE GLYCOL 3350 17 G: 17 POWDER, FOR SOLUTION ORAL at 09:40

## 2024-03-20 RX ADMIN — BENZONATATE 100 MG: 100 CAPSULE ORAL at 09:43

## 2024-03-20 RX ADMIN — HYDROCORTISONE: 25 CREAM TOPICAL at 22:31

## 2024-03-20 RX ADMIN — POLYETHYLENE GLYCOL 3350 17 G: 17 POWDER, FOR SOLUTION ORAL at 21:26

## 2024-03-20 RX ADMIN — ONDANSETRON 4 MG: 2 INJECTION INTRAMUSCULAR; INTRAVENOUS at 09:43

## 2024-03-20 RX ADMIN — HEPARIN SODIUM 5000 UNITS: 5000 INJECTION INTRAVENOUS; SUBCUTANEOUS at 04:03

## 2024-03-20 RX ADMIN — PANTOPRAZOLE SODIUM 40 MG: 40 INJECTION, POWDER, FOR SOLUTION INTRAVENOUS at 09:40

## 2024-03-20 RX ADMIN — SENNOSIDES, DOCUSATE SODIUM 2 TABLET: 8.6; 5 TABLET ORAL at 18:37

## 2024-03-20 RX ADMIN — BENZONATATE 100 MG: 100 CAPSULE ORAL at 21:32

## 2024-03-20 NOTE — PROGRESS NOTES
"[facility]  Progress Note  Name: Chelsey Carson I  MRN: [id number]  Unit/Bed#: NW8 862-02 I Date of Admission: 3/16/2024   Date of Service: 3/20/2024 I Hospital Day: 3    Assessment/Plan   * Proctitis  Assessment & Plan  Presented w/ constipation and rectal pain/spasms - noted h/o waxing/waning constipation and hemorrhoids per patient  PRN Anusol on board for hemorrhoids  PRN Nifedipine/Lidocaine ointment on board for rectal spasms   S/p a dose of glycerin suppository and bowel prep  Continue senna, miralax BID   CT imaging noted: \"Mild stercoral or infectious proctitis. Large colonic stool burden. No obstruction.\"  Gastroenterology consulted, appreciate recs     Constipation  Assessment & Plan  Optimize bowel regimen   See plan for associated proctitis above   Evaluated by gastroenterology with attempts at bedside disimpaction (stopped mid-process due to patient complaints of pain)  KUB on 3/18 again demonstrated constipation   Pt is having regular Bms documented at this time, continue miralax BID, senna     Nausea  Assessment & Plan  Continues with nausea, worse in AM.  No vomiting.  Better in afternoons but persists despite reglan, zofran.   D/w GI will add scopolamine and monitor sx     Thrombocytosis  Assessment & Plan  Likely reactive due to acute medical issue(s)  Monitor platelet count    Macrocytic anemia  Assessment & Plan  B12/folate levels noted and acceptable  Monitor H/H    Ambulatory dysfunction  Assessment & Plan  Previously able to walk without a cane or walker  Appreciate PT/OT input -> recommending skilled rehab once medically stable    Hyponatremia  Assessment & Plan  Improved today   Consider a SIADH type picture in the setting of uncontrolled pain coupled with possible solute loss from decreased oral intake  Trial fluid restriction as paradoxic decrease noted on IV fluids     Chronic cough  Assessment & Plan  Chronic cough most likely cough variant " asthma with possible underlying GERD  C/w PPI and PRN Albuterol  CT imaging noted lower lung tree-in-bud infiltrates suspicious for possible infectious bronchiolitis - follow-up portable CXR negative for acute cardiopulmonary disease  Remains afebrile without leukocytosis -> less likely suspicion for infectious etiology, hence, continue to observe off antibiotics  PRN cough suppression/expectorant therapy on board   Influenza/COVID/RSV negative    Hyperlipidemia  Assessment & Plan  Continue statin         VTE Pharmacologic Prophylaxis: VTE Score: 3 Moderate Risk (Score 3-4) - Pharmacological DVT Prophylaxis Ordered: heparin.    Mobility:   Basic Mobility Inpatient Raw Score: 12  JH-HLM Goal: 4: Move to chair/commode  JH-HLM Achieved: 4: Move to chair/commode  JH-HLM Goal achieved. Continue to encourage appropriate mobility.    Patient Centered Rounds: I performed bedside rounds with nursing staff today.   Discussions with Specialists or Other Care Team Provider: CM, GI     Education and Discussions with Family / Patient: Patient declined call to .     Total Time Spent on Date of Encounter in care of patient: 20 mins. This time was spent on one or more of the following: performing physical exam; counseling and coordination of care; obtaining or reviewing history; documenting in the medical record; reviewing/ordering tests, medications or procedures; communicating with other healthcare professionals and discussing with patient's family/caregivers.    Current Length of Stay: 3 day(s)  Current Patient Status: Inpatient   Certification Statement: The patient will continue to require additional inpatient hospital stay due to nausea, rehab planning  Discharge Plan: Anticipate discharge in 24-48 hrs to rehab facility.    Code Status: Level 3 - DNAR and DNI    Subjective:   Still c/o nausea, worse in AM.  Was able to eat breakfast.  Denies abd pain.  Having Bms.  Rectal pain is improved     Objective:      Vitals:   Temp (24hrs), Av.6 °F (37 °C), Min:98.2 °F (36.8 °C), Max:99 °F (37.2 °C)    Temp:  [98.2 °F (36.8 °C)-99 °F (37.2 °C)] 98.2 °F (36.8 °C)  HR:  [] 96  Resp:  [19] 19  BP: (148-161)/(74-86) 161/86  SpO2:  [95 %] 95 %  Body mass index is 23.83 kg/m².     Input and Output Summary (last 24 hours):   No intake or output data in the 24 hours ending 24 1219    Physical Exam:   Physical Exam  Vitals reviewed.   Constitutional:       General: She is not in acute distress.     Appearance: She is not toxic-appearing.   HENT:      Head: Normocephalic and atraumatic.   Eyes:      Extraocular Movements: Extraocular movements intact.   Cardiovascular:      Rate and Rhythm: Normal rate and regular rhythm.   Pulmonary:      Effort: Pulmonary effort is normal. No respiratory distress.   Abdominal:      General: Bowel sounds are normal. There is no distension.      Palpations: Abdomen is soft.      Tenderness: There is no abdominal tenderness.   Musculoskeletal:         General: Normal range of motion.   Neurological:      General: No focal deficit present.      Mental Status: She is alert and oriented to person, place, and time.   Psychiatric:         Mood and Affect: Mood normal.         Behavior: Behavior normal.         Thought Content: Thought content normal.          Additional Data:     Labs:  Results from last 7 days   Lab Units 24  0456 24  0642 24  0456 24  0549   WBC Thousand/uL 6.33   < > 6.11 7.68   HEMOGLOBIN g/dL 10.0*   < > 8.8* 9.0*   HEMATOCRIT % 31.2*   < > 27.0* 27.0*   PLATELETS Thousands/uL 404*   < > 397* 417*   BANDS PCT %  --   --  4  --    NEUTROS PCT %  --   --   --  68   LYMPHS PCT %  --   --   --  20   LYMPHO PCT %  --   --  16  --    MONOS PCT %  --   --   --  8   MONO PCT %  --   --  2*  --    EOS PCT %  --   --  1 1    < > = values in this interval not displayed.     Results from last 7 days   Lab Units 24  0456 24  0456 24  0549    SODIUM mmol/L 134*   < > 134*   POTASSIUM mmol/L 3.7   < > 4.1   CHLORIDE mmol/L 98   < > 97   CO2 mmol/L 25   < > 26   BUN mg/dL 12   < > 14   CREATININE mg/dL 0.86   < > 0.67   ANION GAP mmol/L 11   < > 11   CALCIUM mg/dL 8.3*   < > 7.5*   ALBUMIN g/dL  --   --  2.7*   TOTAL BILIRUBIN mg/dL  --   --  0.39   ALK PHOS U/L  --   --  56   ALT U/L  --   --  23   AST U/L  --   --  23   GLUCOSE RANDOM mg/dL 96   < > 88    < > = values in this interval not displayed.                       Lines/Drains:  Invasive Devices       Peripheral Intravenous Line  Duration             Peripheral IV 03/16/24 Dorsal (posterior);Left Hand 4 days    Peripheral IV 03/16/24 Left;Ventral (anterior) Forearm 4 days              Drain  Duration             External Urinary Catheter 3 days                          Imaging: No pertinent imaging reviewed.    Recent Cultures (last 7 days):         Last 24 Hours Medication List:   Current Facility-Administered Medications   Medication Dose Route Frequency Provider Last Rate    acetaminophen  650 mg Oral Q6H PRN Anne Frost MD      albuterol  2 puff Inhalation Q4H PRN Anne Frost MD      benzonatate  100 mg Oral TID PRN Anne Frost MD      busPIRone  5 mg Oral TID Kenji Fitzgerald MD      calcium carbonate  500 mg Oral TID PRN Anne Frost MD      doxylamine  25 mg Oral HS PRN Anne Frost MD      heparin (porcine)  5,000 Units Subcutaneous Q8H [facility] Kenji Fitzgerald MD      hydrocortisone   Topical 4x Daily PRN Anne Frost MD      labetalol  10 mg Intravenous Q4H PRN Anne Frost MD      melatonin  3 mg Oral HS PRN Marilyn Hogan PA-C      metoclopramide  10 mg Intravenous Q6H PRN Anne Frost MD      NIFEdipine 0.3%-lidocaine 5%   Topical Q6H PRN Flora Jasso PA-C      ondansetron  4 mg Intravenous Q4H PRN Marilyn Hogan PA-C      pantoprazole  40 mg Intravenous Q12H [facility] Anne Frost MD      polyethylene glycol  17 g Oral BID Flora Jasso PA-C      scopolamine  1 patch  Transdermal Q72H Flora Jasso PA-C      senna-docusate sodium  2 tablet Oral BID Jeff Hennessy MD          Today, Patient Was Seen By: Flora Jasso PA-C    **Please Note: This note may have been constructed using a voice recognition system.**

## 2024-03-20 NOTE — OCCUPATIONAL THERAPY NOTE
Occupational Therapy Cancel        Patient Name: Chelsey Carson  Today's Date: 3/20/2024         03/20/24 1055   OT Last Visit   OT Visit Date 03/20/24   Note Type   Note Type Cancelled Session   Cancel Reasons Refusal  (Attempted to see pt two times today (3/20/2024) both times pt reporting nausea and unable to particpate in  therapy. OT will continue to follow.)

## 2024-03-20 NOTE — ASSESSMENT & PLAN NOTE
Improved today   Consider a SIADH type picture in the setting of uncontrolled pain coupled with possible solute loss from decreased oral intake  Trial fluid restriction as paradoxic decrease noted on IV fluids

## 2024-03-20 NOTE — PHYSICAL THERAPY NOTE
Physical Therapy Progress Note     03/20/24 1220   PT Last Visit   PT Visit Date 03/20/24   Note Type   Note Type Treatment   Pain Assessment   Pain Assessment Tool 0-10   Pain Score No Pain   Restrictions/Precautions   Other Precautions Pain;Fall Risk;Chair Alarm;Hard of hearing   Subjective   Subjective The patient initially declining any therapy, but after empathetic listening she was amenable to some mobility. She is expressive about her current situation as well as her frustrations.   Bed Mobility   Supine to Sit 4  Minimal assistance   Additional items Assist x 1;HOB elevated;Increased time required;LE management;Verbal cues   Sit to Supine 4  Minimal assistance   Additional items Assist x 1;Increased time required;Verbal cues;LE management   Balance   Static Sitting Fair -   Activity Tolerance   Activity Tolerance Patient limited by fatigue   Exercises   Hip Flexion Sitting;10 reps;AROM;Bilateral  (x2 sets.)   Knee AROM Long Arc Quad Sitting;10 reps;AROM;Bilateral  (x2 sets.)   Ankle Pumps Supine;20 reps;AROM;Bilateral   Assessment   Prognosis Good   Problem List Decreased strength;Decreased range of motion;Decreased endurance;Impaired balance;Decreased mobility;Pain   Assessment The patient notes continued nausea as well as a lack of sleep last night. She was expressive about her situation, and initially declined any mobility. Empathetic listening and support was provided, and then she was amenable to mobilizing to the edge of the bed. She declined transfers or any gait at this time. The patient performed exercises with instruction and distraction. The patient notes that she wishes that she would just get better, and encouraged her gently about the importance of mobilizing several times a day with staff in order to attain that goal. She was in bed with all needs within reach post session.   Barriers to Discharge Inaccessible home environment;Decreased caregiver support   Goals   Patient Goals To feel  better.   STG Expiration Date 04/01/24   PT Treatment Day 1   Plan   Treatment/Interventions Functional transfer training;LE strengthening/ROM;Therapeutic exercise;Endurance training;Patient/family training;Bed mobility;Gait training;Elevations   Progress Slow progress, decreased activity tolerance   PT Frequency 2-3x/wk   Discharge Recommendation   Rehab Resource Intensity Level, PT II (Moderate Resource Intensity)   AM-PAC Basic Mobility Inpatient   Turning in Flat Bed Without Bedrails 3   Lying on Back to Sitting on Edge of Flat Bed Without Bedrails 3   Moving Bed to Chair 2   Standing Up From Chair Using Arms 2   Walk in Room 1   Climb 3-5 Stairs With Railing 1   Basic Mobility Inpatient Raw Score 12   Basic Mobility Standardized Score 32.23   [facility] Highest Level Of Mobility   -HLM Goal 4: Move to chair/commode   -M Achieved 3: Sit at edge of bed         An AM-PAC Basic Mobility raw score less than 16 suggests the patient may benefit from discharge to post-acute rehab services.    Karson Field, PTA

## 2024-03-20 NOTE — PLAN OF CARE
Problem: PAIN - ADULT  Goal: Verbalizes/displays adequate comfort level or baseline comfort level  Description: Interventions:  - Encourage patient to monitor pain and request assistance  - Assess pain using appropriate pain scale  - Administer analgesics based on type and severity of pain and evaluate response  - Implement non-pharmacological measures as appropriate and evaluate response  - Consider cultural and social influences on pain and pain management  - Notify physician/advanced practitioner if interventions unsuccessful or patient reports new pain  Outcome: Progressing     Problem: DISCHARGE PLANNING  Goal: Discharge to home or other facility with appropriate resources  Description: INTERVENTIONS:  - Identify barriers to discharge w/patient and caregiver  - Arrange for needed discharge resources and transportation as appropriate  - Identify discharge learning needs (meds, wound care, etc.)  - Arrange for interpretive services to assist at discharge as needed  - Refer to Case Management Department for coordinating discharge planning if the patient needs post-hospital services based on physician/advanced practitioner order or complex needs related to functional status, cognitive ability, or social support system  Outcome: Progressing     Problem: Knowledge Deficit  Goal: Patient/family/caregiver demonstrates understanding of disease process, treatment plan, medications, and discharge instructions  Description: Complete learning assessment and assess knowledge base.  Interventions:  - Provide teaching at level of understanding  - Provide teaching via preferred learning methods  Outcome: Progressing     Problem: GASTROINTESTINAL - ADULT  Goal: Minimal or absence of nausea and/or vomiting  Description: INTERVENTIONS:  - Administer IV fluids if ordered to ensure adequate hydration  - Maintain NPO status until nausea and vomiting are resolved  - Nasogastric tube if ordered  - Administer ordered antiemetic  medications as needed  - Provide nonpharmacologic comfort measures as appropriate  - Advance diet as tolerated, if ordered  - Consider nutrition services referral to assist patient with adequate nutrition and appropriate food choices  Outcome: Progressing  Goal: Maintains or returns to baseline bowel function  Description: INTERVENTIONS:  - Assess bowel function  - Encourage oral fluids to ensure adequate hydration  - Administer IV fluids if ordered to ensure adequate hydration  - Administer ordered medications as needed  - Encourage mobilization and activity  - Consider nutritional services referral to assist patient with adequate nutrition and appropriate food choices  Outcome: Progressing     Problem: Prexisting or High Potential for Compromised Skin Integrity  Goal: Skin integrity is maintained or improved  Description: INTERVENTIONS:  - Identify patients at risk for skin breakdown  - Assess and monitor skin integrity  - Assess and monitor nutrition and hydration status  - Monitor labs   - Assess for incontinence   - Turn and reposition patient  - Assist with mobility/ambulation  - Relieve pressure over bony prominences  - Avoid friction and shearing  - Provide appropriate hygiene as needed including keeping skin clean and dry  - Evaluate need for skin moisturizer/barrier cream  - Collaborate with interdisciplinary team   - Patient/family teaching  - Consider wound care consult   Outcome: Progressing     Problem: Nutrition/Hydration-ADULT  Goal: Nutrient/Hydration intake appropriate for improving, restoring or maintaining nutritional needs  Description: Monitor and assess patient's nutrition/hydration status for malnutrition. Collaborate with interdisciplinary team and initiate plan and interventions as ordered.  Monitor patient's weight and dietary intake as ordered or per policy. Utilize nutrition screening tool and intervene as necessary. Determine patient's food preferences and provide high-protein,  high-caloric foods as appropriate.     INTERVENTIONS:  - Monitor oral intake, urinary output, labs, and treatment plans  - Assess nutrition and hydration status and recommend course of action  - Evaluate amount of meals eaten  - Assist patient with eating if necessary   - Allow adequate time for meals  - Recommend/ encourage appropriate diets, oral nutritional supplements, and vitamin/mineral supplements  - Order, calculate, and assess calorie counts as needed  - Recommend, monitor, and adjust tube feedings and TPN/PPN based on assessed needs  - Assess need for intravenous fluids  - Provide specific nutrition/hydration education as appropriate  - Include patient/family/caregiver in decisions related to nutrition  Outcome: Progressing

## 2024-03-20 NOTE — ASSESSMENT & PLAN NOTE
Continues with nausea, worse in AM.  No vomiting.  Better in afternoons but persists despite reglan, zofran.   D/w GI will add scopolamine and monitor sx

## 2024-03-20 NOTE — PLAN OF CARE
Problem: PHYSICAL THERAPY ADULT  Goal: Performs mobility at highest level of function for planned discharge setting.  See evaluation for individualized goals.  Description: Treatment/Interventions: ADL retraining, Functional transfer training, LE strengthening/ROM, Elevations, Therapeutic exercise, Endurance training, Bed mobility, Gait training, Spoke to nursing, OT          See flowsheet documentation for full assessment, interventions and recommendations.  Outcome: Progressing  Note: Prognosis: Good  Problem List: Decreased strength, Decreased range of motion, Decreased endurance, Impaired balance, Decreased mobility, Pain  Assessment: The patient notes continued nausea as well as a lack of sleep last night. She was expressive about her situation, and initially declined any mobility. Empathetic listening and support was provided, and then she was amenable to mobilizing to the edge of the bed. She declined transfers or any gait at this time. The patient performed exercises with instruction and distraction. The patient notes that she wishes that she would just get better, and encouraged her gently about the importance of mobilizing several times a day with staff in order to attain that goal. She was in bed with all needs within reach post session.  Barriers to Discharge: Inaccessible home environment, Decreased caregiver support     Rehab Resource Intensity Level, PT: II (Moderate Resource Intensity)    See flowsheet documentation for full assessment.

## 2024-03-20 NOTE — CASE MANAGEMENT
Case Management Discharge Planning Note    Patient name Chelsey Carson  Location 8 862/8 862-02 MRN [id number]  : 1935 Date 3/20/2024       Current Admission Date: 3/16/2024  Current Admission Diagnosis:Proctitis   Patient Active Problem List    Diagnosis Date Noted    Nausea 2024    Macrocytic anemia 2024    Thrombocytosis 2024    Ambulatory dysfunction 2024    Proctitis 2024    Tinnitus of both ears 2023    Xerostomia 2023    Mixed conductive and sensorineural hearing loss of left ear with restricted hearing of right ear 2023    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 2023    DNR (do not resuscitate) 2023    Nonintractable headache 2023    Abnormal brain MRI 2023    Slow transit constipation 2023    Primary insomnia 2023    Constipation 2023    Tremor 2023    Hyponatremia 2023    Age related osteoporosis 2022    Esophageal dysphagia 10/10/2022    Stage 3a chronic kidney disease (HCC) 2022    Venous insufficiency 08/10/2022    Other fatigue 2022    COVID-19 2021    Electrolyte abnormality 2021    Dry mouth 2019    Chronic maxillary sinusitis 2019    Breast cancer (HCC) 2019    Chronic cough 2019    Perforation of left tympanic membrane 2019    Nonrheumatic aortic valve insufficiency 11/15/2018    Pes anserinus bursitis of right knee 2018    Hyperlipidemia 2012      LOS (days): 3  Geometric Mean LOS (GMLOS) (days): 3  Days to GMLOS:-0.1     OBJECTIVE:  Risk of Unplanned Readmission Score: 14.28         Current admission status: Inpatient   Preferred Pharmacy:   [id number] - [place], [place] - [address]  [address]  [place] [address]  Phone: [phone] Fax: [phone]    Primary Care Provider: Mariely Fan MD    Primary Insurance:  MEDICARE  Secondary Insurance: COMMERCIAL MISCELLANEOUS    DISCHARGE DETAILS:              Other Referral/Resources/Interventions Provided:  Interventions: Short Term Rehab  Referral Comments: [facility] notified no dc today but possible tomorrow. SLIM provider made aware Flora MIRELES that pt will need covid test done. Pt moved her bowels overnight per nursing and is on bowel regimen. pt remains inpatient today due to nausea.         Treatment Team Recommendation: Short Term Rehab ([facility])  Discharge Destination Plan:: Short Term Rehab ([facility])             IMM Given (Date):: 03/20/24  IMM Given to:: Patient  Family notified:: Reviewed IMM with pt, copy of IMM at bedside, original sent to medical records. Pt is not agreeeable with dc today but if feeling better tomorrow.

## 2024-03-20 NOTE — ASSESSMENT & PLAN NOTE
Optimize bowel regimen   See plan for associated proctitis above   Evaluated by gastroenterology with attempts at bedside disimpaction (stopped mid-process due to patient complaints of pain)  KUB on 3/18 again demonstrated constipation   Pt is having regular Bms documented at this time, continue miralax BID, senna

## 2024-03-21 LAB
ANION GAP SERPL CALCULATED.3IONS-SCNC: 10 MMOL/L (ref 4–13)
ANISOCYTOSIS BLD QL SMEAR: PRESENT
BASOPHILS # BLD MANUAL: 0.15 THOUSAND/UL (ref 0–0.1)
BASOPHILS NFR MAR MANUAL: 2 % (ref 0–1)
BUN SERPL-MCNC: 14 MG/DL (ref 5–25)
CALCIUM SERPL-MCNC: 8.3 MG/DL (ref 8.4–10.2)
CHLORIDE SERPL-SCNC: 97 MMOL/L (ref 96–108)
CO2 SERPL-SCNC: 25 MMOL/L (ref 21–32)
CREAT SERPL-MCNC: 0.76 MG/DL (ref 0.6–1.3)
EOSINOPHIL # BLD MANUAL: 0.22 THOUSAND/UL (ref 0–0.4)
EOSINOPHIL NFR BLD MANUAL: 3 % (ref 0–6)
ERYTHROCYTE [DISTWIDTH] IN BLOOD BY AUTOMATED COUNT: 14.2 % (ref 11.6–15.1)
GFR SERPL CREATININE-BSD FRML MDRD: 70 ML/MIN/1.73SQ M
GLUCOSE SERPL-MCNC: 95 MG/DL (ref 65–140)
HCT VFR BLD AUTO: 31.6 % (ref 34.8–46.1)
HGB BLD-MCNC: 10.2 G/DL (ref 11.5–15.4)
LYMPHOCYTES # BLD AUTO: 1.8 THOUSAND/UL (ref 0.6–4.47)
LYMPHOCYTES # BLD AUTO: 23 % (ref 14–44)
MACROCYTES BLD QL AUTO: PRESENT
MCH RBC QN AUTO: 33.9 PG (ref 26.8–34.3)
MCHC RBC AUTO-ENTMCNC: 32.3 G/DL (ref 31.4–37.4)
MCV RBC AUTO: 105 FL (ref 82–98)
MONOCYTES # BLD AUTO: 0.3 THOUSAND/UL (ref 0–1.22)
MONOCYTES NFR BLD: 4 % (ref 4–12)
NEUTROPHILS # BLD MANUAL: 5.02 THOUSAND/UL (ref 1.85–7.62)
NEUTS BAND NFR BLD MANUAL: 4 % (ref 0–8)
NEUTS SEG NFR BLD AUTO: 63 % (ref 43–75)
OVALOCYTES BLD QL SMEAR: PRESENT
PLATELET # BLD AUTO: 387 THOUSANDS/UL (ref 149–390)
PLATELET BLD QL SMEAR: ABNORMAL
PMV BLD AUTO: 8.4 FL (ref 8.9–12.7)
POIKILOCYTOSIS BLD QL SMEAR: PRESENT
POLYCHROMASIA BLD QL SMEAR: PRESENT
POTASSIUM SERPL-SCNC: 3.8 MMOL/L (ref 3.5–5.3)
RBC # BLD AUTO: 3.01 MILLION/UL (ref 3.81–5.12)
RBC MORPH BLD: PRESENT
SODIUM SERPL-SCNC: 132 MMOL/L (ref 135–147)
TOXIC GRANULES BLD QL SMEAR: PRESENT
VARIANT LYMPHS # BLD AUTO: 1 %
WBC # BLD AUTO: 7.49 THOUSAND/UL (ref 4.31–10.16)

## 2024-03-21 PROCEDURE — 99232 SBSQ HOSP IP/OBS MODERATE 35: CPT | Performed by: INTERNAL MEDICINE

## 2024-03-21 PROCEDURE — 80048 BASIC METABOLIC PNL TOTAL CA: CPT | Performed by: INTERNAL MEDICINE

## 2024-03-21 PROCEDURE — 97530 THERAPEUTIC ACTIVITIES: CPT

## 2024-03-21 PROCEDURE — 85027 COMPLETE CBC AUTOMATED: CPT | Performed by: INTERNAL MEDICINE

## 2024-03-21 PROCEDURE — C9113 INJ PANTOPRAZOLE SODIUM, VIA: HCPCS | Performed by: INTERNAL MEDICINE

## 2024-03-21 PROCEDURE — 85007 BL SMEAR W/DIFF WBC COUNT: CPT | Performed by: INTERNAL MEDICINE

## 2024-03-21 PROCEDURE — 97110 THERAPEUTIC EXERCISES: CPT

## 2024-03-21 PROCEDURE — 99232 SBSQ HOSP IP/OBS MODERATE 35: CPT | Performed by: PHYSICIAN ASSISTANT

## 2024-03-21 RX ORDER — GUAIFENESIN 600 MG/1
600 TABLET, EXTENDED RELEASE ORAL EVERY 12 HOURS SCHEDULED
Status: DISCONTINUED | OUTPATIENT
Start: 2024-03-21 | End: 2024-03-27 | Stop reason: HOSPADM

## 2024-03-21 RX ORDER — MIRTAZAPINE 15 MG/1
7.5 TABLET, FILM COATED ORAL
Status: DISCONTINUED | OUTPATIENT
Start: 2024-03-21 | End: 2024-03-27 | Stop reason: HOSPADM

## 2024-03-21 RX ADMIN — POLYETHYLENE GLYCOL 3350 17 G: 17 POWDER, FOR SOLUTION ORAL at 08:54

## 2024-03-21 RX ADMIN — BUSPIRONE HYDROCHLORIDE 5 MG: 5 TABLET ORAL at 08:54

## 2024-03-21 RX ADMIN — PANTOPRAZOLE SODIUM 40 MG: 40 INJECTION, POWDER, FOR SOLUTION INTRAVENOUS at 08:54

## 2024-03-21 RX ADMIN — DOXYLAMINE SUCCINATE 25 MG: 25 TABLET ORAL at 22:12

## 2024-03-21 RX ADMIN — BENZONATATE 100 MG: 100 CAPSULE ORAL at 10:05

## 2024-03-21 RX ADMIN — HEPARIN SODIUM 5000 UNITS: 5000 INJECTION INTRAVENOUS; SUBCUTANEOUS at 13:32

## 2024-03-21 RX ADMIN — HEPARIN SODIUM 5000 UNITS: 5000 INJECTION INTRAVENOUS; SUBCUTANEOUS at 06:18

## 2024-03-21 RX ADMIN — GUAIFENESIN 600 MG: 600 TABLET, EXTENDED RELEASE ORAL at 13:32

## 2024-03-21 RX ADMIN — PANTOPRAZOLE SODIUM 40 MG: 40 INJECTION, POWDER, FOR SOLUTION INTRAVENOUS at 21:22

## 2024-03-21 RX ADMIN — HEPARIN SODIUM 5000 UNITS: 5000 INJECTION INTRAVENOUS; SUBCUTANEOUS at 21:22

## 2024-03-21 RX ADMIN — BENZONATATE 100 MG: 100 CAPSULE ORAL at 21:21

## 2024-03-21 RX ADMIN — MIRTAZAPINE 7.5 MG: 15 TABLET, FILM COATED ORAL at 21:22

## 2024-03-21 RX ADMIN — BUSPIRONE HYDROCHLORIDE 5 MG: 5 TABLET ORAL at 21:22

## 2024-03-21 RX ADMIN — GUAIFENESIN 600 MG: 600 TABLET, EXTENDED RELEASE ORAL at 22:07

## 2024-03-21 RX ADMIN — ONDANSETRON 4 MG: 2 INJECTION INTRAMUSCULAR; INTRAVENOUS at 10:05

## 2024-03-21 RX ADMIN — BUSPIRONE HYDROCHLORIDE 5 MG: 5 TABLET ORAL at 17:45

## 2024-03-21 RX ADMIN — METOCLOPRAMIDE HYDROCHLORIDE 10 MG: 5 INJECTION INTRAMUSCULAR; INTRAVENOUS at 06:40

## 2024-03-21 RX ADMIN — SENNOSIDES, DOCUSATE SODIUM 2 TABLET: 8.6; 5 TABLET ORAL at 08:54

## 2024-03-21 NOTE — PROGRESS NOTES
"[facility]  Progress Note  Name: Chelsey Carson I  MRN: [id number]  Unit/Bed#: NW8 862-02 I Date of Admission: 3/16/2024   Date of Service: 3/21/2024 I Hospital Day: 4    Assessment/Plan   * Proctitis  Assessment & Plan  Presented w/ constipation and rectal pain/spasms - noted h/o waxing/waning constipation and hemorrhoids per patient  PRN Anusol on board for hemorrhoids  PRN Nifedipine/Lidocaine ointment on board for rectal spasms   S/p a dose of glycerin suppository and bowel prep  Continue senna, miralax BID   CT imaging noted: \"Mild stercoral or infectious proctitis. Large colonic stool burden. No obstruction.\"  Gastroenterology consulted, appreciate recs     Constipation  Assessment & Plan  Optimize bowel regimen   See plan for associated proctitis above   Evaluated by gastroenterology with attempts at bedside disimpaction (stopped mid-process due to patient complaints of pain)  KUB on 3/18 again demonstrated constipation   Pt is having regular Bms documented at this time, continue miralax BID, senna     Nausea  Assessment & Plan  Continues with nausea, worse in AM.  No vomiting.  Better in afternoons but persists despite reglan, zofran.  Has been having bowel movements.  She denies abd pain.   Added scopolamine patch on 3/20 which pt reports made her nausea worse  D/w GI for further recommendations/to re-evaluate.  This AM pt reports was only able to tolerate a few bites of her peaches 2/2 nausea     Thrombocytosis  Assessment & Plan  Likely reactive due to acute medical issue(s)  Monitor platelet count    Macrocytic anemia  Assessment & Plan  B12/folate levels noted and acceptable  Monitor H/H    Ambulatory dysfunction  Assessment & Plan  Previously able to walk without a cane or walker  Appreciate PT/OT input -> recommending skilled rehab once medically stable    Hyponatremia  Assessment & Plan  Overall stable   Consider a SIADH type picture in the setting of " uncontrolled pain coupled with possible solute loss from decreased oral intake  Trial fluid restriction as paradoxic decrease noted on IV fluids     Chronic cough  Assessment & Plan  Chronic cough most likely cough variant asthma with possible underlying GERD  C/w PPI and PRN Albuterol  CT imaging noted lower lung tree-in-bud infiltrates suspicious for possible infectious bronchiolitis - follow-up portable CXR negative for acute cardiopulmonary disease  Remains afebrile without leukocytosis -> less likely suspicion for infectious etiology, hence, continue to observe off antibiotics  PRN cough suppression/expectorant therapy on board   Influenza/COVID/RSV negative    Hyperlipidemia  Assessment & Plan  Continue statin           VTE Pharmacologic Prophylaxis: VTE Score: 3 Moderate Risk (Score 3-4) - Pharmacological DVT Prophylaxis Ordered: heparin.    Mobility:   Basic Mobility Inpatient Raw Score: 12  JH-HLM Goal: 4: Move to chair/commode  JH-HLM Achieved: 3: Sit at edge of bed  JH-HLM Goal NOT achieved. Continue with multidisciplinary rounding and encourage appropriate mobility to improve upon JH-HLM goals.    Patient Centered Rounds: I performed bedside rounds with nursing staff today.   Discussions with Specialists or Other Care Team Provider: GI, CM    Education and Discussions with Family / Patient: Updated  (son) via phone.    Total Time Spent on Date of Encounter in care of patient: 25 mins. This time was spent on one or more of the following: performing physical exam; counseling and coordination of care; obtaining or reviewing history; documenting in the medical record; reviewing/ordering tests, medications or procedures; communicating with other healthcare professionals and discussing with patient's family/caregivers.    Current Length of Stay: 4 day(s)  Current Patient Status: Inpatient   Certification Statement: The patient will continue to require additional inpatient hospital stay due to  intractable nausea  Discharge Plan: Anticipate discharge in 24-48 hrs to rehab facility.    Code Status: Level 3 - DNAR and DNI    Subjective:   Continues with severe nausea that is limiting her ability to eat and participate with therapy.  She thinks the scopolamine patch made her nausea worse. Having Bms.  Denies abdominal pain, vomiting.     Objective:     Vitals:   Temp (24hrs), Av.3 °F (36.8 °C), Min:98.1 °F (36.7 °C), Max:98.4 °F (36.9 °C)    Temp:  [98.1 °F (36.7 °C)-98.4 °F (36.9 °C)] 98.1 °F (36.7 °C)  HR:  [103-120] 107  Resp:  [16] 16  BP: (141-182)/(63-94) 182/94  SpO2:  [94 %-95 %] 94 %  Body mass index is 23.83 kg/m².     Input and Output Summary (last 24 hours):     Intake/Output Summary (Last 24 hours) at 3/21/2024 1148  Last data filed at 3/20/2024 1901  Gross per 24 hour   Intake --   Output 800 ml   Net -800 ml       Physical Exam:   Physical Exam  Vitals reviewed.   Constitutional:       General: She is not in acute distress.     Appearance: She is not toxic-appearing.   HENT:      Head: Normocephalic and atraumatic.   Eyes:      Extraocular Movements: Extraocular movements intact.   Cardiovascular:      Rate and Rhythm: Normal rate and regular rhythm.   Pulmonary:      Effort: Pulmonary effort is normal. No respiratory distress.   Abdominal:      General: Bowel sounds are normal. There is no distension.      Palpations: Abdomen is soft.      Tenderness: There is no abdominal tenderness.   Musculoskeletal:         General: Normal range of motion.   Neurological:      General: No focal deficit present.      Mental Status: She is alert and oriented to person, place, and time.   Psychiatric:         Mood and Affect: Mood normal.         Behavior: Behavior normal.         Thought Content: Thought content normal.          Additional Data:     Labs:  Results from last 7 days   Lab Units 24  0636 24  0456 24  0549   WBC Thousand/uL 7.49   < > 7.68   HEMOGLOBIN g/dL 10.2*   < > 9.0*    HEMATOCRIT % 31.6*   < > 27.0*   PLATELETS Thousands/uL 387   < > 417*   BANDS PCT % 4   < >  --    NEUTROS PCT %  --   --  68   LYMPHS PCT %  --   --  20   LYMPHO PCT % 23   < >  --    MONOS PCT %  --   --  8   MONO PCT % 4   < >  --    EOS PCT % 3   < > 1    < > = values in this interval not displayed.     Results from last 7 days   Lab Units 03/21/24  0636 03/18/24  0456 03/17/24  0549   SODIUM mmol/L 132*   < > 134*   POTASSIUM mmol/L 3.8   < > 4.1   CHLORIDE mmol/L 97   < > 97   CO2 mmol/L 25   < > 26   BUN mg/dL 14   < > 14   CREATININE mg/dL 0.76   < > 0.67   ANION GAP mmol/L 10   < > 11   CALCIUM mg/dL 8.3*   < > 7.5*   ALBUMIN g/dL  --   --  2.7*   TOTAL BILIRUBIN mg/dL  --   --  0.39   ALK PHOS U/L  --   --  56   ALT U/L  --   --  23   AST U/L  --   --  23   GLUCOSE RANDOM mg/dL 95   < > 88    < > = values in this interval not displayed.                       Lines/Drains:  Invasive Devices       Peripheral Intravenous Line  Duration             Peripheral IV 03/16/24 Dorsal (posterior);Left Hand 5 days              Drain  Duration             External Urinary Catheter 4 days                          Imaging: No pertinent imaging reviewed.    Recent Cultures (last 7 days):         Last 24 Hours Medication List:   Current Facility-Administered Medications   Medication Dose Route Frequency Provider Last Rate    acetaminophen  650 mg Oral Q6H PRN Anne Frost MD      albuterol  2 puff Inhalation Q4H PRN Anne Frost MD      benzonatate  100 mg Oral TID PRN Anne Frost MD      busPIRone  5 mg Oral TID Kenji Fitzgerald MD      calcium carbonate  500 mg Oral TID PRN Anne Frost MD      doxylamine  25 mg Oral HS PRN Anne Frost MD      heparin (porcine)  5,000 Units Subcutaneous Q8H [facility] Kenji Fitzgerald MD      hydrocortisone   Topical 4x Daily PRN Anne Frost MD      labetalol  10 mg Intravenous Q4H PRN Anne Frost MD      melatonin  3 mg Oral HS PRN Marilyn Hogan PA-C      metoclopramide  10 mg  Intravenous Q6H PRN Anne Frost MD      NIFEdipine 0.3%-lidocaine 5%   Topical Q6H PRN Flora Jasso PA-C      ondansetron  4 mg Intravenous Q4H PRN Marilyn Hogan PA-C      pantoprazole  40 mg Intravenous Q12H [facility] Anne Frost MD      polyethylene glycol  17 g Oral BID Flora Jasso PA-C      scopolamine  1 patch Transdermal Q72H Flora Jasso PA-C      senna-docusate sodium  2 tablet Oral BID Jeff Hennessy MD          Today, Patient Was Seen By: Flora Jasso PA-C    **Please Note: This note may have been constructed using a voice recognition system.**

## 2024-03-21 NOTE — ASSESSMENT & PLAN NOTE
Overall stable   Consider a SIADH type picture in the setting of uncontrolled pain coupled with possible solute loss from decreased oral intake  Trial fluid restriction as paradoxic decrease noted on IV fluids

## 2024-03-21 NOTE — PHYSICAL THERAPY NOTE
Physical Therapy Progress Note     03/21/24 1155   PT Last Visit   PT Visit Date 03/21/24   Note Type   Note Type Treatment   Pain Assessment   Pain Assessment Tool 0-10   Pain Score No Pain   Restrictions/Precautions   Other Precautions Pain;Fall Risk;Hard of hearing;Chair Alarm   Subjective   Subjective The patient notes continued nausea which precludes her from mobilizing at all. She was expressive about her situation, and how she does not understand how her doctor and her GI doctor did not help her, and that she had to come to the hospital.   Bed Mobility   Rolling R 4  Minimal assistance   Additional items Assist x 1;Increased time required;Verbal cues;LE management   Rolling L 4  Minimal assistance   Additional items Assist x 1;Increased time required;Verbal cues;LE management   Activity Tolerance   Activity Tolerance Patient limited by fatigue   Nurse Made Aware Yes.   Exercises   TKR Supine;Bilateral;5 reps;10 reps;AROM;AAROM  (x3 sets.)   Assessment   Prognosis Good   Problem List Decreased strength;Decreased range of motion;Decreased endurance;Impaired balance;Decreased mobility;Pain   Assessment The patient notes continued nausea that limits her ability to perform any mobility. She remains expressive about her situation, and she was only amenable to rolling while being cleaned after the bedpan as well as therapeutic exercise despite considerable gentle motivation to participate more. Educated her about the likely improvement of her nausea with sitting out of bed as well as ambulating to help foster more bowel movements. The patient continued to defer. Will continue to follow.   Barriers to Discharge Inaccessible home environment;Decreased caregiver support   Goals   Patient Goals To get better.   STG Expiration Date 04/01/24   PT Treatment Day 2   Plan   Treatment/Interventions Functional transfer training;Elevations;LE strengthening/ROM;Therapeutic exercise;Endurance training;Patient/family training;Bed  mobility;Gait training   Progress Slow progress, decreased activity tolerance   PT Frequency 2-3x/wk   Discharge Recommendation   Rehab Resource Intensity Level, PT II (Moderate Resource Intensity)   AM-PAC Basic Mobility Inpatient   Turning in Flat Bed Without Bedrails 3   Lying on Back to Sitting on Edge of Flat Bed Without Bedrails 3   Moving Bed to Chair 2   Standing Up From Chair Using Arms 2   Walk in Room 2   Climb 3-5 Stairs With Railing 2   Basic Mobility Inpatient Raw Score 14   Basic Mobility Standardized Score 35.55   [facility] Highest Level Of Mobility   -[facility] Goal 4: Move to chair/commode   -HLM Achieved 2: Bed activities/Dependent transfer         An AM-PAC Basic Mobility raw score less than 16 suggests the patient may benefit from discharge to post-acute rehab services.    Karson Field, PTA

## 2024-03-21 NOTE — PLAN OF CARE
Problem: PAIN - ADULT  Goal: Verbalizes/displays adequate comfort level or baseline comfort level  Description: Interventions:  - Encourage patient to monitor pain and request assistance  - Assess pain using appropriate pain scale  - Administer analgesics based on type and severity of pain and evaluate response  - Implement non-pharmacological measures as appropriate and evaluate response  - Consider cultural and social influences on pain and pain management  - Notify physician/advanced practitioner if interventions unsuccessful or patient reports new pain  Outcome: Progressing     Problem: DISCHARGE PLANNING  Goal: Discharge to home or other facility with appropriate resources  Description: INTERVENTIONS:  - Identify barriers to discharge w/patient and caregiver  - Arrange for needed discharge resources and transportation as appropriate  - Identify discharge learning needs (meds, wound care, etc.)  - Arrange for interpretive services to assist at discharge as needed  - Refer to Case Management Department for coordinating discharge planning if the patient needs post-hospital services based on physician/advanced practitioner order or complex needs related to functional status, cognitive ability, or social support system  Outcome: Progressing     Problem: Knowledge Deficit  Goal: Patient/family/caregiver demonstrates understanding of disease process, treatment plan, medications, and discharge instructions  Description: Complete learning assessment and assess knowledge base.  Interventions:  - Provide teaching at level of understanding  - Provide teaching via preferred learning methods  Outcome: Progressing     Problem: GASTROINTESTINAL - ADULT  Goal: Minimal or absence of nausea and/or vomiting  Description: INTERVENTIONS:  - Administer IV fluids if ordered to ensure adequate hydration  - Maintain NPO status until nausea and vomiting are resolved  - Nasogastric tube if ordered  - Administer ordered antiemetic  medications as needed  - Provide nonpharmacologic comfort measures as appropriate  - Advance diet as tolerated, if ordered  - Consider nutrition services referral to assist patient with adequate nutrition and appropriate food choices  Outcome: Progressing  Goal: Maintains or returns to baseline bowel function  Description: INTERVENTIONS:  - Assess bowel function  - Encourage oral fluids to ensure adequate hydration  - Administer IV fluids if ordered to ensure adequate hydration  - Administer ordered medications as needed  - Encourage mobilization and activity  - Consider nutritional services referral to assist patient with adequate nutrition and appropriate food choices  Outcome: Progressing     Problem: Prexisting or High Potential for Compromised Skin Integrity  Goal: Skin integrity is maintained or improved  Description: INTERVENTIONS:  - Identify patients at risk for skin breakdown  - Assess and monitor skin integrity  - Assess and monitor nutrition and hydration status  - Monitor labs   - Assess for incontinence   - Turn and reposition patient  - Assist with mobility/ambulation  - Relieve pressure over bony prominences  - Avoid friction and shearing  - Provide appropriate hygiene as needed including keeping skin clean and dry  - Evaluate need for skin moisturizer/barrier cream  - Collaborate with interdisciplinary team   - Patient/family teaching  - Consider wound care consult   Outcome: Progressing

## 2024-03-21 NOTE — PLAN OF CARE
Problem: PHYSICAL THERAPY ADULT  Goal: Performs mobility at highest level of function for planned discharge setting.  See evaluation for individualized goals.  Description: Treatment/Interventions: ADL retraining, Functional transfer training, LE strengthening/ROM, Elevations, Therapeutic exercise, Endurance training, Bed mobility, Gait training, Spoke to nursing, OT          See flowsheet documentation for full assessment, interventions and recommendations.  Outcome: Not Progressing  Note: Prognosis: Good  Problem List: Decreased strength, Decreased range of motion, Decreased endurance, Impaired balance, Decreased mobility, Pain  Assessment: The patient notes continued nausea that limits her ability to perform any mobility. She remains expressive about her situation, and she was only amenable to rolling while being cleaned after the bedpan as well as therapeutic exercise despite considerable gentle motivation to participate more. Educated her about the likely improvement of her nausea with sitting out of bed as well as ambulating to help foster more bowel movements. The patient continued to defer. Will continue to follow.  Barriers to Discharge: Inaccessible home environment, Decreased caregiver support     Rehab Resource Intensity Level, PT: II (Moderate Resource Intensity)    See flowsheet documentation for full assessment.

## 2024-03-21 NOTE — ASSESSMENT & PLAN NOTE
Continues with nausea, worse in AM.  No vomiting.  Better in afternoons but persists despite reglan, zofran.  Has been having bowel movements.  She denies abd pain.   Added scopolamine patch on 3/20 which pt reports made her nausea worse  D/w GI for further recommendations/to re-evaluate.  This AM pt reports was only able to tolerate a few bites of her peaches 2/2 nausea

## 2024-03-21 NOTE — PLAN OF CARE
Problem: GASTROINTESTINAL - ADULT  Goal: Minimal or absence of nausea and/or vomiting  Description: INTERVENTIONS:  - Administer IV fluids if ordered to ensure adequate hydration  - Maintain NPO status until nausea and vomiting are resolved  - Nasogastric tube if ordered  - Administer ordered antiemetic medications as needed  - Provide nonpharmacologic comfort measures as appropriate  - Advance diet as tolerated, if ordered  - Consider nutrition services referral to assist patient with adequate nutrition and appropriate food choices  Outcome: Progressing     Problem: PAIN - ADULT  Goal: Verbalizes/displays adequate comfort level or baseline comfort level  Description: Interventions:  - Encourage patient to monitor pain and request assistance  - Assess pain using appropriate pain scale  - Administer analgesics based on type and severity of pain and evaluate response  - Implement non-pharmacological measures as appropriate and evaluate response  - Consider cultural and social influences on pain and pain management  - Notify physician/advanced practitioner if interventions unsuccessful or patient reports new pain  Outcome: Progressing     Problem: DISCHARGE PLANNING  Goal: Discharge to home or other facility with appropriate resources  Description: INTERVENTIONS:  - Identify barriers to discharge w/patient and caregiver  - Arrange for needed discharge resources and transportation as appropriate  - Identify discharge learning needs (meds, wound care, etc.)  - Arrange for interpretive services to assist at discharge as needed  - Refer to Case Management Department for coordinating discharge planning if the patient needs post-hospital services based on physician/advanced practitioner order or complex needs related to functional status, cognitive ability, or social support system  Outcome: Progressing     Problem: Nutrition/Hydration-ADULT  Goal: Nutrient/Hydration intake appropriate for improving, restoring or  maintaining nutritional needs  Description: Monitor and assess patient's nutrition/hydration status for malnutrition. Collaborate with interdisciplinary team and initiate plan and interventions as ordered.  Monitor patient's weight and dietary intake as ordered or per policy. Utilize nutrition screening tool and intervene as necessary. Determine patient's food preferences and provide high-protein, high-caloric foods as appropriate.     INTERVENTIONS:  - Monitor oral intake, urinary output, labs, and treatment plans  - Assess nutrition and hydration status and recommend course of action  - Evaluate amount of meals eaten  - Assist patient with eating if necessary   - Allow adequate time for meals  - Recommend/ encourage appropriate diets, oral nutritional supplements, and vitamin/mineral supplements  - Order, calculate, and assess calorie counts as needed  - Recommend, monitor, and adjust tube feedings and TPN/PPN based on assessed needs  - Assess need for intravenous fluids  - Provide specific nutrition/hydration education as appropriate  - Include patient/family/caregiver in decisions related to nutrition  Outcome: Progressing

## 2024-03-21 NOTE — PROGRESS NOTES
Progress Note- Chelsey Carson 88 y.o. female MRN: [id number]    Unit/Bed#: NW8 862-02 Encounter: [id number]      Assessment and Plan:    Ms. Chelsey Carson is an 88-year-old female with a past medical history of ambulatory dysfunction, hyperlipidemia, asthma, and chronic constipation who initially presenting with abdominal pain 2/2 pancolonic stool burden for GI was initiation consulted. GI now asked to reevaluate due to continue nausea.    Intractable Nausea  Significant Stool Dolphin   Stercoral Proctocolitis     Patient initially presented with abdominal pain and cramping.  CT imaging demonstrated pancolonic stool burden along with proctocolitis concerning for stercoral proctitis.  Patient's status post attempted enema and manual disimpaction on 3/17; however, patient unable to tolerate due to rectal pain with minimal digital manipulation.  Fortunately, patient's bowel movements have improved with bowel regimen.  However, she continues to have extreme nausea/vomiting with lack of response to Zofran, Reglan, and Scopolamine. Nausea constant and not associated with PO intake. Does admit to lack of appetite and difficulty sleeping at night. Therefore in conjunction with anti-emetics, will trial low dose Remeron and monitor for response.     Plan:  Begin on Remeron 7.5 mg at bedtime   Continue on Scopolamine patch and along with PRN Zofran and Zofran   Continue on Miralax 17 g twice daily   Okay for regular diet as patient tolerates   Avoid use of anticholinergics and opioids  Encourage OOB and ambulation   Additional pain and symptom management per primary team    ______________________________________________________________________    Subjective:     Patient seen and examined at bedside. Lying in bed comfortably in no acute distress or visible discomfort.  Says she has constant nausea.  Denies vomiting but does admit to lack of oral intake due to poor appetite.  Denies any abdominal pain.  Continues to have  daily bowel movements.  Rectal pain now improved.    Medication Administration - last 24 hours from 03/20/2024 1233 to 03/21/2024 1233         Date/Time Order Dose Route Action Action by     03/21/2024 0854 EDT busPIRone (BUSPAR) tablet 5 mg 5 mg Oral Given Myrtle Abreu RN     03/20/2024 2126 EDT busPIRone (BUSPAR) tablet 5 mg 5 mg Oral Given Elaine Bustillos RN     03/20/2024 1605 EDT busPIRone (BUSPAR) tablet 5 mg 5 mg Oral Given Anabelle Mckinley RN     03/21/2024 0618 EDT heparin (porcine) subcutaneous injection 5,000 Units 5,000 Units Subcutaneous Given Elaine Bustillos RN     03/20/2024 2126 EDT heparin (porcine) subcutaneous injection 5,000 Units 5,000 Units Subcutaneous Given Elaine Bustillos RN     03/20/2024 1400 EDT heparin (porcine) subcutaneous injection 5,000 Units 5,000 Units Subcutaneous Not Given Anabelle Mckinley RN     03/20/2024 2231 EDT hydrocortisone (ANUSOL-HC) 2.5 % rectal cream -- Topical Given Elaine Bustillos RN     03/21/2024 0854 EDT senna-docusate sodium (SENOKOT S) 8.6-50 mg per tablet 2 tablet 2 tablet Oral Given Myrtle Abreu RN     03/20/2024 1837 EDT senna-docusate sodium (SENOKOT S) 8.6-50 mg per tablet 2 tablet 2 tablet Oral Given Myrtle Abreu RN     03/20/2024 2132 EDT melatonin tablet 3 mg 3 mg Oral Not Given Elaine Bustillos RN     03/21/2024 1005 EDT ondansetron (ZOFRAN) injection 4 mg 4 mg Intravenous Given Myrtle Abreu RN     03/20/2024 1605 EDT ondansetron (ZOFRAN) injection 4 mg 4 mg Intravenous Given Anabelle Mckinley RN     03/20/2024 2215 EDT doxylamine (UNISOM) tablet 25 mg 25 mg Oral Given Elaine Bustillos RN     03/21/2024 0640 EDT metoclopramide (REGLAN) injection 10 mg 10 mg Intravenous Given Elaine Bustillos RN     03/20/2024 2132 EDT metoclopramide (REGLAN) injection 10 mg 10 mg Intravenous Given Elaine Bustillos RN     03/21/2024 1005 EDT benzonatate (TESSALON PERLES) capsule 100 mg 100 mg Oral Given Myrtle Abreu RN     03/20/2024 2132 EDT benzonatate (TESSALON PERLES) capsule 100 mg 100  "mg Oral Given Elaine Bustillos RN     03/21/2024 0854 EDT pantoprazole (PROTONIX) injection 40 mg 40 mg Intravenous Given Myrtle Abreu RN     03/20/2024 2126 EDT pantoprazole (PROTONIX) injection 40 mg 40 mg Intravenous Given Elaine Bustillos RN     03/21/2024 0854 EDT polyethylene glycol (MIRALAX) packet 17 g 17 g Oral Given Myrtle Abreu RN     03/20/2024 2126 EDT polyethylene glycol (MIRALAX) packet 17 g 17 g Oral Given Elaine Bustillos RN     03/20/2024 1300 EDT scopolamine (TRANSDERM-SCOP) 1 mg/3 days TD 72 hr patch 1 patch 1 patch Transdermal Medication Applied Anabelle Mckinley RN            Objective:     Vitals: Blood pressure (!) 182/94, pulse (!) 107, temperature 98.1 °F (36.7 °C), temperature source Oral, resp. rate 16, height 4' 10\" (1.473 m), weight 51.7 kg (114 lb), SpO2 94%.,Body mass index is 23.83 kg/m².      Intake/Output Summary (Last 24 hours) at 3/21/2024 1233  Last data filed at 3/20/2024 1901  Gross per 24 hour   Intake --   Output 800 ml   Net -800 ml       Physical Exam  Constitutional:       General: She is not in acute distress.     Appearance: She is normal weight. She is not ill-appearing or toxic-appearing.   HENT:      Head: Normocephalic and atraumatic.      Nose: Nose normal. No congestion.   Eyes:      General: No scleral icterus.  Cardiovascular:      Rate and Rhythm: Normal rate.      Pulses: Normal pulses.   Pulmonary:      Effort: Pulmonary effort is normal.   Abdominal:      General: Abdomen is flat. Bowel sounds are normal. There is no distension.      Tenderness: There is no abdominal tenderness.   Musculoskeletal:         General: Normal range of motion.      Cervical back: Normal range of motion.      Right lower leg: No edema.      Left lower leg: No edema.   Skin:     General: Skin is warm.      Capillary Refill: Capillary refill takes less than 2 seconds.      Coloration: Skin is not pale.   Neurological:      Mental Status: She is alert and oriented to person, place, and time. "   Psychiatric:         Mood and Affect: Mood normal.         Behavior: Behavior normal.           Invasive Devices       Peripheral Intravenous Line  Duration             Peripheral IV 03/16/24 Dorsal (posterior);Left Hand 5 days              Drain  Duration             External Urinary Catheter 4 days                    Lab Results:  No results displayed because visit has over 200 results.          Imaging Studies: I have personally reviewed pertinent imaging studies.

## 2024-03-21 NOTE — OCCUPATIONAL THERAPY NOTE
Occupational Therapy Progress Note     Patient Name: Chelsey Carson  Today's Date: 3/21/2024  Problem List  Principal Problem:    Proctitis  Active Problems:    Hyperlipidemia    Chronic cough    Hyponatremia    Constipation    Ambulatory dysfunction    Macrocytic anemia    Thrombocytosis    Nausea            03/21/24 1201   OT Last Visit   OT Visit Date 03/21/24   Note Type   Note type Cancelled Session   Cancel Reasons Refusal   Additional Comments Pt reporting nausea and unable to particpate in therapy at this time. OT will continue to follow pt as appropriate.       JH Chaves, OTR/L

## 2024-03-22 LAB
ANION GAP SERPL CALCULATED.3IONS-SCNC: 8 MMOL/L (ref 4–13)
BUN SERPL-MCNC: 17 MG/DL (ref 5–25)
CALCIUM SERPL-MCNC: 8.6 MG/DL (ref 8.4–10.2)
CHLORIDE SERPL-SCNC: 95 MMOL/L (ref 96–108)
CO2 SERPL-SCNC: 28 MMOL/L (ref 21–32)
CREAT SERPL-MCNC: 0.89 MG/DL (ref 0.6–1.3)
ERYTHROCYTE [DISTWIDTH] IN BLOOD BY AUTOMATED COUNT: 14.3 % (ref 11.6–15.1)
GFR SERPL CREATININE-BSD FRML MDRD: 58 ML/MIN/1.73SQ M
GLUCOSE SERPL-MCNC: 94 MG/DL (ref 65–140)
HCT VFR BLD AUTO: 32.8 % (ref 34.8–46.1)
HGB BLD-MCNC: 10.7 G/DL (ref 11.5–15.4)
MCH RBC QN AUTO: 34.3 PG (ref 26.8–34.3)
MCHC RBC AUTO-ENTMCNC: 32.6 G/DL (ref 31.4–37.4)
MCV RBC AUTO: 105 FL (ref 82–98)
PLATELET # BLD AUTO: 405 THOUSANDS/UL (ref 149–390)
PMV BLD AUTO: 8.5 FL (ref 8.9–12.7)
POTASSIUM SERPL-SCNC: 3.8 MMOL/L (ref 3.5–5.3)
RBC # BLD AUTO: 3.12 MILLION/UL (ref 3.81–5.12)
SODIUM SERPL-SCNC: 131 MMOL/L (ref 135–147)
WBC # BLD AUTO: 6.48 THOUSAND/UL (ref 4.31–10.16)

## 2024-03-22 PROCEDURE — 97110 THERAPEUTIC EXERCISES: CPT

## 2024-03-22 PROCEDURE — 80048 BASIC METABOLIC PNL TOTAL CA: CPT | Performed by: INTERNAL MEDICINE

## 2024-03-22 PROCEDURE — 85027 COMPLETE CBC AUTOMATED: CPT | Performed by: INTERNAL MEDICINE

## 2024-03-22 PROCEDURE — 97535 SELF CARE MNGMENT TRAINING: CPT

## 2024-03-22 PROCEDURE — 99232 SBSQ HOSP IP/OBS MODERATE 35: CPT

## 2024-03-22 PROCEDURE — 97530 THERAPEUTIC ACTIVITIES: CPT

## 2024-03-22 PROCEDURE — 99232 SBSQ HOSP IP/OBS MODERATE 35: CPT | Performed by: INTERNAL MEDICINE

## 2024-03-22 PROCEDURE — 97112 NEUROMUSCULAR REEDUCATION: CPT

## 2024-03-22 PROCEDURE — C9113 INJ PANTOPRAZOLE SODIUM, VIA: HCPCS | Performed by: INTERNAL MEDICINE

## 2024-03-22 RX ADMIN — HYDROCORTISONE: 25 CREAM TOPICAL at 12:43

## 2024-03-22 RX ADMIN — METOCLOPRAMIDE HYDROCHLORIDE 10 MG: 5 INJECTION INTRAMUSCULAR; INTRAVENOUS at 16:00

## 2024-03-22 RX ADMIN — GUAIFENESIN 600 MG: 600 TABLET, EXTENDED RELEASE ORAL at 08:08

## 2024-03-22 RX ADMIN — POLYETHYLENE GLYCOL 3350 17 G: 17 POWDER, FOR SOLUTION ORAL at 08:08

## 2024-03-22 RX ADMIN — PANTOPRAZOLE SODIUM 40 MG: 40 INJECTION, POWDER, FOR SOLUTION INTRAVENOUS at 22:28

## 2024-03-22 RX ADMIN — HEPARIN SODIUM 5000 UNITS: 5000 INJECTION INTRAVENOUS; SUBCUTANEOUS at 14:55

## 2024-03-22 RX ADMIN — PANTOPRAZOLE SODIUM 40 MG: 40 INJECTION, POWDER, FOR SOLUTION INTRAVENOUS at 08:08

## 2024-03-22 RX ADMIN — ONDANSETRON 4 MG: 2 INJECTION INTRAMUSCULAR; INTRAVENOUS at 08:11

## 2024-03-22 RX ADMIN — HEPARIN SODIUM 5000 UNITS: 5000 INJECTION INTRAVENOUS; SUBCUTANEOUS at 22:29

## 2024-03-22 RX ADMIN — MIRTAZAPINE 7.5 MG: 15 TABLET, FILM COATED ORAL at 22:16

## 2024-03-22 RX ADMIN — METOCLOPRAMIDE HYDROCHLORIDE 10 MG: 5 INJECTION INTRAMUSCULAR; INTRAVENOUS at 11:17

## 2024-03-22 RX ADMIN — BUSPIRONE HYDROCHLORIDE 5 MG: 5 TABLET ORAL at 22:17

## 2024-03-22 RX ADMIN — BENZONATATE 100 MG: 100 CAPSULE ORAL at 22:16

## 2024-03-22 RX ADMIN — SENNOSIDES, DOCUSATE SODIUM 2 TABLET: 8.6; 5 TABLET ORAL at 08:08

## 2024-03-22 RX ADMIN — BUSPIRONE HYDROCHLORIDE 5 MG: 5 TABLET ORAL at 16:00

## 2024-03-22 RX ADMIN — BUSPIRONE HYDROCHLORIDE 5 MG: 5 TABLET ORAL at 08:08

## 2024-03-22 RX ADMIN — HEPARIN SODIUM 5000 UNITS: 5000 INJECTION INTRAVENOUS; SUBCUTANEOUS at 06:21

## 2024-03-22 RX ADMIN — SENNOSIDES, DOCUSATE SODIUM 2 TABLET: 8.6; 5 TABLET ORAL at 17:04

## 2024-03-22 NOTE — PLAN OF CARE
Problem: PHYSICAL THERAPY ADULT  Goal: Performs mobility at highest level of function for planned discharge setting.  See evaluation for individualized goals.  Description: Treatment/Interventions: ADL retraining, Functional transfer training, LE strengthening/ROM, Elevations, Therapeutic exercise, Endurance training, Bed mobility, Gait training, Spoke to nursing, OT          See flowsheet documentation for full assessment, interventions and recommendations.  Outcome: Progressing  Note: Prognosis: Good  Problem List: Decreased strength, Decreased range of motion, Decreased endurance, Impaired balance, Decreased mobility, Pain  Assessment: The patient was noted to be sitting more upright in the bed this morning than she has been able to the past several days. She also was able to begin eating without any exacerbation of her nausea. She continues to defer any out of bed mobility, and she inquired what is the difference of sitting in the bed vs in the chair. Provided her education about the benefit of sitting in the chair as well as mobilizing. Encouraged her to attempt out of bed tomorrow as she has not been out of bed in at least three days.  Barriers to Discharge: Inaccessible home environment, Decreased caregiver support     Rehab Resource Intensity Level, PT: II (Moderate Resource Intensity)    See flowsheet documentation for full assessment.

## 2024-03-22 NOTE — PROGRESS NOTES
"[facility]  Progress Note  Name: Chelsey Carson I  MRN: [id number]  Unit/Bed#: NW8 862-02 I Date of Admission: 3/16/2024   Date of Service: 3/22/2024 I Hospital Day: 5    Assessment/Plan   * Proctitis  Assessment & Plan  Presented w/ constipation and rectal pain/spasms - noted h/o waxing/waning constipation and hemorrhoids per patient  PRN Anusol on board for hemorrhoids  PRN Nifedipine/Lidocaine ointment on board for rectal spasms   S/p a dose of glycerin suppository and bowel prep  Continue senna, miralax BID   CT imaging noted: \"Mild stercoral or infectious proctitis. Large colonic stool burden. No obstruction.\"  Gastroenterology consulted, appreciate recs - see below   With continued nausea, Remeron 7.5mg hs has been added   Continue scopolamine patch, PRN zofran, PRN Reglan   Regular diet if tolerating   OOB and ambulation   Discussed with GI about possibly re-imaging abd - no clear indication as patient is having BM, not vomiting, labs/vitals stable, and she is passing gas. Continue conservative management     Nausea  Assessment & Plan  Continues with nausea, worse in AM.  No vomiting.  Better in afternoons but persists despite reglan, zofran.  Has been having bowel movements.  She denies abd pain.   Added scopolamine patch on 3/20 which pt reports made her nausea worse  GI following, appreciate recs  See proctitis for further plan     Thrombocytosis  Assessment & Plan  Likely reactive due to acute medical issue(s)  Monitor platelet count    Macrocytic anemia  Assessment & Plan  B12/folate levels noted and acceptable  Monitor H/H    Ambulatory dysfunction  Assessment & Plan  Previously able to walk without a cane or walker  Appreciate PT/OT input -> recommending skilled rehab once medically stable    Constipation  Assessment & Plan  Optimize bowel regimen   See plan for associated proctitis above   Evaluated by gastroenterology with attempts at bedside disimpaction " (stopped mid-process due to patient complaints of pain)  KUB on 3/18 again demonstrated constipation   Pt is having regular Bms documented at this time, continue miralax BID, senna     Hyponatremia  Assessment & Plan  Appears to be chronic and stable   Consider a SIADH type picture in the setting of uncontrolled pain coupled with possible solute loss from decreased oral intake  Trial fluid restriction as paradoxic decrease noted on IV fluids   Continue to monitor on BMP daily     Chronic cough  Assessment & Plan  Chronic cough most likely cough variant asthma with possible underlying GERD  C/w PPI and PRN Albuterol  CT imaging noted lower lung tree-in-bud infiltrates suspicious for possible infectious bronchiolitis - follow-up portable CXR negative for acute cardiopulmonary disease  Remains afebrile without leukocytosis -> less likely suspicion for infectious etiology, hence, continue to observe off antibiotics  PRN cough suppression/expectorant therapy on board   Influenza/COVID/RSV negative    Hyperlipidemia  Assessment & Plan  Continue statin         VTE Pharmacologic Prophylaxis: VTE Score: 3 Moderate Risk (Score 3-4) - Pharmacological DVT Prophylaxis Ordered: heparin.    Mobility:   Basic Mobility Inpatient Raw Score: 20  JH-HLM Goal: 6: Walk 10 steps or more  JH-HLM Achieved: 2: Bed activities/Dependent transfer  JH-HLM Goal NOT achieved. Continue with multidisciplinary rounding and encourage appropriate mobility to improve upon JH-HLM goals.    Patient Centered Rounds: I performed bedside rounds with nursing staff today.   Discussions with Specialists or Other Care Team Provider: GI    Education and Discussions with Family / Patient: Updated  (son) via phone.    Total Time Spent on Date of Encounter in care of patient: This time was spent on one or more of the following: performing physical exam; counseling and coordination of care; obtaining or reviewing history; documenting in the medical  record; reviewing/ordering tests, medications or procedures; communicating with other healthcare professionals and discussing with patient's family/caregivers.    Current Length of Stay: 5 day(s)  Current Patient Status: Inpatient   Certification Statement: The patient will continue to require additional inpatient hospital stay due to continued monitoring and control of nausea  Discharge Plan: Anticipate discharge in 24-48 hrs to rehab facility.    Code Status: Level 3 - DNAR and DNI    Subjective:   Seen and examined with GI at bedside. Still endorsing nausea throughout the day worse when she is sitting up. She was able to eat a small amount of her breakfast. No vomiting, no diarrhea. Having bowel movements and passing gas. No additional complaints at this time.     Objective:     Vitals:   Temp (24hrs), Av.9 °F (36.6 °C), Min:97.7 °F (36.5 °C), Max:98 °F (36.7 °C)    Temp:  [97.7 °F (36.5 °C)-98 °F (36.7 °C)] 97.7 °F (36.5 °C)  HR:  [] 69  Resp:  [15-18] 17  BP: (121-152)/(57-87) 121/57  SpO2:  [95 %-97 %] 96 %  Body mass index is 23.83 kg/m².     Input and Output Summary (last 24 hours):     Intake/Output Summary (Last 24 hours) at 3/22/2024 1230  Last data filed at 3/21/2024 1306  Gross per 24 hour   Intake 60 ml   Output --   Net 60 ml       Physical Exam:   Physical Exam  Constitutional:       General: She is not in acute distress.     Appearance: She is not toxic-appearing.   HENT:      Mouth/Throat:      Mouth: Mucous membranes are moist.   Eyes:      Conjunctiva/sclera: Conjunctivae normal.   Cardiovascular:      Rate and Rhythm: Normal rate.      Heart sounds: No murmur heard.     No friction rub. No gallop.   Pulmonary:      Breath sounds: No wheezing, rhonchi or rales.   Abdominal:      General: There is distension (mild).      Palpations: Abdomen is soft.      Tenderness: There is abdominal tenderness.   Musculoskeletal:      Right lower leg: No edema.      Left lower leg: No edema.   Skin:      General: Skin is warm and dry.   Neurological:      Mental Status: Mental status is at baseline.          Additional Data:     Labs:  Results from last 7 days   Lab Units 03/22/24  0828 03/21/24  0636 03/18/24  0456 03/17/24  0549   WBC Thousand/uL 6.48 7.49   < > 7.68   HEMOGLOBIN g/dL 10.7* 10.2*   < > 9.0*   HEMATOCRIT % 32.8* 31.6*   < > 27.0*   PLATELETS Thousands/uL 405* 387   < > 417*   BANDS PCT %  --  4   < >  --    NEUTROS PCT %  --   --   --  68   LYMPHS PCT %  --   --   --  20   LYMPHO PCT %  --  23   < >  --    MONOS PCT %  --   --   --  8   MONO PCT %  --  4   < >  --    EOS PCT %  --  3   < > 1    < > = values in this interval not displayed.     Results from last 7 days   Lab Units 03/22/24  0828 03/18/24 0456 03/17/24  0549   SODIUM mmol/L 131*   < > 134*   POTASSIUM mmol/L 3.8   < > 4.1   CHLORIDE mmol/L 95*   < > 97   CO2 mmol/L 28   < > 26   BUN mg/dL 17   < > 14   CREATININE mg/dL 0.89   < > 0.67   ANION GAP mmol/L 8   < > 11   CALCIUM mg/dL 8.6   < > 7.5*   ALBUMIN g/dL  --   --  2.7*   TOTAL BILIRUBIN mg/dL  --   --  0.39   ALK PHOS U/L  --   --  56   ALT U/L  --   --  23   AST U/L  --   --  23   GLUCOSE RANDOM mg/dL 94   < > 88    < > = values in this interval not displayed.                       Lines/Drains:  Invasive Devices       Peripheral Intravenous Line  Duration             Peripheral IV 03/16/24 Dorsal (posterior);Left Hand 6 days              Drain  Duration             External Urinary Catheter 5 days                          Imaging: Reviewed radiology reports from this admission including: abdominal/pelvic CT    Recent Cultures (last 7 days):         Last 24 Hours Medication List:   Current Facility-Administered Medications   Medication Dose Route Frequency Provider Last Rate    acetaminophen  650 mg Oral Q6H PRN Anne Frost MD      albuterol  2 puff Inhalation Q4H PRN Anne Frost MD      benzonatate  100 mg Oral TID PRN Anne Frost MD      busPIRone  5 mg Oral TID Kenji  MD Lia      calcium carbonate  500 mg Oral TID PRN Anne Frsot MD      doxylamine  25 mg Oral HS PRN Anne Frost MD      guaiFENesin  600 mg Oral Q12H [facility] Flora Jasso PA-C      heparin (porcine)  5,000 Units Subcutaneous Q8H [facility] Kenji Fitzgerald MD      hydrocortisone   Topical 4x Daily PRN Anne Frost MD      labetalol  10 mg Intravenous Q4H PRN Anne Frost MD      melatonin  3 mg Oral HS PRN Marilyn Hogan PA-C      metoclopramide  10 mg Intravenous Q6H PRN Anne Frost MD      mirtazapine  7.5 mg Oral HS Mariela Barreto DO      NIFEdipine 0.3%-lidocaine 5%   Topical Q6H PRN Flora Jasso PA-C      ondansetron  4 mg Intravenous Q4H PRN Marilyn Hogan PA-C      pantoprazole  40 mg Intravenous Q12H [facility] Anne Frost MD      polyethylene glycol  17 g Oral BID Flora Jasso PA-C      scopolamine  1 patch Transdermal Q72H Flora Jasso PA-C      senna-docusate sodium  2 tablet Oral BID Jeff Hennessy MD          Today, Patient Was Seen By: Sarai Almendarez PA-C    **Please Note: This note may have been constructed using a voice recognition system.**

## 2024-03-22 NOTE — PLAN OF CARE
Problem: OCCUPATIONAL THERAPY ADULT  Goal: Performs self-care activities at highest level of function for planned discharge setting.  See evaluation for individualized goals.  Description: Treatment Interventions: ADL retraining, Functional transfer training, UE strengthening/ROM, Endurance training, Cognitive reorientation, Patient/family training, Equipment evaluation/education, Compensatory technique education, Continued evaluation, Energy conservation, Activityengagement          See flowsheet documentation for full assessment, interventions and recommendations.   Outcome: Progressing  Note: Limitation: Decreased ADL status, Decreased Safe judgement during ADL, Decreased endurance, Decreased UE strength, Decreased self-care trans, Decreased high-level ADLs  Prognosis: Fair  Assessment: Pt was seen on 3/22/2024 to address ADL retraining, functional transfer training, and activity tolerance/endurance. Pt demonstrating improvements and currently requires MIN A for steadying assistance to complete seated grooming, UB bathing, UB dressing, and L and R rolling. Pt requires MOD A to complete sit to supine and supine to sit bed mobility. Pt requires MAX A to complete LB bathing, LB dressing, and patrizia hygiene after pt incontinent of stool in bed prior to OT arrival. Pt is limited by decreased ADL status, functional transfers, functional mobility, and activity tolerance. Pt supine in bed at beginning of session and supine in bed at end of session with alarm set and all items within reach. The patient's raw score on the AM-PAC Daily Activity Inpatient Short Form is 16. A raw score of greater than or equal to 19 suggests the patient may benefit from discharge to post-acute rehabilitation services. Please refer to the recommendation of the Occupational Therapist for safe discharge planning. Recommend Level II moderate intensity OT services at d/c to maximize pt function.     Rehab Resource Intensity Level, OT: II (Moderate  Resource Intensity)

## 2024-03-22 NOTE — PLAN OF CARE
Problem: DISCHARGE PLANNING  Goal: Discharge to home or other facility with appropriate resources  Description: INTERVENTIONS:  - Identify barriers to discharge w/patient and caregiver  - Arrange for needed discharge resources and transportation as appropriate  - Identify discharge learning needs (meds, wound care, etc.)  - Arrange for interpretive services to assist at discharge as needed  - Refer to Case Management Department for coordinating discharge planning if the patient needs post-hospital services based on physician/advanced practitioner order or complex needs related to functional status, cognitive ability, or social support system  Outcome: Progressing     Problem: Knowledge Deficit  Goal: Patient/family/caregiver demonstrates understanding of disease process, treatment plan, medications, and discharge instructions  Description: Complete learning assessment and assess knowledge base.  Interventions:  - Provide teaching at level of understanding  - Provide teaching via preferred learning methods  Outcome: Progressing     Problem: GASTROINTESTINAL - ADULT  Goal: Minimal or absence of nausea and/or vomiting  Description: INTERVENTIONS:  - Administer IV fluids if ordered to ensure adequate hydration  - Maintain NPO status until nausea and vomiting are resolved  - Nasogastric tube if ordered  - Administer ordered antiemetic medications as needed  - Provide nonpharmacologic comfort measures as appropriate  - Advance diet as tolerated, if ordered  - Consider nutrition services referral to assist patient with adequate nutrition and appropriate food choices  Outcome: Progressing  Goal: Maintains or returns to baseline bowel function  Description: INTERVENTIONS:  - Assess bowel function  - Encourage oral fluids to ensure adequate hydration  - Administer IV fluids if ordered to ensure adequate hydration  - Administer ordered medications as needed  - Encourage mobilization and activity  - Consider nutritional  services referral to assist patient with adequate nutrition and appropriate food choices  Outcome: Progressing     Problem: Prexisting or High Potential for Compromised Skin Integrity  Goal: Skin integrity is maintained or improved  Description: INTERVENTIONS:  - Identify patients at risk for skin breakdown  - Assess and monitor skin integrity  - Assess and monitor nutrition and hydration status  - Monitor labs   - Assess for incontinence   - Turn and reposition patient  - Assist with mobility/ambulation  - Relieve pressure over bony prominences  - Avoid friction and shearing  - Provide appropriate hygiene as needed including keeping skin clean and dry  - Evaluate need for skin moisturizer/barrier cream  - Collaborate with interdisciplinary team   - Patient/family teaching  - Consider wound care consult   Outcome: Progressing

## 2024-03-22 NOTE — ASSESSMENT & PLAN NOTE
Appears to be chronic and stable   Consider a SIADH type picture in the setting of uncontrolled pain coupled with possible solute loss from decreased oral intake  Continue fluid restriction  Continue to monitor on BMP daily

## 2024-03-22 NOTE — PROGRESS NOTES
Progress Note- Chelsey Carson 88 y.o. female MRN: [id number]    Unit/Bed#: NW8 862-02 Encounter: [id number]      Assessment and Plan:    Ms. Chelsey Carson is an 88-year-old female with a past medical history of ambulatory dysfunction, hyperlipidemia, asthma, and chronic constipation who initially presenting with abdominal pain 2/2 pancolonic stool burden for GI was initiation consulted. GI now asked to reevaluate due to continue nausea.     Intractable Nausea  Significant Stool [place]   Stercoral Proctocolitis      Patient initially presented with abdominal pain and cramping.  CT imaging demonstrated pancolonic stool burden along with proctocolitis concerning for stercoral proctitis.  Patient's status post attempted enema and manual disimpaction on 3/17; however, patient unable to tolerate due to rectal pain with minimal digital manipulation.  Fortunately, patient's bowel movements have improved with bowel regimen.  However, she continues to have extreme nausea/vomiting with lack of response to Zofran, Reglan, and Scopolamine. Nausea constant and not associated with PO intake. Does admit to lack of appetite and difficulty sleeping at night. Therefore in conjunction with anti-emetics, patient started on Remeron last night with plans to continue to monitor for response.  Will also attempt to schedule antiemetics as opposed to as needed dosing.  Given unclear etiology of nausea and persistent symptoms, can consider EGD on Monday if symptoms persist and patient remains hospitalized throughout the weekend.     Plan:  Continue on  Remeron 7.5 mg at bedtime   Schedule Reglan 5 mg TID prior to meals, Qtc 439 (3/16/24); repeat EKG today   May utilize Zofran PRN for second line, can also trial Tigan if nausea persist   Continue Scopolamine patch   Consider EGD on Monday if symptoms persist through the week  Continue on Miralax 17 g twice daily   Okay for regular diet as patient tolerates   Avoid use of anticholinergics  and opioids  Encourage OOB and ambulation   Additional pain and symptom management per primary team  ______________________________________________________________________    Subjective:     Patient seen and examined at bedside.  Patient sitting up in bed comfortably in no acute distress or visible discomfort.  Tolerated breakfast this morning without issue.  However, patient states that she had to force herself to eat as she continues to not have an appetite.  Continues to complain of nausea persistently but no vomiting.  Denies abdominal pain.  Denies abdominal distention or bloating.  Continues to have bowel movements.    Medication Administration - last 24 hours from 03/21/2024 0741 to 03/22/2024 0741         Date/Time Order Dose Route Action Action by     03/21/2024 2122 EDT busPIRone (BUSPAR) tablet 5 mg 5 mg Oral Given Elaine Bustillos RN     03/21/2024 1745 EDT busPIRone (BUSPAR) tablet 5 mg 5 mg Oral Given Myrtle Abreu RN     03/21/2024 0854 EDT busPIRone (BUSPAR) tablet 5 mg 5 mg Oral Given Myrtle Abreu RN     03/22/2024 0621 EDT heparin (porcine) subcutaneous injection 5,000 Units 5,000 Units Subcutaneous Given Elaine Bustillos RN     03/21/2024 2122 EDT heparin (porcine) subcutaneous injection 5,000 Units 5,000 Units Subcutaneous Given Elaine Bustillos RN     03/21/2024 1332 EDT heparin (porcine) subcutaneous injection 5,000 Units 5,000 Units Subcutaneous Given Myrtle Abreu RN     03/21/2024 1745 EDT senna-docusate sodium (SENOKOT S) 8.6-50 mg per tablet 2 tablet 2 tablet Oral Not Given Myrtle Abreu RN     03/21/2024 0854 EDT senna-docusate sodium (SENOKOT S) 8.6-50 mg per tablet 2 tablet 2 tablet Oral Given Myrtle Abreu RN     03/21/2024 1005 EDT ondansetron (ZOFRAN) injection 4 mg 4 mg Intravenous Given Myrtle Abreu RN     03/21/2024 2212 EDT doxylamine (UNISOM) tablet 25 mg 25 mg Oral Given Elaine Bustillos RN     03/21/2024 2121 EDT benzonatate (TESSALON PERLES) capsule 100 mg 100 mg Oral Given Elaine  "CARYL Bustillos     03/21/2024 1005 EDT benzonatate (TESSALON PERLES) capsule 100 mg 100 mg Oral Given Myrtle Abreu RN     03/21/2024 2122 EDT pantoprazole (PROTONIX) injection 40 mg 40 mg Intravenous Given Elaine Bustillos RN     03/21/2024 0854 EDT pantoprazole (PROTONIX) injection 40 mg 40 mg Intravenous Given Myrtle Abreu RN     03/21/2024 2122 EDT polyethylene glycol (MIRALAX) packet 17 g 17 g Oral Not Given Elaine Bustillos RN     03/21/2024 0854 EDT polyethylene glycol (MIRALAX) packet 17 g 17 g Oral Given Myrtle Abreu RN     03/21/2024 2207 EDT guaiFENesin (MUCINEX) 12 hr tablet 600 mg 600 mg Oral Given Elaine Bustillos RN     03/21/2024 1332 EDT guaiFENesin (MUCINEX) 12 hr tablet 600 mg 600 mg Oral Given Myrtle Abreu RN     03/21/2024 2122 EDT mirtazapine (REMERON) tablet 7.5 mg 7.5 mg Oral Given Elaine Bustillos RN            Objective:     Vitals: Blood pressure 121/57, pulse 69, temperature 97.7 °F (36.5 °C), temperature source Oral, resp. rate 17, height 4' 10\" (1.473 m), weight 51.7 kg (114 lb), SpO2 96%.,Body mass index is 23.83 kg/m².      Intake/Output Summary (Last 24 hours) at 3/22/2024 0741  Last data filed at 3/21/2024 1306  Gross per 24 hour   Intake 60 ml   Output --   Net 60 ml       Physical Exam  Constitutional:       General: She is not in acute distress.     Appearance: She is normal weight. She is not ill-appearing or toxic-appearing.   HENT:      Head: Normocephalic.      Nose: Nose normal. No congestion.   Eyes:      General: No scleral icterus.  Cardiovascular:      Rate and Rhythm: Normal rate.      Pulses: Normal pulses.   Pulmonary:      Effort: Pulmonary effort is normal. No respiratory distress.   Abdominal:      General: Abdomen is flat. Bowel sounds are normal. There is no distension.      Palpations: Abdomen is soft.      Tenderness: There is no abdominal tenderness. There is no guarding or rebound.   Musculoskeletal:      Cervical back: Normal range of motion.   Skin:     General: Skin is " warm.      Capillary Refill: Capillary refill takes less than 2 seconds.      Coloration: Skin is not pale.   Neurological:      Mental Status: She is alert and oriented to person, place, and time. Mental status is at baseline.   Psychiatric:         Mood and Affect: Mood normal.         Behavior: Behavior normal.           Invasive Devices       Peripheral Intravenous Line  Duration             Peripheral IV 03/16/24 Dorsal (posterior);Left Hand 5 days              Drain  Duration             External Urinary Catheter 5 days                    Lab Results:  No results displayed because visit has over 200 results.          Imaging Studies: I have personally reviewed pertinent imaging studies.

## 2024-03-22 NOTE — CASE MANAGEMENT
Case Management Progress Note    Patient name Chelsey Carson  Location  862/ 862-02 MRN [id number]  : 1935 Date 3/22/2024       LOS (days): 5  Geometric Mean LOS (GMLOS) (days): 3  Days to GMLOS:-1.9        OBJECTIVE:        Current admission status: Inpatient  Preferred Pharmacy:   [id number] - [place], [place] - [address]   [address] [place] [address]  Phone: [phone] Fax: [phone]    Primary Care Provider: Mariely Fan MD    Primary Insurance: MEDICARE  Secondary Insurance: COMMERCIAL MISCELLANEOUS    PROGRESS NOTE:  Patient not yet stable for discharge. CM contacted [facility] to inform of possible weekend discharge. [facility] able to accept back if discharged over the weekend.

## 2024-03-22 NOTE — ASSESSMENT & PLAN NOTE
"Presented w/ constipation and rectal pain/spasms - noted h/o waxing/waning constipation and hemorrhoids per patient  PRN Anusol on board for hemorrhoids  PRN Nifedipine/Lidocaine ointment on board for rectal spasms   S/p a dose of glycerin suppository and bowel prep  Continue senna, miralax BID   CT imaging noted: \"Mild stercoral or infectious proctitis. Large colonic stool burden. No obstruction.\"  Gastroenterology consulted, appreciate recs - see below   With continued nausea, Remeron 7.5mg hs has been added   Continue scopolamine patch, PRN zofran, PRN Reglan   Regular diet if tolerating   OOB and ambulation   Discussed with GI about possibly re-imaging abd - no clear indication as patient is having BM, not vomiting, labs/vitals stable, and she is passing gas. Continue conservative management   "

## 2024-03-22 NOTE — ASSESSMENT & PLAN NOTE
Continues with nausea, worse in AM.  No vomiting.  Better in afternoons but persists despite reglan, zofran.  Has been having bowel movements.  She denies abd pain.   Added scopolamine patch on 3/20 which pt reports made her nausea worse  GI following, appreciate recs

## 2024-03-22 NOTE — PHYSICAL THERAPY NOTE
Physical Therapy Progress Note     03/22/24 1500   PT Last Visit   PT Visit Date 03/22/24   Note Type   Note Type Treatment   Pain Assessment   Pain Assessment Tool 0-10   Pain Score No Pain   Restrictions/Precautions   Other Precautions Fall Risk;Chair Alarm   Subjective   Subjective The patient reports that she still has nausea, but that she is starting to eat a little. She reports sleeping well again last night.   Bed Mobility   Supine to Sit 3  Moderate assistance   Additional items Assist x 1;HOB elevated;Increased time required;Verbal cues;LE management   Sit to Supine 3  Moderate assistance   Additional items Assist x 1;Increased time required;Verbal cues;LE management   Balance   Static Sitting Fair   Dynamic Sitting Fair -   Activity Tolerance   Activity Tolerance Other (Comment)  (Pt. limited due to nausea.)   Nurse Made Aware Yes.   Exercises   TKR Supine;Bilateral;AROM;10 reps   Assessment   Prognosis Good   Problem List Decreased strength;Decreased range of motion;Decreased endurance;Impaired balance;Decreased mobility;Pain   Assessment The patient was noted to be sitting more upright in the bed this morning than she has been able to the past several days. She also was able to begin eating without any exacerbation of her nausea. She continues to defer any out of bed mobility, and she inquired what is the difference of sitting in the bed vs in the chair. Provided her education about the benefit of sitting in the chair as well as mobilizing. Encouraged her to attempt out of bed tomorrow as she has not been out of bed in at least three days.   Barriers to Discharge Inaccessible home environment;Decreased caregiver support   Goals   Patient Goals To feel better.   STG Expiration Date 04/01/24   PT Treatment Day 3   Plan   Treatment/Interventions Functional transfer training;LE strengthening/ROM;Therapeutic exercise;Endurance training;Patient/family training;Bed mobility;Gait training;Elevations   Progress  Slow progress, decreased activity tolerance   PT Frequency 2-3x/wk   Discharge Recommendation   Rehab Resource Intensity Level, PT II (Moderate Resource Intensity)   AM-PAC Basic Mobility Inpatient   Turning in Flat Bed Without Bedrails 3   Lying on Back to Sitting on Edge of Flat Bed Without Bedrails 2   Moving Bed to Chair 2   Standing Up From Chair Using Arms 2   Walk in Room 2   Climb 3-5 Stairs With Railing 1   Basic Mobility Inpatient Raw Score 12   Basic Mobility Standardized Score 32.23   [facility] Highest Level Of Mobility   -HL Goal 4: Move to chair/commode   -HLM Achieved 3: Sit at edge of bed         An AM-PAC Basic Mobility raw score less than 16 suggests the patient may benefit from discharge to post-acute rehab services.    Karson Field, PTA

## 2024-03-22 NOTE — OCCUPATIONAL THERAPY NOTE
Occupational Therapy Progress Note     Patient Name: Chelsey Carson  Today's Date: 3/22/2024  Problem List  Principal Problem:    Proctitis  Active Problems:    Hyperlipidemia    Chronic cough    Hyponatremia    Constipation    Ambulatory dysfunction    Macrocytic anemia    Thrombocytosis    Nausea          03/22/24 1208   OT Last Visit   OT Visit Date 03/22/24   Note Type   Note Type Treatment   Pain Assessment   Pain Assessment Tool 0-10   Pain Score No Pain   Restrictions/Precautions   Weight Bearing Precautions Per Order No   Other Precautions Multiple lines;Fall Risk;Pain  (Nausea)   ADL   Where Assessed Edge of bed   Grooming Assistance 4  Minimal Assistance   Grooming Deficit Steadying;Wash/dry face   Grooming Comments Pt requires MIN A for steadying assistance to wash face seated EOB   UB Bathing Assistance 4  Minimal Assistance   UB Bathing Deficit Steadying;Increased time to complete;Chest;Right arm;Left arm;Abdomen   UB Bathing Comments Pt requires MIN A for steadying assistance to complete UB bathing seated EOB   LB Bathing Assistance 2  Maximal Assistance   LB Bathing Deficit Steadying;Right upper leg;Left upper leg;Right lower leg including foot;Left lower leg including foot   LB Bathing Comments Pt requires MAX A to complete LB bathing seated EOB with steadying assistance   UB Dressing Assistance 4  Minimal Assistance   UB Dressing Deficit Thread RUE;Thread LUE;Steadying   UB Dressing Comments Pt requires MIN A for steadying assistance and gown management to don hosptial gown seated EOB   LB Dressing Assistance 2  Maximal Assistance   LB Dressing Deficit Steadying;Don/doff R sock;Don/doff L sock   LB Dressing Comments Pt requires MAX A and steadying assistance to don L and R sock seated EOB   Toileting Assistance  2  Maximal Assistance   Toileting Deficit Perineal hygiene   Toileting Comments Pt incontinent of stool prior to OT session, requires MAX A to complete patrizia hygiene in bed   Bed  Mobility   Rolling R 4  Minimal assistance   Additional items Assist x 1;Bedrails;Increased time required;Verbal cues;LE management   Rolling L 4  Minimal assistance   Additional items Assist x 1;Bedrails;Increased time required;Verbal cues;LE management   Supine to Sit 3  Moderate assistance   Additional items Assist x 1;Increased time required;Verbal cues;LE management   Sit to Supine 3  Moderate assistance   Additional items Assist x 1;Increased time required;LE management;Bedrails   Transfers   Additional Comments pt reporting nausea and unable to complete functional transfers at this time   Cognition   Overall Cognitive Status WFL   Arousal/Participation Alert;Responsive;Cooperative   Attention Attends with cues to redirect   Orientation Level Oriented X4   Memory Within functional limits   Following Commands Follows one step commands without difficulty   Comments Pt agreeable to therapy   Activity Tolerance   Activity Tolerance Treatment limited secondary to medical complications (Comment)  (nausea)   Medical Staff Made Aware RN aware   Assessment   Assessment Pt was seen on 3/22/2024 to address ADL retraining, functional transfer training, and activity tolerance/endurance. Pt demonstrating improvements and currently requires MIN A for steadying assistance to complete seated grooming, UB bathing, UB dressing, and L and R rolling. Pt requires MOD A to complete sit to supine and supine to sit bed mobility. Pt requires MAX A to complete LB bathing, LB dressing, and patrizia hygiene after pt incontinent of stool in bed prior to OT arrival. Pt is limited by decreased ADL status, functional transfers, functional mobility, and activity tolerance. Pt supine in bed at beginning of session and supine in bed at end of session with alarm set and all items within reach. The patient's raw score on the AM-PAC Daily Activity Inpatient Short Form is 16. A raw score of greater than or equal to 19 suggests the patient may benefit  from discharge to post-acute rehabilitation services. Please refer to the recommendation of the Occupational Therapist for safe discharge planning. Recommend Level II moderate intensity OT services at d/c to maximize pt function.   Plan   Treatment Interventions ADL retraining;Functional transfer training;UE strengthening/ROM;Endurance training;Patient/family training;Compensatory technique education;Equipment evaluation/education;Continued evaluation;Energy conservation;Activityengagement   Goal Expiration Date 04/01/24   OT Treatment Day 1   OT Frequency 2-3x/wk   Discharge Recommendation   Rehab Resource Intensity Level, OT II (Moderate Resource Intensity)   AM-PAC Daily Activity Inpatient   Lower Body Dressing 2   Bathing 2   Toileting 2   Upper Body Dressing 3   Grooming 3   Eating 4   Daily Activity Raw Score 16   Daily Activity Standardized Score (Calc for Raw Score >=11) 35.96   AM-PAC Applied Cognition Inpatient   Following a Speech/Presentation 3   Understanding Ordinary Conversation 4   Taking Medications 4   Remembering Where Things Are Placed or Put Away 4   Remembering List of 4-5 Errands 4   Taking Care of Complicated Tasks 3   Applied Cognition Raw Score 22   Applied Cognition Standardized Score 47.83   End of Consult   Education Provided Yes   Patient Position at End of Consult Supine;Bed/Chair alarm activated;All needs within reach   Nurse Communication Nurse aware of consult     JH Chaves, OTR/L

## 2024-03-22 NOTE — ASSESSMENT & PLAN NOTE
Continues with nausea, worse in AM.  No vomiting.  Better in afternoons but persists despite reglan, zofran.  Has been having bowel movements.  She denies abd pain.   Added scopolamine patch on 3/20 which pt reports made her nausea worse  GI following, appreciate recs  See proctitis for further plan

## 2024-03-23 PROBLEM — R00.0 TACHYCARDIA: Status: ACTIVE | Noted: 2024-03-23

## 2024-03-23 LAB
ANION GAP SERPL CALCULATED.3IONS-SCNC: 8 MMOL/L (ref 4–13)
ATRIAL RATE: 109 BPM
BASOPHILS # BLD AUTO: 0.06 THOUSANDS/ÂΜL (ref 0–0.1)
BASOPHILS NFR BLD AUTO: 1 % (ref 0–1)
BUN SERPL-MCNC: 17 MG/DL (ref 5–25)
CALCIUM SERPL-MCNC: 8.3 MG/DL (ref 8.4–10.2)
CHLORIDE SERPL-SCNC: 97 MMOL/L (ref 96–108)
CO2 SERPL-SCNC: 27 MMOL/L (ref 21–32)
CREAT SERPL-MCNC: 0.84 MG/DL (ref 0.6–1.3)
EOSINOPHIL # BLD AUTO: 0.21 THOUSAND/ÂΜL (ref 0–0.61)
EOSINOPHIL NFR BLD AUTO: 3 % (ref 0–6)
ERYTHROCYTE [DISTWIDTH] IN BLOOD BY AUTOMATED COUNT: 14.4 % (ref 11.6–15.1)
GFR SERPL CREATININE-BSD FRML MDRD: 62 ML/MIN/1.73SQ M
GLUCOSE SERPL-MCNC: 97 MG/DL (ref 65–140)
HCT VFR BLD AUTO: 31.8 % (ref 34.8–46.1)
HGB BLD-MCNC: 10.4 G/DL (ref 11.5–15.4)
IMM GRANULOCYTES # BLD AUTO: 0.14 THOUSAND/UL (ref 0–0.2)
IMM GRANULOCYTES NFR BLD AUTO: 2 % (ref 0–2)
LYMPHOCYTES # BLD AUTO: 1.78 THOUSANDS/ÂΜL (ref 0.6–4.47)
LYMPHOCYTES NFR BLD AUTO: 21 % (ref 14–44)
MCH RBC QN AUTO: 34.1 PG (ref 26.8–34.3)
MCHC RBC AUTO-ENTMCNC: 32.7 G/DL (ref 31.4–37.4)
MCV RBC AUTO: 104 FL (ref 82–98)
MONOCYTES # BLD AUTO: 0.87 THOUSAND/ÂΜL (ref 0.17–1.22)
MONOCYTES NFR BLD AUTO: 10 % (ref 4–12)
NEUTROPHILS # BLD AUTO: 5.42 THOUSANDS/ÂΜL (ref 1.85–7.62)
NEUTS SEG NFR BLD AUTO: 63 % (ref 43–75)
NRBC BLD AUTO-RTO: 0 /100 WBCS
P AXIS: 71 DEGREES
PLATELET # BLD AUTO: 367 THOUSANDS/UL (ref 149–390)
PMV BLD AUTO: 8.3 FL (ref 8.9–12.7)
POTASSIUM SERPL-SCNC: 4 MMOL/L (ref 3.5–5.3)
PR INTERVAL: 206 MS
QRS AXIS: 45 DEGREES
QRSD INTERVAL: 80 MS
QT INTERVAL: 320 MS
QTC INTERVAL: 430 MS
RBC # BLD AUTO: 3.05 MILLION/UL (ref 3.81–5.12)
SODIUM SERPL-SCNC: 132 MMOL/L (ref 135–147)
T WAVE AXIS: 65 DEGREES
VENTRICULAR RATE: 109 BPM
WBC # BLD AUTO: 8.48 THOUSAND/UL (ref 4.31–10.16)

## 2024-03-23 PROCEDURE — 93005 ELECTROCARDIOGRAM TRACING: CPT

## 2024-03-23 PROCEDURE — 80048 BASIC METABOLIC PNL TOTAL CA: CPT

## 2024-03-23 PROCEDURE — 93010 ELECTROCARDIOGRAM REPORT: CPT | Performed by: INTERNAL MEDICINE

## 2024-03-23 PROCEDURE — C9113 INJ PANTOPRAZOLE SODIUM, VIA: HCPCS | Performed by: INTERNAL MEDICINE

## 2024-03-23 PROCEDURE — 99232 SBSQ HOSP IP/OBS MODERATE 35: CPT

## 2024-03-23 PROCEDURE — 85025 COMPLETE CBC W/AUTO DIFF WBC: CPT | Performed by: INTERNAL MEDICINE

## 2024-03-23 RX ADMIN — BUSPIRONE HYDROCHLORIDE 5 MG: 5 TABLET ORAL at 08:45

## 2024-03-23 RX ADMIN — SENNOSIDES, DOCUSATE SODIUM 2 TABLET: 8.6; 5 TABLET ORAL at 08:45

## 2024-03-23 RX ADMIN — METOCLOPRAMIDE HYDROCHLORIDE 10 MG: 5 INJECTION INTRAMUSCULAR; INTRAVENOUS at 08:46

## 2024-03-23 RX ADMIN — PANTOPRAZOLE SODIUM 40 MG: 40 INJECTION, POWDER, FOR SOLUTION INTRAVENOUS at 08:45

## 2024-03-23 RX ADMIN — BUSPIRONE HYDROCHLORIDE 5 MG: 5 TABLET ORAL at 21:26

## 2024-03-23 RX ADMIN — GUAIFENESIN 600 MG: 600 TABLET, EXTENDED RELEASE ORAL at 21:26

## 2024-03-23 RX ADMIN — HEPARIN SODIUM 5000 UNITS: 5000 INJECTION INTRAVENOUS; SUBCUTANEOUS at 13:02

## 2024-03-23 RX ADMIN — BUSPIRONE HYDROCHLORIDE 5 MG: 5 TABLET ORAL at 15:13

## 2024-03-23 RX ADMIN — MELATONIN 3 MG: at 21:26

## 2024-03-23 RX ADMIN — PANTOPRAZOLE SODIUM 40 MG: 40 INJECTION, POWDER, FOR SOLUTION INTRAVENOUS at 21:27

## 2024-03-23 RX ADMIN — MIRTAZAPINE 7.5 MG: 15 TABLET, FILM COATED ORAL at 21:26

## 2024-03-23 RX ADMIN — HEPARIN SODIUM 5000 UNITS: 5000 INJECTION INTRAVENOUS; SUBCUTANEOUS at 05:58

## 2024-03-23 RX ADMIN — DOXYLAMINE SUCCINATE 25 MG: 25 TABLET ORAL at 01:40

## 2024-03-23 RX ADMIN — SCOPALAMINE 1 PATCH: 1 PATCH, EXTENDED RELEASE TRANSDERMAL at 14:59

## 2024-03-23 RX ADMIN — GUAIFENESIN 600 MG: 600 TABLET, EXTENDED RELEASE ORAL at 08:45

## 2024-03-23 RX ADMIN — Medication 1 APPLICATION: at 01:40

## 2024-03-23 RX ADMIN — HYDROCORTISONE: 25 CREAM TOPICAL at 01:40

## 2024-03-23 RX ADMIN — HEPARIN SODIUM 5000 UNITS: 5000 INJECTION INTRAVENOUS; SUBCUTANEOUS at 21:26

## 2024-03-23 RX ADMIN — POLYETHYLENE GLYCOL 3350 17 G: 17 POWDER, FOR SOLUTION ORAL at 08:45

## 2024-03-23 NOTE — PROGRESS NOTES
"[facility]  Progress Note  Name: Chelsey Carson I  MRN: [id number]  Unit/Bed#: [facility] 902-01 I Date of Admission: 3/16/2024   Date of Service: 3/23/2024 I Hospital Day: 6    Assessment/Plan   * Proctitis  Assessment & Plan  Presented w/ constipation and rectal pain/spasms - noted h/o waxing/waning constipation and hemorrhoids per patient  PRN Anusol on board for hemorrhoids  PRN Nifedipine/Lidocaine ointment on board for rectal spasms   S/p a dose of glycerin suppository and bowel prep  Continue senna, miralax BID   CT imaging noted: \"Mild stercoral or infectious proctitis. Large colonic stool burden. No obstruction.\"  Gastroenterology consulted, appreciate recs - see below   With continued nausea, Remeron 7.5mg hs has been added   Continue scopolamine patch, PRN zofran, PRN Reglan   Regular diet if tolerating   OOB and ambulation   Discussed with GI about possibly re-imaging abd - no clear indication as patient is having BM, not vomiting, labs/vitals stable, and she is passing gas. Continue conservative management     Tachycardia  Assessment & Plan  Noted on exam this morning  F/u EKG   No chest pain, SOB  Continue to monitor     Nausea  Assessment & Plan  Continues with nausea, worse in AM.  No vomiting.  Better in afternoons but persists despite reglan, zofran.  Has been having bowel movements.  She denies abd pain.   Added scopolamine patch on 3/20 which pt reports made her nausea worse  GI following, appreciate recs     Thrombocytosis  Assessment & Plan  Likely reactive due to acute medical issue(s)  Resolved     Macrocytic anemia  Assessment & Plan  B12/folate levels noted and acceptable  Stable without active bleeding     Ambulatory dysfunction  Assessment & Plan  Previously able to walk without a cane or walker  Appreciate PT/OT input -> recommending skilled rehab once medically stable    Constipation  Assessment & Plan  Optimize bowel regimen   See plan for associated " proctitis above   Evaluated by gastroenterology with attempts at bedside disimpaction (stopped mid-process due to patient complaints of pain)  KUB on 3/18 again demonstrated constipation   Pt is having regular Bms documented at this time, continue miralax BID, senna     Hyponatremia  Assessment & Plan  Appears to be chronic and stable   Consider a SIADH type picture in the setting of uncontrolled pain coupled with possible solute loss from decreased oral intake  Continue fluid restriction  Continue to monitor on BMP daily     Chronic cough  Assessment & Plan  Chronic cough most likely cough variant asthma with possible underlying GERD  C/w PPI and PRN Albuterol  CT imaging noted lower lung tree-in-bud infiltrates suspicious for possible infectious bronchiolitis - follow-up portable CXR negative for acute cardiopulmonary disease  Remains afebrile without leukocytosis -> less likely suspicion for infectious etiology, hence, continue to observe off antibiotics  PRN cough suppression/expectorant therapy on board   Influenza/COVID/RSV negative    Hyperlipidemia  Assessment & Plan  Continue statin         VTE Pharmacologic Prophylaxis: VTE Score: 3 Moderate Risk (Score 3-4) - Pharmacological DVT Prophylaxis Ordered: heparin.    Mobility:   Basic Mobility Inpatient Raw Score: 12  JH-HLM Goal: 4: Move to chair/commode  JH-HLM Achieved: 3: Sit at edge of bed  JH-HLM Goal NOT achieved. Continue with multidisciplinary rounding and encourage appropriate mobility to improve upon JH-HLM goals.    Patient Centered Rounds: I performed bedside rounds with nursing staff today.   Discussions with Specialists or Other Care Team Provider: None    Education and Discussions with Family / Patient:  Will update son.     Total Time Spent on Date of Encounter in care of patient: This time was spent on one or more of the following: performing physical exam; counseling and coordination of care; obtaining or reviewing history; documenting in the  medical record; reviewing/ordering tests, medications or procedures; communicating with other healthcare professionals and discussing with patient's family/caregivers.    Current Length of Stay: 6 day(s)  Current Patient Status: Inpatient   Certification Statement: The patient will continue to require additional inpatient hospital stay due to continued management of nausea   Discharge Plan: Anticipate discharge in 24-48 hrs to rehab facility.    Code Status: Level 3 - DNAR and DNI    Subjective:   Seen and examined.  Sitting comfortably eating breakfast this a.m - half of bagel.  Denies nausea at the moment, however states it comes and goes. Last BM on Thursday. No fevers, chills, vomiting, chest pain, SOB. Does have tremor in which she states is her baseline since having COVID. Does not feel ready for discharge as she is still intermittently nauseous.     Objective:     Vitals:   Temp (24hrs), Av.2 °F (36.8 °C), Min:97.7 °F (36.5 °C), Max:98.6 °F (37 °C)    Temp:  [97.7 °F (36.5 °C)-98.6 °F (37 °C)] 97.7 °F (36.5 °C)  HR:  [] 99  Resp:  [18] 18  BP: (131-134)/(69-70) 134/69  SpO2:  [93 %-94 %] 93 %  Body mass index is 23.83 kg/m².     Input and Output Summary (last 24 hours):     Intake/Output Summary (Last 24 hours) at 3/23/2024 0881  Last data filed at 3/22/2024 1758  Gross per 24 hour   Intake 50 ml   Output 50 ml   Net 0 ml       Physical Exam:   Physical Exam  Constitutional:       General: She is not in acute distress.  HENT:      Mouth/Throat:      Mouth: Mucous membranes are moist.   Eyes:      Conjunctiva/sclera: Conjunctivae normal.   Cardiovascular:      Rate and Rhythm: Tachycardia present.      Heart sounds: No murmur heard.     No friction rub. No gallop.   Pulmonary:      Breath sounds: No wheezing, rhonchi or rales.   Abdominal:      General: There is no distension.      Palpations: Abdomen is soft.      Tenderness: There is no abdominal tenderness.   Musculoskeletal:      Right lower leg:  No edema.      Left lower leg: No edema.   Skin:     General: Skin is warm and dry.   Neurological:      Mental Status: Mental status is at baseline.          Additional Data:     Labs:  Results from last 7 days   Lab Units 03/23/24  0555 03/22/24  0828 03/21/24  0636   WBC Thousand/uL 8.48   < > 7.49   HEMOGLOBIN g/dL 10.4*   < > 10.2*   HEMATOCRIT % 31.8*   < > 31.6*   PLATELETS Thousands/uL 367   < > 387   BANDS PCT %  --   --  4   NEUTROS PCT % 63  --   --    LYMPHS PCT % 21  --   --    LYMPHO PCT %  --   --  23   MONOS PCT % 10  --   --    MONO PCT %  --   --  4   EOS PCT % 3  --  3    < > = values in this interval not displayed.     Results from last 7 days   Lab Units 03/23/24  0555 03/18/24  0456 03/17/24  0549   SODIUM mmol/L 132*   < > 134*   POTASSIUM mmol/L 4.0   < > 4.1   CHLORIDE mmol/L 97   < > 97   CO2 mmol/L 27   < > 26   BUN mg/dL 17   < > 14   CREATININE mg/dL 0.84   < > 0.67   ANION GAP mmol/L 8   < > 11   CALCIUM mg/dL 8.3*   < > 7.5*   ALBUMIN g/dL  --   --  2.7*   TOTAL BILIRUBIN mg/dL  --   --  0.39   ALK PHOS U/L  --   --  56   ALT U/L  --   --  23   AST U/L  --   --  23   GLUCOSE RANDOM mg/dL 97   < > 88    < > = values in this interval not displayed.                       Lines/Drains:  Invasive Devices       Peripheral Intravenous Line  Duration             Peripheral IV 03/22/24 Left Antecubital <1 day              Drain  Duration             External Urinary Catheter 6 days                          Imaging: No pertinent imaging reviewed.    Recent Cultures (last 7 days):         Last 24 Hours Medication List:   Current Facility-Administered Medications   Medication Dose Route Frequency Provider Last Rate    acetaminophen  650 mg Oral Q6H PRN Anne Frost MD      albuterol  2 puff Inhalation Q4H PRN Anne Frost MD      benzonatate  100 mg Oral TID PRN Anne Frost MD      busPIRone  5 mg Oral TID Kenji Fitzgerald MD      calcium carbonate  500 mg Oral TID PRN Anne Frost MD       doxylamine  25 mg Oral HS PRN Anne Frost MD      guaiFENesin  600 mg Oral Q12H UMAIR Flora Jasso PA-C      heparin (porcine)  5,000 Units Subcutaneous Q8H [facility] Kenji Fitzgerald MD      hydrocortisone   Topical 4x Daily PRN Anne Frost MD      labetalol  10 mg Intravenous Q4H PRN Anne Frost MD      melatonin  3 mg Oral HS PRN Marilyn Hogan PA-C      metoclopramide  10 mg Intravenous Q6H PRN Anne Frost MD      mirtazapine  7.5 mg Oral HS Mariela Barreto DO      NIFEdipine 0.3%-lidocaine 5%   Topical Q6H PRN Flora Jasso PA-C      ondansetron  4 mg Intravenous Q4H PRN Marilyn Hogan PA-C      pantoprazole  40 mg Intravenous Q12H [facility] Anne Frost MD      polyethylene glycol  17 g Oral BID Flora Jasso PA-C      scopolamine  1 patch Transdermal Q72H Flora Jasso PA-C      senna-docusate sodium  2 tablet Oral BID Jeff Hennessy MD          Today, Patient Was Seen By: Sarai Almendarez PA-C    **Please Note: This note may have been constructed using a voice recognition system.**

## 2024-03-24 ENCOUNTER — APPOINTMENT (INPATIENT)
Dept: RADIOLOGY | Facility: HOSPITAL | Age: 89
DRG: 394 | End: 2024-03-24
Payer: MEDICARE

## 2024-03-24 LAB
ANION GAP SERPL CALCULATED.3IONS-SCNC: 12 MMOL/L (ref 4–13)
BASOPHILS # BLD AUTO: 0.06 THOUSANDS/ÂΜL (ref 0–0.1)
BASOPHILS NFR BLD AUTO: 1 % (ref 0–1)
BUN SERPL-MCNC: 18 MG/DL (ref 5–25)
CALCIUM SERPL-MCNC: 8.3 MG/DL (ref 8.4–10.2)
CHLORIDE SERPL-SCNC: 95 MMOL/L (ref 96–108)
CO2 SERPL-SCNC: 21 MMOL/L (ref 21–32)
CREAT SERPL-MCNC: 0.82 MG/DL (ref 0.6–1.3)
EOSINOPHIL # BLD AUTO: 0.18 THOUSAND/ÂΜL (ref 0–0.61)
EOSINOPHIL NFR BLD AUTO: 2 % (ref 0–6)
ERYTHROCYTE [DISTWIDTH] IN BLOOD BY AUTOMATED COUNT: 14.6 % (ref 11.6–15.1)
GFR SERPL CREATININE-BSD FRML MDRD: 64 ML/MIN/1.73SQ M
GLUCOSE SERPL-MCNC: 96 MG/DL (ref 65–140)
HCT VFR BLD AUTO: 29.4 % (ref 34.8–46.1)
HGB BLD-MCNC: 9.6 G/DL (ref 11.5–15.4)
IMM GRANULOCYTES # BLD AUTO: 0.08 THOUSAND/UL (ref 0–0.2)
IMM GRANULOCYTES NFR BLD AUTO: 1 % (ref 0–2)
LYMPHOCYTES # BLD AUTO: 1.48 THOUSANDS/ÂΜL (ref 0.6–4.47)
LYMPHOCYTES NFR BLD AUTO: 17 % (ref 14–44)
MCH RBC QN AUTO: 34 PG (ref 26.8–34.3)
MCHC RBC AUTO-ENTMCNC: 32.7 G/DL (ref 31.4–37.4)
MCV RBC AUTO: 104 FL (ref 82–98)
MONOCYTES # BLD AUTO: 1.09 THOUSAND/ÂΜL (ref 0.17–1.22)
MONOCYTES NFR BLD AUTO: 12 % (ref 4–12)
NEUTROPHILS # BLD AUTO: 6.08 THOUSANDS/ÂΜL (ref 1.85–7.62)
NEUTS SEG NFR BLD AUTO: 67 % (ref 43–75)
NRBC BLD AUTO-RTO: 0 /100 WBCS
PLATELET # BLD AUTO: 411 THOUSANDS/UL (ref 149–390)
PMV BLD AUTO: 8.7 FL (ref 8.9–12.7)
POTASSIUM SERPL-SCNC: 4.4 MMOL/L (ref 3.5–5.3)
RBC # BLD AUTO: 2.82 MILLION/UL (ref 3.81–5.12)
SODIUM SERPL-SCNC: 128 MMOL/L (ref 135–147)
WBC # BLD AUTO: 8.97 THOUSAND/UL (ref 4.31–10.16)

## 2024-03-24 PROCEDURE — 74177 CT ABD & PELVIS W/CONTRAST: CPT

## 2024-03-24 PROCEDURE — 80048 BASIC METABOLIC PNL TOTAL CA: CPT | Performed by: FAMILY MEDICINE

## 2024-03-24 PROCEDURE — 99232 SBSQ HOSP IP/OBS MODERATE 35: CPT

## 2024-03-24 PROCEDURE — C9113 INJ PANTOPRAZOLE SODIUM, VIA: HCPCS | Performed by: INTERNAL MEDICINE

## 2024-03-24 PROCEDURE — 85025 COMPLETE CBC W/AUTO DIFF WBC: CPT | Performed by: FAMILY MEDICINE

## 2024-03-24 RX ORDER — SODIUM CHLORIDE 9 MG/ML
75 INJECTION, SOLUTION INTRAVENOUS CONTINUOUS
Status: DISCONTINUED | OUTPATIENT
Start: 2024-03-24 | End: 2024-03-25

## 2024-03-24 RX ADMIN — IOHEXOL 100 ML: 350 INJECTION, SOLUTION INTRAVENOUS at 12:16

## 2024-03-24 RX ADMIN — BUSPIRONE HYDROCHLORIDE 5 MG: 5 TABLET ORAL at 12:54

## 2024-03-24 RX ADMIN — HEPARIN SODIUM 5000 UNITS: 5000 INJECTION INTRAVENOUS; SUBCUTANEOUS at 21:47

## 2024-03-24 RX ADMIN — SENNOSIDES, DOCUSATE SODIUM 2 TABLET: 8.6; 5 TABLET ORAL at 17:40

## 2024-03-24 RX ADMIN — BENZONATATE 100 MG: 100 CAPSULE ORAL at 02:07

## 2024-03-24 RX ADMIN — Medication: at 11:13

## 2024-03-24 RX ADMIN — GUAIFENESIN 600 MG: 600 TABLET, EXTENDED RELEASE ORAL at 12:54

## 2024-03-24 RX ADMIN — HEPARIN SODIUM 5000 UNITS: 5000 INJECTION INTRAVENOUS; SUBCUTANEOUS at 13:01

## 2024-03-24 RX ADMIN — ACETAMINOPHEN 650 MG: 325 TABLET, FILM COATED ORAL at 02:07

## 2024-03-24 RX ADMIN — SODIUM CHLORIDE 75 ML/HR: 0.9 INJECTION, SOLUTION INTRAVENOUS at 12:57

## 2024-03-24 RX ADMIN — PANTOPRAZOLE SODIUM 40 MG: 40 INJECTION, POWDER, FOR SOLUTION INTRAVENOUS at 21:49

## 2024-03-24 RX ADMIN — BENZONATATE 100 MG: 100 CAPSULE ORAL at 12:57

## 2024-03-24 RX ADMIN — BUSPIRONE HYDROCHLORIDE 5 MG: 5 TABLET ORAL at 17:39

## 2024-03-24 RX ADMIN — HEPARIN SODIUM 5000 UNITS: 5000 INJECTION INTRAVENOUS; SUBCUTANEOUS at 06:38

## 2024-03-24 RX ADMIN — DOXYLAMINE SUCCINATE 25 MG: 25 TABLET ORAL at 21:47

## 2024-03-24 RX ADMIN — MIRTAZAPINE 7.5 MG: 15 TABLET, FILM COATED ORAL at 21:46

## 2024-03-24 RX ADMIN — ONDANSETRON 4 MG: 2 INJECTION INTRAMUSCULAR; INTRAVENOUS at 18:07

## 2024-03-24 RX ADMIN — BUSPIRONE HYDROCHLORIDE 5 MG: 5 TABLET ORAL at 21:47

## 2024-03-24 RX ADMIN — TRIMETHOBENZAMIDE HYDROCHLORIDE 200 MG: 100 INJECTION INTRAMUSCULAR at 11:01

## 2024-03-24 NOTE — ASSESSMENT & PLAN NOTE
"Presented w/ constipation and rectal pain/spasms - noted h/o waxing/waning constipation and hemorrhoids per patient  PRN Anusol on board for hemorrhoids  PRN Nifedipine/Lidocaine ointment on board for rectal spasms   S/p a dose of glycerin suppository and bowel prep  Continue senna, miralax BID   CT imaging noted: \"Mild stercoral or infectious proctitis. Large colonic stool burden. No obstruction.\"  Gastroenterology consulted, appreciate recs - see below   With continued nausea, Remeron 7.5mg hs has been added   Continue scopolamine patch, PRN zofran, PRN Reglan   Trial tigan   Regular diet if tolerating   OOB and ambulation   Given ongoing nausea and refusal to eat this morning, CT abd/pelvis ordered. Will f/u   Made NPO at midnight incase GI would like to proceed with EGD tomorrow  Start IVF NS 75ml/hr  "

## 2024-03-24 NOTE — ASSESSMENT & PLAN NOTE
Appears to be chronic and stable   Consider a SIADH type picture in the setting of uncontrolled pain coupled with possible solute loss from decreased oral intake  Sodium 128 on am labs - notified by nursing that patient refusing to eat d/t nausea   Will start IVF NS 75ml/hr for 24 hours   BMP in the AM   Continue to monitor and prevent overcorrection

## 2024-03-24 NOTE — ASSESSMENT & PLAN NOTE
Continues with nausea, worse in AM.  No vomiting.  Better in afternoons but persists despite reglan, zofran.  Has been having bowel movements.    Added scopolamine patch on 3/20 which pt reports made her nausea worse  Endorsing LUQ pain with palpation this morning.  GI following, appreciate recs

## 2024-03-24 NOTE — PROGRESS NOTES
"[facility]  Progress Note  Name: Chelsey Carson I  MRN: [id number]  Unit/Bed#: [facility] 902-01 I Date of Admission: 3/16/2024   Date of Service: 3/24/2024 I Hospital Day: 7    Assessment/Plan   * Proctitis  Assessment & Plan  Presented w/ constipation and rectal pain/spasms - noted h/o waxing/waning constipation and hemorrhoids per patient  PRN Anusol on board for hemorrhoids  PRN Nifedipine/Lidocaine ointment on board for rectal spasms   S/p a dose of glycerin suppository and bowel prep  Continue senna, miralax BID   CT imaging noted: \"Mild stercoral or infectious proctitis. Large colonic stool burden. No obstruction.\"  Gastroenterology consulted, appreciate recs - see below   With continued nausea, Remeron 7.5mg hs has been added   Continue scopolamine patch, PRN zofran, PRN Reglan   Trial tigan   Regular diet if tolerating   OOB and ambulation   Given ongoing nausea and refusal to eat this morning, CT abd/pelvis ordered. Will f/u   Made NPO at midnight incase GI would like to proceed with EGD tomorrow  Start IVF NS 75ml/hr    Tachycardia  Assessment & Plan  Noted on exam this morning  EKG NSR  No chest pain, SOB  Continue to monitor     Nausea  Assessment & Plan  Continues with nausea, worse in AM.  No vomiting.  Better in afternoons but persists despite reglan, zofran.  Has been having bowel movements.    Added scopolamine patch on 3/20 which pt reports made her nausea worse  Endorsing LUQ pain with palpation this morning.  GI following, appreciate recs     Thrombocytosis  Assessment & Plan  Likely reactive due to acute medical issue(s)  Continue to monitor     Macrocytic anemia  Assessment & Plan  B12/folate levels noted and acceptable  Stable without active bleeding     Ambulatory dysfunction  Assessment & Plan  Previously able to walk without a cane or walker  Appreciate PT/OT input -> recommending skilled rehab once medically stable    Constipation  Assessment & " Plan  Optimize bowel regimen   See plan for associated proctitis above   Evaluated by gastroenterology with attempts at bedside disimpaction (stopped mid-process due to patient complaints of pain)  KUB on 3/18 again demonstrated constipation   Pt is having regular Bms documented at this time, continue miralax BID, senna     Hyponatremia  Assessment & Plan  Appears to be chronic and stable   Consider a SIADH type picture in the setting of uncontrolled pain coupled with possible solute loss from decreased oral intake  Sodium 128 on am labs - notified by nursing that patient refusing to eat d/t nausea   Will start IVF NS 75ml/hr for 24 hours   BMP in the AM   Continue to monitor and prevent overcorrection     Chronic cough  Assessment & Plan  Chronic cough most likely cough variant asthma with possible underlying GERD  C/w PPI and PRN Albuterol  CT imaging noted lower lung tree-in-bud infiltrates suspicious for possible infectious bronchiolitis - follow-up portable CXR negative for acute cardiopulmonary disease  Remains afebrile without leukocytosis -> less likely suspicion for infectious etiology, hence, continue to observe off antibiotics  PRN cough suppression/expectorant therapy on board   Influenza/COVID/RSV negative    Hyperlipidemia  Assessment & Plan  Continue statin         VTE Pharmacologic Prophylaxis: VTE Score: 3 Moderate Risk (Score 3-4) - Pharmacological DVT Prophylaxis Ordered: heparin.    Mobility:   Basic Mobility Inpatient Raw Score: 11  JH-HLM Goal: 4: Move to chair/commode  JH-HLM Achieved: 3: Sit at edge of bed  JH-HLM Goal NOT achieved. Continue with multidisciplinary rounding and encourage appropriate mobility to improve upon JH-HLM goals.    Patient Centered Rounds: I performed bedside rounds with nursing staff today.   Discussions with Specialists or Other Care Team Provider: None     Education and Discussions with Family / Patient: Updated  (son) via phone.    Total Time Spent  on Date of Encounter in care of patient:  This time was spent on one or more of the following: performing physical exam; counseling and coordination of care; obtaining or reviewing history; documenting in the medical record; reviewing/ordering tests, medications or procedures; communicating with other healthcare professionals and discussing with patient's family/caregivers.    Current Length of Stay: 7 day(s)  Current Patient Status: Inpatient   Certification Statement: The patient will continue to require additional inpatient hospital stay due to continued management of nausea   Discharge Plan: Anticipate discharge in 24-48 hrs to rehab facility.    Code Status: Level 3 - DNAR and DNI    Subjective:   Seen and examined.  Refusing to eat or drink given increased nausea this morning.  Continues to complain about hemorrhoids.  Denies vomiting, diarrhea.  States she is still passing gas.  No additional complaints    Objective:     Vitals:   Temp (24hrs), Av.8 °F (37.1 °C), Min:98.1 °F (36.7 °C), Max:99.7 °F (37.6 °C)    Temp:  [98.1 °F (36.7 °C)-99.7 °F (37.6 °C)] 98.1 °F (36.7 °C)  HR:  [] 85  Resp:  [16-18] 18  BP: (130-138)/(72-74) 130/72  SpO2:  [93 %-96 %] 95 %  Body mass index is 23.83 kg/m².     Input and Output Summary (last 24 hours):     Intake/Output Summary (Last 24 hours) at 3/24/2024 1111  Last data filed at 3/24/2024 0900  Gross per 24 hour   Intake 118 ml   Output 750 ml   Net -632 ml       Physical Exam:   Physical Exam  Constitutional:       General: She is not in acute distress.  HENT:      Mouth/Throat:      Mouth: Mucous membranes are moist.   Eyes:      Conjunctiva/sclera: Conjunctivae normal.   Cardiovascular:      Rate and Rhythm: Normal rate.      Heart sounds: No murmur heard.     No friction rub. No gallop.   Pulmonary:      Breath sounds: No wheezing, rhonchi or rales.   Abdominal:      General: There is no distension.      Palpations: Abdomen is soft.      Tenderness: There is  abdominal tenderness (LUQ with palpation).   Musculoskeletal:      Right lower leg: No edema.      Left lower leg: No edema.   Skin:     General: Skin is warm and dry.          Additional Data:     Labs:  Results from last 7 days   Lab Units 03/24/24  0354 03/22/24  0828 03/21/24  0636   WBC Thousand/uL 8.97   < > 7.49   HEMOGLOBIN g/dL 9.6*   < > 10.2*   HEMATOCRIT % 29.4*   < > 31.6*   PLATELETS Thousands/uL 411*   < > 387   BANDS PCT %  --   --  4   NEUTROS PCT % 67   < >  --    LYMPHS PCT % 17   < >  --    LYMPHO PCT %  --   --  23   MONOS PCT % 12   < >  --    MONO PCT %  --   --  4   EOS PCT % 2   < > 3    < > = values in this interval not displayed.     Results from last 7 days   Lab Units 03/24/24  0354   SODIUM mmol/L 128*   POTASSIUM mmol/L 4.4   CHLORIDE mmol/L 95*   CO2 mmol/L 21   BUN mg/dL 18   CREATININE mg/dL 0.82   ANION GAP mmol/L 12   CALCIUM mg/dL 8.3*   GLUCOSE RANDOM mg/dL 96                       Lines/Drains:  Invasive Devices       Peripheral Intravenous Line  Duration             Peripheral IV 03/22/24 Left Antecubital 1 day              Drain  Duration             External Urinary Catheter 7 days                          Imaging: No pertinent imaging reviewed.    Recent Cultures (last 7 days):         Last 24 Hours Medication List:   Current Facility-Administered Medications   Medication Dose Route Frequency Provider Last Rate    acetaminophen  650 mg Oral Q6H PRN Anne Frost MD      albuterol  2 puff Inhalation Q4H PRN Anne Frost MD      benzonatate  100 mg Oral TID PRN Anne Frost MD      busPIRone  5 mg Oral TID Kenji Fitzgerald MD      calcium carbonate  500 mg Oral TID PRN Anne Frost MD      doxylamine  25 mg Oral HS PRN Anne Frost MD      guaiFENesin  600 mg Oral Q12H UMAIR Jasso PA-C      heparin (porcine)  5,000 Units Subcutaneous Q8H [facility] Kenji Fitzgerald MD      hydrocortisone   Topical 4x Daily PRN Anne Frost MD      labetalol  10 mg Intravenous Q4H PRN Anne  MD Santosh      melatonin  3 mg Oral HS PRN Marilyn Hogan PA-C      metoclopramide  10 mg Intravenous Q6H PRN Anne Frost MD      mirtazapine  7.5 mg Oral HS Mariela Barreto DO      NIFEdipine 0.3%-lidocaine 5%   Topical Q6H PRN Flora Jasso PA-C      ondansetron  4 mg Intravenous Q4H PRN Marilyn Hogan PA-C      pantoprazole  40 mg Intravenous Q12H [facility] Anne Frost MD      polyethylene glycol  17 g Oral BID Flora Jasso PA-C      scopolamine  1 patch Transdermal Q72H Flora Jasso PA-C      senna-docusate sodium  2 tablet Oral BID Jeff Hennessy MD      sodium chloride  75 mL/hr Intravenous Continuous Sarai Almendarez PA-C 75 mL/hr (03/24/24 1110)    trimethobenzamide  200 mg Intramuscular Q6H PRN Sarai Almendarez PA-C          Today, Patient Was Seen By: Sarai Almendarez PA-C    **Please Note: This note may have been constructed using a voice recognition system.**

## 2024-03-25 ENCOUNTER — ANESTHESIA (INPATIENT)
Dept: GASTROENTEROLOGY | Facility: HOSPITAL | Age: 89
DRG: 394 | End: 2024-03-25
Payer: MEDICARE

## 2024-03-25 ENCOUNTER — APPOINTMENT (INPATIENT)
Dept: GASTROENTEROLOGY | Facility: HOSPITAL | Age: 89
DRG: 394 | End: 2024-03-25
Payer: MEDICARE

## 2024-03-25 ENCOUNTER — ANESTHESIA EVENT (INPATIENT)
Dept: GASTROENTEROLOGY | Facility: HOSPITAL | Age: 89
DRG: 394 | End: 2024-03-25
Payer: MEDICARE

## 2024-03-25 LAB
ANION GAP SERPL CALCULATED.3IONS-SCNC: 9 MMOL/L (ref 4–13)
BUN SERPL-MCNC: 15 MG/DL (ref 5–25)
CALCIUM SERPL-MCNC: 8.2 MG/DL (ref 8.4–10.2)
CHLORIDE SERPL-SCNC: 97 MMOL/L (ref 96–108)
CO2 SERPL-SCNC: 26 MMOL/L (ref 21–32)
CREAT SERPL-MCNC: 0.8 MG/DL (ref 0.6–1.3)
ERYTHROCYTE [DISTWIDTH] IN BLOOD BY AUTOMATED COUNT: 14.4 % (ref 11.6–15.1)
GFR SERPL CREATININE-BSD FRML MDRD: 66 ML/MIN/1.73SQ M
GLUCOSE SERPL-MCNC: 91 MG/DL (ref 65–140)
HCT VFR BLD AUTO: 29.2 % (ref 34.8–46.1)
HGB BLD-MCNC: 9.5 G/DL (ref 11.5–15.4)
MCH RBC QN AUTO: 33.8 PG (ref 26.8–34.3)
MCHC RBC AUTO-ENTMCNC: 32.5 G/DL (ref 31.4–37.4)
MCV RBC AUTO: 104 FL (ref 82–98)
PLATELET # BLD AUTO: 401 THOUSANDS/UL (ref 149–390)
PMV BLD AUTO: 8.8 FL (ref 8.9–12.7)
POTASSIUM SERPL-SCNC: 3.7 MMOL/L (ref 3.5–5.3)
RBC # BLD AUTO: 2.81 MILLION/UL (ref 3.81–5.12)
SODIUM SERPL-SCNC: 132 MMOL/L (ref 135–147)
WBC # BLD AUTO: 6.11 THOUSAND/UL (ref 4.31–10.16)

## 2024-03-25 PROCEDURE — 97530 THERAPEUTIC ACTIVITIES: CPT

## 2024-03-25 PROCEDURE — C9113 INJ PANTOPRAZOLE SODIUM, VIA: HCPCS | Performed by: INTERNAL MEDICINE

## 2024-03-25 PROCEDURE — 85027 COMPLETE CBC AUTOMATED: CPT

## 2024-03-25 PROCEDURE — 99232 SBSQ HOSP IP/OBS MODERATE 35: CPT | Performed by: PHYSICIAN ASSISTANT

## 2024-03-25 PROCEDURE — 97110 THERAPEUTIC EXERCISES: CPT

## 2024-03-25 PROCEDURE — 0DJ08ZZ INSPECTION OF UPPER INTESTINAL TRACT, VIA NATURAL OR ARTIFICIAL OPENING ENDOSCOPIC: ICD-10-PCS | Performed by: INTERNAL MEDICINE

## 2024-03-25 PROCEDURE — 80048 BASIC METABOLIC PNL TOTAL CA: CPT

## 2024-03-25 PROCEDURE — 97535 SELF CARE MNGMENT TRAINING: CPT

## 2024-03-25 RX ORDER — LIDOCAINE HYDROCHLORIDE 10 MG/ML
INJECTION, SOLUTION EPIDURAL; INFILTRATION; INTRACAUDAL; PERINEURAL AS NEEDED
Status: DISCONTINUED | OUTPATIENT
Start: 2024-03-25 | End: 2024-03-25

## 2024-03-25 RX ORDER — SODIUM CHLORIDE 9 MG/ML
INJECTION, SOLUTION INTRAVENOUS CONTINUOUS PRN
Status: DISCONTINUED | OUTPATIENT
Start: 2024-03-25 | End: 2024-03-25

## 2024-03-25 RX ORDER — PROPOFOL 10 MG/ML
INJECTION, EMULSION INTRAVENOUS AS NEEDED
Status: DISCONTINUED | OUTPATIENT
Start: 2024-03-25 | End: 2024-03-25

## 2024-03-25 RX ADMIN — SODIUM CHLORIDE: 0.9 INJECTION, SOLUTION INTRAVENOUS at 11:53

## 2024-03-25 RX ADMIN — LIDOCAINE HYDROCHLORIDE 50 MG: 10 INJECTION, SOLUTION EPIDURAL; INFILTRATION; INTRACAUDAL; PERINEURAL at 12:02

## 2024-03-25 RX ADMIN — BUSPIRONE HYDROCHLORIDE 5 MG: 5 TABLET ORAL at 21:49

## 2024-03-25 RX ADMIN — DOXYLAMINE SUCCINATE 25 MG: 25 TABLET ORAL at 21:49

## 2024-03-25 RX ADMIN — BUSPIRONE HYDROCHLORIDE 5 MG: 5 TABLET ORAL at 09:52

## 2024-03-25 RX ADMIN — BENZONATATE 100 MG: 100 CAPSULE ORAL at 15:29

## 2024-03-25 RX ADMIN — BENZONATATE 100 MG: 100 CAPSULE ORAL at 09:53

## 2024-03-25 RX ADMIN — PANTOPRAZOLE SODIUM 40 MG: 40 INJECTION, POWDER, FOR SOLUTION INTRAVENOUS at 09:53

## 2024-03-25 RX ADMIN — BUSPIRONE HYDROCHLORIDE 5 MG: 5 TABLET ORAL at 15:23

## 2024-03-25 RX ADMIN — SODIUM CHLORIDE 75 ML/HR: 0.9 INJECTION, SOLUTION INTRAVENOUS at 05:02

## 2024-03-25 RX ADMIN — HEPARIN SODIUM 5000 UNITS: 5000 INJECTION INTRAVENOUS; SUBCUTANEOUS at 15:24

## 2024-03-25 RX ADMIN — HYDROCORTISONE: 25 CREAM TOPICAL at 21:49

## 2024-03-25 RX ADMIN — MIRTAZAPINE 7.5 MG: 15 TABLET, FILM COATED ORAL at 21:49

## 2024-03-25 RX ADMIN — HEPARIN SODIUM 5000 UNITS: 5000 INJECTION INTRAVENOUS; SUBCUTANEOUS at 21:51

## 2024-03-25 RX ADMIN — PROPOFOL 20 MG: 10 INJECTION, EMULSION INTRAVENOUS at 12:03

## 2024-03-25 RX ADMIN — PROPOFOL 20 MG: 10 INJECTION, EMULSION INTRAVENOUS at 12:09

## 2024-03-25 RX ADMIN — PROPOFOL 60 MG: 10 INJECTION, EMULSION INTRAVENOUS at 12:02

## 2024-03-25 RX ADMIN — PROPOFOL 20 MG: 10 INJECTION, EMULSION INTRAVENOUS at 12:06

## 2024-03-25 NOTE — OCCUPATIONAL THERAPY NOTE
Occupational Therapy Progress Note     Patient Name: Chelsey Carson  Today's Date: 3/25/2024  Problem List  Principal Problem:    Proctitis  Active Problems:    Hyperlipidemia    Chronic cough    Hyponatremia    Constipation    Ambulatory dysfunction    Macrocytic anemia    Thrombocytosis    Nausea    Tachycardia       03/25/24 0918   OT Last Visit   OT Visit Date 03/25/24   Note Type   Note Type Treatment   Pain Assessment   Pain Assessment Tool 0-10   Pain Score No Pain   Restrictions/Precautions   Weight Bearing Precautions Per Order No   Other Precautions Cognitive;Chair Alarm;Bed Alarm;Multiple lines;Fall Risk;Hard of hearing  (+ full body tremors; RN aware)   Lifestyle   Autonomy I w/ ADLS, I w/ household IADLS, has aides 4x/wk to take her grocery shopping and to the casino 2x/wk; I w/ transfers and functional mobility PTA   Reciprocal Relationships pt lives alone   Service to Others 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   ADL   Where Assessed Edge of bed   Grooming Assistance 5  Supervision/Setup   Grooming Deficit Setup;Increased time to complete;Wash/dry face;Teeth care   UB Bathing Assistance 4  Minimal Assistance   UB Bathing Deficit Setup;Verbal cueing;Supervision/safety;Increased time to complete;Chest;Right arm;Left arm;Abdomen   UB Bathing Comments Pt required + vc and A for thoroughness to wash UB.   LB Bathing Assistance 2  Maximal Assistance   LB Bathing Deficit Setup;Perineal area;Buttocks;Right upper leg;Left upper leg;Right lower leg including foot;Left lower leg including foot   LB Bathing Comments Pt required A to wash b/l lower legs, buttocks, and perineal area; able to wash b/l thighs w/o physical A.   UB Dressing Assistance 4  Minimal Assistance   UB Dressing Deficit Setup;Verbal cueing;Increased time to complete;Thread RUE;Thread LUE;Pull around back;Fasteners   LB Dressing Assistance 2  Maximal Assistance   LB Dressing Deficit Setup;Don/doff R sock;Don/doff L sock    Toileting Assistance  2  Maximal Assistance   Toileting Deficit Setup;Perineal hygiene   Toileting Comments Pt incontinent of bladder upon therapist's arrival; required A w/ hygiene while standing w/ Ax2.   Bed Mobility   Supine to Sit 3  Moderate assistance   Additional items Assist x 1;HOB elevated;Bedrails;Increased time required;Verbal cues;LE management   Sit to Supine Unable to assess   Additional Comments Pt seated OOB in chair at end of OT tx session w/ all needs within reach and alarm activated.  (Noted R lateral lean upon sitting EOB; min Ax1 to maintain sitting balance.)   Transfers   Sit to Stand 3  Moderate assistance   Additional items Assist x 2;Increased time required;Verbal cues   Stand to Sit 3  Moderate assistance   Additional items Assist x 2;Increased time required;Verbal cues   Stand pivot 3  Moderate assistance   Additional items Assist x 2;Increased time required;Verbal cues   Additional Comments Transfers were completed w/ b/l HHA. SPT was performed from EOB to drop arm recliner chair.   Functional Mobility   Functional Mobility 3  Moderate assistance   Additional Comments Pt took few steps from EOB to chair w/ mod Ax2 w/ b/l HHA.   Additional items Hand hold assistance   Cognition   Overall Cognitive Status Impaired   Arousal/Participation Alert;Responsive;Cooperative   Attention Attends with cues to redirect   Orientation Level Oriented to person;Oriented to place;Oriented to time   Memory Decreased recall of precautions;Decreased recall of recent events;Decreased short term memory   Following Commands Follows one step commands with increased time or repetition   Comments Pt was pleasant, cooperative, and willing to participate in OT tx session today. Pt wiffy at times t/o session. For example, pt demonstrated decreased short term memory as she reported feeling nauseous several times t/o session; however, she told doctor she hadn't felt nauseous yet this morning.   Activity Tolerance    Activity Tolerance Patient limited by fatigue;Treatment limited secondary to medical complications (Comment);Other (Comment)  (+ nausea; cognition)   Medical Staff Made Aware RN clearance prior to session; PT Stephenie, due to pt's medical complexity and multiple comorbidities   Assessment   Assessment Pt seen for skilled OT treatment session from 0837 to 0918 w/ interventions focusing on ADL participation, activity tolerance, sitting tolerance, sitting balance, standing tolerance, standing balance, bed mobility , transfer skills, and fxnl mobility. Pt was agreeable and willing to participate in session. Pt engaged in the following tasks: S for grooming, min A for UB ADLs, and max A for LB ADLs, and toileting. Pt also required mod Ax1 for bed mobility and mod Ax2 for transfers and fxnl mobility w/ b/l HHA. In comparison to previous session, pt continues to require consistent levels of physical assistance to completed both ADLs and mobility tasks at this time. Pt continues to be functioning below baseline level as occupational performance remains limited by decreased ADL status, decreased activity tolerance, decreased endurance, decreased sitting tolerance, decreased sitting balance, decreased standing tolerance, decreased standing balance, decreased transfer skills, decreased fxnl mobility, decreased safety awareness, decreased insight into deficits, generalized weakness , and impaired cognition . From OT standpoint, recommend Level II (Moderate Resource Intensity) at time of d/c. Pt will benefit from continued OT treatment while in acute care to address deficits as defined above and maximize level of functional independence with ADLs and functional mobility. Pt seated OOB in chair w/ alarm activated and all needs met at end of session.   Plan   Treatment Interventions ADL retraining;Functional transfer training;UE strengthening/ROM;Endurance training;Patient/family training;Equipment  evaluation/education;Compensatory technique education;Continued evaluation;Energy conservation;Activityengagement   Goal Expiration Date 04/01/24   OT Treatment Day 1   OT Frequency 2-3x/wk   Discharge Recommendation   Rehab Resource Intensity Level, OT II (Moderate Resource Intensity)   AM-PAC Daily Activity Inpatient   Lower Body Dressing 2   Bathing 2   Toileting 2   Upper Body Dressing 3   Grooming 3   Eating 4   Daily Activity Raw Score 16   Daily Activity Standardized Score (Calc for Raw Score >=11) 35.96   AM-PAC Applied Cognition Inpatient   Following a Speech/Presentation 2   Understanding Ordinary Conversation 3   Taking Medications 2   Remembering Where Things Are Placed or Put Away 3   Remembering List of 4-5 Errands 2   Taking Care of Complicated Tasks 2   Applied Cognition Raw Score 14   Applied Cognition Standardized Score 32.02         The patient's raw score on the AM-PAC Daily Activity Inpatient Short Form is 16. A raw score of less than 19 suggests the patient may benefit from discharge to post-acute rehabilitation services. Please refer to the recommendation of the Occupational Therapist for safe discharge planning.    Bhavani Hagen MS, OTR/L

## 2024-03-25 NOTE — ANESTHESIA POSTPROCEDURE EVALUATION
Post-Op Assessment Note    CV Status:  Stable  Pain Score: 0    Pain management: adequate       Mental Status:  Sleepy   Hydration Status:  Stable and euvolemic   PONV Controlled:  None   Airway Patency:  Patent     Post Op Vitals Reviewed: Yes    No anethesia notable event occurred.    Staff: CRNA               BP   14/65   Temp      Pulse 103   Resp 18   SpO2 100

## 2024-03-25 NOTE — ASSESSMENT & PLAN NOTE
Optimize bowel regimen   See plan for associated proctitis above   Evaluated by gastroenterology with attempts at bedside disimpaction (stopped mid-process due to patient complaints of pain)  KUB on 3/18 again demonstrated constipation   Pt is having regular Bms documented at this time, continue miralax BID, senna  - improved on most recent CT scan

## 2024-03-25 NOTE — PLAN OF CARE
Problem: GASTROINTESTINAL - ADULT  Goal: Minimal or absence of nausea and/or vomiting  Description: INTERVENTIONS:  - Administer IV fluids if ordered to ensure adequate hydration  - Maintain NPO status until nausea and vomiting are resolved  - Nasogastric tube if ordered  - Administer ordered antiemetic medications as needed  - Provide nonpharmacologic comfort measures as appropriate  - Advance diet as tolerated, if ordered  - Consider nutrition services referral to assist patient with adequate nutrition and appropriate food choices  Outcome: Progressing  Goal: Maintains or returns to baseline bowel function  Description: INTERVENTIONS:  - Assess bowel function  - Encourage oral fluids to ensure adequate hydration  - Administer IV fluids if ordered to ensure adequate hydration  - Administer ordered medications as needed  - Encourage mobilization and activity  - Consider nutritional services referral to assist patient with adequate nutrition and appropriate food choices  Outcome: Progressing     Problem: Prexisting or High Potential for Compromised Skin Integrity  Goal: Skin integrity is maintained or improved  Description: INTERVENTIONS:  - Identify patients at risk for skin breakdown  - Assess and monitor skin integrity  - Assess and monitor nutrition and hydration status  - Monitor labs   - Assess for incontinence   - Turn and reposition patient  - Assist with mobility/ambulation  - Relieve pressure over bony prominences  - Avoid friction and shearing  - Provide appropriate hygiene as needed including keeping skin clean and dry  - Evaluate need for skin moisturizer/barrier cream  - Collaborate with interdisciplinary team   - Patient/family teaching  - Consider wound care consult   Outcome: Progressing     Problem: Nutrition/Hydration-ADULT  Goal: Nutrient/Hydration intake appropriate for improving, restoring or maintaining nutritional needs  Description: Monitor and assess patient's nutrition/hydration status  for malnutrition. Collaborate with interdisciplinary team and initiate plan and interventions as ordered.  Monitor patient's weight and dietary intake as ordered or per policy. Utilize nutrition screening tool and intervene as necessary. Determine patient's food preferences and provide high-protein, high-caloric foods as appropriate.     INTERVENTIONS:  - Monitor oral intake, urinary output, labs, and treatment plans  - Assess nutrition and hydration status and recommend course of action  - Evaluate amount of meals eaten  - Assist patient with eating if necessary   - Allow adequate time for meals  - Recommend/ encourage appropriate diets, oral nutritional supplements, and vitamin/mineral supplements  - Order, calculate, and assess calorie counts as needed  - Recommend, monitor, and adjust tube feedings and TPN/PPN based on assessed needs  - Assess need for intravenous fluids  - Provide specific nutrition/hydration education as appropriate  - Include patient/family/caregiver in decisions related to nutrition  Outcome: Progressing     Problem: PAIN - ADULT  Goal: Verbalizes/displays adequate comfort level or baseline comfort level  Description: Interventions:  - Encourage patient to monitor pain and request assistance  - Assess pain using appropriate pain scale  - Administer analgesics based on type and severity of pain and evaluate response  - Implement non-pharmacological measures as appropriate and evaluate response  - Consider cultural and social influences on pain and pain management  - Notify physician/advanced practitioner if interventions unsuccessful or patient reports new pain  Outcome: Progressing

## 2024-03-25 NOTE — PROGRESS NOTES
"[facility]  Progress Note  Name: Chelsey Carson I  MRN: [id number]  [facility] Date of Admission: 3/16/2024   Date of Service: 3/25/2024 I Hospital Day: 8    Assessment/Plan   * Proctitis  Assessment & Plan  Presented w/ constipation and rectal pain/spasms - noted h/o waxing/waning constipation and hemorrhoids per patient  PRN Anusol on board for hemorrhoids  PRN Nifedipine/Lidocaine ointment on board for rectal spasms   S/p a dose of glycerin suppository and bowel prep  Continue senna, miralax BID   CT imaging noted: \"Mild stercoral or infectious proctitis. Large colonic stool burden. No obstruction.\"  Now improved on repeat ST abd/pelvis  Symptoms overall better today  Monitor on diet    Nausea  Assessment & Plan  Persistent nausea, worse in AM.  No vomiting.  Better in afternoons but persists despite reglan, zofran.  Has been having bowel movements.  Nausea better as of 3/25  Added scopolamine patch on 3/20 which pt reports made her nausea worse - will discontinue  GI appreciated  EGD - hiatal hernia  Continue Remeron.  Antiemetics changed to as needed.  PPI discontinued.  Monitor back on diet    Tachycardia  Assessment & Plan  Noted on exam 3/24  EKG NSR  No chest pain, SOB  Continue to monitor     Hyponatremia  Assessment & Plan  Appears to be chronic and stable   Consider a SIADH type picture in the setting of uncontrolled pain coupled with possible solute loss from decreased oral intake  Sodium improving s/p IVF     Constipation  Assessment & Plan  Optimize bowel regimen   See plan for associated proctitis above   Evaluated by gastroenterology with attempts at bedside disimpaction (stopped mid-process due to patient complaints of pain)  KUB on 3/18 again demonstrated constipation   Pt is having regular Bms documented at this time, continue miralax BID, senna  - improved on most recent CT scan    Ambulatory dysfunction  Assessment & Plan  Previously able " to walk without a cane or walker  Appreciate PT/OT input -> recommending skilled rehab once medically stable    Hyperlipidemia  Assessment & Plan  Continue statin    Chronic cough  Assessment & Plan  Chronic cough most likely cough variant asthma with possible underlying GERD  C/w PRN Albuterol. No indication for PPI per GI and was discontinued  CT imaging noted lower lung tree-in-bud infiltrates suspicious for possible infectious bronchiolitis - follow-up portable CXR negative for acute cardiopulmonary disease  Remains afebrile without leukocytosis -> less likely suspicion for infectious etiology, hence, continue to observe off antibiotics  PRN cough suppression/expectorant therapy on board   Influenza/COVID/RSV negative             VTE Pharmacologic Prophylaxis: VTE Score: 3 Moderate Risk (Score 3-4) - Pharmacological DVT Prophylaxis Ordered: heparin.    Mobility:   Basic Mobility Inpatient Raw Score: 14  JH-HLM Goal: 4: Move to chair/commode  JH-HLM Achieved: 4: Move to chair/commode  JH-HLM Goal NOT achieved. Continue with multidisciplinary rounding and encourage appropriate mobility to improve upon JH-HLM goals.    Patient Centered Rounds: I performed bedside rounds with nursing staff today.   Discussions with Specialists or Other Care Team Provider: case management, GI    Education and Discussions with Family / Patient: Updated  (son) via phone.    Total Time Spent on Date of Encounter in care of patient: 35 mins. This time was spent on one or more of the following: performing physical exam; counseling and coordination of care; obtaining or reviewing history; documenting in the medical record; reviewing/ordering tests, medications or procedures; communicating with other healthcare professionals and discussing with patient's family/caregivers.    Current Length of Stay: 8 day(s)  Current Patient Status: Inpatient   Certification Statement: The patient will continue to require additional inpatient  hospital stay due to monitor oral intake  Discharge Plan: Anticipate discharge in 24-48 hrs to rehab facility.    Code Status: Level 3 - DNAR and DNI    Subjective:   Patient seen earlier this morning and states her nausea is better.    Objective:     Vitals:   Temp (24hrs), Av.6 °F (37 °C), Min:98.3 °F (36.8 °C), Max:99 °F (37.2 °C)    Temp:  [98.3 °F (36.8 °C)-99 °F (37.2 °C)] 98.4 °F (36.9 °C)  HR:  [] 104  Resp:  [16-18] 16  BP: (126-144)/(59-70) 130/59  SpO2:  [93 %-99 %] 94 %  Body mass index is 23.83 kg/m².     Input and Output Summary (last 24 hours):     Intake/Output Summary (Last 24 hours) at 3/25/2024 1243  Last data filed at 3/25/2024 1210  Gross per 24 hour   Intake 338 ml   Output 400 ml   Net -62 ml       Physical Exam:   Physical Exam  Constitutional:       Appearance: Normal appearance.   Cardiovascular:      Rate and Rhythm: Normal rate and regular rhythm.      Heart sounds: No murmur heard.  Pulmonary:      Effort: Pulmonary effort is normal.      Breath sounds: Normal breath sounds.   Abdominal:      General: Bowel sounds are normal. There is no distension.      Palpations: Abdomen is soft.      Tenderness: There is no abdominal tenderness.   Skin:     General: Skin is warm and dry.   Neurological:      General: No focal deficit present.      Mental Status: She is alert and oriented to person, place, and time.   Psychiatric:         Mood and Affect: Mood normal.          Additional Data:     Labs:  Results from last 7 days   Lab Units 24  0435 24  0354 24  0828 24  0636   WBC Thousand/uL 6.11 8.97   < > 7.49   HEMOGLOBIN g/dL 9.5* 9.6*   < > 10.2*   HEMATOCRIT % 29.2* 29.4*   < > 31.6*   PLATELETS Thousands/uL 401* 411*   < > 387   BANDS PCT %  --   --   --  4   NEUTROS PCT %  --  67   < >  --    LYMPHS PCT %  --  17   < >  --    LYMPHO PCT %  --   --   --  23   MONOS PCT %  --  12   < >  --    MONO PCT %  --   --   --  4   EOS PCT %  --  2   < > 3    < > =  values in this interval not displayed.     Results from last 7 days   Lab Units 03/25/24  0435   SODIUM mmol/L 132*   POTASSIUM mmol/L 3.7   CHLORIDE mmol/L 97   CO2 mmol/L 26   BUN mg/dL 15   CREATININE mg/dL 0.80   ANION GAP mmol/L 9   CALCIUM mg/dL 8.2*   GLUCOSE RANDOM mg/dL 91                       Lines/Drains:  Invasive Devices       Peripheral Intravenous Line  Duration             Peripheral IV 03/22/24 Left Antecubital 3 days              Drain  Duration             External Urinary Catheter 8 days                          Imaging: Reviewed radiology reports from this admission including: abdominal/pelvic CT and procedure reports    Recent Cultures (last 7 days):         Last 24 Hours Medication List:   Current Facility-Administered Medications   Medication Dose Route Frequency Provider Last Rate    acetaminophen  650 mg Oral Q6H PRN Anne Frost MD      albuterol  2 puff Inhalation Q4H PRN Anne Frost MD      benzonatate  100 mg Oral TID PRN Anne Frost MD      busPIRone  5 mg Oral TID Kenji Fitzgerald MD      calcium carbonate  500 mg Oral TID PRN Anne Frost MD      doxylamine  25 mg Oral HS PRN Anne Frost MD      guaiFENesin  600 mg Oral Q12H [facility] Flora Jasso PA-C      heparin (porcine)  5,000 Units Subcutaneous Q8H [facility] Kenji Fitzgerald MD      hydrocortisone   Topical 4x Daily PRN Anne Frost MD      labetalol  10 mg Intravenous Q4H PRN Anne Frost MD      melatonin  3 mg Oral HS PRN Marilyn Hogan PA-C      metoclopramide  10 mg Intravenous Q6H PRN Anne Frost MD      mirtazapine  7.5 mg Oral HS Mariela Barreto DO      NIFEdipine 0.3%-lidocaine 5%   Topical Q6H PRN Flora Jasso PA-C      ondansetron  4 mg Intravenous Q4H PRN Marilyn Hogan PA-C      polyethylene glycol  17 g Oral BID Flora Jasso PA-C      senna-docusate sodium  2 tablet Oral BID Jeff Hennessy MD      trimethobenzamide  200 mg Intramuscular Q6H PRN Sarai Almendarez PA-C          Today, Patient Was Seen By:  Leesa Andrew PA-C    **Please Note: This note may have been constructed using a voice recognition system.**

## 2024-03-25 NOTE — ASSESSMENT & PLAN NOTE
Appears to be chronic and stable   Consider a SIADH type picture in the setting of uncontrolled pain coupled with possible solute loss from decreased oral intake  Sodium improving s/p IVF

## 2024-03-25 NOTE — CASE MANAGEMENT
Case Management Progress Note    Patient name Chelsey Carson  Location [facility]  MRN [id number]  : 1935 Date 3/25/2024       LOS (days): 8  Geometric Mean LOS (GMLOS) (days): 3  Days to GMLOS:-4.8        OBJECTIVE:        Current admission status: Inpatient  Preferred Pharmacy:   [id number] - [place], [place] - [address] [place] [address]  Phone: [phone] Fax: [phone]    Primary Care Provider: Mariely Fan MD    Primary Insurance: MEDICARE  Secondary Insurance: COMMERCIAL MISCELLANEOUS    PROGRESS NOTE: CM consulted with medical provider, pt not medically cleared for discharge today.  CM notified [facility].  CM will continue to follow for discharge planning needs.

## 2024-03-25 NOTE — PLAN OF CARE
"  Problem: PHYSICAL THERAPY ADULT  Goal: Performs mobility at highest level of function for planned discharge setting.  See evaluation for individualized goals.  Description: Treatment/Interventions: ADL retraining, Functional transfer training, LE strengthening/ROM, Elevations, Therapeutic exercise, Endurance training, Bed mobility, Gait training, Spoke to nursing, OT          See flowsheet documentation for full assessment, interventions and recommendations.  Outcome: Progressing  Note: Prognosis: Good  Problem List: Decreased strength, Decreased range of motion, Decreased endurance, Impaired balance, Decreased mobility, Pain  Assessment: Pt seen for PT treatment session this date. Therapy session focused on bed mobility, functional transfers, gait training, therapeutic exercise, and endurance training in order to improve overall mobility and independence. Pt requires mod A for bed mobility tasks and mod Ax2 for functional transfers and steps to chair. Pt very tremulous today, reports this happens and she needs her \"medicine\"- RN aware. Required increased A for EOB sitting balance this date to correct R lateral lean. Increased time to complete all tasks this date with rest breaks required t/o session. Performed/ tolerated seated therex without complaints. Pt making slow but steady progress toward goals. Pt was left sitting OOB in recliner at the end of PT session with all needs in reach. Pt would benefit from continued PT services while in hospital to address remaining limitations. PT to continue to follow pt and recommends level II resource intensity. The patient's AM-PAC Basic Mobility Inpatient Short Form Raw Score is 9. A Raw score of less than or equal to 16 suggests the patient may benefit from discharge to post-acute rehabilitation services. Please also refer to the recommendation of the Physical Therapist for safe discharge planning.  Barriers to Discharge: Inaccessible home environment, Decreased " caregiver support     Rehab Resource Intensity Level, PT: II (Moderate Resource Intensity)    See flowsheet documentation for full assessment.

## 2024-03-25 NOTE — ASSESSMENT & PLAN NOTE
Chronic cough most likely cough variant asthma with possible underlying GERD  C/w PRN Albuterol. No indication for PPI per GI and was discontinued  CT imaging noted lower lung tree-in-bud infiltrates suspicious for possible infectious bronchiolitis - follow-up portable CXR negative for acute cardiopulmonary disease  Remains afebrile without leukocytosis -> less likely suspicion for infectious etiology, hence, continue to observe off antibiotics  PRN cough suppression/expectorant therapy on board   Influenza/COVID/RSV negative

## 2024-03-25 NOTE — ASSESSMENT & PLAN NOTE
"Presented w/ constipation and rectal pain/spasms - noted h/o waxing/waning constipation and hemorrhoids per patient  PRN Anusol on board for hemorrhoids  PRN Nifedipine/Lidocaine ointment on board for rectal spasms   S/p a dose of glycerin suppository and bowel prep  Continue senna, miralax BID   CT imaging noted: \"Mild stercoral or infectious proctitis. Large colonic stool burden. No obstruction.\"  Now improved on repeat ST abd/pelvis  Symptoms overall better today  Monitor on diet  "

## 2024-03-25 NOTE — PHYSICAL THERAPY NOTE
PHYSICAL THERAPY NOTE          Patient Name: Chelsey Carson  Today's Date: 3/25/2024       03/25/24 0917   PT Last Visit   PT Visit Date 03/25/24   Note Type   Note Type Treatment   Pain Assessment   Pain Assessment Tool 0-10   Pain Score No Pain   Restrictions/Precautions   Weight Bearing Precautions Per Order No   Other Precautions Cognitive;Chair Alarm;Bed Alarm;Multiple lines;Fall Risk;Hard of hearing  (tremulous)   General   Chart Reviewed Yes   Response to Previous Treatment Patient with no complaints from previous session.   Family/Caregiver Present No   Cognition   Overall Cognitive Status Impaired   Arousal/Participation Alert;Responsive;Cooperative   Attention Attends with cues to redirect   Orientation Level Oriented X4   Memory Decreased recall of precautions;Decreased short term memory;Decreased recall of recent events   Following Commands Follows one step commands with increased time or repetition   Comments pt pleasant and cooperative, [place]. wifty at times.   Bed Mobility   Supine to Sit 3  Moderate assistance   Additional items Assist x 2;HOB elevated;Bedrails;Increased time required;Verbal cues   Sit to Supine Unable to assess   Additional Comments pt supine in bed upon arrival. Pt left sitting OOB in recliner with all needs wihtin reach - alarm on   Transfers   Sit to Stand 3  Moderate assistance   Additional items Assist x 2;Increased time required;Verbal cues   Stand to Sit 3  Moderate assistance   Additional items Assist x 2;Increased time required;Verbal cues   Stand pivot 3  Moderate assistance   Additional items Assist x 2;Increased time required;Verbal cues   Additional Comments transfers with b/l HHA; performed 2x STS t/o session   Ambulation/Elevation   Gait pattern Short stride;Foward flexed;Decreased foot clearance;Improper Weight shift  (+ tremulous)   Gait Assistance 3  Moderate assist   Additional  "items Assist x 2;Verbal cues   Assistive Device Other (Comment)  (b/l HHA)   Distance 2-3 steps from EOB >drop arm recliner   Balance   Static Sitting Fair -   Dynamic Sitting Poor +   Static Standing Poor   Dynamic Standing Poor   Ambulatory Poor   Endurance Deficit   Endurance Deficit Yes   Endurance Deficit Description generalized weakness, deconditioning, fatigue, cognition   Activity Tolerance   Activity Tolerance Patient limited by fatigue   Medical Staff Made Aware COLLIN freire; co-session completed this date 2* increased medical complexity and multiple co-morbidities   Nurse Made Aware RN cleared   Exercises   Knee AROM Long Arc Quad Sitting;Bilateral;15 reps;AROM   Ankle Pumps Sitting;Bilateral;15 reps;AROM   Marching Sitting;Bilateral;10 reps;AROM   Balance training  sitting EOB ~ 20 min with min A required to correct R lateral lean. trialed visual cues with mirror in bathromo however poor carryover   Assessment   Prognosis Good   Problem List Decreased strength;Decreased range of motion;Decreased endurance;Impaired balance;Decreased mobility;Pain   Assessment Pt seen for PT treatment session this date. Therapy session focused on bed mobility, functional transfers, gait training, therapeutic exercise, and endurance training in order to improve overall mobility and independence. Pt requires mod A for bed mobility tasks and mod Ax2 for functional transfers and steps to chair. Pt very tremulous today, reports this happens and she needs her \"medicine\"- RN aware. Required increased A for EOB sitting balance this date to correct R lateral lean. Increased time to complete all tasks this date with rest breaks required t/o session. Performed/ tolerated seated therex without complaints. Pt making slow but steady progress toward goals. Pt was left sitting OOB in recliner at the end of PT session with all needs in reach. Pt would benefit from continued PT services while in hospital to address remaining limitations. PT " to continue to follow pt and recommends level II resource intensity. The patient's AM-[facility] Basic Mobility Inpatient Short Form Raw Score is 9. A Raw score of less than or equal to 16 suggests the patient may benefit from discharge to post-acute rehabilitation services. Please also refer to the recommendation of the Physical Therapist for safe discharge planning.   Barriers to Discharge Inaccessible home environment;Decreased caregiver support   Goals   Patient Goals to take her medication - RN aware   STG Expiration Date 04/01/24   PT Treatment Day 4   Plan   Treatment/Interventions Functional transfer training;LE strengthening/ROM;Therapeutic exercise;Endurance training;Cognitive reorientation;Patient/family training;Equipment eval/education;Bed mobility;Gait training;Spoke to nursing;Spoke to case management;OT   Progress Slow progress, decreased activity tolerance   PT Frequency 2-3x/wk   Discharge Recommendation   Rehab Resource Intensity Level, PT II (Moderate Resource Intensity)   AM-PAC Basic Mobility Inpatient   Turning in Flat Bed Without Bedrails 3   Lying on Back to Sitting on Edge of Flat Bed Without Bedrails 2   Moving Bed to Chair 1   Standing Up From Chair Using Arms 1   Walk in Room 1   Climb 3-5 Stairs With Railing 1   Basic Mobility Inpatient Raw Score 9   [facility] Highest Level Of Mobility   -HLM Goal 3: Sit at edge of bed   -HLM Achieved 4: Move to chair/commode   Education   Education Provided Mobility training   Patient Reinforcement needed   End of Consult   Patient Position at End of Consult Bedside chair;Bed/Chair alarm activated;All needs within reach     Stephenie Masterson, PT, DPT

## 2024-03-25 NOTE — ANESTHESIA PREPROCEDURE EVALUATION
Procedure:  EGD    Relevant Problems   CARDIO   (+) Hyperlipidemia   (+) Nonrheumatic aortic valve insufficiency      GI/HEPATIC   (+) Esophageal dysphagia      /RENAL   (+) Stage 3a chronic kidney disease (HCC)      GYN   (+) Breast cancer (HCC)      HEMATOLOGY   (+) Macrocytic anemia      NEURO/PSYCH   (+) Nonintractable headache      Echo 11/21/2023:    Left Ventricle: Left ventricular cavity size is normal. Wall thickness is mildly increased. There is concentric remodeling. The left ventricular ejection fraction is 55-60%. Systolic function is normal. Although no diagnostic regional wall motion abnormality was identified, this possibility cannot be completely excluded on the basis of this study. Diastolic function is normal for age.    Right Ventricle: Right ventricular cavity size is normal. Systolic function is normal.    Aortic Valve: There is mild regurgitation. There is aortic valve sclerosis.    Mitral Valve: There is mild annular calcification. There is mild regurgitation.    Tricuspid Valve: There is mild regurgitation.    Pulmonic Valve: There is mild regurgitation.     Physical Exam    Airway    Mallampati score: II  TM Distance: >3 FB  Neck ROM: full     Dental   No notable dental hx     Cardiovascular      Pulmonary      Other Findings  post-pubertal.      Anesthesia Plan  ASA Score- 3     Anesthesia Type- IV sedation with anesthesia with ASA Monitors.         Additional Monitors:     Airway Plan:            Plan Factors-    Chart reviewed.    Patient summary reviewed.                  Induction- intravenous.    Postoperative Plan-     Informed Consent- Anesthetic plan and risks discussed with patient.  I personally reviewed this patient with the CRNA. Discussed and agreed on the Anesthesia Plan with the CRNA..

## 2024-03-25 NOTE — PLAN OF CARE
Problem: OCCUPATIONAL THERAPY ADULT  Goal: Performs self-care activities at highest level of function for planned discharge setting.  See evaluation for individualized goals.  Description: Treatment Interventions: ADL retraining, Functional transfer training, UE strengthening/ROM, Endurance training, Cognitive reorientation, Patient/family training, Equipment evaluation/education, Compensatory technique education, Continued evaluation, Energy conservation, Activityengagement          See flowsheet documentation for full assessment, interventions and recommendations.   Note: Limitation: Decreased ADL status, Decreased Safe judgement during ADL, Decreased endurance, Decreased UE strength, Decreased self-care trans, Decreased high-level ADLs  Prognosis: Fair  Assessment: Pt seen for skilled OT treatment session from 0837 to 0918 w/ interventions focusing on ADL participation, activity tolerance, sitting tolerance, sitting balance, standing tolerance, standing balance, bed mobility , transfer skills, and fxnl mobility. Pt was agreeable and willing to participate in session. Pt engaged in the following tasks: S for grooming, min A for UB ADLs, and max A for LB ADLs, and toileting. Pt also required mod Ax1 for bed mobility and mod Ax2 for transfers and fxnl mobility w/ b/l HHA. In comparison to previous session, pt continues to require consistent levels of physical assistance to completed both ADLs and mobility tasks at this time. Pt continues to be functioning below baseline level as occupational performance remains limited by decreased ADL status, decreased activity tolerance, decreased endurance, decreased sitting tolerance, decreased sitting balance, decreased standing tolerance, decreased standing balance, decreased transfer skills, decreased fxnl mobility, decreased safety awareness, decreased insight into deficits, generalized weakness , and impaired cognition . From OT standpoint, recommend Level II (Moderate  Resource Intensity) at time of d/c. Pt will benefit from continued OT treatment while in acute care to address deficits as defined above and maximize level of functional independence with ADLs and functional mobility. Pt seated OOB in chair w/ alarm activated and all needs met at end of session.     Rehab Resource Intensity Level, OT: II (Moderate Resource Intensity)

## 2024-03-25 NOTE — ASSESSMENT & PLAN NOTE
Persistent nausea, worse in AM.  No vomiting.  Better in afternoons but persists despite reglan, zofran.  Has been having bowel movements.  Nausea better as of 3/25  Added scopolamine patch on 3/20 which pt reports made her nausea worse - will discontinue  GI appreciated  EGD - hiatal hernia  Continue Remeron.  Antiemetics changed to as needed.  PPI discontinued.  Monitor back on diet

## 2024-03-26 LAB
ANION GAP SERPL CALCULATED.3IONS-SCNC: 8 MMOL/L (ref 4–13)
BUN SERPL-MCNC: 15 MG/DL (ref 5–25)
CALCIUM SERPL-MCNC: 8.7 MG/DL (ref 8.4–10.2)
CHLORIDE SERPL-SCNC: 98 MMOL/L (ref 96–108)
CO2 SERPL-SCNC: 25 MMOL/L (ref 21–32)
CREAT SERPL-MCNC: 0.74 MG/DL (ref 0.6–1.3)
GFR SERPL CREATININE-BSD FRML MDRD: 72 ML/MIN/1.73SQ M
GLUCOSE SERPL-MCNC: 142 MG/DL (ref 65–140)
POTASSIUM SERPL-SCNC: 3.8 MMOL/L (ref 3.5–5.3)
SODIUM SERPL-SCNC: 131 MMOL/L (ref 135–147)

## 2024-03-26 PROCEDURE — 80048 BASIC METABOLIC PNL TOTAL CA: CPT | Performed by: PHYSICIAN ASSISTANT

## 2024-03-26 PROCEDURE — 99232 SBSQ HOSP IP/OBS MODERATE 35: CPT | Performed by: PHYSICIAN ASSISTANT

## 2024-03-26 RX ADMIN — SENNOSIDES, DOCUSATE SODIUM 2 TABLET: 8.6; 5 TABLET ORAL at 08:54

## 2024-03-26 RX ADMIN — Medication 1 APPLICATION: at 00:38

## 2024-03-26 RX ADMIN — DOXYLAMINE SUCCINATE 25 MG: 25 TABLET ORAL at 22:01

## 2024-03-26 RX ADMIN — BUSPIRONE HYDROCHLORIDE 5 MG: 5 TABLET ORAL at 08:54

## 2024-03-26 RX ADMIN — POLYETHYLENE GLYCOL 3350 17 G: 17 POWDER, FOR SOLUTION ORAL at 08:54

## 2024-03-26 RX ADMIN — ONDANSETRON 4 MG: 2 INJECTION INTRAMUSCULAR; INTRAVENOUS at 10:28

## 2024-03-26 RX ADMIN — GUAIFENESIN 600 MG: 600 TABLET, EXTENDED RELEASE ORAL at 08:54

## 2024-03-26 RX ADMIN — ONDANSETRON 4 MG: 2 INJECTION INTRAMUSCULAR; INTRAVENOUS at 15:44

## 2024-03-26 RX ADMIN — HEPARIN SODIUM 5000 UNITS: 5000 INJECTION INTRAVENOUS; SUBCUTANEOUS at 15:39

## 2024-03-26 RX ADMIN — BENZONATATE 100 MG: 100 CAPSULE ORAL at 00:25

## 2024-03-26 RX ADMIN — HEPARIN SODIUM 5000 UNITS: 5000 INJECTION INTRAVENOUS; SUBCUTANEOUS at 06:29

## 2024-03-26 RX ADMIN — BUSPIRONE HYDROCHLORIDE 5 MG: 5 TABLET ORAL at 22:00

## 2024-03-26 RX ADMIN — ACETAMINOPHEN 650 MG: 325 TABLET, FILM COATED ORAL at 01:55

## 2024-03-26 RX ADMIN — MIRTAZAPINE 7.5 MG: 15 TABLET, FILM COATED ORAL at 22:01

## 2024-03-26 RX ADMIN — BUSPIRONE HYDROCHLORIDE 5 MG: 5 TABLET ORAL at 15:39

## 2024-03-26 NOTE — PROGRESS NOTES
"[facility]  Progress Note  Name: Chelsey Carson I  MRN: [id number]  Unit/Bed#: [facility] 902-01 I Date of Admission: 3/16/2024   Date of Service: 3/26/2024 I Hospital Day: 9    Assessment/Plan   * Proctitis  Assessment & Plan  Presented w/ constipation and rectal pain/spasms - noted h/o waxing/waning constipation and hemorrhoids per patient  PRN Anusol on board for hemorrhoids  PRN Nifedipine/Lidocaine ointment on board for rectal spasms   S/p a dose of glycerin suppository and bowel prep  Continue senna, miralax BID   CT imaging noted: \"Mild stercoral or infectious proctitis. Large colonic stool burden. No obstruction.\"  Now improved on repeat CT abd/pelvis  Monitor on diet    Nausea  Assessment & Plan  Presented with persistent nausea, worse in AM.  No vomiting.  Better in afternoons but persists despite reglan, zofran.  Has been having bowel movements.  Nausea better as of 3/25, but now with mild symptoms this am  Added scopolamine patch on 3/20 which pt reports made her nausea worse - will discontinue  GI appreciated  EGD - hiatal hernia  Continue Remeron.  Antiemetics changed to as needed.  PPI discontinued.  Monitor back on diet another day. Anticipate discharge to rehab in am    Tachycardia  Assessment & Plan  Noted on exam 3/24  EKG NSR  No chest pain, SOB  Continue to monitor     Hyponatremia  Assessment & Plan  Appears to be chronic and stable   Consider a SIADH type picture in the setting of uncontrolled pain coupled with possible solute loss from decreased oral intake  Sodium improved s/p IVF and is running around 131-132    Macrocytic anemia  Assessment & Plan  B12/folate levels noted and acceptable  Stable without active bleeding     Thrombocytosis  Assessment & Plan  Likely reactive due to acute medical issue(s)  Continue to monitor     Constipation  Assessment & Plan  Optimize bowel regimen   See plan for associated proctitis above   Evaluated by gastroenterology " with attempts at bedside disimpaction (stopped mid-process due to patient complaints of pain)  KUB on 3/18 again demonstrated constipation   Pt is having regular Bms documented at this time, continue miralax BID, senna  - improved on most recent CT scan    Ambulatory dysfunction  Assessment & Plan  Previously able to walk without a cane or walker  Appreciate PT/OT input -> recommending skilled rehab once medically stable    Hyperlipidemia  Assessment & Plan  Continue statin    Chronic cough  Assessment & Plan  Chronic cough most likely cough variant asthma with possible underlying GERD  C/w PRN Albuterol. No indication for PPI per GI and was discontinued  CT imaging noted lower lung tree-in-bud infiltrates suspicious for possible infectious bronchiolitis - follow-up portable CXR negative for acute cardiopulmonary disease  Remains afebrile without leukocytosis -> less likely suspicion for infectious etiology, hence, continue to observe off antibiotics  PRN cough suppression/expectorant therapy on board   Influenza/COVID/RSV negative             VTE Pharmacologic Prophylaxis: VTE Score: 3 Moderate Risk (Score 3-4) - Pharmacological DVT Prophylaxis Ordered: heparin.    Mobility:   Basic Mobility Inpatient Raw Score: 9  -HLM Goal: 3: Sit at edge of bed  JH-HLM Achieved: 4: Move to chair/commode  JH-HLM Goal NOT achieved. Continue with multidisciplinary rounding and encourage appropriate mobility to improve upon JH-HLM goals.    Patient Centered Rounds: I performed bedside rounds with nursing staff today.   Discussions with Specialists or Other Care Team Provider: case management    Education and Discussions with Family / Patient: Attempted to update  (son) via phone. Left voicemail.     Total Time Spent on Date of Encounter in care of patient: 35 mins. This time was spent on one or more of the following: performing physical exam; counseling and coordination of care; obtaining or reviewing history;  documenting in the medical record; reviewing/ordering tests, medications or procedures; communicating with other healthcare professionals and discussing with patient's family/caregivers.    Current Length of Stay: 9 day(s)  Current Patient Status: Inpatient   Certification Statement: The patient will continue to require additional inpatient hospital stay due to monitor oral intake  Discharge Plan: Anticipate discharge tomorrow to rehab facility.    Code Status: Level 3 - DNAR and DNI    Subjective:   C/O nausea and fatigue this am. Ate well last evening. Had about 25% for breakfast this morning. No vomiting.     Objective:     Vitals:   Temp (24hrs), Av.7 °F (37.1 °C), Min:97.6 °F (36.4 °C), Max:99.7 °F (37.6 °C)    Temp:  [97.6 °F (36.4 °C)-99.7 °F (37.6 °C)] 99.7 °F (37.6 °C)  HR:  [] 105  Resp:  [16-18] 18  BP: (126-145)/(59-71) 145/71  SpO2:  [94 %-99 %] 94 %  Body mass index is 23.83 kg/m².     Input and Output Summary (last 24 hours):     Intake/Output Summary (Last 24 hours) at 3/26/2024 1211  Last data filed at 3/26/2024 0900  Gross per 24 hour   Intake 476 ml   Output 700 ml   Net -224 ml       Physical Exam:   Physical Exam  Constitutional:       Comments: Appears fatigued   Cardiovascular:      Rate and Rhythm: Normal rate and regular rhythm.      Heart sounds: No murmur heard.  Pulmonary:      Effort: Pulmonary effort is normal.      Breath sounds: Normal breath sounds.   Abdominal:      General: Bowel sounds are normal. There is no distension.      Palpations: Abdomen is soft.      Tenderness: There is no abdominal tenderness.   Skin:     General: Skin is warm and dry.   Neurological:      General: No focal deficit present.      Mental Status: She is alert and oriented to person, place, and time.   Psychiatric:         Mood and Affect: Mood normal.         Additional Data:     Labs:  Results from last 7 days   Lab Units 24  0435 24  0354 24  0828 24  0636   WBC  Thousand/uL 6.11 8.97   < > 7.49   HEMOGLOBIN g/dL 9.5* 9.6*   < > 10.2*   HEMATOCRIT % 29.2* 29.4*   < > 31.6*   PLATELETS Thousands/uL 401* 411*   < > 387   BANDS PCT %  --   --   --  4   NEUTROS PCT %  --  67   < >  --    LYMPHS PCT %  --  17   < >  --    LYMPHO PCT %  --   --   --  23   MONOS PCT %  --  12   < >  --    MONO PCT %  --   --   --  4   EOS PCT %  --  2   < > 3    < > = values in this interval not displayed.     Results from last 7 days   Lab Units 03/26/24  0919   SODIUM mmol/L 131*   POTASSIUM mmol/L 3.8   CHLORIDE mmol/L 98   CO2 mmol/L 25   BUN mg/dL 15   CREATININE mg/dL 0.74   ANION GAP mmol/L 8   CALCIUM mg/dL 8.7   GLUCOSE RANDOM mg/dL 142*                       Lines/Drains:  Invasive Devices       Peripheral Intravenous Line  Duration             Peripheral IV 03/22/24 Left Antecubital 4 days              Drain  Duration             External Urinary Catheter 9 days                          Imaging: No pertinent imaging reviewed.    Recent Cultures (last 7 days):         Last 24 Hours Medication List:   Current Facility-Administered Medications   Medication Dose Route Frequency Provider Last Rate    acetaminophen  650 mg Oral Q6H PRN Anne Frost MD      albuterol  2 puff Inhalation Q4H PRN Anne Frost MD      benzonatate  100 mg Oral TID PRN Anne Frost MD      busPIRone  5 mg Oral TID Kenji Fitzgerald MD      calcium carbonate  500 mg Oral TID PRN Anne Frost MD      doxylamine  25 mg Oral HS PRN Anne Frost MD      guaiFENesin  600 mg Oral Q12H [facility] Flora Jasso PA-C      heparin (porcine)  5,000 Units Subcutaneous Q8H [facility] Kenji Fitzgerald MD      hydrocortisone   Topical 4x Daily PRN Anne Frost MD      labetalol  10 mg Intravenous Q4H PRN Anne Frost MD      melatonin  3 mg Oral HS PRN Marilyn Hogan PA-C      metoclopramide  10 mg Intravenous Q6H PRN Anne Frots MD      mirtazapine  7.5 mg Oral HS Mariela Barreto DO      ondansetron  4 mg Intravenous Q4H PRN Marilyn H  SOWMYA Hogan      polyethylene glycol  17 g Oral BID Flora Jasso PA-C      senna-docusate sodium  2 tablet Oral BID Jeff Hennessy MD      trimethobenzamide  200 mg Intramuscular Q6H PRN Sarai Almendarez PA-C          Today, Patient Was Seen By: Leesa Andrew PA-C    **Please Note: This note may have been constructed using a voice recognition system.**

## 2024-03-26 NOTE — PLAN OF CARE
Problem: DISCHARGE PLANNING  Goal: Discharge to home or other facility with appropriate resources  Description: INTERVENTIONS:  - Identify barriers to discharge w/patient and caregiver  - Arrange for needed discharge resources and transportation as appropriate  - Identify discharge learning needs (meds, wound care, etc.)  - Arrange for interpretive services to assist at discharge as needed  - Refer to Case Management Department for coordinating discharge planning if the patient needs post-hospital services based on physician/advanced practitioner order or complex needs related to functional status, cognitive ability, or social support system  Outcome: Not Progressing     Problem: Knowledge Deficit  Goal: Patient/family/caregiver demonstrates understanding of disease process, treatment plan, medications, and discharge instructions  Description: Complete learning assessment and assess knowledge base.  Interventions:  - Provide teaching at level of understanding  - Provide teaching via preferred learning methods  Outcome: Not Progressing     Problem: GASTROINTESTINAL - ADULT  Goal: Minimal or absence of nausea and/or vomiting  Description: INTERVENTIONS:  - Administer IV fluids if ordered to ensure adequate hydration  - Maintain NPO status until nausea and vomiting are resolved  - Nasogastric tube if ordered  - Administer ordered antiemetic medications as needed  - Provide nonpharmacologic comfort measures as appropriate  - Advance diet as tolerated, if ordered  - Consider nutrition services referral to assist patient with adequate nutrition and appropriate food choices  Outcome: Not Progressing  Goal: Maintains or returns to baseline bowel function  Description: INTERVENTIONS:  - Assess bowel function  - Encourage oral fluids to ensure adequate hydration  - Administer IV fluids if ordered to ensure adequate hydration  - Administer ordered medications as needed  - Encourage mobilization and activity  - Consider  nutritional services referral to assist patient with adequate nutrition and appropriate food choices  Outcome: Not Progressing     Problem: Nutrition/Hydration-ADULT  Goal: Nutrient/Hydration intake appropriate for improving, restoring or maintaining nutritional needs  Description: Monitor and assess patient's nutrition/hydration status for malnutrition. Collaborate with interdisciplinary team and initiate plan and interventions as ordered.  Monitor patient's weight and dietary intake as ordered or per policy. Utilize nutrition screening tool and intervene as necessary. Determine patient's food preferences and provide high-protein, high-caloric foods as appropriate.     INTERVENTIONS:  - Monitor oral intake, urinary output, labs, and treatment plans  - Assess nutrition and hydration status and recommend course of action  - Evaluate amount of meals eaten  - Assist patient with eating if necessary   - Allow adequate time for meals  - Recommend/ encourage appropriate diets, oral nutritional supplements, and vitamin/mineral supplements  - Order, calculate, and assess calorie counts as needed  - Recommend, monitor, and adjust tube feedings and TPN/PPN based on assessed needs  - Assess need for intravenous fluids  - Provide specific nutrition/hydration education as appropriate  - Include patient/family/caregiver in decisions related to nutrition  Outcome: Progressing

## 2024-03-26 NOTE — ASSESSMENT & PLAN NOTE
Appears to be chronic and stable   Consider a SIADH type picture in the setting of uncontrolled pain coupled with possible solute loss from decreased oral intake  Sodium improved s/p IVF and is running around 131-132

## 2024-03-26 NOTE — ASSESSMENT & PLAN NOTE
Presented with persistent nausea, worse in AM.  No vomiting.  Better in afternoons but persists despite reglan, zofran.  Has been having bowel movements.  Nausea better as of 3/25, but now with mild symptoms this am  Added scopolamine patch on 3/20 which pt reports made her nausea worse - will discontinue  GI appreciated  EGD - hiatal hernia  Continue Remeron.  Antiemetics changed to as needed.  PPI discontinued.  Monitor back on diet another day. Anticipate discharge to rehab in am

## 2024-03-26 NOTE — ASSESSMENT & PLAN NOTE
"Presented w/ constipation and rectal pain/spasms - noted h/o waxing/waning constipation and hemorrhoids per patient  PRN Anusol on board for hemorrhoids  PRN Nifedipine/Lidocaine ointment on board for rectal spasms   S/p a dose of glycerin suppository and bowel prep  Continue senna, miralax BID   CT imaging noted: \"Mild stercoral or infectious proctitis. Large colonic stool burden. No obstruction.\"  Now improved on repeat CT abd/pelvis  Monitor on diet  "

## 2024-03-27 ENCOUNTER — NURSING HOME VISIT (OUTPATIENT)
Dept: GERIATRICS | Facility: OTHER | Age: 89
End: 2024-03-27
Payer: MEDICARE

## 2024-03-27 ENCOUNTER — TELEPHONE (OUTPATIENT)
Dept: OTHER | Facility: OTHER | Age: 89
End: 2024-03-27

## 2024-03-27 VITALS
BODY MASS INDEX: 23.93 KG/M2 | OXYGEN SATURATION: 95 % | HEIGHT: 58 IN | DIASTOLIC BLOOD PRESSURE: 72 MMHG | HEART RATE: 89 BPM | SYSTOLIC BLOOD PRESSURE: 133 MMHG | TEMPERATURE: 98.3 F | RESPIRATION RATE: 18 BRPM | WEIGHT: 114 LBS

## 2024-03-27 DIAGNOSIS — I10 BENIGN ESSENTIAL HTN: ICD-10-CM

## 2024-03-27 DIAGNOSIS — F41.9 ANXIETY DISORDER, UNSPECIFIED TYPE: ICD-10-CM

## 2024-03-27 DIAGNOSIS — R11.0 NAUSEA: ICD-10-CM

## 2024-03-27 DIAGNOSIS — R33.9 URINARY RETENTION: ICD-10-CM

## 2024-03-27 DIAGNOSIS — K62.89 PROCTITIS: Primary | ICD-10-CM

## 2024-03-27 DIAGNOSIS — R26.2 AMBULATORY DYSFUNCTION: ICD-10-CM

## 2024-03-27 DIAGNOSIS — E78.5 HYPERLIPIDEMIA, UNSPECIFIED HYPERLIPIDEMIA TYPE: ICD-10-CM

## 2024-03-27 DIAGNOSIS — N18.31 STAGE 3A CHRONIC KIDNEY DISEASE (HCC): ICD-10-CM

## 2024-03-27 PROBLEM — K59.01 SLOW TRANSIT CONSTIPATION: Status: RESOLVED | Noted: 2024-03-15 | Resolved: 2024-03-27

## 2024-03-27 PROBLEM — E87.6 HYPOKALEMIA: Status: ACTIVE | Noted: 2021-02-01

## 2024-03-27 PROBLEM — R68.2 DRY MOUTH, UNSPECIFIED: Status: RESOLVED | Noted: 2024-03-15 | Resolved: 2024-03-27

## 2024-03-27 PROBLEM — U07.1 PNEUMONIA DUE TO COVID-19 VIRUS: Status: RESOLVED | Noted: 2021-02-01 | Resolved: 2024-03-27

## 2024-03-27 PROBLEM — J45.909 UNSPECIFIED ASTHMA, UNCOMPLICATED: Status: ACTIVE | Noted: 2024-03-15

## 2024-03-27 PROBLEM — K21.9 GERD (GASTROESOPHAGEAL REFLUX DISEASE): Status: ACTIVE | Noted: 2022-10-10

## 2024-03-27 PROBLEM — R63.0 LOSS OF APPETITE: Status: ACTIVE | Noted: 2024-03-15

## 2024-03-27 PROBLEM — D64.9 ANEMIA: Status: RESOLVED | Noted: 2024-03-15 | Resolved: 2024-03-27

## 2024-03-27 PROBLEM — E22.2 SYNDROME OF INAPPROPRIATE SECRETION OF ANTIDIURETIC HORMONE (HCC): Status: RESOLVED | Noted: 2024-03-15 | Resolved: 2024-03-27

## 2024-03-27 PROBLEM — J12.82 PNEUMONIA DUE TO COVID-19 VIRUS: Status: RESOLVED | Noted: 2021-02-01 | Resolved: 2024-03-27

## 2024-03-27 PROBLEM — J10.1 INFLUENZA B: Status: RESOLVED | Noted: 2024-03-05 | Resolved: 2024-03-27

## 2024-03-27 PROBLEM — E87.1 HYPONATREMIA: Status: RESOLVED | Noted: 2021-02-01 | Resolved: 2024-03-27

## 2024-03-27 PROBLEM — G31.84 MCI (MILD COGNITIVE IMPAIRMENT): Status: RESOLVED | Noted: 2021-02-15 | Resolved: 2024-03-27

## 2024-03-27 PROBLEM — G47.00 INSOMNIA: Chronic | Status: RESOLVED | Noted: 2021-02-17 | Resolved: 2024-03-27

## 2024-03-27 PROBLEM — G25.2 OTHER SPECIFIED FORMS OF TREMOR: Status: RESOLVED | Noted: 2024-03-15 | Resolved: 2024-03-27

## 2024-03-27 PROBLEM — R05.3 CHRONIC COUGH: Status: RESOLVED | Noted: 2017-01-13 | Resolved: 2024-03-27

## 2024-03-27 PROBLEM — M81.0 AGE RELATED OSTEOPOROSIS: Status: RESOLVED | Noted: 2022-11-01 | Resolved: 2024-03-27

## 2024-03-27 PROBLEM — I35.0 NONRHEUMATIC AORTIC (VALVE) STENOSIS: Status: RESOLVED | Noted: 2024-03-15 | Resolved: 2024-03-27

## 2024-03-27 PROBLEM — D75.839 THROMBOCYTOSIS, UNSPECIFIED: Status: RESOLVED | Noted: 2024-03-15 | Resolved: 2024-03-27

## 2024-03-27 PROBLEM — R00.2 PALPITATIONS: Status: RESOLVED | Noted: 2024-03-15 | Resolved: 2024-03-27

## 2024-03-27 PROBLEM — Z86.16 HISTORY OF 2019 NOVEL CORONAVIRUS DISEASE (COVID-19): Status: RESOLVED | Noted: 2021-02-15 | Resolved: 2024-03-27

## 2024-03-27 LAB — SARS-COV-2 RNA RESP QL NAA+PROBE: NEGATIVE

## 2024-03-27 PROCEDURE — 99305 1ST NF CARE MODERATE MDM 35: CPT | Performed by: INTERNAL MEDICINE

## 2024-03-27 PROCEDURE — 99239 HOSP IP/OBS DSCHRG MGMT >30: CPT | Performed by: PHYSICIAN ASSISTANT

## 2024-03-27 PROCEDURE — 87635 SARS-COV-2 COVID-19 AMP PRB: CPT | Performed by: PHYSICIAN ASSISTANT

## 2024-03-27 RX ORDER — BENZONATATE 100 MG/1
100 CAPSULE ORAL 3 TIMES DAILY PRN
Start: 2024-03-27

## 2024-03-27 RX ORDER — HYDROCORTISONE 25 MG/G
CREAM TOPICAL 4 TIMES DAILY PRN
Start: 2024-03-27

## 2024-03-27 RX ORDER — GUAIFENESIN 600 MG/1
600 TABLET, EXTENDED RELEASE ORAL EVERY 12 HOURS SCHEDULED
Start: 2024-03-27

## 2024-03-27 RX ORDER — ACETAMINOPHEN 325 MG/1
650 TABLET ORAL EVERY 6 HOURS PRN
Start: 2024-03-27

## 2024-03-27 RX ORDER — POLYETHYLENE GLYCOL 3350 17 G/17G
17 POWDER, FOR SOLUTION ORAL 2 TIMES DAILY
Start: 2024-03-27

## 2024-03-27 RX ORDER — METOCLOPRAMIDE 10 MG/1
10 TABLET ORAL 4 TIMES DAILY PRN
Start: 2024-03-27

## 2024-03-27 RX ORDER — MIRTAZAPINE 7.5 MG/1
7.5 TABLET, FILM COATED ORAL
Start: 2024-03-27

## 2024-03-27 RX ORDER — AMOXICILLIN 250 MG
2 CAPSULE ORAL 2 TIMES DAILY
Start: 2024-03-27

## 2024-03-27 RX ADMIN — CALCIUM CARBONATE (ANTACID) CHEW TAB 500 MG 500 MG: 500 CHEW TAB at 09:47

## 2024-03-27 RX ADMIN — SENNOSIDES, DOCUSATE SODIUM 2 TABLET: 8.6; 5 TABLET ORAL at 09:46

## 2024-03-27 RX ADMIN — ACETAMINOPHEN 650 MG: 325 TABLET, FILM COATED ORAL at 13:37

## 2024-03-27 RX ADMIN — BUSPIRONE HYDROCHLORIDE 5 MG: 5 TABLET ORAL at 09:07

## 2024-03-27 RX ADMIN — HEPARIN SODIUM 5000 UNITS: 5000 INJECTION INTRAVENOUS; SUBCUTANEOUS at 05:16

## 2024-03-27 NOTE — ASSESSMENT & PLAN NOTE
Presented with persistent nausea, worse in AM.  No vomiting.  Better in afternoons but persisted despite reglan, zofran.  Has been having bowel movements.  Nausea better as of 3/25, but still with mild intermittent symptoms  Added scopolamine patch on 3/20 which pt reports made her nausea worse - will discontinue  GI appreciated  EGD - hiatal hernia  Continue Remeron.  Antiemetics changed to as needed.  PPI discontinued.  Tolerating full diet

## 2024-03-27 NOTE — CASE MANAGEMENT
Case Management Discharge Planning Note    Patient name Chelsey Carson  Location [facility] 902/[facility] 902-01 MRN [id number]  : 1935 Date 3/27/2024       Current Admission Date: 3/16/2024  Current Admission Diagnosis:Proctitis   Patient Active Problem List    Diagnosis Date Noted    Tachycardia 2024    Nausea 2024    Macrocytic anemia 2024    Thrombocytosis 2024    Ambulatory dysfunction 2024    Proctitis 2024    Tinnitus of both ears 2023    Xerostomia 2023    Mixed conductive and sensorineural hearing loss of left ear with restricted hearing of right ear 2023    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 2023    DNR (do not resuscitate) 2023    Nonintractable headache 2023    Abnormal brain MRI 2023    Slow transit constipation 2023    Primary insomnia 2023    Constipation 2023    Tremor 2023    Hyponatremia 2023    Age related osteoporosis 2022    Esophageal dysphagia 10/10/2022    Stage 3a chronic kidney disease (HCC) 2022    Venous insufficiency 08/10/2022    Other fatigue 2022    COVID-19 2021    Electrolyte abnormality 2021    Dry mouth 2019    Chronic maxillary sinusitis 2019    Breast cancer (HCC) 2019    Chronic cough 2019    Perforation of left tympanic membrane 2019    Nonrheumatic aortic valve insufficiency 11/15/2018    Pes anserinus bursitis of right knee 2018    Hyperlipidemia 2012      LOS (days): 10  Geometric Mean LOS (GMLOS) (days): 3  Days to GMLOS:-6.8     OBJECTIVE:  Risk of Unplanned Readmission Score: 12.54         Current admission status: Inpatient   Preferred Pharmacy:   [id number] - [place], [place] - [address]   [address] [place] [address]  Phone: [phone] Fax: [phone]    Primary Care Provider: Marieyl Fan,  MD    Primary Insurance: MEDICARE  Secondary Insurance: COMMERCIAL MISCELLANEOUS    DISCHARGE DETAILS:    Discharge planning discussed with:: Pt  Freedom of Choice: Yes  Comments - Freedom of Choice: Pt to return to [facility] contacted family/caregiver?: Yes (Pt alert and oriented)  Were Treatment Team discharge recommendations reviewed with patient/caregiver?: Yes  Did patient/caregiver verbalize understanding of patient care needs?: N/A- going to facility  Were patient/caregiver advised of the risks associated with not following Treatment Team discharge recommendations?: Yes    Contacts  Patient Contacts: Brookept's son.  Relationship to Patient:: Family  Contact Method: Phone  Phone Number: [phone]  Reason/Outcome: Emergency Contact, Continuity of Care, Discharge Planning    Requested Home Health Care         Is the patient interested in HHC at discharge?: No    DME Referral Provided  Referral made for DME?: No    Other Referral/Resources/Interventions Provided:  Interventions: Short Term Rehab  Referral Comments: Pt to be transferred to [facility] for [facility]         Treatment Team Recommendation: Short Term Rehab  Discharge Destination Plan:: Short Term Rehab  Transport at Discharge : [facility]     Number/Name of Dispatcher: Esua  Transported by (Company and Unit #): [facility]  ETA of Transport (Date): 03/27/24  ETA of Transport (Time): 1400     Transfer Mode: Stretcher  Accompanied by: EMS personnel     IMM Given (Date):: 03/27/24  IMM Given to:: Patient          Accepting Facility Name, City & State : [facility]  Receiving Facility/Agency Phone Number: [phone]

## 2024-03-27 NOTE — DISCHARGE SUMMARY
"[facility]  Discharge- Chelsey Carson 1935, 88 y.o. female MRN: [id number]  Unit/Bed#: [facility] 902-01 Encounter: [id number]  Primary Care Provider: Mariely Fan MD   Date and time admitted to hospital: 3/16/2024 11:04 AM    * Proctitis  Assessment & Plan  Presented w/ constipation and rectal pain/spasms - noted h/o waxing/waning constipation and hemorrhoids per patient  PRN Anusol on board for hemorrhoids  PRN Nifedipine/Lidocaine ointment on board for rectal spasms   S/p a dose of glycerin suppository and bowel prep  Continue senna, miralax BID   CT imaging noted: \"Mild stercoral or infectious proctitis. Large colonic stool burden. No obstruction.\"  Now improved on repeat CT abd/pelvis  Monitor on diet    Nausea  Assessment & Plan  Presented with persistent nausea, worse in AM.  No vomiting.  Better in afternoons but persisted despite reglan, zofran.  Has been having bowel movements.  Nausea better as of 3/25, but still with mild intermittent symptoms  Added scopolamine patch on 3/20 which pt reports made her nausea worse - will discontinue  GI appreciated  EGD - hiatal hernia  Continue Remeron.  Antiemetics changed to as needed.  PPI discontinued.  Tolerating full diet    Urinary retention  Assessment & Plan  Required straight cath overnight  No previous history of urinary retention  Suspect in the setting of decreased mobility  Encourage increased activity  Straight cath as needed    Tachycardia  Assessment & Plan  Noted on exam 3/24  EKG NSR  No chest pain, SOB  Now resolved     Hyponatremia  Assessment & Plan  Appears to be chronic and stable   Consider a SIADH type picture in the setting of uncontrolled pain coupled with possible solute loss from decreased oral intake  Sodium improved s/p IVF and is running around 131-132    Macrocytic anemia  Assessment & Plan  B12/folate levels noted and acceptable  Stable without active bleeding     Thrombocytosis  Assessment & " Plan  Likely reactive due to acute medical issue(s)  Continue to monitor     Constipation  Assessment & Plan  Optimize bowel regimen   See plan for associated proctitis above   Evaluated by gastroenterology with attempts at bedside disimpaction (stopped mid-process due to patient complaints of pain)  KUB on 3/18 again demonstrated constipation   Pt is having regular Bms documented at this time, continue miralax BID, senna  - improved on most recent CT scan    Ambulatory dysfunction  Assessment & Plan  Previously able to walk without a cane or walker  Appreciate PT/OT input -> recommending skilled rehab once medically stable    Hyperlipidemia  Assessment & Plan  Continue statin    Chronic cough  Assessment & Plan  Chronic cough most likely cough variant asthma with possible underlying GERD  C/w PRN Albuterol. No indication for PPI per GI and was discontinued  CT imaging noted lower lung tree-in-bud infiltrates suspicious for possible infectious bronchiolitis - follow-up portable CXR negative for acute cardiopulmonary disease  Remains afebrile without leukocytosis -> less likely suspicion for infectious etiology, hence, continue to observe off antibiotics  PRN cough suppression/expectorant therapy on board   Influenza/COVID/RSV negative      Medical Problems       Resolved Problems  Date Reviewed: 3/27/2024   None       Discharging Physician / Practitioner: Leesa Andrew PA-C  PCP: Mariely Fan MD  Admission Date:   Admission Orders (From admission, onward)       Ordered        03/17/24 1356  Inpatient Admission  Once            03/16/24 1516  Place in Observation  Once                          Discharge Date: 03/27/24    Disposition:    [facility]: [facility]. Unable to reach physician and no message left.    Reason for Admission: Rectal pain    Discharge Diagnoses:   Please see assessment and plan section above for further details regarding discharge diagnoses.      Consultations During Hospital Stay:  GI    Procedures Performed:     CT abd/pelvis 3/24   1.  No acute abdominopelvic abnormality.  2.  Resolution of previously described proctitis. No significant stool burden.  3.  Improving bilateral lower lobe tree-in-bud opacities, reflecting a resolving infectious process.    KUB  Constipation suggested  Nonspecific bowel gas presentation otherwise    CXR  No acute cardiopulmonary disease.  (Please refer to CT abdomen report from yesterday for additional findings at the lung bases)    CT abd/pelvis  1. Mild stercoral or infectious proctitis. Large colonic stool burden. No obstruction.     2. Bibasilar tree-in-bud infiltrates compatible with infectious bronchiolitis.    EGD  IMPRESSION:  Medium 4 cm hiatal hernia with regular appearing Z-line  The upper third of the esophagus, middle third of the esophagus, lower third of the esophagus and GE junction appeared normal.  The cardia, fundus of the stomach, body of the stomach, greater curve of the stomach, lesser curve of the stomach, incisura, antrum and pylorus appeared normal.  The duodenal bulb, 1st part of the duodenum and 2nd part of the duodenum appeared normal.        RECOMMENDATION:    Return back to the medical surgical floor  Okay to resume diet as patient tolerates  Continue on Remeron 7.5 mg daily   Continue on Scopolamine patch and PRN Reglan  No indication for PPI therapy, okay to discontinue   GI will sign off at this time. Please call with any questions or concerns.        Significant Findings / Test Results:   See above    Incidental Findings:   none     Test Results Pending at Discharge (will require follow up):   none     Outpatient Tests Requested:  none    Complications:  none    Hospital Course:      Chelsey Carson is a 88 y.o. female patient who originally presented to the hospital on 3/16/2024 due to ambulatory dysfunction secondary to rectal pain.  She was found to be severely constipated with an  "acute proctitis.  She improved with aggressive bowel regimen and disimpaction.  Patient then persisted with intractable nausea.  She underwent EGD and repeat CT of the abdomen pelvis without significant findings.  She was started on Remeron 7.5 mg daily and as needed antiemetics.  We tried a scopolamine patch, but patient did not want to continue this.  Patient slowly improved over the course of her stay and is now tolerating a diet.  Her nausea appears to be resolved, though she has mild nausea in the mornings intermittently.  Patient also developed urinary retention prior to discharge.  Suspect this is from decreased mobility.  Patient can continue to be straight cathed as needed.  Patient will discharge to rehab in stable condition.    Condition at Discharge: good    Discharge Day Visit / Exam:   Subjective: Patient complains of generalized weakness.  She denies nausea or abdominal pain this morning.  Most of her breakfast is eaten.  Nursing reports they had to straight cath patient overnight.  Patient due for repeat bladder scan later today.  Vitals: Blood Pressure: 133/72 (03/27/24 0737)  Pulse: 89 (03/27/24 0737)  Temperature: 98.3 °F (36.8 °C) (03/27/24 0737)  Temp Source: Oral (03/26/24 2200)  Respirations: 18 (03/27/24 0737)  Height: 4' 10\" (147.3 cm) (03/22/24 1555)  Weight - Scale: 51.7 kg (114 lb) (03/18/24 0900)  SpO2: 95 % (03/27/24 0737)  Exam:   Physical Exam  Constitutional:       Appearance: Normal appearance.   Cardiovascular:      Rate and Rhythm: Normal rate and regular rhythm.      Heart sounds: No murmur heard.  Pulmonary:      Effort: Pulmonary effort is normal.      Breath sounds: Normal breath sounds.   Abdominal:      General: Bowel sounds are normal. There is no distension.      Palpations: Abdomen is soft.      Tenderness: There is no abdominal tenderness.   Skin:     General: Skin is warm and dry.   Neurological:      General: No focal deficit present.      Mental Status: She is alert " and oriented to person, place, and time.   Psychiatric:         Mood and Affect: Mood normal.         Discussion with Family: Son called with update    Medication Adjustments and Discharge Medications:  Discharge Medication List: See after visit summary for reconciled discharge medications.   Medication Dosing Tapers - Please refer to Discharge Medication List for details on any medication dosing tapers (if applicable to patient).   Summary of Medication Adjustments made as a result of this hospitalization: Remeron 7.5mg daily, anusol, miralax, senna for constipation, reglan and tigan prn nausea  Medications being temporarily held (include recommended restart time):     Wound Care Recommendations:  When applicable, please see wound care section of After Visit Summary.    Instructions for any Catheters / Lines Present at Discharge (including removal date, if applicable): straight cath prn urinary retention (no void in >8 hours or bladder scan greater than 300ml)    Diet Recommendations at Discharge:  Diet -        Diet Orders   (From admission, onward)                 Start     Ordered    03/25/24 1223  Diet Regular; Regular House  Diet effective now        References:    Adult Nutrition Support Algorithm    RD Therapeutic Diet Order Protocol   Question Answer Comment   Diet Type Regular    Regular Regular House    RD to adjust diet per protocol? Yes        03/25/24 1222    03/19/24 1739  Dietary nutrition supplements  Once        Question Answer Comment   Select Supplement: Ensure Compact-Vanilla    Frequency Lunch        03/19/24 1738                    Mobility at time of Discharge:   Basic Mobility Inpatient Raw Score: 9  JH-HLM Goal: 3: Sit at edge of bed  JH-HLM Achieved: 4: Move to chair/commode  HLM Goal NOT achieved. Continue to encourage mobility in post discharge setting.    Goals of Care Discussions:  Code Status at Discharge: Level 3 - DNAR and DNI  Goals of care were not discussed during this  admission.    Discharge instructions/Information to patient and family:   See after visit summary section titled Discharge Instructions for information provided to patient and family.      Planned Readmission: none      Discharge Statement:  I spent 45 minutes discharging the patient. This time was spent on the day of discharge. I had direct contact with the patient on the day of discharge. Greater than 50% of the total time was spent examining patient, answering all patient questions, arranging and discussing plan of care with patient as well as directly providing post-discharge instructions.  Additional time then spent on discharge activities.    **Please Note: This note may have been constructed using a voice recognition system.**

## 2024-03-27 NOTE — ASSESSMENT & PLAN NOTE
Required straight cath overnight  No previous history of urinary retention  Suspect in the setting of decreased mobility  Encourage increased activity  Straight cath as needed

## 2024-03-27 NOTE — PROGRESS NOTES
[facility]  [address]  [place] [address]  [id number]    Nursing Home Re-Admission    NAME: Chelsey Carson  AGE: 88 y.o. SEX: female [id number]    DATE OF ENCOUNTER: 3/27/2024    Assessment/Plan:   Patient’s care was coordinated with nursing facility staff. Recent vitals, labs and updated medications were reviewed on [facility] system of facility. Past Medical, surgical, social, medication and allergy history and patient’s previous records reviewed.     1. Proctitis        2. Nausea        3. Urinary retention        4. Benign essential HTN        5. Stage 3a chronic kidney disease (HCC)        6. Anxiety disorder, unspecified type        7. Hyperlipidemia, unspecified hyperlipidemia type        8. Ambulatory dysfunction             Proctitis  Pt s/p rectal spasms/pain  CT scan abd/pelvis had shown mild stercoral or infectious proctitis. Large colonic stool burden; no obstruction.  S/p constipation  Pt now having regular BM  Pt complains of nausea associated with gas  Add simethicone 80 mg 1 tab po q6 hours prn gas  Cont scheduled stool softners & antiemetics  Cont prn anusol    Nausea  Pt still with nausea  Pt s/p egd with hiatal hernia  Staff faxed over meds for antiemetic  I also added simethicone 80 mg 1 tab po q6 hours prn gas as pt says nausea associated with gas  Liberalize diet & allow pt's family to bring in food to help pt's po intake    Urinary retention  Resolved  Pt had one episode of urinary retention for which she underwent straight cath  Cont aggressive stool softners    Benign essential HTN  3/27/2024 15:21 130 / 44 mmHg   BP stable  Monitor off meds    Stage 3a chronic kidney disease (HCC)  Lab Results   Component Value Date    EGFR 72 03/26/2024    EGFR 66 03/25/2024    EGFR 64 03/24/2024    CREATININE 0.74 03/26/2024    CREATININE 0.80 03/25/2024    CREATININE 0.82 03/24/2024   Cr stable  Avoid nsaids/nephrotoxins/IV contrast  Check  periodic BMP    Anxiety disorder, unspecified  Stable  Cont buspar 5 mg 1 tab po TID    Hyperlipidemia  Stable  Cont simvastatin 5 mg 1 tab po QHS    Ambulatory dysfunction  Pt weaker than her baseline  She used to walk without a walker  PT/OT to eval/tx & help with balance/strength training  Encourage good po intake of solids/liquids  OOB as tolerated (with assistance from staff)  Encourage pt to stay motivated with physical/occupational therapy  Early/Frequent mobilization with assistance from staff/therapy  Fall precaution    Chief Complaint     Here for STR    HPI   Patient is a 88 y.o. female with PMH Hyperlipidemia, breast ca, CKD3a, anxiety, aortic valve disorder, HTN, Hyponatremia, SIADH, Asthma, Osteoporosis, GERD, thrombocytosis, and tremor.    Pt admitted at [facility] from 3/16/2024-3/27/2024 after having intractable rectal pain and constipation. Pt had ambulatory dysfunction as well. Pt found to have external hemorrhoids and started on anusol. Pt underwent CT scan abd/pelvis which revealed severe stool burden on CT. G.I. was consulted and attempted bedside disimpaction. Pt given aggressive stool softners and then able to have BM. Her symptoms slowly improved with treatment.    In addition pt had mild hyponatremia. Pt underwent a trial of fluid restriction. Her sodium level improved to 131 from 128. She clinically improved and was discharged to [facility] for further rehabilitation.    Pt seen and examined. Pt complains of nausea and gas. She says the gas causes nausea. Pt is A&Ox3. Inquired if pt likes shakes. Pt does not like shakes regardless of vanilla or chocolate flavors. She says she did not like the hospital food. Thus I gave permission for pt's family to bring in food to maintain pt's po intake and weight.     Past Medical History:   Diagnosis Date    Abnormal laboratory test 08/01/2022    Acute sinusitis 02/13/2013    Age related osteoporosis 11/01/2022    Formatting of this note might be  different from the original.   Last Assessment & Plan:     Formatting of this note might be different from the original.    Risedronate  on allergy list but she does not recall what happened    Her kids told her not to get treatment but I recommend it again to her.     Given the possible allergy refer to rheum to discuss treatment options    Breast cancer (HCC)     Cancer (HCC)     Chronic cough 01/13/2017    COVID-19     History of 2019 novel coronavirus disease (COVID-19) 02/15/2021    Hyperlipidemia     Hyponatremia 02/01/2021    Influenza B 03/05/2024    Insomnia 02/17/2021    MCI (mild cognitive impairment) 02/15/2021    Nonrheumatic aortic (valve) stenosis 03/15/2024    Other specified forms of tremor 03/15/2024    Palpitations 03/15/2024    Pneumonia due to COVID-19 virus 02/01/2021    Slow transit constipation 03/15/2024    Stage 3a chronic kidney disease (HCC) 09/27/2023    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    Thrombocytosis, unspecified 03/15/2024       Past Surgical History:   Procedure Laterality Date    BREAST SURGERY      cancer (> 5 years)    HYSTERECTOMY         Social History     Tobacco Use   Smoking Status Never   Smokeless Tobacco Never          Family History   Problem Relation Age of Onset    Breast cancer Mother     Heart disease Father         heart problem    Heart disease Sister         heart problem    Heart disease Brother         heart problem        Allergies   Allergen Reactions    Pravastatin     Risedronate     Paxlovid [Nirmatrelvir-Ritonavir] Nausea Only          Current Outpatient Medications:     acetaminophen (TYLENOL) 325 mg tablet, Take 2 tablets (650 mg total) by mouth every 6 (six) hours as needed for mild pain, headaches or fever, Disp: , Rfl:     Asmanex  MCG/ACT AERO, Inhale 2 puffs if needed, Disp: , Rfl:     benzonatate (TESSALON PERLES) 100 mg capsule, Take 1 capsule (100 mg total) by mouth 3 (three) times a day as  needed for cough, Disp: , Rfl:     busPIRone (BUSPAR) 5 mg tablet, Take 1 tablet (5 mg total) by mouth 3 (three) times a day, Disp: 60 tablet, Rfl: 5    Calcium Carb-Cholecalciferol 1000-800 MG-UNIT TABS, Take by mouth, Disp: , Rfl:     doxylamine (Unisom SleepTabs) 25 MG tablet, Take 25 mg by mouth daily at bedtime as needed for sleep, Disp: , Rfl:     guaiFENesin (MUCINEX) 600 mg 12 hr tablet, Take 1 tablet (600 mg total) by mouth every 12 (twelve) hours, Disp: , Rfl:     hydrocortisone (ANUSOL-HC) 2.5 % rectal cream, Apply topically 4 (four) times a day as needed for hemorrhoids, Disp: , Rfl:     metoclopramide (Reglan) 10 mg tablet, Take 1 tablet (10 mg total) by mouth 4 (four) times a day as needed (nausea), Disp: , Rfl:     mirtazapine (REMERON) 7.5 MG tablet, Take 1 tablet (7.5 mg total) by mouth daily at bedtime, Disp: , Rfl:     Multiple Vitamins-Minerals (CENTRUM SILVER 50+WOMEN PO), Take by mouth, Disp: , Rfl:     polyethylene glycol (MIRALAX) 17 g packet, Take 17 g by mouth 2 (two) times a day, Disp: , Rfl:     senna-docusate sodium (SENOKOT S) 8.6-50 mg per tablet, Take 2 tablets by mouth 2 (two) times a day, Disp: , Rfl:     simvastatin (ZOCOR) 5 MG tablet, Take 1 tablet (5 mg total) by mouth daily at bedtime, Disp: 90 tablet, Rfl: 3    trimethobenzamide (TIGAN) 100 mg/mL, Inject 2 mL (200 mg total) into a muscle every 6 (six) hours as needed (nausea if unable to take oral pill), Disp: 20 mL, Rfl: 0  No current facility-administered medications for this visit.    Updated list was reviewed in [facility] system of facility.     Vital signs were reviewed in point click care    A 12-point comprehensive review of symptoms was obtained.  Pertinent positives and negatives noted in HPI.   Review of Systems   Constitutional:  Positive for appetite change. Negative for chills and fever.   Gastrointestinal:  Positive for nausea.   All other systems reviewed and are negative.      Physical  Exam  Constitutional:       General: She is not in acute distress.  HENT:      Head: Normocephalic.   Cardiovascular:      Rate and Rhythm: Regular rhythm.      Pulses:           Dorsalis pedis pulses are 2+ on the right side and 2+ on the left side.      Heart sounds: Normal heart sounds.   Pulmonary:      Effort: Pulmonary effort is normal. No respiratory distress.      Breath sounds: Normal breath sounds. No wheezing.   Abdominal:      General: Bowel sounds are normal. There is no distension.      Palpations: Abdomen is soft.      Tenderness: There is no abdominal tenderness.   Musculoskeletal:      Right lower leg: No edema.      Left lower leg: No edema.   Neurological:      Mental Status: She is alert and oriented to person, place, and time.   Psychiatric:         Mood and Affect: Mood normal.         Behavior: Behavior normal.       Diagnostic Data     Recent labs and imaging studies were reviewed.     Code Status:    DNR    Gave orders for patient's medications and nursing home admission orders. Ordered labs for next week given hx of hypokalemia & hyponatremia.    This note was electronically signed by Dr. Dariana Bell  [facility]

## 2024-03-28 ENCOUNTER — PATIENT OUTREACH (OUTPATIENT)
Dept: CASE MANAGEMENT | Facility: OTHER | Age: 89
End: 2024-03-28

## 2024-03-28 ENCOUNTER — TRANSITIONAL CARE MANAGEMENT (OUTPATIENT)
Dept: INTERNAL MEDICINE CLINIC | Facility: CLINIC | Age: 89
End: 2024-03-28

## 2024-03-28 PROCEDURE — TCMXX

## 2024-03-28 NOTE — ASSESSMENT & PLAN NOTE
Lab Results   Component Value Date    EGFR 72 03/26/2024    EGFR 66 03/25/2024    EGFR 64 03/24/2024    CREATININE 0.74 03/26/2024    CREATININE 0.80 03/25/2024    CREATININE 0.82 03/24/2024   Cr stable  Avoid nsaids/nephrotoxins/IV contrast  Check periodic BMP

## 2024-03-28 NOTE — PROGRESS NOTES
Outpatient care management referral via HRR report 3/28/24. Discharged to [facility] 3/27/24. Email sent to facility to inform them I will be following the patient during their skilled stay.  This Admin Coordinator will continue to monitor via chart review.

## 2024-03-28 NOTE — ASSESSMENT & PLAN NOTE
Resolved  Pt had one episode of urinary retention for which she underwent straight cath  Cont aggressive stool softners

## 2024-03-28 NOTE — ASSESSMENT & PLAN NOTE
Pt still with nausea  Pt s/p egd with hiatal hernia  Staff faxed over meds for antiemetic  I also added simethicone 80 mg 1 tab po q6 hours prn gas as pt says nausea associated with gas  Liberalize diet & allow pt's family to bring in food to help pt's po intake

## 2024-03-28 NOTE — ASSESSMENT & PLAN NOTE
Pt weaker than her baseline  She used to walk without a walker  PT/OT to eval/tx & help with balance/strength training  Encourage good po intake of solids/liquids  OOB as tolerated (with assistance from staff)  Encourage pt to stay motivated with physical/occupational therapy  Early/Frequent mobilization with assistance from staff/therapy  Fall precaution

## 2024-03-28 NOTE — ASSESSMENT & PLAN NOTE
Pt s/p rectal spasms/pain  CT scan abd/pelvis had shown mild stercoral or infectious proctitis. Large colonic stool burden; no obstruction.  S/p constipation  Pt now having regular BM  Pt complains of nausea associated with gas  Add simethicone 80 mg 1 tab po q6 hours prn gas  Cont scheduled stool softners & antiemetics  Cont prn anusol

## 2024-03-29 ENCOUNTER — NURSING HOME VISIT (OUTPATIENT)
Dept: GERIATRICS | Facility: OTHER | Age: 89
End: 2024-03-29
Payer: MEDICARE

## 2024-03-29 DIAGNOSIS — M81.0 AGE RELATED OSTEOPOROSIS, UNSPECIFIED PATHOLOGICAL FRACTURE PRESENCE: ICD-10-CM

## 2024-03-29 DIAGNOSIS — R26.2 AMBULATORY DYSFUNCTION: ICD-10-CM

## 2024-03-29 DIAGNOSIS — K59.01 SLOW TRANSIT CONSTIPATION: ICD-10-CM

## 2024-03-29 DIAGNOSIS — R00.0 TACHYCARDIA: Primary | ICD-10-CM

## 2024-03-29 DIAGNOSIS — F41.9 ANXIETY DISORDER, UNSPECIFIED TYPE: ICD-10-CM

## 2024-03-29 DIAGNOSIS — E78.5 HYPERLIPIDEMIA, UNSPECIFIED HYPERLIPIDEMIA TYPE: ICD-10-CM

## 2024-03-29 DIAGNOSIS — I10 BENIGN ESSENTIAL HTN: ICD-10-CM

## 2024-03-29 PROCEDURE — 99310 SBSQ NF CARE HIGH MDM 45: CPT | Performed by: INTERNAL MEDICINE

## 2024-03-29 RX ORDER — PROPRANOLOL HYDROCHLORIDE 10 MG/1
10 TABLET ORAL 2 TIMES DAILY
COMMUNITY

## 2024-03-29 NOTE — PROGRESS NOTES
[facility]  [address] [address] [phone]  [facility]  [address]      NAME: Chelsey Carson  AGE: 88 y.o. SEX: female [phone]    DATE OF ENCOUNTER: 3/29/2024    Assessment and Plan     1. Tachycardia        2. Benign essential HTN        3. Slow transit constipation        4. Age related osteoporosis, unspecified pathological fracture presence        5. Anxiety disorder, unspecified type        6. Ambulatory dysfunction        7. Hyperlipidemia, unspecified hyperlipidemia type           Tachycardia  Pt with known history of tachycardia  Rhythm appears regular but tachycardic at 130s  Pt just got out of shower she says so likely exertional vs deconditioning  Will check EKG  No caffiene  Pt has had Zio patch placed on before for one week last year  She last saw cardio 10/31/2023 in which she was placed on a holter monitor and had echo ordered  She has also been on low dose propanolol in the past for her tremors/palpitation  Restart propanolol 10 mg 1 tab po BID (hold if sbp <90 or HR <55)  Check TSH, BMP  Consult [facility] re: tachycardia    Benign essential HTN  /67  Stable  Start propanolol 10 mg 1 tab po BID given pt's HR 130s & hx of palpitation  [facility] appt    Slow transit constipation  Stable  Pt with one large BM & one small BM on 3/28/2024  Cont miralax 17 g po BID  Cont sennakot 2 tabs po BID  Cont MOM q3 days prn constipation  Cont simethicone 80 mg 1 tab po q6 hours prn gas    Age related osteoporosis  Stable  10/18/2022 DEXA scan revealed osteoporosis  Cont calcium plus vitamin D 2 tabs po daily    Anxiety disorder, unspecified  Pt anxious at times  HR 130s though this am; later 103 in early afternooon  Cont buspar 5 mg 1 tab po TID  Add propanolol 10 mg 1 tab po BID for hx of palpitations; also can help with anxiety    Ambulatory dysfunction  Pt gets tachycardic after exertion  Her HR was 130s post shower  Add propanolol 10 mg 1 tab po BID  given hx of palpitations  Cont physical therapy if HR <120  PT/OT to eval/tx & help with balance/strength training  Encourage good po intake of solids/liquids  OOB as tolerated (with assistance from staff)  Encourage pt to stay motivated with physical/occupational therapy  Early/Frequent mobilization with assistance from staff/therapy  Fall precaution      Hyperlipidemia  Stable  7/19/2021   F/u with PCP for repeat lipid panel  Cont simvastatin 5 mg 1 tab po qhs       Code status: DNR    Chief Complaint     STR term follow-up visit.    History of Present Illness   Pt seen and examined. Pt just got out of the shower and was tachycardic at 130s. Pt sitting OOB to chair. Pt feels palpitation. Rate is fast but regular. Will check EKG if no improvement in HR. Avoid caffeine. Check TSH. No further therapy today.    The following portions of the patient's history were reviewed and updated as appropriate: allergies, current medications, past family history, past medical history, past social history, past surgical history and problem list.    Review of Systems     Review of Systems   Psychiatric/Behavioral:  The patient is nervous/anxious.    All other systems reviewed and are negative.  +palpitations (when I asked)    A comprehensive review of symptoms was obtained.  Pertinent positives and negatives noted in HPI.     Objective     Vitals: Reviewed in [employer] system. VSS    Weight: 114.0 3/27/2024 15:21        Blood Pressure: 128 /  67 3/29/2024 13:59        Temperature: 98.0 3/29/2024 13:59        Pulse: 103 3/29/2024 13:59        Respirations: 20 3/29/2024 13:59        Blood Sugar:           O2 sats: 95.0 % 3/29/2024 13:59        Height: 58.0 3/27/2024 15:21        Pain Level: 0 3/29/2024 09:27         General: Awake, alert and oriented  Head: atraumatic, normocephalic  Cardiovascular: tachycardia, normal rhythm  Lungs: Clear to auscultation bilaterally  Abdomen: nontender/nondistended, +BS  Legs: no  cyanosis, clubbing or edema  Skin: clean, dry  Psych: calm, cooperative    Pertinent Laboratory/Diagnostic Studies:  Refer to facility chart.    Current Medications   Medications reviewed and updated in facility chart.     Dulcolax Insert 1 suppository rectally every 72 hours as needed for constipation Administer every 3rd day as needed.   Fleet Enema Insert 1 unit rectally every 72 hours as needed for constipation Q 3rd day PRN   Milk of Magnesia Give 30 ml by mouth every 72 hours as needed for constipation every 3 days as needed   Tylenol 325mg (pain) Give 2 tablet by mouth every 4 hours as needed for Mild Pain   Tylenol 325mg (fever) Give 2 tablet by mouth every 4 hours as needed for fever Administer 2 tablets (650MG) by mouth every 4 hours as needed for increased temperature. Document temperature (Max 3GM/24HR)   BENZONATATE CAP 100MG 1 CAP BY MOUTH THREE TIMES DAILY AS NEEDED FOR COUGH FOR 10 DAYS **SWALLOW WHOLE-DO NOT CHEW OR CRUSH**(Indications for Use: chronic cough)   BusPIRone 5MG TABS 1 TAB BY MOUTH THREE TIMES DAILY FOR ANXIETY   CALCIUM+XTRA D 500MG/400UNITS 2 TABS BY MOUTH ONCE DAILY FOR OSTEOPOROSIS(Indications for Use: supplement)   [facility] SENIOR WOMEN 50+ 1 TAB BY MOUTH ONCE DAILY FOR OSTEOPOROSIS(Indications for Use: supplement)   SIMVASTATIN TAB 5MG 1 TAB BY MOUTH AT BEDTIME FOR HLD   SLEEP-AID TAB 25MG (DOXYLAMINE 25 MG) 1 TAB BY MOUTH AT BEDTIME AS NEEDED - FOR SLEEP FOR 14 DAYS (AUTOSTOP)(Indications for Use: sleeplessness)   MUCUS RELIEF TAB 600MG ER 1 TAB BY MOUTH EVERY 12 HOURS FOR COUGH **SWALLOW WHOLE-DO NOT CHEW OR CRUSH**   METOCLOPRAM TAB 10MG 1 TAB BY MOUTH FOUR TIMES DAILY AS NEEDED FOR NAUSEA FOR 10 DAYS   MIRTAZAPINE 7.5MG TABS 1 TAB BY MOUTH AT BEDTIME FOR INSOMNIA   HYDROCORTISONE CRM 2.5% RECTAL APPLY TOPICALLY TO HEMORRHOIDS FOUR TIMES DAILY AS NEEDED(Indications for Use: inflammation)   POLYETHYLENE GLYCOL 3350 MEASURE 17GMS IN DOSING CAP, MIX WITH SUITABLE LIQUID AND  ADMINISTER BY MOUTH TWICE DAILY FOR CONSTIPATION   SENNA-S 8.6-50MG TAB 2 TABS BY MOUTH TWICE DAILY FOR CONSTIPATION   SIMETHICONE CHW 80MG CHEW 1 TAB BY MOUTH EVERY 6 HOURS AS NEEDED FOR GAS   FLUTICAS HFA AER 44MCG INHALE 2 PUFFS EVERY 12 HOURS - RINSE MOUTH THEN SPIT AFTE       Total time spent: 46 minutes in coordination with nurse for updating pt's family & vitals. Also consulted cardiology and reviewed pt's chart.    Dariana Bell MD  Internal Medicine  Senior Care Physician

## 2024-03-29 NOTE — ASSESSMENT & PLAN NOTE
Pt gets tachycardic after exertion  Her HR was 130s post shower  Add propanolol 10 mg 1 tab po BID given hx of palpitations  Cont physical therapy if HR <120  PT/OT to eval/tx & help with balance/strength training  Encourage good po intake of solids/liquids  OOB as tolerated (with assistance from staff)  Encourage pt to stay motivated with physical/occupational therapy  Early/Frequent mobilization with assistance from staff/therapy  Fall precaution

## 2024-03-29 NOTE — ASSESSMENT & PLAN NOTE
Pt with known history of tachycardia  Rhythm appears regular but tachycardic at 130s  Pt just got out of shower she says so likely exertional vs deconditioning  Will check EKG  No caffiene  Pt has had Zio patch placed on before for one week last year  She last saw cardio 10/31/2023 in which she was placed on a holter monitor and had echo ordered  She has also been on low dose propanolol in the past for her tremors/palpitation  Restart propanolol 10 mg 1 tab po BID (hold if sbp <90 or HR <55)  Check TSH, BMP  Consult [facility] re: tachycardia

## 2024-03-29 NOTE — ASSESSMENT & PLAN NOTE
Pt anxious at times  HR 130s though this am; later 103 in early afternooon  Cont buspar 5 mg 1 tab po TID  Add propanolol 10 mg 1 tab po BID for hx of palpitations; also can help with anxiety

## 2024-03-29 NOTE — ASSESSMENT & PLAN NOTE
Stable  Pt with one large BM & one small BM on 3/28/2024  Cont miralax 17 g po BID  Cont sennakot 2 tabs po BID  Cont MOM q3 days prn constipation  Cont simethicone 80 mg 1 tab po q6 hours prn gas

## 2024-03-29 NOTE — ASSESSMENT & PLAN NOTE
/67  Stable  Start propanolol 10 mg 1 tab po BID given pt's HR 130s & hx of palpitation  [facility] appt

## 2024-04-01 ENCOUNTER — NURSING HOME VISIT (OUTPATIENT)
Dept: GERIATRICS | Facility: OTHER | Age: 89
End: 2024-04-01
Payer: MEDICARE

## 2024-04-01 DIAGNOSIS — I10 BENIGN ESSENTIAL HTN: ICD-10-CM

## 2024-04-01 DIAGNOSIS — E78.5 HYPERLIPIDEMIA, UNSPECIFIED HYPERLIPIDEMIA TYPE: ICD-10-CM

## 2024-04-01 DIAGNOSIS — R30.0 DYSURIA: Primary | ICD-10-CM

## 2024-04-01 DIAGNOSIS — R00.0 TACHYCARDIA: ICD-10-CM

## 2024-04-01 PROCEDURE — 99309 SBSQ NF CARE MODERATE MDM 30: CPT | Performed by: INTERNAL MEDICINE

## 2024-04-01 NOTE — ASSESSMENT & PLAN NOTE
Improved  97 bpm Regular 4/1/2024 08:07    4/1/2024 00:49 97 bpm Regular    3/31/2024 15:55 90 bpm Regular    3/31/2024 08:15 89 bpm Regular    3/31/2024 01:02 78 bpm Regular    3/30/2024 19:24 105 bpm Regular    3/30/2024 00:34 96 bpm Regular    3/29/2024 20:47 95 bpm     Pt also with dysuria so rule out secondary causes  Pt with known hx of palpitations & sees cardio  Pt had zio patch on last year & had holter monitor ordered then  Pt has been on low dose propanolol in the past before  Cont propanolol 10 mg 1 tab po BID (hold if sbp <90 or HR <55)  F/u tsh, bmp in am  Keep [facility] 4/3/2024 8:40 am

## 2024-04-01 NOTE — PROGRESS NOTES
"[facility]  [address] [address] [phone]  [facility]  [address]      NAME: Chelsey Carson  AGE: 88 y.o. SEX: female [id number]    DATE OF ENCOUNTER: 4/1/2024    Assessment and Plan     1. Dysuria        2. Benign essential HTN        3. Tachycardia        4. Hyperlipidemia, unspecified hyperlipidemia type           Dysuria  Pt complains of dysuria  Ordered UA, urine culture (clean catch; if unable then straight cath)  Check cbc with diff in am  Check vitals qshift with temp    Benign essential HTN  134 / 74 mmHg Sitting r/arm 4/1/2024 08:07     4/1/2024 00:49 110 / 66 mmHg Lying r/arm     3/31/2024 15:55 122 / 64 mmHg Sitting r/arm     3/31/2024 08:15 136 / 75 mmHg Lying r/arm     3/30/2024 19:24 125 / 66 mmHg      BP stable  Cont propanolol 10 mg 1 tab po BID (hx of tachycardia/hx of palpitation)    Tachycardia  Improved  97 bpm Regular 4/1/2024 08:07    4/1/2024 00:49 97 bpm Regular    3/31/2024 15:55 90 bpm Regular    3/31/2024 08:15 89 bpm Regular    3/31/2024 01:02 78 bpm Regular    3/30/2024 19:24 105 bpm Regular    3/30/2024 00:34 96 bpm Regular    3/29/2024 20:47 95 bpm     Pt also with dysuria so rule out secondary causes  Pt with known hx of palpitations & sees cardio  Pt had zio patch on last year & had holter monitor ordered then  Pt has been on low dose propanolol in the past before  Cont propanolol 10 mg 1 tab po BID (hold if sbp <90 or HR <55)  F/u tsh, bmp in am  Keep [facility] 4/3/2024 8:40 am    Hyperlipidemia  Stable  Last ldl 116 on 7/19/2021  F/u outpatient with pcp for repeat lipid panel  Cont simvastatin 5 mg 1 tab po QHS     Code status: DNR    Chief Complaint     STR term follow-up visit.    History of Present Illness   Pt seen and examined. Pt denies any fever or chills. Pt herself tells me she \"has pain down there and burning.\" Pt referring to burning upon urination. Will check UA & ur cx and am labs.    Encouraged pt to eat well. She denies any " constipation. Pt denies any other pain.    The following portions of the patient's history were reviewed and updated as appropriate: allergies, current medications, past family history, past medical history, past social history, past surgical history and problem list.    Review of Systems     Review of Systems   Constitutional:  Negative for chills and fatigue.   Genitourinary:  Positive for dysuria.   Neurological:  Negative for light-headedness and headaches.   All other systems reviewed and are negative.    A comprehensive review of symptoms was obtained.  Pertinent positives and negatives noted in HPI.     Objective     Vitals: Reviewed in [employer] system. VSS    General: Awake, alert and oriented; she knows it was Easter yesterday  Head: atraumatic, normocephalic  Cardiovascular: RRR  Lungs: Clear to auscultation bilaterally  Abdomen: nontender/nondistended, +BS  Legs: no cyanosis, clubbing or edema  Skin: clean, dry  Psych: calm, cooperative    Pertinent Laboratory/Diagnostic Studies:  Refer to facility chart.    Current Medications   Medications reviewed and updated in facility chart.    Dariana Bell MD  Internal Medicine  Senior Care Physician

## 2024-04-01 NOTE — ASSESSMENT & PLAN NOTE
Pt complains of dysuria  Ordered UA, urine culture (clean catch; if unable then straight cath)  Check cbc with diff in am  Check vitals qshift with temp

## 2024-04-01 NOTE — ASSESSMENT & PLAN NOTE
Stable  Last ldl 116 on 7/19/2021  F/u outpatient with pcp for repeat lipid panel  Cont simvastatin 5 mg 1 tab po QHS

## 2024-04-01 NOTE — ASSESSMENT & PLAN NOTE
134 / 74 mmHg Sitting r/arm 4/1/2024 08:07     4/1/2024 00:49 110 / 66 mmHg Lying r/arm     3/31/2024 15:55 122 / 64 mmHg Sitting r/arm     3/31/2024 08:15 136 / 75 mmHg Lying r/arm     3/30/2024 19:24 125 / 66 mmHg      BP stable  Cont propanolol 10 mg 1 tab po BID (hx of tachycardia/hx of palpitation)

## 2024-04-02 ENCOUNTER — APPOINTMENT (EMERGENCY)
Dept: RADIOLOGY | Facility: HOSPITAL | Age: 89
DRG: 189 | End: 2024-04-02
Payer: MEDICARE

## 2024-04-02 ENCOUNTER — NURSING HOME VISIT (OUTPATIENT)
Dept: GERIATRICS | Facility: OTHER | Age: 89
End: 2024-04-02
Payer: MEDICARE

## 2024-04-02 ENCOUNTER — HOSPITAL ENCOUNTER (INPATIENT)
Facility: HOSPITAL | Age: 89
LOS: 2 days | Discharge: NURSING, REHAB OR LONG-TERM CARE | DRG: 189 | End: 2024-04-04
Attending: EMERGENCY MEDICINE | Admitting: INTERNAL MEDICINE
Payer: MEDICARE

## 2024-04-02 DIAGNOSIS — R93.89 ABNORMAL CT OF THE CHEST: ICD-10-CM

## 2024-04-02 DIAGNOSIS — I10 BENIGN ESSENTIAL HTN: ICD-10-CM

## 2024-04-02 DIAGNOSIS — R07.89 CHEST HEAVINESS: Primary | ICD-10-CM

## 2024-04-02 DIAGNOSIS — J45.909 UNCOMPLICATED ASTHMA, UNSPECIFIED ASTHMA SEVERITY, UNSPECIFIED WHETHER PERSISTENT: ICD-10-CM

## 2024-04-02 DIAGNOSIS — N18.31 STAGE 3A CHRONIC KIDNEY DISEASE (HCC): ICD-10-CM

## 2024-04-02 DIAGNOSIS — E78.5 HYPERLIPIDEMIA, UNSPECIFIED HYPERLIPIDEMIA TYPE: ICD-10-CM

## 2024-04-02 DIAGNOSIS — R06.02 SOB (SHORTNESS OF BREATH): Primary | ICD-10-CM

## 2024-04-02 DIAGNOSIS — R09.02 HYPOXIA: ICD-10-CM

## 2024-04-02 DIAGNOSIS — F41.9 ANXIETY DISORDER, UNSPECIFIED TYPE: ICD-10-CM

## 2024-04-02 DIAGNOSIS — J44.1 COPD EXACERBATION (HCC): ICD-10-CM

## 2024-04-02 PROBLEM — R65.10 SIRS (SYSTEMIC INFLAMMATORY RESPONSE SYNDROME) (HCC): Status: ACTIVE | Noted: 2024-04-02

## 2024-04-02 LAB
2HR DELTA HS TROPONIN: -1 NG/L
4HR DELTA HS TROPONIN: 1 NG/L
ALBUMIN SERPL BCP-MCNC: 3.5 G/DL (ref 3.5–5)
ALP SERPL-CCNC: 59 U/L (ref 34–104)
ALT SERPL W P-5'-P-CCNC: 14 U/L (ref 7–52)
ANION GAP SERPL CALCULATED.3IONS-SCNC: 8 MMOL/L (ref 4–13)
AST SERPL W P-5'-P-CCNC: 16 U/L (ref 13–39)
ATRIAL RATE: 124 BPM
BASE EX.OXY STD BLDV CALC-SCNC: 71.6 % (ref 60–80)
BASE EXCESS BLDV CALC-SCNC: 2.3 MMOL/L
BASOPHILS # BLD AUTO: 0.08 THOUSANDS/ÂΜL (ref 0–0.1)
BASOPHILS NFR BLD AUTO: 1 % (ref 0–1)
BILIRUB SERPL-MCNC: 0.57 MG/DL (ref 0.2–1)
BUN SERPL-MCNC: 18 MG/DL (ref 5–25)
CALCIUM SERPL-MCNC: 9.1 MG/DL (ref 8.4–10.2)
CARDIAC TROPONIN I PNL SERPL HS: 4 NG/L
CARDIAC TROPONIN I PNL SERPL HS: 5 NG/L
CARDIAC TROPONIN I PNL SERPL HS: 6 NG/L
CHLORIDE SERPL-SCNC: 97 MMOL/L (ref 96–108)
CO2 SERPL-SCNC: 27 MMOL/L (ref 21–32)
CREAT SERPL-MCNC: 0.96 MG/DL (ref 0.6–1.3)
EOSINOPHIL # BLD AUTO: 0.57 THOUSAND/ÂΜL (ref 0–0.61)
EOSINOPHIL NFR BLD AUTO: 8 % (ref 0–6)
ERYTHROCYTE [DISTWIDTH] IN BLOOD BY AUTOMATED COUNT: 14.3 % (ref 11.6–15.1)
FLUAV RNA RESP QL NAA+PROBE: NEGATIVE
FLUBV RNA RESP QL NAA+PROBE: NEGATIVE
GFR SERPL CREATININE-BSD FRML MDRD: 52 ML/MIN/1.73SQ M
GLUCOSE SERPL-MCNC: 94 MG/DL (ref 65–140)
HCO3 BLDV-SCNC: 26.6 MMOL/L (ref 24–30)
HCT VFR BLD AUTO: 33.8 % (ref 34.8–46.1)
HGB BLD-MCNC: 10.8 G/DL (ref 11.5–15.4)
IMM GRANULOCYTES # BLD AUTO: 0.03 THOUSAND/UL (ref 0–0.2)
IMM GRANULOCYTES NFR BLD AUTO: 0 % (ref 0–2)
LACTATE SERPL-SCNC: 1.8 MMOL/L (ref 0.5–2)
LYMPHOCYTES # BLD AUTO: 2.48 THOUSANDS/ÂΜL (ref 0.6–4.47)
LYMPHOCYTES NFR BLD AUTO: 35 % (ref 14–44)
MCH RBC QN AUTO: 34.4 PG (ref 26.8–34.3)
MCHC RBC AUTO-ENTMCNC: 32 G/DL (ref 31.4–37.4)
MCV RBC AUTO: 108 FL (ref 82–98)
MONOCYTES # BLD AUTO: 0.98 THOUSAND/ÂΜL (ref 0.17–1.22)
MONOCYTES NFR BLD AUTO: 14 % (ref 4–12)
NEUTROPHILS # BLD AUTO: 3.03 THOUSANDS/ÂΜL (ref 1.85–7.62)
NEUTS SEG NFR BLD AUTO: 42 % (ref 43–75)
NRBC BLD AUTO-RTO: 0 /100 WBCS
O2 CT BLDV-SCNC: 12.1 ML/DL
P AXIS: 85 DEGREES
PCO2 BLDV: 39.9 MM HG (ref 42–50)
PH BLDV: 7.44 [PH] (ref 7.3–7.4)
PLATELET # BLD AUTO: 465 THOUSANDS/UL (ref 149–390)
PMV BLD AUTO: 8.6 FL (ref 8.9–12.7)
PO2 BLDV: 38.7 MM HG (ref 35–45)
POTASSIUM SERPL-SCNC: 3.8 MMOL/L (ref 3.5–5.3)
PR INTERVAL: 166 MS
PROCALCITONIN SERPL-MCNC: <0.05 NG/ML
PROT SERPL-MCNC: 7.3 G/DL (ref 6.4–8.4)
QRS AXIS: 32 DEGREES
QRSD INTERVAL: 66 MS
QT INTERVAL: 298 MS
QTC INTERVAL: 428 MS
RBC # BLD AUTO: 3.14 MILLION/UL (ref 3.81–5.12)
RSV RNA RESP QL NAA+PROBE: NEGATIVE
SARS-COV-2 RNA RESP QL NAA+PROBE: NEGATIVE
SODIUM SERPL-SCNC: 132 MMOL/L (ref 135–147)
T WAVE AXIS: 77 DEGREES
VENTRICULAR RATE: 124 BPM
WBC # BLD AUTO: 7.17 THOUSAND/UL (ref 4.31–10.16)

## 2024-04-02 PROCEDURE — 0241U HB NFCT DS VIR RESP RNA 4 TRGT: CPT

## 2024-04-02 PROCEDURE — 87040 BLOOD CULTURE FOR BACTERIA: CPT

## 2024-04-02 PROCEDURE — 36415 COLL VENOUS BLD VENIPUNCTURE: CPT

## 2024-04-02 PROCEDURE — 94760 N-INVAS EAR/PLS OXIMETRY 1: CPT

## 2024-04-02 PROCEDURE — 94664 DEMO&/EVAL PT USE INHALER: CPT

## 2024-04-02 PROCEDURE — 80053 COMPREHEN METABOLIC PANEL: CPT

## 2024-04-02 PROCEDURE — 84484 ASSAY OF TROPONIN QUANT: CPT

## 2024-04-02 PROCEDURE — 83605 ASSAY OF LACTIC ACID: CPT

## 2024-04-02 PROCEDURE — 85025 COMPLETE CBC W/AUTO DIFF WBC: CPT

## 2024-04-02 PROCEDURE — 99223 1ST HOSP IP/OBS HIGH 75: CPT | Performed by: INTERNAL MEDICINE

## 2024-04-02 PROCEDURE — 99285 EMERGENCY DEPT VISIT HI MDM: CPT

## 2024-04-02 PROCEDURE — 71275 CT ANGIOGRAPHY CHEST: CPT

## 2024-04-02 PROCEDURE — 82805 BLOOD GASES W/O2 SATURATION: CPT

## 2024-04-02 PROCEDURE — 93010 ELECTROCARDIOGRAM REPORT: CPT | Performed by: INTERNAL MEDICINE

## 2024-04-02 PROCEDURE — 84145 PROCALCITONIN (PCT): CPT

## 2024-04-02 PROCEDURE — 96365 THER/PROPH/DIAG IV INF INIT: CPT

## 2024-04-02 PROCEDURE — 99291 CRITICAL CARE FIRST HOUR: CPT | Performed by: EMERGENCY MEDICINE

## 2024-04-02 PROCEDURE — 74177 CT ABD & PELVIS W/CONTRAST: CPT

## 2024-04-02 PROCEDURE — 93005 ELECTROCARDIOGRAM TRACING: CPT

## 2024-04-02 PROCEDURE — 99310 SBSQ NF CARE HIGH MDM 45: CPT | Performed by: INTERNAL MEDICINE

## 2024-04-02 PROCEDURE — 94640 AIRWAY INHALATION TREATMENT: CPT

## 2024-04-02 RX ORDER — ONDANSETRON 2 MG/ML
4 INJECTION INTRAMUSCULAR; INTRAVENOUS EVERY 6 HOURS PRN
Status: DISCONTINUED | OUTPATIENT
Start: 2024-04-02 | End: 2024-04-04 | Stop reason: HOSPADM

## 2024-04-02 RX ORDER — METHYLPREDNISOLONE SODIUM SUCCINATE 40 MG/ML
40 INJECTION, POWDER, LYOPHILIZED, FOR SOLUTION INTRAMUSCULAR; INTRAVENOUS EVERY 8 HOURS SCHEDULED
Status: DISCONTINUED | OUTPATIENT
Start: 2024-04-02 | End: 2024-04-03

## 2024-04-02 RX ORDER — PROPRANOLOL HYDROCHLORIDE 10 MG/1
10 TABLET ORAL 2 TIMES DAILY
Status: DISCONTINUED | OUTPATIENT
Start: 2024-04-02 | End: 2024-04-04 | Stop reason: HOSPADM

## 2024-04-02 RX ORDER — BUSPIRONE HYDROCHLORIDE 5 MG/1
5 TABLET ORAL 3 TIMES DAILY
Status: DISCONTINUED | OUTPATIENT
Start: 2024-04-02 | End: 2024-04-04 | Stop reason: HOSPADM

## 2024-04-02 RX ORDER — IPRATROPIUM BROMIDE AND ALBUTEROL SULFATE .5; 3 MG/3ML; MG/3ML
1 SOLUTION RESPIRATORY (INHALATION) ONCE
Status: COMPLETED | OUTPATIENT
Start: 2024-04-02 | End: 2024-04-02

## 2024-04-02 RX ORDER — HEPARIN SODIUM 5000 [USP'U]/ML
5000 INJECTION, SOLUTION INTRAVENOUS; SUBCUTANEOUS EVERY 8 HOURS SCHEDULED
Status: DISCONTINUED | OUTPATIENT
Start: 2024-04-02 | End: 2024-04-04 | Stop reason: HOSPADM

## 2024-04-02 RX ORDER — SODIUM CHLORIDE FOR INHALATION 0.9 %
12 VIAL, NEBULIZER (ML) INHALATION ONCE
Status: COMPLETED | OUTPATIENT
Start: 2024-04-02 | End: 2024-04-02

## 2024-04-02 RX ORDER — POLYETHYLENE GLYCOL 3350 17 G/17G
17 POWDER, FOR SOLUTION ORAL 2 TIMES DAILY
Status: DISCONTINUED | OUTPATIENT
Start: 2024-04-02 | End: 2024-04-04 | Stop reason: HOSPADM

## 2024-04-02 RX ORDER — MIRTAZAPINE 15 MG/1
7.5 TABLET, FILM COATED ORAL
Status: DISCONTINUED | OUTPATIENT
Start: 2024-04-02 | End: 2024-04-04 | Stop reason: HOSPADM

## 2024-04-02 RX ORDER — AMOXICILLIN 250 MG
2 CAPSULE ORAL 2 TIMES DAILY
Status: DISCONTINUED | OUTPATIENT
Start: 2024-04-02 | End: 2024-04-04 | Stop reason: HOSPADM

## 2024-04-02 RX ORDER — SODIUM CHLORIDE 9 MG/ML
75 INJECTION, SOLUTION INTRAVENOUS CONTINUOUS
Status: DISCONTINUED | OUTPATIENT
Start: 2024-04-02 | End: 2024-04-03

## 2024-04-02 RX ORDER — HYDROCORTISONE 25 MG/G
CREAM TOPICAL 4 TIMES DAILY PRN
Status: DISCONTINUED | OUTPATIENT
Start: 2024-04-02 | End: 2024-04-04 | Stop reason: HOSPADM

## 2024-04-02 RX ORDER — METHYLPREDNISOLONE SOD SUCC 125 MG
1 VIAL (EA) INJECTION ONCE
Status: COMPLETED | OUTPATIENT
Start: 2024-04-02 | End: 2024-04-02

## 2024-04-02 RX ORDER — ACETAMINOPHEN 325 MG/1
650 TABLET ORAL EVERY 6 HOURS PRN
Status: DISCONTINUED | OUTPATIENT
Start: 2024-04-02 | End: 2024-04-04 | Stop reason: HOSPADM

## 2024-04-02 RX ORDER — PRAVASTATIN SODIUM 10 MG
10 TABLET ORAL
Status: DISCONTINUED | OUTPATIENT
Start: 2024-04-02 | End: 2024-04-04 | Stop reason: HOSPADM

## 2024-04-02 RX ORDER — LANOLIN ALCOHOL/MO/W.PET/CERES
1 CREAM (GRAM) TOPICAL
Status: DISCONTINUED | OUTPATIENT
Start: 2024-04-03 | End: 2024-04-04 | Stop reason: HOSPADM

## 2024-04-02 RX ORDER — CALCIUM CARBONATE 500 MG/1
500 TABLET, CHEWABLE ORAL DAILY PRN
Status: DISCONTINUED | OUTPATIENT
Start: 2024-04-02 | End: 2024-04-04 | Stop reason: HOSPADM

## 2024-04-02 RX ORDER — BUSPIRONE HYDROCHLORIDE 5 MG/1
5 TABLET ORAL ONCE
Status: COMPLETED | OUTPATIENT
Start: 2024-04-02 | End: 2024-04-02

## 2024-04-02 RX ORDER — ALBUTEROL SULFATE 2.5 MG/3ML
1 SOLUTION RESPIRATORY (INHALATION) ONCE
Status: COMPLETED | OUTPATIENT
Start: 2024-04-02 | End: 2024-04-02

## 2024-04-02 RX ORDER — BENZONATATE 100 MG/1
100 CAPSULE ORAL 3 TIMES DAILY PRN
Status: DISCONTINUED | OUTPATIENT
Start: 2024-04-02 | End: 2024-04-04 | Stop reason: HOSPADM

## 2024-04-02 RX ADMIN — SENNOSIDES, DOCUSATE SODIUM 2 TABLET: 8.6; 5 TABLET ORAL at 19:46

## 2024-04-02 RX ADMIN — ACETAMINOPHEN 650 MG: 325 TABLET, FILM COATED ORAL at 23:49

## 2024-04-02 RX ADMIN — IOHEXOL 85 ML: 350 INJECTION, SOLUTION INTRAVENOUS at 12:09

## 2024-04-02 RX ADMIN — HEPARIN SODIUM 5000 UNITS: 5000 INJECTION INTRAVENOUS; SUBCUTANEOUS at 23:49

## 2024-04-02 RX ADMIN — SODIUM CHLORIDE 500 ML: 0.9 INJECTION, SOLUTION INTRAVENOUS at 15:34

## 2024-04-02 RX ADMIN — SODIUM CHLORIDE 75 ML/HR: 0.9 INJECTION, SOLUTION INTRAVENOUS at 23:50

## 2024-04-02 RX ADMIN — ISODIUM CHLORIDE 12 ML: 0.03 SOLUTION RESPIRATORY (INHALATION) at 10:30

## 2024-04-02 RX ADMIN — ALBUTEROL SULFATE 10 MG: 2.5 SOLUTION RESPIRATORY (INHALATION) at 10:30

## 2024-04-02 RX ADMIN — MIRTAZAPINE 7.5 MG: 15 TABLET, FILM COATED ORAL at 23:49

## 2024-04-02 RX ADMIN — BUSPIRONE HYDROCHLORIDE 5 MG: 5 TABLET ORAL at 12:50

## 2024-04-02 RX ADMIN — CEFTRIAXONE SODIUM 1000 MG: 10 INJECTION, POWDER, FOR SOLUTION INTRAVENOUS at 14:51

## 2024-04-02 RX ADMIN — IPRATROPIUM BROMIDE 1 MG: 0.5 SOLUTION RESPIRATORY (INHALATION) at 10:30

## 2024-04-02 RX ADMIN — PROPRANOLOL HYDROCHLORIDE 10 MG: 10 TABLET ORAL at 19:46

## 2024-04-02 RX ADMIN — POLYETHYLENE GLYCOL 3350 17 G: 17 POWDER, FOR SOLUTION ORAL at 23:51

## 2024-04-02 RX ADMIN — PRAVASTATIN SODIUM 10 MG: 10 TABLET ORAL at 19:46

## 2024-04-02 RX ADMIN — BUSPIRONE HYDROCHLORIDE 5 MG: 5 TABLET ORAL at 23:49

## 2024-04-02 RX ADMIN — METHYLPREDNISOLONE SODIUM SUCCINATE 40 MG: 40 INJECTION, POWDER, FOR SOLUTION INTRAMUSCULAR; INTRAVENOUS at 23:48

## 2024-04-02 NOTE — ASSESSMENT & PLAN NOTE
Chronic, reports this has worsened in her advanced age.  Has seen neurology outpatient.  Propranolol  Fall/safety precautions

## 2024-04-02 NOTE — ED NOTES
RN unable to obtain second set of cultures due to poor vascular access and patient unable to tolerate additional attempts. Provider aware.     Abbi Chun RN  04/02/24 [id number]

## 2024-04-02 NOTE — ED NOTES
Patient aware that a urine specimen is needed. Patient still unable to urinate at this time.     Abbi Chun RN  04/02/24 [id number]

## 2024-04-02 NOTE — ASSESSMENT & PLAN NOTE
Presents with acute onset shortness of breath and wheezing following breakfast this morning at nursing facility.  Also reports some right-sided chest tightness. Tachypenic, tachycardic on arrival. Afebrile without leukocytosis.   CT chest PE study negative PE but did show Scattered groundglass densities in the both lungs with associated branching tree-in-bud opacities in the both lower lobes, on infectious basis/aspiration as well as pleural parenchymal density seen left base measuring about 7 mm. These changes appear to be somewhat chronic.   Has seen pulmonology at [facility] for chronic cough, no formal diagnosis of COPD/asthma.  Will start steroids.  Continue ceftriaxone for now pending procalcitonin.  Given chronicity of abnormal CT chest will appreciate pulmonology input.  Differential includes PNA, viral illness, atypical infection.   Respiratory protocol

## 2024-04-02 NOTE — H&P
[facility]  H&P  Name: Chelsey Carson 88 y.o. female I MRN: [id number]  Unit/Bed#: ED 20 I Date of Admission: 4/2/2024   Date of Service: 4/2/2024 I Hospital Day: 0      Assessment/Plan   * SOB (shortness of breath)  Assessment & Plan  Presents with acute onset shortness of breath and wheezing following breakfast this morning at nursing facility.  Also reports some right-sided chest tightness. Tachypenic, tachycardic on arrival. Afebrile without leukocytosis.   CT chest PE study negative PE but did show Scattered groundglass densities in the both lungs with associated branching tree-in-bud opacities in the both lower lobes, on infectious basis/aspiration as well as pleural parenchymal density seen left base measuring about 7 mm. These changes appear to be somewhat chronic.   Has seen pulmonology at [facility] for chronic cough, no formal diagnosis of COPD/asthma.  Will start steroids.  Continue ceftriaxone for now pending procalcitonin.  Given chronicity of abnormal CT chest will appreciate pulmonology input.  Differential includes PNA, viral illness, atypical infection.   Respiratory protocol    SIRS (systemic inflammatory response syndrome) ([facility])  Assessment & Plan  SIRS versus sepsis POA given tachypnea, tachycardia  Plan as above     Proctitis  Assessment & Plan  Recent hospital admission for constipation with large stool burden and proctitis. Has been having regular BM  Continue bowel regimen    Benign essential HTN  Assessment & Plan  BP acceptable  Continue propranolol  Monitor     Urinary retention  Assessment & Plan  Episode at nursing facility requiring straight cath.   Check UA, patient denies dysuria but does report pressure. Urothelial enhancement noted on CT.   Urinary retention protocol    Ambulatory dysfunction  Assessment & Plan  Recently discharged from hospital to rehab.  PT/OT evals to be obtained    Breast cancer ([facility])  Assessment & Plan  S/p ductal  "carcinoma s/p lumpectomy and RT  Outpatient f/u PRN    Tremor  Assessment & Plan  Chronic, reports this has worsened in her advanced age.  Has seen neurology outpatient.  Propranolol  Fall/safety precautions    Hyperlipidemia  Assessment & Plan  Continue statin         VTE Pharmacologic Prophylaxis: VTE Score: 6 High Risk (Score >/= 5) - Pharmacological DVT Prophylaxis Ordered: heparin. Sequential Compression Devices Ordered.  Code Status: Prior DNR/DNI, d/w patient   Discussion with family: Attempted to update  (son) via phone. Left voicemail.     Anticipated Length of Stay: Patient will be admitted on an inpatient basis with an anticipated length of stay of greater than 2 midnights secondary to tachypnea/sob requiring further work up.    Total Time Spent on Date of Encounter in care of patient: 50 mins. This time was spent on one or more of the following: performing physical exam; counseling and coordination of care; obtaining or reviewing history; documenting in the medical record; reviewing/ordering tests, medications or procedures; communicating with other healthcare professionals and discussing with patient's family/caregivers.    Chief Complaint: \"I'm wheezing and short of breath\"    History of Present Illness:  Chelsey Carson is a 88 y.o. female with a PMH of proctitis, tremor, breast cancer, COVID, hyperlipidemia, chronic cough who presents with sudden onset shortness of breath following eating breakfast this morning at her skilled nursing facility.  Patient was recently admitted for proctitis/constipation and discharged to rehab when improved.  Patient endorses shortness of breath, intermittent wheezing and a cough which she states is chronic.  She also reports poor appetite and generalized fatigue which has been pretty persistent since her initial COVID infection a few years back.  She has no formal hx of COPD/asthma that she is aware of.  Denies fevers, chills.  She denies chest pain but " does report some tightness which she associates with her shortness of breath/trouble breathing.  She denies any lower extremity edema.  No recent sick contacts.    Review of Systems:  Review of Systems   Constitutional:  Positive for fatigue (chronic). Negative for activity change, appetite change, chills and fever.   Respiratory:  Positive for cough (chronic, dry), chest tightness, shortness of breath and wheezing.    Cardiovascular:  Negative for chest pain, palpitations and leg swelling.   Gastrointestinal:  Positive for constipation. Negative for abdominal distention, diarrhea, nausea and vomiting.   Genitourinary:  Positive for decreased urine volume and difficulty urinating. Negative for dysuria.   Skin:  Negative for wound.   Neurological:  Positive for weakness (generalized). Negative for dizziness and numbness.   All other systems reviewed and are negative.      Past Medical and Surgical History:   Past Medical History:   Diagnosis Date    Abnormal laboratory test 08/01/2022    Acute sinusitis 02/13/2013    Age related osteoporosis 11/01/2022    Formatting of this note might be different from the original.   Last Assessment & Plan:     Formatting of this note might be different from the original.    Risedronate  on allergy list but she does not recall what happened    Her kids told her not to get treatment but I recommend it again to her.     Given the possible allergy refer to rheum to discuss treatment options    Breast cancer (HCC)     Cancer (HCC)     Chronic cough 01/13/2017    COVID-19     History of 2019 novel coronavirus disease (COVID-19) 02/15/2021    Hyperlipidemia     Hyponatremia 02/01/2021    Influenza B 03/05/2024    Insomnia 02/17/2021    MCI (mild cognitive impairment) 02/15/2021    Nonrheumatic aortic (valve) stenosis 03/15/2024    Other specified forms of tremor 03/15/2024    Palpitations 03/15/2024    Pneumonia due to COVID-19 virus 02/01/2021    Slow transit constipation 03/15/2024     Stage 3a chronic kidney disease (HCC) 09/27/2023    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 Authorizing Provider   acetaminophen (TYLENOL) 325 mg tablet Take 2 tablets (650 mg total) by mouth every 6 (six) hours as needed for mild pain, headaches or fever 3/27/24   Leesa Andrew PA-C   Asmanex  MCG/ACT AERO Inhale 2 puffs if needed 6/9/23   Historical Provider, MD   benzonatate (TESSALON PERLES) 100 mg capsule Take 1 capsule (100 mg total) by mouth 3 (three) times a day as needed for cough 3/27/24   Leesa Andrew PA-C   busPIRone (BUSPAR) 5 mg tablet Take 1 tablet (5 mg total) by mouth 3 (three) times a day 10/11/23   Mariely Fan MD   Calcium Carb-Cholecalciferol 1000-800 MG-UNIT TABS Take by mouth    Historical Provider, MD   doxylamine (Unisom SleepTabs) 25 MG tablet Take 25 mg by mouth daily at bedtime as needed for sleep    Historical Provider, MD   guaiFENesin (MUCINEX) 600 mg 12 hr tablet Take 1 tablet (600 mg total) by mouth every 12 (twelve) hours 3/27/24   Leesa Andrew PA-C   hydrocortisone (ANUSOL-HC) 2.5 % rectal cream Apply topically 4 (four) times a day as needed for hemorrhoids 3/27/24   Leesa Andrew PA-C   metoclopramide (Reglan) 10 mg tablet Take 1 tablet (10 mg total) by mouth 4 (four) times a day as needed (nausea) 3/27/24   Leesa Andrew PA-C   mirtazapine (REMERON) 7.5 MG tablet Take 1 tablet (7.5 mg total) by mouth daily at bedtime 3/27/24   Leesa Andrew PA-C   Multiple Vitamins-Minerals (CENTRUM SILVER 50+WOMEN PO) Take by mouth    Historical Provider, MD   polyethylene glycol (MIRALAX) 17 g packet Take 17 g by mouth 2 (two) times a day 3/27/24   Leesa Andrew PA-C   propranolol (INDERAL)  10 mg tablet Take 10 mg by mouth 2 (two) times a day    Historical Provider, MD   senna-docusate sodium (SENOKOT S) 8.6-50 mg per tablet Take 2 tablets by mouth 2 (two) times a day 3/27/24   Leesa Andrew PA-C   simvastatin (ZOCOR) 5 MG tablet Take 1 tablet (5 mg total) by mouth daily at bedtime 1/13/23   Mariely Fan MD   trimethobenzamide (TIGAN) 100 mg/mL Inject 2 mL (200 mg total) into a muscle every 6 (six) hours as needed (nausea if unable to take oral pill) 3/27/24   Leesa Andrew PA-C     I have reveiwed home medications using records provided by [facility].    Allergies:   Allergies   Allergen Reactions    Pravastatin     Risedronate     Paxlovid [Nirmatrelvir-Ritonavir] Nausea Only       Social History:  Marital Status:    Patient Pre-hospital Living Situation: Skilled Nursing Facility: for rehab  Patient Pre-hospital Level of Mobility: walks with walker  Patient Pre-hospital Diet Restrictions: none  Substance Use History:   Social History     Substance and Sexual Activity   Alcohol Use Not Currently    Alcohol/week: 1.0 standard drink of alcohol    Types: 1 Glasses of wine per week     Social History     Tobacco Use   Smoking Status Never   Smokeless Tobacco Never     Social History     Substance and Sexual Activity   Drug Use No       Family History:  Family History   Problem Relation Age of Onset    Breast cancer Mother     Heart disease Father         heart problem    Heart disease Sister         heart problem    Heart disease Brother         heart problem       Physical Exam:     Vitals:   Blood Pressure: 127/61 (04/02/24 1509)  Pulse: (!) 128 (04/02/24 1509)  Temperature: (!) 97.1 °F (36.2 °C) (04/02/24 0955)  Temp Source: Oral (04/02/24 0955)  Respirations: (!) 36 (04/02/24 1509)  SpO2: 95 % (04/02/24 1509)    Physical Exam  Vitals and nursing note reviewed.   Constitutional:       General: She is not in acute distress.     Interventions: Nasal cannula in place.      Comments: Frail appearing   HENT:       Mouth/Throat:      Mouth: Mucous membranes are dry.   Cardiovascular:      Rate and Rhythm: Tachycardia present.      Heart sounds: No murmur heard.  Pulmonary:      Breath sounds: Wheezing and rhonchi present.   Abdominal:      Tenderness: There is no abdominal tenderness.   Musculoskeletal:         General: No swelling.   Skin:     General: Skin is warm.      Coloration: Skin is pale.   Neurological:      Mental Status: She is alert and oriented to person, place, and time. Mental status is at baseline.   Psychiatric:         Mood and Affect: Mood normal.          Additional Data:     Lab Results:  Results from last 7 days   Lab Units 04/02/24  1015   WBC Thousand/uL 7.17   HEMOGLOBIN g/dL 10.8*   HEMATOCRIT % 33.8*   PLATELETS Thousands/uL 465*   NEUTROS PCT % 42*   LYMPHS PCT % 35   MONOS PCT % 14*   EOS PCT % 8*     Results from last 7 days   Lab Units 04/02/24  1015   SODIUM mmol/L 132*   POTASSIUM mmol/L 3.8   CHLORIDE mmol/L 97   CO2 mmol/L 27   BUN mg/dL 18   CREATININE mg/dL 0.96   ANION GAP mmol/L 8   CALCIUM mg/dL 9.1   ALBUMIN g/dL 3.5   TOTAL BILIRUBIN mg/dL 0.57   ALK PHOS U/L 59   ALT U/L 14   AST U/L 16   GLUCOSE RANDOM mg/dL 94                 Results from last 7 days   Lab Units 04/02/24  1446   PROCALCITONIN ng/ml <0.05       Lines/Drains:  Invasive Devices       Peripheral Intravenous Line  Duration             Peripheral IV 04/02/24 Left Antecubital <1 day              Drain  Duration             External Urinary Catheter 16 days                        Imaging: Reviewed radiology reports from this admission including: chest CT scan  CT pe study w abdomen pelvis w contrast   Final Result by Jaymie Coto MD (04/02 0000)   No pulmonary embolism      Scattered groundglass densities in the both lungs with associated branching tree-in-bud opacities in the both lower lobes, on infectious basis/aspiration   Follow-up at 3 months suggested to demonstrate resolution      A pleural parenchymal  density seen left base measuring about 7 mm probably scarring attention at follow-up CT to demonstrate stability      No intra-abdominal fluid collection seen      There is moderate size hiatal hernia with mildly dilated esophagus eval for esophageal dysmotility      Urothelial enhancement seen in the both renal pelvises correlate with urinary evaluation to evaluate for UTI      Distended bladder      The study was marked in EPIC for immediate notification.                  Workstation performed: [id number]             EKG and Other Studies Reviewed on Admission:   EKG: Sinus Tachycardia. 's.    ** Please Note: This note has been constructed using a voice recognition system. **

## 2024-04-02 NOTE — ED PROVIDER NOTES
History  Chief Complaint   Patient presents with    Shortness of Breath     [facility] stated that she was feeling sob this morning. States that she received her daily inhaler this morning before this happened. Duo neb and solu medrol given by EMS     88-year-old female patient with a history of HLD, COPD from [facility] [place] presenting with sudden onset shortness of breath onset today.  Patient states that she was drinking orange juice when she suddenly felt shortness of breath.  Patient states that she took her daily inhaler but did not help her symptoms.  States that she has some chest heaviness but denies any nausea, vomiting, diarrhea, fevers,.  Patient states that she has some cough.  Patient was given DuoNeb and Solu-Medrol by EMS.  States is still having difficulty breathing.        Prior to Admission Medications   Prescriptions Last Dose Informant Patient Reported? Taking?   Asmanex  MCG/ACT AERO  Self Yes No   Sig: Inhale 2 puffs if needed   Calcium Carb-Cholecalciferol 1000-800 MG-UNIT TABS  Self Yes No   Sig: Take by mouth   Multiple Vitamins-Minerals (CENTRUM SILVER 50+WOMEN PO)  Self Yes No   Sig: Take by mouth   acetaminophen (TYLENOL) 325 mg tablet   No No   Sig: Take 2 tablets (650 mg total) by mouth every 6 (six) hours as needed for mild pain, headaches or fever   benzonatate (TESSALON PERLES) 100 mg capsule   No No   Sig: Take 1 capsule (100 mg total) by mouth 3 (three) times a day as needed for cough   busPIRone (BUSPAR) 5 mg tablet  Self No No   Sig: Take 1 tablet (5 mg total) by mouth 3 (three) times a day   doxylamine (Unisom SleepTabs) 25 MG tablet   Yes No   Sig: Take 25 mg by mouth daily at bedtime as needed for sleep   guaiFENesin (MUCINEX) 600 mg 12 hr tablet   No No   Sig: Take 1 tablet (600 mg total) by mouth every 12 (twelve) hours   hydrocortisone (ANUSOL-HC) 2.5 % rectal cream   No No   Sig: Apply topically 4 (four) times a day as needed for hemorrhoids    metoclopramide (Reglan) 10 mg tablet   No No   Sig: Take 1 tablet (10 mg total) by mouth 4 (four) times a day as needed (nausea)   mirtazapine (REMERON) 7.5 MG tablet   No No   Sig: Take 1 tablet (7.5 mg total) by mouth daily at bedtime   polyethylene glycol (MIRALAX) 17 g packet   No No   Sig: Take 17 g by mouth 2 (two) times a day   propranolol (INDERAL) 10 mg tablet   Yes No   Sig: Take 10 mg by mouth 2 (two) times a day   senna-docusate sodium (SENOKOT S) 8.6-50 mg per tablet   No No   Sig: Take 2 tablets by mouth 2 (two) times a day   simvastatin (ZOCOR) 5 MG tablet  Self No No   Sig: Take 1 tablet (5 mg total) by mouth daily at bedtime   trimethobenzamide (TIGAN) 100 mg/mL   No No   Sig: Inject 2 mL (200 mg total) into a muscle every 6 (six) hours as needed (nausea if unable to take oral pill)      Facility-Administered Medications: None       Past Medical History:   Diagnosis Date    Abnormal laboratory test 08/01/2022    Acute sinusitis 02/13/2013    Age related osteoporosis 11/01/2022    Formatting of this note might be different from the original.   Last Assessment & Plan:     Formatting of this note might be different from the original.    Risedronate  on allergy list but she does not recall what happened    Her kids told her not to get treatment but I recommend it again to her.     Given the possible allergy refer to rheum to discuss treatment options    Breast cancer (HCC)     Cancer (HCC)     Chronic cough 01/13/2017    COVID-19     History of 2019 novel coronavirus disease (COVID-19) 02/15/2021    Hyperlipidemia     Hyponatremia 02/01/2021    Influenza B 03/05/2024    Insomnia 02/17/2021    MCI (mild cognitive impairment) 02/15/2021    Nonrheumatic aortic (valve) stenosis 03/15/2024    Other specified forms of tremor 03/15/2024    Palpitations 03/15/2024    Pneumonia due to COVID-19 virus 02/01/2021    Slow transit constipation 03/15/2024    Stage 3a chronic kidney disease (HCC) 09/27/2023     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    E-Cigarette/Vaping    E-Cigarette Use Never User      E-Cigarette/Vaping Substances    Nicotine No     THC No     CBD No     Flavoring No      Social History     Tobacco Use    Smoking status: Never    Smokeless tobacco: Never   Vaping Use    Vaping status: Never Used   Substance Use Topics    Alcohol use: Not Currently     Alcohol/week: 1.0 standard drink of alcohol     Types: 1 Glasses of wine per week    Drug use: No        Review of Systems   Respiratory:  Positive for cough and shortness of breath.    Cardiovascular:  Positive for chest pain. Negative for leg swelling.   All other systems reviewed and are negative.      Physical Exam  ED Triage Vitals   Temperature Pulse Respirations Blood Pressure SpO2   04/02/24 0955 04/02/24 0955 04/02/24 0955 04/02/24 0955 04/02/24 0955   (!) 97.1 °F (36.2 °C) (!) 117 (!) 30 139/63 100 %      Temp Source Heart Rate Source Patient Position - Orthostatic VS BP Location FiO2 (%)   04/02/24 0955 04/02/24 0955 04/02/24 0955 04/02/24 0955 --   Oral Monitor Sitting Right arm       Pain Score       04/02/24 1830       No Pain             Orthostatic Vital Signs  Vitals:    04/03/24 1507 04/03/24 2221 04/04/24 0716 04/04/24 0723   BP: 130/70 132/69 136/69    Pulse: 95 91 92 101   Patient Position - Orthostatic VS:           Physical Exam  Vitals reviewed.   Constitutional:       Appearance: Normal appearance.   HENT:      Head: Normocephalic and atraumatic.      Nose: Nose  normal.      Mouth/Throat:      Mouth: Mucous membranes are moist.      Pharynx: Oropharynx is clear.   Eyes:      Extraocular Movements: Extraocular movements intact.      Conjunctiva/sclera: Conjunctivae normal.   Cardiovascular:      Rate and Rhythm: Regular rhythm. Tachycardia present.      Pulses: Normal pulses.      Heart sounds: Normal heart sounds.   Pulmonary:      Effort: Tachypnea and respiratory distress present.      Breath sounds: Wheezing (Bilateral wheezing expiratory) present.   Abdominal:      General: Bowel sounds are normal.      Palpations: Abdomen is soft.      Tenderness: There is no abdominal tenderness.   Musculoskeletal:         General: Normal range of motion.      Cervical back: Normal range of motion.      Right lower leg: No tenderness. No edema.      Left lower leg: No tenderness. No edema.   Skin:     General: Skin is warm and dry.   Neurological:      General: No focal deficit present.      Mental Status: She is alert and oriented to person, place, and time. Mental status is at baseline.         ED Medications  Medications   acetaminophen (TYLENOL) tablet 650 mg (650 mg Oral Given 4/2/24 2349)   benzonatate (TESSALON PERLES) capsule 100 mg (100 mg Oral Given 4/4/24 0922)   busPIRone (BUSPAR) tablet 5 mg (5 mg Oral Given 4/4/24 0915)   calcium carbonate-vitamin D 500 mg-5 mcg tablet 1 tablet (1 tablet Oral Given 4/4/24 0915)   hydrocortisone (ANUSOL-HC) 2.5 % rectal cream (1 Application Topical Given 4/3/24 1345)   mirtazapine (REMERON) tablet 7.5 mg (7.5 mg Oral Given 4/3/24 2105)   polyethylene glycol (MIRALAX) packet 17 g (0 g Oral Hold 4/4/24 0916)   propranolol (INDERAL) tablet 10 mg (10 mg Oral Given 4/4/24 0915)   senna-docusate sodium (SENOKOT S) 8.6-50 mg per tablet 2 tablet (0 tablets Oral Hold 4/4/24 0918)   pravastatin (PRAVACHOL) tablet 10 mg (10 mg Oral Given 4/3/24 1741)   ondansetron (ZOFRAN) injection 4 mg (has no administration in time range)   heparin (porcine)  subcutaneous injection 5,000 Units (5,000 Units Subcutaneous Given 4/4/24 0512)   calcium carbonate (TUMS) chewable tablet 500 mg (has no administration in time range)   melatonin tablet 3 mg (3 mg Oral Given 4/3/24 0048)   predniSONE tablet 40 mg (40 mg Oral Given 4/4/24 0915)   levalbuterol (XOPENEX) inhalation solution 1.25 mg (has no administration in time range)   ipratropium (ATROVENT) 0.02 % inhalation solution 0.5 mg (has no administration in time range)   albuterol inhalation solution 2.5 mg (has no administration in time range)   albuterol (FOR EMS ONLY) (2.5 mg/3 mL) 0.083 % inhalation solution 2.5 mg (0 mg Does not apply Given to EMS 4/2/24 0958)   methylPREDNISolone sodium succinate (FOR EMS ONLY) (Solu-MEDROL) 125 MG injection 125 mg (0 mg Does not apply Given to EMS 4/2/24 0958)   ipratropium-albuterol (FOR EMS ONLY) (DUO-NEB) 0.5-2.5 mg/3 mL inhalation solution 3 mL (0 mL Does not apply Given to EMS 4/2/24 0958)   albuterol inhalation solution 10 mg (10 mg Nebulization Given 4/2/24 1030)   ipratropium (ATROVENT) 0.02 % inhalation solution 1 mg (1 mg Nebulization Given 4/2/24 1030)   sodium chloride 0.9 % inhalation solution 12 mL (12 mL Nebulization Given 4/2/24 1030)   iohexol (OMNIPAQUE) 350 MG/ML injection (MULTI-DOSE) 85 mL (85 mL Intravenous Given 4/2/24 1209)   busPIRone (BUSPAR) tablet 5 mg (5 mg Oral Given 4/2/24 1250)   ceftriaxone (ROCEPHIN) 1 g/50 mL in dextrose IVPB (0 mg Intravenous Stopped 4/2/24 1530)   sodium chloride 0.9 % bolus 500 mL (0 mL Intravenous Stopped 4/2/24 1634)   potassium chloride (Klor-Con M20) CR tablet 20 mEq (20 mEq Oral Given 4/4/24 0915)       Diagnostic Studies  Results Reviewed       Procedure Component Value Units Date/Time    Blood culture [[id number]] Collected: 04/02/24 1435    Lab Status: Preliminary result Specimen: Blood from Hand, Left Updated: 04/03/24 1901     Blood Culture No Growth at 24 hrs.    Blood culture [[id number]] Collected: 04/02/24 1015     Lab Status: Preliminary result Specimen: Blood from Arm, Left Updated: 04/03/24 1401     Blood Culture No Growth at 24 hrs.    Legionella antigen, Urine [[id number]]  (Normal) Collected: 04/03/24 1025    Lab Status: Final result Specimen: Urine Updated: 04/03/24 1124     Legionella Urinary Antigen Negative    Strep Pneumoniae, Urine [[id number]]  (Normal) Collected: 04/03/24 1052    Lab Status: Final result Specimen: Urine Updated: 04/03/24 1121     Strep pneumoniae antigen, urine Negative    Procalcitonin, Next Day AM Collection [[id number]]  (Normal) Collected: 04/03/24 0505    Lab Status: Final result Specimen: Blood from Arm, Left Updated: 04/03/24 0712     Procalcitonin <0.05 ng/ml     Basic metabolic panel, AM Draw, Tomorrow [[id number]]  (Abnormal) Collected: 04/03/24 0505    Lab Status: Final result Specimen: Blood from Hand, Left Updated: 04/03/24 0708     Sodium 133 mmol/L      Potassium 4.1 mmol/L      Chloride 99 mmol/L      CO2 24 mmol/L      ANION GAP 10 mmol/L      BUN 25 mg/dL      Creatinine 0.89 mg/dL      Glucose 159 mg/dL      Calcium 8.1 mg/dL      eGFR 58 ml/min/1.73sq m     Narrative:      National Kidney Disease Foundation guidelines for Chronic Kidney Disease (CKD):     Stage 1 with normal or high GFR (GFR > 90 mL/min/1.73 square meters)    Stage 2 Mild CKD (GFR = 60-89 mL/min/1.73 square meters)    Stage 3A Moderate CKD (GFR = 45-59 mL/min/1.73 square meters)    Stage 3B Moderate CKD (GFR = 30-44 mL/min/1.73 square meters)    Stage 4 Severe CKD (GFR = 15-29 mL/min/1.73 square meters)    Stage 5 End Stage CKD (GFR <15 mL/min/1.73 square meters)  Note: GFR calculation is accurate only with a steady state creatinine    CBC and differential, AM Draw, Tomorrow [[id number]]  (Abnormal) Collected: 04/03/24 0505    Lab Status: Final result Specimen: Blood from Arm, Left Updated: 04/03/24 0639     WBC 4.79 Thousand/uL      RBC 2.50 Million/uL      Hemoglobin 8.4 g/dL      Hematocrit 26.1 %        fL      MCH 33.6 pg      MCHC 32.2 g/dL      RDW 14.3 %      MPV 8.9 fL      Platelets 402 Thousands/uL      nRBC 0 /100 WBCs      Neutrophils Relative 83 %      Immature Grans % 1 %      Lymphocytes Relative 14 %      Monocytes Relative 2 %      Eosinophils Relative 0 %      Basophils Relative 0 %      Neutrophils Absolute 4.02 Thousands/µL      Absolute Immature Grans 0.03 Thousand/uL      Absolute Lymphocytes 0.66 Thousands/µL      Absolute Monocytes 0.07 Thousand/µL      Eosinophils Absolute 0.00 Thousand/µL      Basophils Absolute 0.01 Thousands/µL     UA w Reflex to Microscopic w Reflex to Culture [[id number]]  (Abnormal) Resulted: 04/03/24 0520    Lab Status: Final result Specimen: Urine Updated: 04/03/24 0520     Color, UA Yellow     Clarity, UA Turbid     Specific Gravity, UA 1.016     pH, UA 7.0     Leukocytes, UA Negative     Nitrite, UA Negative     Protein, UA Trace mg/dl      Glucose, UA Negative mg/dl      Ketones, UA Negative mg/dl      Urobilinogen, UA <2.0 mg/dl      Bilirubin, UA Negative     Occult Blood, UA Negative    Lactic acid, plasma (w/reflex if result > 2.0) [[id number]]  (Normal) Collected: 04/02/24 1446    Lab Status: Final result Specimen: Blood from Arm, Left Updated: 04/02/24 1552     LACTIC ACID 1.8 mmol/L     Narrative:      Result may be elevated if tourniquet was used during collection.    COVID/FLU/RSV [[id number]]  (Normal) Collected: 04/02/24 1446    Lab Status: Final result Specimen: Nares from Nose Updated: 04/02/24 1536     SARS-CoV-2 Negative     INFLUENZA A PCR Negative     INFLUENZA B PCR Negative     RSV PCR Negative    Narrative:      FOR PEDIATRIC PATIENTS - copy/paste COVID Guidelines URL to browser: [email or web address]    SARS-CoV-2 assay is a Nucleic Acid Amplification assay intended for the  qualitative detection of nucleic acid from SARS-CoV-2 in nasopharyngeal  swabs. Results are for the presumptive  identification of SARS-CoV-2 RNA.    Positive results are indicative of infection with SARS-CoV-2, the virus  causing COVID-19, but do not rule out bacterial infection or co-infection  with other viruses. Laboratories within the [place] and its  territories are required to report all positive results to the appropriate  public health authorities. Negative results do not preclude SARS-CoV-2  infection and should not be used as the sole basis for treatment or other  patient management decisions. Negative results must be combined with  clinical observations, patient history, and epidemiological information.  This test has not been FDA cleared or approved.    This test has been authorized by FDA under an Emergency Use Authorization  (EUA). This test is only authorized for the duration of time the  declaration that circumstances exist justifying the authorization of the  emergency use of an in vitro diagnostic tests for detection of SARS-CoV-2  virus and/or diagnosis of COVID-19 infection under section 564(b)(1) of  the Act, 21 U.S.C. 360bbb-3(b)(1), unless the authorization is terminated  or revoked sooner. The test has been validated but independent review by FDA  and CLIA is pending.    Test performed using [employer] GeneXpert: This RT-PCR assay targets N2,  a region unique to SARS-CoV-2. A conserved region in the E-gene was chosen  for pan-Sarbecovirus detection which includes SARS-CoV-2.    According to CMS-2020-01-R, this platform meets the definition of high-throughput technology.    Procalcitonin [[id number]]  (Normal) Collected: 04/02/24 1446    Lab Status: Final result Specimen: Blood from Arm, Left Updated: 04/02/24 1526     Procalcitonin <0.05 ng/ml     HS Troponin I 4hr [[id number]]  (Normal) Collected: 04/02/24 1435    Lab Status: Final result Specimen: Blood from Arm, Left Updated: 04/02/24 1507     hs TnI 4hr 6 ng/L      Delta 4hr hsTnI 1 ng/L     HS Troponin I 2hr [[id number]]  (Normal) Collected:  04/02/24 1250    Lab Status: Final result Specimen: Blood from Arm, Left Updated: 04/02/24 1429     hs TnI 2hr 4 ng/L      Delta 2hr hsTnI -1 ng/L     HS Troponin 0hr (reflex protocol) [[id number]]  (Normal) Collected: 04/02/24 1015    Lab Status: Final result Specimen: Blood from Arm, Left Updated: 04/02/24 1105     hs TnI 0hr 5 ng/L     Comprehensive metabolic panel [[id number]]  (Abnormal) Collected: 04/02/24 1015    Lab Status: Final result Specimen: Blood from Arm, Left Updated: 04/02/24 1100     Sodium 132 mmol/L      Potassium 3.8 mmol/L      Chloride 97 mmol/L      CO2 27 mmol/L      ANION GAP 8 mmol/L      BUN 18 mg/dL      Creatinine 0.96 mg/dL      Glucose 94 mg/dL      Calcium 9.1 mg/dL      AST 16 U/L      ALT 14 U/L      Alkaline Phosphatase 59 U/L      Total Protein 7.3 g/dL      Albumin 3.5 g/dL      Total Bilirubin 0.57 mg/dL      eGFR 52 ml/min/1.73sq m     Narrative:      National Kidney Disease Foundation guidelines for Chronic Kidney Disease (CKD):     Stage 1 with normal or high GFR (GFR > 90 mL/min/1.73 square meters)    Stage 2 Mild CKD (GFR = 60-89 mL/min/1.73 square meters)    Stage 3A Moderate CKD (GFR = 45-59 mL/min/1.73 square meters)    Stage 3B Moderate CKD (GFR = 30-44 mL/min/1.73 square meters)    Stage 4 Severe CKD (GFR = 15-29 mL/min/1.73 square meters)    Stage 5 End Stage CKD (GFR <15 mL/min/1.73 square meters)  Note: GFR calculation is accurate only with a steady state creatinine    CBC and differential [[id number]]  (Abnormal) Collected: 04/02/24 1015    Lab Status: Final result Specimen: Blood from Arm, Left Updated: 04/02/24 1043     WBC 7.17 Thousand/uL      RBC 3.14 Million/uL      Hemoglobin 10.8 g/dL      Hematocrit 33.8 %       fL      MCH 34.4 pg      MCHC 32.0 g/dL      RDW 14.3 %      MPV 8.6 fL      Platelets 465 Thousands/uL      nRBC 0 /100 WBCs      Neutrophils Relative 42 %      Immature Grans % 0 %      Lymphocytes Relative 35 %      Monocytes Relative 14 %       Eosinophils Relative 8 %      Basophils Relative 1 %      Neutrophils Absolute 3.03 Thousands/µL      Absolute Immature Grans 0.03 Thousand/uL      Absolute Lymphocytes 2.48 Thousands/µL      Absolute Monocytes 0.98 Thousand/µL      Eosinophils Absolute 0.57 Thousand/µL      Basophils Absolute 0.08 Thousands/µL     Blood gas, venous [[id number]]  (Abnormal) Collected: 04/02/24 1015    Lab Status: Final result Specimen: Blood from Arm, Left Updated: 04/02/24 1039     pH, Geraldo 7.441     pCO2, Geraldo 39.9 mm Hg      pO2, Geraldo 38.7 mm Hg      HCO3, Geraldo 26.6 mmol/L      Base Excess, Geraldo 2.3 mmol/L      O2 Content, Geraldo 12.1 ml/dL      O2 HGB, VENOUS 71.6 %                    CT pe study w abdomen pelvis w contrast   Final Result by Jaymie Coto MD (04/02 7275)   No pulmonary embolism      Scattered groundglass densities in the both lungs with associated branching tree-in-bud opacities in the both lower lobes, on infectious basis/aspiration   Follow-up at 3 months suggested to demonstrate resolution      A pleural parenchymal density seen left base measuring about 7 mm probably scarring attention at follow-up CT to demonstrate stability      No intra-abdominal fluid collection seen      There is moderate size hiatal hernia with mildly dilated esophagus eval for esophageal dysmotility      Urothelial enhancement seen in the both renal pelvises correlate with urinary evaluation to evaluate for UTI      Distended bladder      The study was marked in EPIC for immediate notification.                  Workstation performed: [id number]               Procedures  Procedures      ED Course                                       Medical Decision Making  88-year-old female presenting with shortness of breath.  Somewhat has chest heaviness.  States happened suddenly while she was eating.  DDx includes aspiration, pneumonia, viral syndrome, COPD exacerbation, pulmonary embolism.  On arrival, patient tachycardic to 120s.  Exam shows  patient tachypneic and wheezing, patient treated with heart neb with improvement of symptoms.  CT PE study shows no pulmonary embolism however concerning for pneumonia or infectious etiology.  Patient started on ceftriaxone.  Admitted to medicine for shortness of breath, pneumonia.  Patient requiring 2 L of oxygen.    Amount and/or Complexity of Data Reviewed  Labs: ordered.  Radiology: ordered.    Risk  Prescription drug management.  Decision regarding hospitalization.          Disposition  Final diagnoses:   Hypoxia   COPD exacerbation (HCC)     Time reflects when diagnosis was documented in both MDM as applicable and the Disposition within this note       Time User Action Codes Description Comment    4/2/2024  3:40 PM Reina Sexton Add [R06.02] SOB (shortness of breath)     4/2/2024  3:40 PM Reina Sexton Add [R93.89] Abnormal CT of the chest     4/2/2024  3:46 PM Kar Parker Add [R09.02] Hypoxia     4/2/2024  3:46 PM Kar Parker Add [J44.1] COPD exacerbation (HCC)           ED Disposition       ED Disposition   Admit    Condition   Stable    Date/Time   Tue Apr 2, 2024  3:46 PM    Comment   Case was discussed with Dr. Saini and the patient's admission status was agreed to be Admission Status: inpatient status to the service of Dr. Saini .               Follow-up Information    None         Current Discharge Medication List        CONTINUE these medications which have NOT CHANGED    Details   acetaminophen (TYLENOL) 325 mg tablet Take 2 tablets (650 mg total) by mouth every 6 (six) hours as needed for mild pain, headaches or fever    Associated Diagnoses: Slow transit constipation      Asmanex  MCG/ACT AERO Inhale 2 puffs if needed      benzonatate (TESSALON PERLES) 100 mg capsule Take 1 capsule (100 mg total) by mouth 3 (three) times a day as needed for cough    Associated Diagnoses: Chronic cough      busPIRone (BUSPAR) 5 mg tablet Take 1 tablet (5 mg total) by mouth 3 (three) times a day  Qty:  60 tablet, Refills: 5    Associated Diagnoses: Anxiety; Nonintractable headache, unspecified chronicity pattern, unspecified headache type      Calcium Carb-Cholecalciferol 1000-800 MG-UNIT TABS Take by mouth      doxylamine (Unisom SleepTabs) 25 MG tablet Take 25 mg by mouth daily at bedtime as needed for sleep      guaiFENesin (MUCINEX) 600 mg 12 hr tablet Take 1 tablet (600 mg total) by mouth every 12 (twelve) hours    Associated Diagnoses: Chronic cough      hydrocortisone (ANUSOL-HC) 2.5 % rectal cream Apply topically 4 (four) times a day as needed for hemorrhoids    Associated Diagnoses: Slow transit constipation      metoclopramide (Reglan) 10 mg tablet Take 1 tablet (10 mg total) by mouth 4 (four) times a day as needed (nausea)    Associated Diagnoses: Slow transit constipation      mirtazapine (REMERON) 7.5 MG tablet Take 1 tablet (7.5 mg total) by mouth daily at bedtime    Associated Diagnoses: Slow transit constipation      Multiple Vitamins-Minerals (CENTRUM SILVER 50+WOMEN PO) Take by mouth      polyethylene glycol (MIRALAX) 17 g packet Take 17 g by mouth 2 (two) times a day    Associated Diagnoses: Slow transit constipation      propranolol (INDERAL) 10 mg tablet Take 10 mg by mouth 2 (two) times a day      senna-docusate sodium (SENOKOT S) 8.6-50 mg per tablet Take 2 tablets by mouth 2 (two) times a day    Associated Diagnoses: Slow transit constipation      simvastatin (ZOCOR) 5 MG tablet Take 1 tablet (5 mg total) by mouth daily at bedtime  Qty: 90 tablet, Refills: 3    Associated Diagnoses: Hyperlipidemia, unspecified hyperlipidemia type      trimethobenzamide (TIGAN) 100 mg/mL Inject 2 mL (200 mg total) into a muscle every 6 (six) hours as needed (nausea if unable to take oral pill)  Qty: 20 mL, Refills: 0    Associated Diagnoses: Slow transit constipation           No discharge procedures on file.    PDMP Review         Value Time User    PDMP Reviewed  Yes 8/8/2023  4:41 PM Babs Han  SOWMYA             ED Provider  Attending physically available and evaluated Chelsey Carson. I managed the patient along with the ED Attending.    Electronically Signed by           Kar Parker MD  04/04/24 3555

## 2024-04-02 NOTE — ED ATTENDING ATTESTATION
4/2/2024  I, Donta Cool MD, saw and evaluated the patient. I have discussed the patient with the resident/non-physician practitioner and agree with the resident's/non-physician practitioner's findings, Plan of Care, and MDM as documented in the resident's/non-physician practitioner's note, except where noted. All available labs and Radiology studies were reviewed.  I was present for key portions of any procedure(s) performed by the resident/non-physician practitioner and I was immediately available to provide assistance.       At this point I agree with the current assessment done in the Emergency Department.  I have conducted an independent evaluation of this patient a history and physical is as follows:    88-year-old woman with history of COPD presenting with chest pain or shortness of breath.  Patient states that the chest pain started this morning, is described as right-sided pressure without radiation which has been fairly constant.  She is also experiencing shortness of breath.  She states she has had a chronic cough but it has not gotten worse recently.  Denies leg pain or swelling.  On exam patient awake and alert in respiratory distress with hypoxia requiring supplemental O2, increased work of breathing.  There are decreased sounds in all lung fields and both inspiratory and expiratory wheezes in all lung fields.  Heart tachycardic, regular, no murmurs rubs or gallops.  Abdomen soft, nontender, nondistended.  Skin warm and dry.  No extremity swelling or edema.  Patient was treated with MOYER neb and ongoing supplemental O2.  EKG, labs, imaging done.  Plan admission.        ED Course         Critical Care Time  CriticalCare Time    Date/Time: 4/2/2024 3:00 PM    Performed by: Donta Cool MD  Authorized by: Donta Cool MD    Critical care provider statement:     Critical care time (minutes):  33    Critical care time was exclusive of:  Separately billable procedures and treating other  patients and teaching time    Critical care was necessary to treat or prevent imminent or life-threatening deterioration of the following conditions:  Respiratory failure    Critical care was time spent personally by me on the following activities:  Obtaining history from patient or surrogate, development of treatment plan with patient or surrogate, evaluation of patient's response to treatment, examination of patient, interpretation of cardiac output measurements, ordering and performing treatments and interventions, ordering and review of laboratory studies, ordering and review of radiographic studies, re-evaluation of patient's condition and review of old charts    I assumed direction of critical care for this patient from another provider in my specialty: no

## 2024-04-02 NOTE — ASSESSMENT & PLAN NOTE
Episode at nursing facility requiring straight cath.   Check UA, patient denies dysuria but does report pressure. Urothelial enhancement noted on CT.   Urinary retention protocol

## 2024-04-02 NOTE — ASSESSMENT & PLAN NOTE
Recent hospital admission for constipation with large stool burden and proctitis. Has been having regular BM  Continue bowel regimen

## 2024-04-03 ENCOUNTER — PATIENT OUTREACH (OUTPATIENT)
Dept: CASE MANAGEMENT | Facility: OTHER | Age: 89
End: 2024-04-03

## 2024-04-03 PROBLEM — R07.89 CHEST HEAVINESS: Status: ACTIVE | Noted: 2024-04-02

## 2024-04-03 LAB
AMORPH URATE CRY URNS QL MICRO: ABNORMAL
ANION GAP SERPL CALCULATED.3IONS-SCNC: 10 MMOL/L (ref 4–13)
BACTERIA UR QL AUTO: ABNORMAL /HPF
BASOPHILS # BLD AUTO: 0.01 THOUSANDS/ÂΜL (ref 0–0.1)
BASOPHILS NFR BLD AUTO: 0 % (ref 0–1)
BILIRUB UR QL STRIP: NEGATIVE
BUN SERPL-MCNC: 25 MG/DL (ref 5–25)
CALCIUM SERPL-MCNC: 8.1 MG/DL (ref 8.4–10.2)
CHLORIDE SERPL-SCNC: 99 MMOL/L (ref 96–108)
CLARITY UR: ABNORMAL
CO2 SERPL-SCNC: 24 MMOL/L (ref 21–32)
COLOR UR: YELLOW
CREAT SERPL-MCNC: 0.89 MG/DL (ref 0.6–1.3)
EOSINOPHIL # BLD AUTO: 0 THOUSAND/ÂΜL (ref 0–0.61)
EOSINOPHIL NFR BLD AUTO: 0 % (ref 0–6)
ERYTHROCYTE [DISTWIDTH] IN BLOOD BY AUTOMATED COUNT: 14.3 % (ref 11.6–15.1)
GFR SERPL CREATININE-BSD FRML MDRD: 58 ML/MIN/1.73SQ M
GLUCOSE SERPL-MCNC: 159 MG/DL (ref 65–140)
GLUCOSE UR STRIP-MCNC: NEGATIVE MG/DL
HCT VFR BLD AUTO: 26.1 % (ref 34.8–46.1)
HGB BLD-MCNC: 8.4 G/DL (ref 11.5–15.4)
HGB UR QL STRIP.AUTO: NEGATIVE
IMM GRANULOCYTES # BLD AUTO: 0.03 THOUSAND/UL (ref 0–0.2)
IMM GRANULOCYTES NFR BLD AUTO: 1 % (ref 0–2)
KETONES UR STRIP-MCNC: NEGATIVE MG/DL
L PNEUMO1 AG UR QL IA.RAPID: NEGATIVE
LEUKOCYTE ESTERASE UR QL STRIP: NEGATIVE
LYMPHOCYTES # BLD AUTO: 0.66 THOUSANDS/ÂΜL (ref 0.6–4.47)
LYMPHOCYTES NFR BLD AUTO: 14 % (ref 14–44)
MCH RBC QN AUTO: 33.6 PG (ref 26.8–34.3)
MCHC RBC AUTO-ENTMCNC: 32.2 G/DL (ref 31.4–37.4)
MCV RBC AUTO: 104 FL (ref 82–98)
MONOCYTES # BLD AUTO: 0.07 THOUSAND/ÂΜL (ref 0.17–1.22)
MONOCYTES NFR BLD AUTO: 2 % (ref 4–12)
MUCOUS THREADS UR QL AUTO: ABNORMAL
NEUTROPHILS # BLD AUTO: 4.02 THOUSANDS/ÂΜL (ref 1.85–7.62)
NEUTS SEG NFR BLD AUTO: 83 % (ref 43–75)
NITRITE UR QL STRIP: NEGATIVE
NON-SQ EPI CELLS URNS QL MICRO: ABNORMAL /HPF
NRBC BLD AUTO-RTO: 0 /100 WBCS
PH UR STRIP.AUTO: 7 [PH]
PLATELET # BLD AUTO: 402 THOUSANDS/UL (ref 149–390)
PMV BLD AUTO: 8.9 FL (ref 8.9–12.7)
POTASSIUM SERPL-SCNC: 4.1 MMOL/L (ref 3.5–5.3)
PROCALCITONIN SERPL-MCNC: <0.05 NG/ML
PROT UR STRIP-MCNC: ABNORMAL MG/DL
RBC # BLD AUTO: 2.5 MILLION/UL (ref 3.81–5.12)
RBC #/AREA URNS AUTO: ABNORMAL /HPF
S PNEUM AG UR QL: NEGATIVE
SODIUM SERPL-SCNC: 133 MMOL/L (ref 135–147)
SP GR UR STRIP.AUTO: 1.02 (ref 1–1.03)
UROBILINOGEN UR STRIP-ACNC: <2 MG/DL
WBC # BLD AUTO: 4.79 THOUSAND/UL (ref 4.31–10.16)
WBC #/AREA URNS AUTO: ABNORMAL /HPF

## 2024-04-03 PROCEDURE — 84145 PROCALCITONIN (PCT): CPT | Performed by: NURSE PRACTITIONER

## 2024-04-03 PROCEDURE — 97167 OT EVAL HIGH COMPLEX 60 MIN: CPT

## 2024-04-03 PROCEDURE — 94760 N-INVAS EAR/PLS OXIMETRY 1: CPT

## 2024-04-03 PROCEDURE — 87449 NOS EACH ORGANISM AG IA: CPT | Performed by: NURSE PRACTITIONER

## 2024-04-03 PROCEDURE — 85025 COMPLETE CBC W/AUTO DIFF WBC: CPT | Performed by: NURSE PRACTITIONER

## 2024-04-03 PROCEDURE — 99232 SBSQ HOSP IP/OBS MODERATE 35: CPT | Performed by: NURSE PRACTITIONER

## 2024-04-03 PROCEDURE — 94640 AIRWAY INHALATION TREATMENT: CPT

## 2024-04-03 PROCEDURE — 80048 BASIC METABOLIC PNL TOTAL CA: CPT | Performed by: NURSE PRACTITIONER

## 2024-04-03 PROCEDURE — 97163 PT EVAL HIGH COMPLEX 45 MIN: CPT

## 2024-04-03 PROCEDURE — 81001 URINALYSIS AUTO W/SCOPE: CPT | Performed by: NURSE PRACTITIONER

## 2024-04-03 RX ORDER — IPRATROPIUM BROMIDE AND ALBUTEROL SULFATE 2.5; .5 MG/3ML; MG/3ML
3 SOLUTION RESPIRATORY (INHALATION)
Status: DISCONTINUED | OUTPATIENT
Start: 2024-04-03 | End: 2024-04-04

## 2024-04-03 RX ORDER — LANOLIN ALCOHOL/MO/W.PET/CERES
3 CREAM (GRAM) TOPICAL
Status: DISCONTINUED | OUTPATIENT
Start: 2024-04-03 | End: 2024-04-04 | Stop reason: HOSPADM

## 2024-04-03 RX ORDER — PREDNISONE 20 MG/1
40 TABLET ORAL DAILY
Status: DISCONTINUED | OUTPATIENT
Start: 2024-04-03 | End: 2024-04-04 | Stop reason: HOSPADM

## 2024-04-03 RX ADMIN — BENZONATATE 100 MG: 100 CAPSULE ORAL at 21:14

## 2024-04-03 RX ADMIN — PRAVASTATIN SODIUM 10 MG: 10 TABLET ORAL at 17:41

## 2024-04-03 RX ADMIN — HYDROCORTISONE 1 APPLICATION: 25 CREAM TOPICAL at 13:45

## 2024-04-03 RX ADMIN — Medication 3 MG: at 00:48

## 2024-04-03 RX ADMIN — IPRATROPIUM BROMIDE AND ALBUTEROL SULFATE 3 ML: 2.5; .5 SOLUTION RESPIRATORY (INHALATION) at 12:19

## 2024-04-03 RX ADMIN — POLYETHYLENE GLYCOL 3350 17 G: 17 POWDER, FOR SOLUTION ORAL at 08:15

## 2024-04-03 RX ADMIN — HEPARIN SODIUM 5000 UNITS: 5000 INJECTION INTRAVENOUS; SUBCUTANEOUS at 21:05

## 2024-04-03 RX ADMIN — PROPRANOLOL HYDROCHLORIDE 10 MG: 10 TABLET ORAL at 08:15

## 2024-04-03 RX ADMIN — SENNOSIDES, DOCUSATE SODIUM 2 TABLET: 8.6; 5 TABLET ORAL at 17:40

## 2024-04-03 RX ADMIN — BUSPIRONE HYDROCHLORIDE 5 MG: 5 TABLET ORAL at 08:15

## 2024-04-03 RX ADMIN — BUSPIRONE HYDROCHLORIDE 5 MG: 5 TABLET ORAL at 21:05

## 2024-04-03 RX ADMIN — HEPARIN SODIUM 5000 UNITS: 5000 INJECTION INTRAVENOUS; SUBCUTANEOUS at 05:09

## 2024-04-03 RX ADMIN — Medication 1 TABLET: at 08:15

## 2024-04-03 RX ADMIN — MIRTAZAPINE 7.5 MG: 15 TABLET, FILM COATED ORAL at 21:05

## 2024-04-03 RX ADMIN — BUSPIRONE HYDROCHLORIDE 5 MG: 5 TABLET ORAL at 17:41

## 2024-04-03 RX ADMIN — METHYLPREDNISOLONE SODIUM SUCCINATE 40 MG: 40 INJECTION, POWDER, FOR SOLUTION INTRAMUSCULAR; INTRAVENOUS at 05:09

## 2024-04-03 RX ADMIN — PROPRANOLOL HYDROCHLORIDE 10 MG: 10 TABLET ORAL at 17:41

## 2024-04-03 RX ADMIN — PREDNISONE 40 MG: 20 TABLET ORAL at 10:58

## 2024-04-03 RX ADMIN — HEPARIN SODIUM 5000 UNITS: 5000 INJECTION INTRAVENOUS; SUBCUTANEOUS at 13:35

## 2024-04-03 RX ADMIN — SENNOSIDES, DOCUSATE SODIUM 2 TABLET: 8.6; 5 TABLET ORAL at 08:15

## 2024-04-03 NOTE — PROGRESS NOTES
[facility]  [address] [address]  [phone]  [facility]  POS 31      NAME: Chelsey Carson  AGE: 88 y.o. SEX: female [id number]    DATE OF ENCOUNTER: 4/2/2024    Assessment and Plan     1. Chest heaviness        2. Uncomplicated asthma, unspecified asthma severity, unspecified whether persistent        3. Hyperlipidemia, unspecified hyperlipidemia type        4. Anxiety disorder, unspecified type        5. Benign essential HTN        6. Stage 3a chronic kidney disease (HCC)           Unspecified asthma, uncomplicated  Pt with wheezing and tachypnea and chest heaviness  Cont Asmanex  MCG/ACT AERO, Inhale 2 puffs if needed  Transfer to E.R. for chest heaviness    Chest heaviness  Pt says she was eating and suddenly began having chest heaviness  Pt also with wheezing; suspect asthma exacerbation  Pt tachy at ; sats 92-93% on room air  Pt needs stat EKG/troponin/CXR  Cont statin  Transfer to E.R.    Hyperlipidemia  Stable  Cont statin    Anxiety disorder, unspecified  Pt anxious and mildly tachypneic  Cont buspar 5 mg 1 tab po TID  Pt recently started on propanolol 10 mg 1 tab po BID  Consult psych to adjust meds    Benign essential HTN  /88  Overall moderately controlled  Slightly on higher side given pt's pain/sob  Cont propanolol 10 mg 1 tab po BID    Stage 3a chronic kidney disease (HCC)  Lab Results   Component Value Date    EGFR 58 04/03/2024    EGFR 52 04/02/2024    EGFR 72 03/26/2024    CREATININE 0.89 04/03/2024    CREATININE 0.96 04/02/2024    CREATININE 0.74 03/26/2024   Stable Cr  Check periodic Bmp  Avoid NSAIDS/Nephrotoxins/IV contrast         Chief Complaint     Acute STR follow-up visit.    History of Present Illness   Asked by nurse to see pt as pt tachypneic. Pt also voiced chest heaviness. Pt saturating 93% on room air. Pt with wheezing and HR 100s. Transfer to E.R.    The following portions of the patient's history were reviewed and updated  as appropriate: allergies, current medications, past family history, past medical history, past social history, past surgical history and problem list.    Review of Systems     Review of Systems   Respiratory:  Positive for chest tightness, shortness of breath and wheezing.    Psychiatric/Behavioral:  The patient is nervous/anxious.    All other systems reviewed and are negative.    A comprehensive review of symptoms was obtained.  Pertinent positives and negatives noted in HPI.     Objective     Vitals: Reviewed in [employer] system. VSS    Weight: 114.0 3/27/2024 15:21        Blood Pressure: 155 /  88 4/2/2024 09:04        Temperature: 97.8 4/2/2024 09:04        Pulse: 108 4/2/2024 09:04        Respirations: 24 4/2/2024 09:04        Blood Sugar:           O2 sats: 92.0 % 4/2/2024 09:04        Height: 58.0 3/27/2024 15:21        Pain Level: 0 4/2/2024 09:04         General: Awake, alert and oriented, mild tachypnea  Head: atraumatic, normocephalic  Cardiovascular: mild tachycardia  Lungs: wheezing  Abdomen: nontender/nondistended, +BS  Legs: no cyanosis, clubbing or edema  Skin: clean, dry  Psych: anxious, cooperative    Pertinent Laboratory/Diagnostic Studies:  Refer to facility chart.    Current Medications   Medications reviewed and updated in facility chart. Total time spent 45 minutes in d/w & arranging with nurse, cna and HUC about transfer to [facility]    Dariana Bell MD  Internal Medicine  Senior Care Physician

## 2024-04-03 NOTE — OCCUPATIONAL THERAPY NOTE
Occupational Therapy Progress Note     Patient Name: Chelsey Carson  Today's Date: 4/3/2024  Problem List  Principal Problem:    SOB (shortness of breath)  Active Problems:    Hyperlipidemia    Breast cancer (HCC)    Tremor    Ambulatory dysfunction    Proctitis    Urinary retention    Benign essential HTN    SIRS (systemic inflammatory response syndrome) (HCC)          04/03/24 0849   OT Last Visit   OT Visit Date 04/03/24   Note Type   Note type Evaluation   Pain Assessment   Pain Assessment Tool 0-10   Pain Score No Pain   Restrictions/Precautions   Weight Bearing Precautions Per Order No   Other Precautions Multiple lines;O2;Fall Risk   Home Living   Type of Home House   Home Layout Two level;Bed/bath upstairs;1/2 bath on main level;Stairs to enter with rails  (1 ANALISA)   Bathroom Shower/Tub Walk-in shower   Bathroom Toilet Standard   Bathroom Equipment Grab bars in shower   Bathroom Accessibility Accessible   Home Equipment Walker  (does not use at baseline)   Prior Function   Level of [place] Independent with ADLs;Independent with functional mobility;Independent with IADLS   Lives With Alone   Receives Help From Personal care attendant  (Aid  4 x week)   IADLs Family/Friend/Other provides transportation;Independent with meal prep;Independent with medication management   Falls in the last 6 months 0   Vocational Retired   Lifestyle   Autonomy Pt reports (I) with ADLs, IADLs, and functional mobility without AD. Pt -    Reciprocal Relationships Aide   Service to Others retired   ADL   Where Assessed Edge of bed   Eating Assistance 5  Supervision/Setup   Grooming Assistance 5  Supervision/Setup   UB Bathing Assistance 4  Minimal Assistance   LB Bathing Assistance 2  Maximal Assistance   UB Dressing Assistance 4  Minimal Assistance   LB Dressing Assistance 2  Maximal Assistance   Toileting Assistance  2  Maximal Assistance   Functional Assistance 3  Moderate Assistance   Bed Mobility   Rolling R 5   Supervision   Additional items Bedrails;Increased time required;Verbal cues   Rolling L 5  Supervision   Additional items Bedrails;Increased time required;Verbal cues   Supine to Sit 4  Minimal assistance   Additional items Assist x 1;HOB elevated;Increased time required;Verbal cues   Sit to Supine 4  Minimal assistance   Additional items Assist x 1;Increased time required;Verbal cues;HOB elevated   Transfers   Sit to Stand 3  Moderate assistance   Additional items Assist x 2;Increased time required;Verbal cues   Stand to Sit 3  Moderate assistance   Additional items Assist x 2;Increased time required;Verbal cues   Additional Comments HHA   Balance   Static Sitting Fair   Dynamic Sitting Fair -   Static Standing Poor -   Dynamic Standing Poor -   Activity Tolerance   Activity Tolerance Patient limited by fatigue   Medical Staff Made Aware PT   Nurse Made Aware RN Cleared   RUE Assessment   RUE Assessment WFL   LUE Assessment   LUE Assessment WFL   Hand Function   Gross Motor Coordination Functional   Fine Motor Coordination Functional   Cognition   Overall Cognitive Status WFL   Arousal/Participation Alert;Responsive;Cooperative   Attention Attends with cues to redirect   Orientation Level Oriented X4   Memory Within functional limits   Following Commands Follows one step commands without difficulty   Comments Pt agreeable to therapy   Assessment   Limitation Decreased ADL status;Decreased UE strength;Decreased endurance;Decreased self-care trans;Decreased high-level ADLs   Prognosis Good   Assessment Pt is an 87 y/o female that was admitted to [facility] 4/2/2024 with SOB. 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 Pt lives alone in a two level home with 1 ANALISA, 1/2 bathroom on the main, and bedroom and bathroom upstairs. Pt reports having a walk in shower with grab bars and standard toilet. Pt reports having an aide that visits 4 times per week. Pt reports having a rw, but does not use at baseline. Prior to admission pt (I) ADLs, IADLs, and functional mobility. Pt currently requires supervision to complete seated eating and grooming tasks EOB and rolling. Pt requires MIN A to complete UB ADLs, and sit to supine and supine to sit bed mobility. Pt requires MOD Ax2 to complete functional transfers with HHA. Pt requires MAX A for LB ADLs and toileting. Pt limited by decreased ADL status, functional transfers, functional mobility, and activity tolerance. Pt supine in bed at begning of session, pt supine in bed at end of session with alarm set and items within reach. The patient's raw score on the AM-PAC Daily Activity Inpatient Short Form is 15. A raw score of less than 19 suggests the patient may benefit from discharge to post-acute rehabilitation services. Please refer to the recommendation of the Occupational Therapist for safe discharge planning. Recommend Level II moderate intensity OT services at d/c to maximize pt function.   Goals   Patient Goals To feel better   LTG Time Frame 10-14   Plan   Treatment Interventions ADL retraining;Functional transfer training;UE strengthening/ROM;Endurance training;Patient/family training;Equipment evaluation/education;Compensatory technique education;Continued evaluation;Energy conservation;Activityengagement   Goal Expiration Date 04/17/24   OT Frequency 2-3x/wk   Discharge Recommendation   Rehab Resource Intensity Level, OT II (Moderate Resource  Intensity)   AM-PAC Daily Activity Inpatient   Lower Body Dressing 2   Bathing 2   Toileting 2   Upper Body Dressing 3   Grooming 3   Eating 3   Daily Activity Raw Score 15   Daily Activity Standardized Score (Calc for Raw Score >=11) 34.69   AM-PAC Applied Cognition Inpatient   Following a Speech/Presentation 4   Understanding Ordinary Conversation 4   Taking Medications 4   Remembering Where Things Are Placed or Put Away 4   Remembering List of 4-5 Errands 3   Taking Care of Complicated Tasks 3   Applied Cognition Raw Score 22   Applied Cognition Standardized Score 47.83   End of Consult   Education Provided Yes   Patient Position at End of Consult Supine;Bed/Chair alarm activated;All needs within reach   Nurse Communication Nurse aware of consult     Goals:    Pt will complete functional transfers with MIN A and appropriate AD to maximize pt safety.    Pt will complete bed mobility with MOD IND  to maximize pt safety.    Pt will complete LB bathing with MIN A  to maximize pt independence.    Pt will complete UB bathing with MOD IND to maximize pt independence.    Pt will complete toileting with MIN A to maximize pt independence.    Pt will complete functional household distance mobility with MIN A and appropriate AD to maximize pt safety.    Pt will be able to tolerate 30 minutes of functional activity during therapy session.    Pt will follow multistep directions with increased time or repeating directions 2X to maximize pt safety.         JH Chaves, OTR/L

## 2024-04-03 NOTE — ASSESSMENT & PLAN NOTE
Lab Results   Component Value Date    EGFR 58 04/03/2024    EGFR 52 04/02/2024    EGFR 72 03/26/2024    CREATININE 0.89 04/03/2024    CREATININE 0.96 04/02/2024    CREATININE 0.74 03/26/2024   Stable Cr  Check periodic Bmp  Avoid NSAIDS/Nephrotoxins/IV contrast

## 2024-04-03 NOTE — RESPIRATORY THERAPY NOTE
RT Protocol Note  Chelsey Carson 88 y.o. female MRN: [id number]  Unit/Bed#: -01 Encounter: [id number]    Assessment    Principal Problem:    SOB (shortness of breath)  Active Problems:    Hyperlipidemia    Breast cancer (HCC)    Tremor    Ambulatory dysfunction    Proctitis    Urinary retention    Benign essential HTN    SIRS (systemic inflammatory response syndrome) (HCC)      Home Pulmonary Medications:  Ellipta       Past Medical History:   Diagnosis Date    Abnormal laboratory test 08/01/2022    Acute sinusitis 02/13/2013    Age related osteoporosis 11/01/2022    Formatting of this note might be different from the original.   Last Assessment & Plan:     Formatting of this note might be different from the original.    Risedronate  on allergy list but she does not recall what happened    Her kids told her not to get treatment but I recommend it again to her.     Given the possible allergy refer to rheum to discuss treatment options    Breast cancer (HCC)     Cancer (HCC)     Chronic cough 01/13/2017    COVID-19     History of 2019 novel coronavirus disease (COVID-19) 02/15/2021    Hyperlipidemia     Hyponatremia 02/01/2021    Influenza B 03/05/2024    Insomnia 02/17/2021    MCI (mild cognitive impairment) 02/15/2021    Nonrheumatic aortic (valve) stenosis 03/15/2024    Other specified forms of tremor 03/15/2024    Palpitations 03/15/2024    Pneumonia due to COVID-19 virus 02/01/2021    Slow transit constipation 03/15/2024    Stage 3a chronic kidney disease (HCC) 09/27/2023    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    Thrombocytosis, unspecified 03/15/2024     Social History     Socioeconomic History    Marital status:      Spouse name: None    Number of children: None    Years of education: None    Highest education level: None   Occupational History    None   Tobacco Use    Smoking status: Never    Smokeless tobacco: Never   Vaping Use    Vaping status: Never  Used   Substance and Sexual Activity    Alcohol use: Not Currently     Alcohol/week: 1.0 standard drink of alcohol     Types: 1 Glasses of wine per week    Drug use: No    Sexual activity: Not Currently   Other Topics Concern    None   Social History Narrative    None     Social Determinants of Health     Financial Resource Strain: Low Risk  (3/5/2024)    Received from [facility]    Overall Financial Resource Strain (CARDIA)     Difficulty of Paying Living Expenses: Not very hard   Food Insecurity: No Food Insecurity (3/19/2024)    Hunger Vital Sign     Worried About Running Out of Food in the Last Year: Never true     Ran Out of Food in the Last Year: Never true   Transportation Needs: No Transportation Needs (3/19/2024)    PRAPARE - Transportation     Lack of Transportation (Medical): No     Lack of Transportation (Non-Medical): No   Physical Activity: Not on file   Stress: Not on file   Social Connections: Not on file   Intimate Partner Violence: Not At Risk (3/5/2024)    Received from [facility]    Humiliation, Afraid, Rape, and Kick questionnaire     Fear of Current or Ex-Partner: No     Emotionally Abused: No     Physically Abused: No     Sexually Abused: No   Housing Stability: Low Risk  (3/19/2024)    Housing Stability Vital Sign     Unable to Pay for Housing in the Last Year: No     Number of Places Lived in the Last Year: 1     Unstable Housing in the Last Year: No       Subjective         Objective    Physical Exam:   Assessment Type: Assess only  General Appearance: Awake  Respiratory Pattern: Normal  Chest Assessment: Chest expansion symmetrical  Bilateral Breath Sounds: Diminished    Vitals:  Blood pressure 126/73, pulse 85, temperature 97.8 °F (36.6 °C), resp. rate (P) 20, SpO2 99%.          Imaging and other studies: I have personally reviewed pertinent reports.      O2 Device: nc     Plan    Respiratory Plan: Mild Distress pathway, Discontinue Protocol        Resp  Comments: (P) pt here for increased work of breathing while having breakfast. Pt has no formal diag. of asthma or COPD but takes ellipta at nursing facility. Will order pt albuterol MDI q6 prn and d/c protocol at this time.

## 2024-04-03 NOTE — ASSESSMENT & PLAN NOTE
/88  Overall moderately controlled  Slightly on higher side given pt's pain/sob  Cont propanolol 10 mg 1 tab po BID

## 2024-04-03 NOTE — CASE MANAGEMENT
Case Management Assessment & Discharge Planning Note    Patient name Chelsey Carson  Location /-01 MRN [id number]  : 1935 Date 4/3/2024       Current Admission Date: 2024  Current Admission Diagnosis:SOB (shortness of breath)   Patient Active Problem List    Diagnosis Date Noted    SOB (shortness of breath) 2024    SIRS (systemic inflammatory response syndrome) ([facility]) 2024    Chest heaviness 2024    Dysuria 2024    Urinary retention 2024    Tachycardia 2024    Nausea 2024    Macrocytic anemia 2024    Thrombocytosis 2024    Ambulatory dysfunction 2024    Proctitis 2024    Unspecified asthma, uncomplicated 03/15/2024    Loss of appetite 03/15/2024    Anxiety disorder, unspecified 03/15/2024    Tinnitus of both ears 2023    Xerostomia 2023    Mixed conductive and sensorineural hearing loss of left ear with restricted hearing of right ear 2023    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 2023    Do not resuscitate 2023    Nonintractable headache 2023    Abnormal brain MRI 2023    Slow transit constipation 2023    Primary insomnia 2023    Constipation 2023    Tremor 2023    Hyponatremia 2023    Age related osteoporosis 2022    GERD (gastroesophageal reflux disease) 10/10/2022    Stage 3a chronic kidney disease (HCC) 2022    Venous insufficiency 08/10/2022    Other fatigue 2022    COVID-19 2021    Electrolyte abnormality 2021    Hypokalemia 2021    Dry mouth 2019    Chronic maxillary sinusitis 2019    Breast cancer (HCC) 2019    Chronic cough 2019    Perforation of left tympanic membrane 2019    Nonrheumatic aortic valve insufficiency 11/15/2018    Pes anserinus bursitis of right knee 2018    Benign essential HTN 10/19/2017    Aortic valve  disorder 08/19/2013    Hyperlipidemia 12/19/2012      LOS (days): 1  Geometric Mean LOS (GMLOS) (days): 4.1  Days to GMLOS:3.2     OBJECTIVE:  PATIENT READMITTED TO HOSPITAL  Risk of Unplanned Readmission Score: 26.24         Current admission status: Inpatient       Preferred Pharmacy:   [id number] - [place], [place] - [address]  [address]  [place] [address]  Phone: [phone] Fax: [phone]    Primary Care Provider: Mariely Fan MD    Primary Insurance: MEDICARE  Secondary Insurance: COMMERCIAL MISCELLANEOUS    ASSESSMENT:  Active Health Care Proxies    There are no active Health Care Proxies on file.                 Readmission Root Cause  30 Day Readmission: Yes  Who directed you to return to the hospital?: Other (comment) ([facility])  Did you understand whom to contact if you had questions or problems?: Yes  Did you get your prescriptions before you left the hospital?: Yes  Were you able to get your prescriptions filled when you left the hospital?: Yes  Did you take your medications as prescribed?: Yes  Were you able to get to your follow-up appointments?: No  Reason:: Readmitted prior to appointment  Patient was readmitted due to: SOB  Action Plan: return to [facility]    Patient Information  Admitted from:: Facility ([facility])  Mental Status: Alert  During Assessment patient was accompanied by: Not accompanied during assessment  Assessment information provided by:: Patient  Primary Caregiver: Self  Support Systems: Self, Family members  County of Residence: [place]  What [facility] do you live in?: [place]  Type of Current Residence: [address] [place] home  Living Arrangements: Lives Alone    Activities of Daily Living Prior to Admission  Functional Status: Assistance  Completes ADLs independently?: No  Level of ADL dependence: Assistance         Patient Information Continued  Income Source: Pension/alf  Does patient have a history of Mental  Health Diagnosis?: Yes (anxiety)                    DISCHARGE DETAILS:    Discharge planning discussed with:: patient  Freedom of Choice: Yes  Comments - Freedom of Choice: came from [facility] plan to return  CM contacted family/caregiver?: Yes  Were Treatment Team discharge recommendations reviewed with patient/caregiver?: Yes  Did patient/caregiver verbalize understanding of patient care needs?: Yes  Were patient/caregiver advised of the risks associated with not following Treatment Team discharge recommendations?: Yes    Contacts  Patient Contacts: Alirio's son.  Relationship to Patient:: Family  Contact Method: Phone  Phone Number: [phone]  Reason/Outcome: Continuity of Care, Emergency Contact, Discharge Planning    Requested Home Health Care         Is the patient interested in HHC at discharge?: No    DME Referral Provided  Referral made for DME?: No    Other Referral/Resources/Interventions Provided:  Interventions: Short Term Rehab       Additional Comments: Jorge states she has been at [facility] just for a couple of days after a hospitalization and had to return to the hospital.  Patient states the plan is for her to go back to STR at [facility] when medically ready for DC.  Referral was sent to Mirta and awaiting determination.  A VM was left for ian Kim to disucss DC needs and awaiting a call back for further DC planning needs.  CM to be available     3:25pm  Son Julio called and confirmed the plan is for the patient to continue STR at [facility] when medically ready for DC.  Julio said he has started talking to his mother about transitioning out of her home to a facility where she can get more support after STR.  CM to be available

## 2024-04-03 NOTE — ASSESSMENT & PLAN NOTE
Pt with wheezing and tachypnea and chest heaviness  Cont Asmanex  MCG/ACT AERO, Inhale 2 puffs if needed  Transfer to E.R. for chest heaviness

## 2024-04-03 NOTE — PLAN OF CARE
Problem: PHYSICAL THERAPY ADULT  Goal: Performs mobility at highest level of function for planned discharge setting.  See evaluation for individualized goals.  Description: Treatment/Interventions: ADL retraining, Functional transfer training, LE strengthening/ROM, Therapeutic exercise, Elevations, Endurance training, Cognitive reorientation, Bed mobility, Gait training, Spoke to nursing, OT          See flowsheet documentation for full assessment, interventions and recommendations.  Note: Prognosis: Fair  Problem List: Decreased strength, Decreased range of motion, Impaired balance, Decreased endurance, Decreased mobility, Pain  Assessment: PT orders received and acknowledged. Patient was seen today for high complexity PT evaluation. High complexity evaluation due to Ongoing medical management for primary dx, Decreased activity tolerance compared to baseline, Fall risk, Continuous pulse oximetry monitoring  Patient is a 88 y.o. female  who was admitted to [facility] on 4/2/2024  with SOB (shortness of breath) . Pt presents to [facility] from [facility] c/o shortness of breath and wheezing. Patient performed functional mobility as described above.  At baseline, pt resides alone in house and was independent with functional mobility prior to hospital admission. Currently, upon initial examination, pt  is requiring min assist x1 for bed mobility skills;  mod assist x2 for functional transfers.  Patient currently presents below baseline with limitations in gait, balance, and transfers. Patient will benefit from continued PT services while in hospital in order to address remaining limitations. The patients AM-PAC Basic Mobility Inpatient Short From Raw Score is 13 . Based on AM-PAC scoring and patient presentation, PT currently recommending Level II (Moderate Resource Intensity). Please also refer to the recommendation of the Physical Therapist for safe discharge planning.  Barriers to Discharge: Decreased  caregiver support, Inaccessible home environment     Rehab Resource Intensity Level, PT: II (Moderate Resource Intensity)    See flowsheet documentation for full assessment.

## 2024-04-03 NOTE — ASSESSMENT & PLAN NOTE
Presents with acute onset shortness of breath and wheezing following breakfast this morning at nursing facility.  Also reports some right-sided chest tightness. Tachypenic, tachycardic on arrival. Afebrile without leukocytosis.   CT chest PE study negative PE but did show Scattered groundglass densities in the both lungs with associated branching tree-in-bud opacities in the both lower lobes, on infectious basis/aspiration as well as pleural parenchymal density seen left base measuring about 7 mm. These changes appear to be somewhat chronic.   Has seen pulmonology at [facility] for chronic cough, no formal diagnosis of COPD/asthma.  Significant improvement overnight.  Continue IV steroids and antibiotics for now pending pulmonology evaluation.  Differential includes PNA, viral illness, atypical infection.   Respiratory protocol

## 2024-04-03 NOTE — PROGRESS NOTES
[facility]  Progress Note  Name: Chelsey Carson I  MRN: [id number]  Unit/Bed#: -01 I Date of Admission: 4/2/2024   Date of Service: 4/3/2024 I Hospital Day: 1    Assessment/Plan   * SOB (shortness of breath)  Assessment & Plan  Presents with acute onset shortness of breath and wheezing following breakfast this morning at nursing facility.  Also reports some right-sided chest tightness. Tachypenic, tachycardic on arrival. Afebrile without leukocytosis.   CT chest PE study negative PE but did show Scattered groundglass densities in the both lungs with associated branching tree-in-bud opacities in the both lower lobes, on infectious basis/aspiration as well as pleural parenchymal density seen left base measuring about 7 mm. These changes appear to be somewhat chronic.   Has seen pulmonology at [facility] for chronic cough, no formal diagnosis of COPD/asthma.  Significant improvement overnight.  Continue IV steroids and antibiotics for now pending pulmonology evaluation.  Differential includes PNA, viral illness, atypical infection.   Respiratory protocol    SIRS (systemic inflammatory response syndrome) ([facility])  Assessment & Plan  SIRS versus sepsis POA given tachypnea, tachycardia  Plan as above     Proctitis  Assessment & Plan  Recent hospital admission for constipation with large stool burden and proctitis. Has been having regular BM  Continue bowel regimen    Benign essential HTN  Assessment & Plan  BP acceptable  Continue propranolol  Monitor     Urinary retention  Assessment & Plan  At [facility] and required 1 straight cath here  Patient denies dysuria but does report pressure. Urothelial enhancement noted on CT.   Urinary retention protocol  UA negative   Monitor     Ambulatory dysfunction  Assessment & Plan  Recently discharged from hospital to rehab.  PT/OT evals pending    Breast cancer ([facility])  Assessment & Plan  S/p ductal carcinoma s/p lumpectomy and RT  Outpatient  f/u PRN    Tremor  Assessment & Plan  Chronic, reports this has worsened in her advanced age.  Has seen neurology outpatient.  Propranolol  Fall/safety precautions    Hyperlipidemia  Assessment & Plan  Continue statin           VTE Pharmacologic Prophylaxis: VTE Score: 6 Moderate Risk (Score 3-4) - Pharmacological DVT Prophylaxis Ordered: heparin.    Mobility:   Basic Mobility Inpatient Raw Score: 21  JH-HLM Goal: 6: Walk 10 steps or more  JH-HLM Goal NOT achieved. Continue with multidisciplinary rounding and encourage appropriate mobility to improve upon JH-HLM goals.    Patient Centered Rounds: I performed bedside rounds with nursing staff today.   Discussions with Specialists or Other Care Team Provider: nursing, case management     Education and Discussions with Family / Patient: Updated  (son) via phone.    Total Time Spent on Date of Encounter in care of patient: 25 mins. This time was spent on one or more of the following: performing physical exam; counseling and coordination of care; obtaining or reviewing history; documenting in the medical record; reviewing/ordering tests, medications or procedures; communicating with other healthcare professionals and discussing with patient's family/caregivers.    Current Length of Stay: 1 day(s)    Current Patient Status: Inpatient     Certification Statement: The patient will continue to require additional inpatient hospital stay due to pulmonary evaluation, IV steroids/abx    Discharge Plan: Anticipate discharge in 24-48 hrs to rehab facility.    Code Status: Level 3 - DNAR and DNI    Subjective:   Patient reports feeling tired which is her baseline since her COVID infection.  She does report significant improving in her breathing, no longer feeling short of breath not noting any significant wheezing. She also has a cough which is dry and chronic. She has a poor appetite which is also chronic. Had BM yesterday.     Objective:     Vitals:   Temp (24hrs),  Av.8 °F (36.6 °C), Min:97.2 °F (36.2 °C), Max:98.4 °F (36.9 °C)    Temp:  [97.2 °F (36.2 °C)-98.4 °F (36.9 °C)] 97.7 °F (36.5 °C)  HR:  [] 82  Resp:  [16-44] 16  BP: (116-132)/(56-74) 127/74  SpO2:  [95 %-100 %] 99 %  Body mass index is 23.68 kg/m².     Input and Output Summary (last 24 hours):     Intake/Output Summary (Last 24 hours) at 4/3/2024 1007  Last data filed at 4/3/2024 0421  Gross per 24 hour   Intake --   Output 850 ml   Net -850 ml       Physical Exam:   Physical Exam  Vitals and nursing note reviewed.   Constitutional:       General: She is not in acute distress.     Comments: On RA  Frail appearing   Cardiovascular:      Rate and Rhythm: Normal rate.   Pulmonary:      Breath sounds: Decreased breath sounds and wheezing (fine expiratory wheezes) present.      Comments: Dry cough noted   Abdominal:      Tenderness: There is no abdominal tenderness.   Musculoskeletal:         General: No swelling.   Skin:     General: Skin is warm.   Neurological:      Mental Status: She is alert and oriented to person, place, and time. Mental status is at baseline.   Psychiatric:         Mood and Affect: Mood normal.          Additional Data:     Labs:  Results from last 7 days   Lab Units 24  0505   WBC Thousand/uL 4.79   HEMOGLOBIN g/dL 8.4*   HEMATOCRIT % 26.1*   PLATELETS Thousands/uL 402*   NEUTROS PCT % 83*   LYMPHS PCT % 14   MONOS PCT % 2*   EOS PCT % 0     Results from last 7 days   Lab Units 24  0505 24  1015   SODIUM mmol/L 133* 132*   POTASSIUM mmol/L 4.1 3.8   CHLORIDE mmol/L 99 97   CO2 mmol/L 24 27   BUN mg/dL 25 18   CREATININE mg/dL 0.89 0.96   ANION GAP mmol/L 10 8   CALCIUM mg/dL 8.1* 9.1   ALBUMIN g/dL  --  3.5   TOTAL BILIRUBIN mg/dL  --  0.57   ALK PHOS U/L  --  59   ALT U/L  --  14   AST U/L  --  16   GLUCOSE RANDOM mg/dL 159* 94                 Results from last 7 days   Lab Units 24  0505 24  1446   LACTIC ACID mmol/L  --  1.8   PROCALCITONIN ng/ml <0.05  <0.05       Lines/Drains:  Invasive Devices       Peripheral Intravenous Line  Duration             Peripheral IV 04/03/24 Distal;Left;Upper;Ventral (anterior) Arm <1 day              Drain  Duration             External Urinary Catheter 17 days                          Imaging: No pertinent imaging reviewed.    Recent Cultures (last 7 days):   Results from last 7 days   Lab Units 04/02/24  1435 04/02/24  1015   BLOOD CULTURE  Received in Microbiology Lab. Culture in Progress. Received in Microbiology Lab. Culture in Progress.       Last 24 Hours Medication List:   Current Facility-Administered Medications   Medication Dose Route Frequency Provider Last Rate    acetaminophen  650 mg Oral Q6H PRN Reina Sexton, LANI      benzonatate  100 mg Oral TID PRN Reina Sexton, LANI      busPIRone  5 mg Oral TID LANI Cobb      calcium carbonate  500 mg Oral Daily PRN Bacilio Fisher MD      calcium carbonate-vitamin D  1 tablet Oral Daily With Breakfast LANI Cobb      cefTRIAXone  1,000 mg Intravenous Q24H Bacilio Fisher MD      heparin (porcine)  5,000 Units Subcutaneous Q8H [facility] LANI Cobb      hydrocortisone   Topical 4x Daily PRN LANI Cobb      ipratropium-albuterol  3 mL Nebulization Q6H Ron Ramos MD      melatonin  3 mg Oral HS PRN Marilyn Hogan PA-C      mirtazapine  7.5 mg Oral HS LANI Cobb      ondansetron  4 mg Intravenous Q6H PRN LANI Cobb      polyethylene glycol  17 g Oral BID LANI Cobb      pravastatin  10 mg Oral Daily With Dinner LANI Cobb      predniSONE  40 mg Oral Daily Ron Ramos MD      propranolol  10 mg Oral BID Reina Sexton, LANI      senna-docusate sodium  2 tablet Oral BID LANI Cobb          Today, Patient Was Seen By: LANI Cobb    **Please Note: This note may have been constructed using a voice recognition system.**

## 2024-04-03 NOTE — PLAN OF CARE
Problem: PAIN - ADULT  Goal: Verbalizes/displays adequate comfort level or baseline comfort level  Description: Interventions:  - Encourage patient to monitor pain and request assistance  - Assess pain using appropriate pain scale  - Administer analgesics based on type and severity of pain and evaluate response  - Implement non-pharmacological measures as appropriate and evaluate response  - Consider cultural and social influences on pain and pain management  - Notify physician/advanced practitioner if interventions unsuccessful or patient reports new pain  Outcome: Progressing     Problem: INFECTION - ADULT  Goal: Absence or prevention of progression during hospitalization  Description: INTERVENTIONS:  - Assess and monitor for signs and symptoms of infection  - Monitor lab/diagnostic results  - Monitor all insertion sites, i.e. indwelling lines, tubes, and drains  - Monitor endotracheal if appropriate and nasal secretions for changes in amount and color  - Roby appropriate cooling/warming therapies per order  - Administer medications as ordered  - Instruct and encourage patient and family to use good hand hygiene technique  - Identify and instruct in appropriate isolation precautions for identified infection/condition  Outcome: Progressing  Goal: Absence of fever/infection during neutropenic period  Description: INTERVENTIONS:  - Monitor WBC    Outcome: Progressing     Problem: SAFETY ADULT  Goal: Patient will remain free of falls  Description: INTERVENTIONS:  - Educate patient/family on patient safety including physical limitations  - Instruct patient to call for assistance with activity   - Consult OT/PT to assist with strengthening/mobility   - Keep Call bell within reach  - Keep bed low and locked with side rails adjusted as appropriate  - Keep care items and personal belongings within reach  - Initiate and maintain comfort rounds  - Make Fall Risk Sign visible to staff  - Offer Toileting every 2 Hours,  in advance of need  - Initiate/Maintain bed alarm  - Apply yellow socks and bracelet for high fall risk patients  - Consider moving patient to room near nurses station  Outcome: Progressing  Goal: Maintain or return to baseline ADL function  Description: INTERVENTIONS:  -  Assess patient's ability to carry out ADLs; assess patient's baseline for ADL function and identify physical deficits which impact ability to perform ADLs (bathing, care of mouth/teeth, toileting, grooming, dressing, etc.)  - Assess/evaluate cause of self-care deficits   - Assess range of motion  - Assess patient's mobility; develop plan if impaired  - Assess patient's need for assistive devices and provide as appropriate  - Encourage maximum independence but intervene and supervise when necessary  - Involve family in performance of ADLs  - Assess for home care needs following discharge   - Consider OT consult to assist with ADL evaluation and planning for discharge  - Provide patient education as appropriate  Outcome: Progressing  Goal: Maintains/Returns to pre admission functional level  Description: INTERVENTIONS:  - Perform AM-PAC 6 Click Basic Mobility/ Daily Activity assessment daily.  - Set and communicate daily mobility goal to care team and patient/family/caregiver.   - Collaborate with rehabilitation services on mobility goals if consulted  - Out of bed for toileting  - Record patient progress and toleration of activity level   Outcome: Progressing     Problem: DISCHARGE PLANNING  Goal: Discharge to home or other facility with appropriate resources  Description: INTERVENTIONS:  - Identify barriers to discharge w/patient and caregiver  - Arrange for needed discharge resources and transportation as appropriate  - Identify discharge learning needs (meds, wound care, etc.)  - Arrange for interpretive services to assist at discharge as needed  - Refer to Case Management Department for coordinating discharge planning if the patient needs  post-hospital services based on physician/advanced practitioner order or complex needs related to functional status, cognitive ability, or social support system  Outcome: Progressing     Problem: Knowledge Deficit  Goal: Patient/family/caregiver demonstrates understanding of disease process, treatment plan, medications, and discharge instructions  Description: Complete learning assessment and assess knowledge base.  Interventions:  - Provide teaching at level of understanding  - Provide teaching via preferred learning methods  Outcome: Progressing     Problem: Prexisting or High Potential for Compromised Skin Integrity  Goal: Skin integrity is maintained or improved  Description: INTERVENTIONS:  - Identify patients at risk for skin breakdown  - Assess and monitor skin integrity  - Assess and monitor nutrition and hydration status  - Monitor labs   - Assess for incontinence   - Turn and reposition patient  - Assist with mobility/ambulation  - Relieve pressure over bony prominences  - Avoid friction and shearing  - Provide appropriate hygiene as needed including keeping skin clean and dry  - Evaluate need for skin moisturizer/barrier cream  - Collaborate with interdisciplinary team   - Patient/family teaching  - Consider wound care consult   Outcome: Progressing

## 2024-04-03 NOTE — CONSULTS
Consult Note - Pulmonary   Chelsey Carson 88 y.o. female MRN: [id number]  Unit/Bed#: -01 Encounter: [id number]      Reason for consultation: abnormal CT     Requesting provider: LANI Pickering    Assessment & Recommendations:   Bronchiolitis   RML Bronchiectasis  Mild intermittent Asthma, not in AE  Recent influenza pneumonia   Hyponatremia     Reviewing patient's CT scans to as far back as 2023, seems she has had tree in bud nodularity and RML bronchiectasis. Likely a sequela of her previous COVID infections in the setting of asthma. Will discontinue solumedrol and start 5 day prednisone 40mg burst along with duonebs ATC. Would stay away from ICS given her bronchiectasis due to increased risk of infection. Patient also does not appear to have secondary bacterial pneumonia at this time, CT findings likely evolvement of recent influenza pneumonia at [facility]. Also without clinical presentation consistent with pneumonia. Would recommend discontinuing antibiotics. Encourage OOB to chair, IS use, and ambulation.      History of Present Illness   HPI:  Chelsey Carson is a 88 y.o. female with PMH of asthma who presents with complaints of weakness. She was recently admitted early March with influenza pneumonia then discharged home. She never felt back to her baseline so then she presented to [facility] and was diagnosed with proctitis and treated and never felt back to her baseline. Yesterday she was sent back to the ED due to sudden onset SOB. She states she complaints of generalized weakness. She denies fevers, chills, cough, sputum production, sick contacts. She states she has a chronic cough that is intermittently productive. She had covid twice in 2021. She did not have asthma as a child. She uses her inhaler intermittently.     Review of systems:  12 point review of systems was completed and was otherwise negative except as listed in HPI.  Review of Systems   Constitutional:  Positive for fatigue. Negative for  activity change, chills, diaphoresis and fever.   HENT:  Negative for congestion, postnasal drip, rhinorrhea, sinus pressure, sneezing, sore throat and trouble swallowing.    Eyes: Negative.    Respiratory:  Negative for cough, chest tightness and shortness of breath.    Cardiovascular:  Negative for chest pain, palpitations and leg swelling.   Gastrointestinal:  Negative for constipation, diarrhea, nausea and vomiting.   Endocrine: Negative.    Genitourinary: Negative.    Musculoskeletal:  Negative for back pain, joint swelling and neck pain.   Skin: Negative.    Allergic/Immunologic: Negative.    Neurological:  Positive for weakness. Negative for dizziness, syncope, light-headedness and headaches.   Hematological: Negative.    Psychiatric/Behavioral: Negative.     All other systems reviewed and are negative.        Historical Information   Past Medical History:   Diagnosis Date    Abnormal laboratory test 08/01/2022    Acute sinusitis 02/13/2013    Age related osteoporosis 11/01/2022    Formatting of this note might be different from the original.   Last Assessment & Plan:     Formatting of this note might be different from the original.    Risedronate  on allergy list but she does not recall what happened    Her kids told her not to get treatment but I recommend it again to her.     Given the possible allergy refer to rheum to discuss treatment options    Breast cancer (HCC)     Cancer (HCC)     Chronic cough 01/13/2017    COVID-19     History of 2019 novel coronavirus disease (COVID-19) 02/15/2021    Hyperlipidemia     Hyponatremia 02/01/2021    Influenza B 03/05/2024    Insomnia 02/17/2021    MCI (mild cognitive impairment) 02/15/2021    Nonrheumatic aortic (valve) stenosis 03/15/2024    Other specified forms of tremor 03/15/2024    Palpitations 03/15/2024    Pneumonia due to COVID-19 virus 02/01/2021    Slow transit constipation 03/15/2024    Stage 3a chronic kidney disease (HCC) 09/27/2023    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) 03/15/2024    Thrombocytosis, unspecified 03/15/2024     Past Surgical History:   Procedure Laterality Date    BREAST SURGERY      cancer (> 5 years)    HYSTERECTOMY       Family History   Problem Relation Age of Onset    Breast cancer Mother     Heart disease Father         heart problem    Heart disease Sister         heart problem    Heart disease Brother         heart problem       Occupational History: retired      Social History:   Alcohol: none   Smoking: none   Illicit drugs: none   Home heating system: electric  Pets:none   Hobbies: none   Exposures: none  (amio, nitrofurantoin, cyclophos, asbestos, beryllium, glass cutting, mining, sandblasting, farming, woodwork, Birds, down bedding, hot tubs)    Meds/Allergies   Current Facility-Administered Medications   Medication Dose Route Frequency    acetaminophen (TYLENOL) tablet 650 mg  650 mg Oral Q6H PRN    benzonatate (TESSALON PERLES) capsule 100 mg  100 mg Oral TID PRN    busPIRone (BUSPAR) tablet 5 mg  5 mg Oral TID    calcium carbonate (TUMS) chewable tablet 500 mg  500 mg Oral Daily PRN    calcium carbonate-vitamin D 500 mg-5 mcg tablet 1 tablet  1 tablet Oral Daily With Breakfast    ceftriaxone (ROCEPHIN) 1 g/50 mL in dextrose IVPB  1,000 mg Intravenous Q24H    fluticasone (ARNUITY ELLIPTA) 100 MCG/ACT inhaler 1 puff  1 puff Inhalation Daily    heparin (porcine) subcutaneous injection 5,000 Units  5,000 Units Subcutaneous Q8H [facility]    hydrocortisone (ANUSOL-HC) 2.5 % rectal cream   Topical 4x Daily PRN    melatonin tablet 3 mg  3 mg Oral HS PRN    methylPREDNISolone sodium succinate (Solu-MEDROL) injection 40 mg  40 mg Intravenous Q8H [facility]    mirtazapine (REMERON) tablet 7.5 mg  7.5 mg Oral HS    ondansetron (ZOFRAN) injection 4 mg  4 mg Intravenous Q6H PRN    polyethylene glycol (MIRALAX) packet 17 g  17 g Oral BID    pravastatin (PRAVACHOL) tablet 10 mg  10 mg Oral Daily With Dinner     "propranolol (INDERAL) tablet 10 mg  10 mg Oral BID    senna-docusate sodium (SENOKOT S) 8.6-50 mg per tablet 2 tablet  2 tablet Oral BID    sodium chloride 0.9 % infusion  75 mL/hr Intravenous Continuous     Medications Prior to Admission   Medication    acetaminophen (TYLENOL) 325 mg tablet    Asmanex  MCG/ACT AERO    benzonatate (TESSALON PERLES) 100 mg capsule    busPIRone (BUSPAR) 5 mg tablet    Calcium Carb-Cholecalciferol 1000-800 MG-UNIT TABS    doxylamine (Unisom SleepTabs) 25 MG tablet    guaiFENesin (MUCINEX) 600 mg 12 hr tablet    hydrocortisone (ANUSOL-HC) 2.5 % rectal cream    metoclopramide (Reglan) 10 mg tablet    mirtazapine (REMERON) 7.5 MG tablet    Multiple Vitamins-Minerals (CENTRUM SILVER 50+WOMEN PO)    polyethylene glycol (MIRALAX) 17 g packet    propranolol (INDERAL) 10 mg tablet    senna-docusate sodium (SENOKOT S) 8.6-50 mg per tablet    simvastatin (ZOCOR) 5 MG tablet    trimethobenzamide (TIGAN) 100 mg/mL     Allergies   Allergen Reactions    Pravastatin     Risedronate     Paxlovid [Nirmatrelvir-Ritonavir] Nausea Only       Vitals: Blood pressure 127/74, pulse 82, temperature 97.7 °F (36.5 °C), temperature source Oral, resp. rate 16, height 4' 10\" (1.473 m), weight 51.4 kg (113 lb 5.1 oz), SpO2 99%., RA, Body mass index is 23.68 kg/m².      Intake/Output Summary (Last 24 hours) at 4/3/2024 0910  Last data filed at 4/3/2024 0421  Gross per 24 hour   Intake --   Output 850 ml   Net -850 ml       Physical Exam  Physical Exam  Vitals and nursing note reviewed.   Constitutional:       Appearance: Normal appearance. She is normal weight.   HENT:      Head: Normocephalic and atraumatic.      Right Ear: External ear normal.      Left Ear: External ear normal.      Nose: Nose normal.      Mouth/Throat:      Mouth: Mucous membranes are moist.      Pharynx: Oropharynx is clear.   Eyes:      Conjunctiva/sclera: Conjunctivae normal.   Cardiovascular:      Rate and Rhythm: Normal rate and " regular rhythm.      Pulses: Normal pulses.      Heart sounds: Normal heart sounds.   Pulmonary:      Effort: Pulmonary effort is normal.      Breath sounds: Normal breath sounds.   Abdominal:      General: Abdomen is flat. Bowel sounds are normal.      Palpations: Abdomen is soft.   Musculoskeletal:         General: No swelling or tenderness.      Cervical back: Neck supple. No muscular tenderness.   Skin:     General: Skin is warm and dry.      Capillary Refill: Capillary refill takes less than 2 seconds.   Neurological:      Mental Status: She is alert and oriented to person, place, and time. Mental status is at baseline.   Psychiatric:         Mood and Affect: Mood normal.         Behavior: Behavior normal.         Thought Content: Thought content normal.         Judgment: Judgment normal.         Labs: I have personally reviewed pertinent lab results.  Laboratory and Diagnostics  Results from last 7 days   Lab Units 04/03/24  0505 04/02/24  1015   WBC Thousand/uL 4.79 7.17   HEMOGLOBIN g/dL 8.4* 10.8*   HEMATOCRIT % 26.1* 33.8*   PLATELETS Thousands/uL 402* 465*   NEUTROS PCT % 83* 42*   MONOS PCT % 2* 14*   EOS PCT % 0 8*     Results from last 7 days   Lab Units 04/03/24  0505 04/02/24  1015   SODIUM mmol/L 133* 132*   POTASSIUM mmol/L 4.1 3.8   CHLORIDE mmol/L 99 97   CO2 mmol/L 24 27   ANION GAP mmol/L 10 8   BUN mg/dL 25 18   CREATININE mg/dL 0.89 0.96   CALCIUM mg/dL 8.1* 9.1   GLUCOSE RANDOM mg/dL 159* 94   ALT U/L  --  14   AST U/L  --  16   ALK PHOS U/L  --  59   ALBUMIN g/dL  --  3.5   TOTAL BILIRUBIN mg/dL  --  0.57                   Results from last 7 days   Lab Units 04/02/24  1446   LACTIC ACID mmol/L 1.8                     Results from last 7 days   Lab Units 04/03/24  0505 04/02/24  1446   PROCALCITONIN ng/ml <0.05 <0.05       ABG:       Imaging and other studies: I have personally reviewed pertinent reports.   and I have personally reviewed pertinent films in PACS  CTA chest 4/2/24: BLL tree  "in bud nodularity, RML bronchiectasis  CTA abd/pelvis 3/8/24: BLL tree in bud nodularity  CT abd/pelvis 9/19/23: BLL tree in bud nodularity   Pulmonary function testing 4/8/23: moderate obstruction, air trapping, DLCO is normal     EKG, Pathology, and Other Studies: I have personally reviewed pertinent reports.    Echo 11/1/23: EF 55-60%    Code Status: Level 3 - DNAR and DNI    Ron Ramos MD  Pulmonary/Critical Care Fellow PGY-V  [facility]      Disclaimer: Portions of the record may have been created with voice recognition software. Occasional wrong word or \"sound a like\" substitutions may have occurred due to the inherent limitations of voice recognition software. Careful consideration should be taken to recognize, using context, where substitutions have occurred.   "

## 2024-04-03 NOTE — PLAN OF CARE
Problem: OCCUPATIONAL THERAPY ADULT  Goal: Performs self-care activities at highest level of function for planned discharge setting.  See evaluation for individualized goals.  Description: Treatment Interventions: ADL retraining, Functional transfer training, UE strengthening/ROM, Endurance training, Patient/family training, Equipment evaluation/education, Compensatory technique education, Continued evaluation, Energy conservation, Activityengagement          See flowsheet documentation for full assessment, interventions and recommendations.   Outcome: Progressing  Note: Limitation: Decreased ADL status, Decreased UE strength, Decreased endurance, Decreased self-care trans, Decreased high-level ADLs  Prognosis: Good  Assessment: Pt is an 87 y/o female that was admitted to [facility] 4/2/2024 with SOB. 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 Pt lives alone in a two level home with 1 ANALISA, 1/2 bathroom on the main, and bedroom and bathroom upstairs. Pt reports having a walk in shower with grab bars and standard toilet. Pt reports having an aide that visits 4 times per week. Pt reports having a rw, but does not use at baseline. Prior to admission pt (I) ADLs, IADLs, and functional mobility. Pt currently requires supervision to complete seated eating and grooming tasks EOB and rolling. Pt requires MIN A to complete UB ADLs, and sit to supine and supine to sit bed mobility.  Pt requires MOD Ax2 to complete functional transfers with HHA. Pt requires MAX A for LB ADLs and toileting. Pt limited by decreased ADL status, functional transfers, functional mobility, and activity tolerance. Pt supine in bed at begning of session, pt supine in bed at end of session with alarm set and items within reach. The patient's raw score on the AM-PAC Daily Activity Inpatient Short Form is 15. A raw score of less than 19 suggests the patient may benefit from discharge to post-acute rehabilitation services. Please refer to the recommendation of the Occupational Therapist for safe discharge planning. Recommend Level II moderate intensity OT services at d/c to maximize pt function.     Rehab Resource Intensity Level, OT: II (Moderate Resource Intensity)

## 2024-04-03 NOTE — PROGRESS NOTES
ADT alert received the patient admitted 4/2/24 to [facility]. This Admin Coordinator will continue to monitor via chart review.

## 2024-04-03 NOTE — ASSESSMENT & PLAN NOTE
Pt anxious and mildly tachypneic  Cont buspar 5 mg 1 tab po TID  Pt recently started on propanolol 10 mg 1 tab po BID  Consult psych to adjust meds

## 2024-04-03 NOTE — PHYSICAL THERAPY NOTE
Physical Therapy Evaluation     Patient's Name: Chelsey Carson    Admitting Diagnosis  SOB (shortness of breath) [R06.02]  Hypoxia [R09.02]  Abnormal CT of the chest [R93.89]  COPD exacerbation ([facility]) [J44.1]    Problem List  Patient Active Problem List   Diagnosis    Hyperlipidemia    Pes anserinus bursitis of right knee    Nonrheumatic aortic valve insufficiency    Perforation of left tympanic membrane    Chronic cough    Breast cancer ([facility])    Dry mouth    Chronic maxillary sinusitis    COVID-19    Electrolyte abnormality    Other fatigue    Venous insufficiency    Stage 3a chronic kidney disease ([facility])    GERD (gastroesophageal reflux disease)    Age related osteoporosis    Tremor    Hyponatremia    Constipation    Slow transit constipation    Primary insomnia    Nonintractable headache    Abnormal brain MRI    Do not resuscitate    Tinnitus of both ears    Xerostomia    Mixed conductive and sensorineural hearing loss of left ear with restricted hearing of right ear    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    Ambulatory dysfunction    Proctitis    Macrocytic anemia    Thrombocytosis    Nausea    Tachycardia    Urinary retention    Unspecified asthma, uncomplicated    Hypokalemia    Loss of appetite    Benign essential HTN    Anxiety disorder, unspecified    Aortic valve disorder    Dysuria    SOB (shortness of breath)    SIRS (systemic inflammatory response syndrome) ([facility])    Chest heaviness       Past Medical History  Past Medical History:   Diagnosis Date    Abnormal laboratory test 08/01/2022    Acute sinusitis 02/13/2013    Age related osteoporosis 11/01/2022    Formatting of this note might be different from the original.   Last Assessment & Plan:     Formatting of this note might be different from the original.    Risedronate  on allergy list but she does not recall what happened    Her kids told her not to get treatment but I recommend it again to her.      Given the possible allergy refer to rheum to discuss treatment options    Breast cancer (HCC)     Cancer (HCC)     Chronic cough 01/13/2017    COVID-19     History of 2019 novel coronavirus disease (COVID-19) 02/15/2021    Hyperlipidemia     Hyponatremia 02/01/2021    Influenza B 03/05/2024    Insomnia 02/17/2021    MCI (mild cognitive impairment) 02/15/2021    Nonrheumatic aortic (valve) stenosis 03/15/2024    Other specified forms of tremor 03/15/2024    Palpitations 03/15/2024    Pneumonia due to COVID-19 virus 02/01/2021    Slow transit constipation 03/15/2024    Stage 3a chronic kidney disease (HCC) 09/27/2023    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    Thrombocytosis, unspecified 03/15/2024       Past Surgical History  Past Surgical History:   Procedure Laterality Date    BREAST SURGERY      cancer (> 5 years)    HYSTERECTOMY              04/03/24 0850   PT Last Visit   PT Visit Date 04/03/24   Note Type   Note type Evaluation   Pain Assessment   Pain Assessment Tool 0-10   Pain Score No Pain   Restrictions/Precautions   Weight Bearing Precautions Per Order No   Other Precautions Multiple lines;O2;Fall Risk   Home Living   Type of Home House   Home Layout Two level;Stairs to enter with rails  (1STE)   Bathroom Shower/Tub Walk-in shower   Bathroom Toilet Standard   Bathroom Equipment Grab bars in shower   Bathroom Accessibility Accessible   Home Equipment Walker   Prior Function   Level of [place] Independent with ADLs;Independent with functional mobility;Independent with IADLS   Lives With Alone   Receives Help From Personal care attendant  (4x per week)   IADLs Family/Friend/Other provides transportation;Independent with meal prep;Independent with medication management   Falls in the last 6 months 0   Vocational Retired   General   Family/Caregiver Present No   Cognition   Overall Cognitive Status WFL   Arousal/Participation Alert   Attention Attends with cues to  redirect   Orientation Level Oriented X4   Memory Within functional limits   Following Commands Follows one step commands without difficulty   Subjective   Subjective pt pleasant and cooperative throughout therapy session. pt received supine in bed   RLE Assessment   RLE Assessment X  (4-/5 grossly)   LLE Assessment   LLE Assessment X  (4-/5 grossly)   Bed Mobility   Rolling R 5  Supervision   Additional items Increased time required;Verbal cues   Rolling L 5  Supervision   Additional items Increased time required;Verbal cues   Supine to Sit 4  Minimal assistance   Additional items Assist x 1;Increased time required;Verbal cues;LE management   Sit to Supine 4  Minimal assistance   Additional items Assist x 1;Increased time required;Verbal cues;LE management   Transfers   Sit to Stand 3  Moderate assistance   Additional items Assist x 2;Increased time required;Verbal cues   Stand to Sit 3  Moderate assistance   Additional items Assist x 2;Increased time required;Verbal cues   Additional Comments transfers w HHA   Ambulation/Elevation   Gait pattern Not tested   Ambulation/Elevation Additional Comments pt began displaying urinary and bowel incontinence and was returned to sitting at EOB   Balance   Static Sitting Fair   Dynamic Sitting Fair -   Static Standing Poor +   Dynamic Standing Poor   Endurance Deficit   Endurance Deficit Yes   Endurance Deficit Description generalized weakness, decreased exercise tolerance   Activity Tolerance   Activity Tolerance Patient limited by fatigue   Medical Staff Made Aware alia Light due to medical complexity and multiple comorbidities   Nurse Made Aware RN cleared and updated   Assessment   Prognosis Fair   Problem List Decreased strength;Decreased range of motion;Impaired balance;Decreased endurance;Decreased mobility;Pain   Assessment PT orders received and acknowledged. Patient was seen today for high complexity PT evaluation. High complexity evaluation due to Ongoing  medical management for primary dx, Decreased activity tolerance compared to baseline, Fall risk, Continuous pulse oximetry monitoring  Patient is a 88 y.o. female  who was admitted to [facility] on 4/2/2024  with SOB (shortness of breath) . Pt presents to [facility] c/o shortness of breath and wheezing. Patient performed functional mobility as described above.  At baseline, pt resides alone in house and was independent with functional mobility prior to hospital admission. Currently, upon initial examination, pt  is requiring min assist x1 for bed mobility skills;  mod assist x2 for functional transfers.  Patient currently presents below baseline with limitations in gait, balance, and transfers. Patient will benefit from continued PT services while in hospital in order to address remaining limitations. The patients AM-PAC Basic Mobility Inpatient Short From Raw Score is 13 . Based on AM-PAC scoring and patient presentation, PT currently recommending Level II (Moderate Resource Intensity). Please also refer to the recommendation of the Physical Therapist for safe discharge planning.   Barriers to Discharge Decreased caregiver support;Inaccessible home environment   Goals   Patient Goals to go home   STG Expiration Date 04/17/24   Short Term Goal #1 Patient will be able to perform bed mobility tasks with  modified independent   in order to improve overall functional mobility and assist in safe d/c. STG 2 Patient will sit EOB for at least 25 minutes at  modified independent   level in order to strengthen abdominal musculature and assist in future transfers and ambulation. STG 3 Patient will be able to perform functional transfer with  modified independent   in order to improve overall functional mobility and assist in safe discharge. STG 4 Patient will be able to ambulate at least 50 feet with least restrictive device with  modified independent   assist in order to improve overall functional  mobility and assist in safe discharge. STG 5 Patient will improve sitting/standing static/dynamic balance 1/2 grade in order to improve functional mobility and assist in safe discharge. STG 6 Patient will improve LE strength by 1/2 grade in order to improve functional mobility and assist in safe discharge. STG 7 Patient will be able to negotiate at least 1 stairs with least restrictive device with  modified independent   A in order to improve overall functional mobility and assist in safe discharge   PT Treatment Day 0   Plan   Treatment/Interventions ADL retraining;Functional transfer training;LE strengthening/ROM;Therapeutic exercise;Elevations;Endurance training;Cognitive reorientation;Bed mobility;Gait training;Spoke to nursing;OT   PT Frequency 2-3x/wk   Discharge Recommendation   Rehab Resource Intensity Level, PT II (Moderate Resource Intensity)   AM-PAC Basic Mobility Inpatient   Turning in Flat Bed Without Bedrails 3   Lying on Back to Sitting on Edge of Flat Bed Without Bedrails 3   Moving Bed to Chair 2   Standing Up From Chair Using Arms 2   Walk in Room 2   Climb 3-5 Stairs With Railing 1   Basic Mobility Inpatient Raw Score 13   Basic Mobility Standardized Score 33.99   [facility] Highest Level Of Mobility   -HL Goal 4: Move to chair/commode   -HLM Achieved 5: Stand (1 or more minutes)   End of Consult   Patient Position at End of Consult Supine;Bed/Chair alarm activated;All needs within reach       Eugene Pruett, PT

## 2024-04-03 NOTE — ASSESSMENT & PLAN NOTE
Pt says she was eating and suddenly began having chest heaviness  Pt also with wheezing; suspect asthma exacerbation  Pt tachy at ; sats 92-93% on room air  Pt needs stat EKG/troponin/CXR  Cont statin  Transfer to [facility].

## 2024-04-03 NOTE — ASSESSMENT & PLAN NOTE
At SNF and required 1 straight cath here  Patient denies dysuria but does report pressure. Urothelial enhancement noted on CT.   Urinary retention protocol  UA negative   Monitor

## 2024-04-04 ENCOUNTER — TELEPHONE (OUTPATIENT)
Dept: OTHER | Facility: OTHER | Age: 89
End: 2024-04-04

## 2024-04-04 ENCOUNTER — PATIENT OUTREACH (OUTPATIENT)
Dept: CASE MANAGEMENT | Facility: OTHER | Age: 89
End: 2024-04-04

## 2024-04-04 VITALS
DIASTOLIC BLOOD PRESSURE: 74 MMHG | SYSTOLIC BLOOD PRESSURE: 136 MMHG | HEART RATE: 86 BPM | WEIGHT: 113.32 LBS | BODY MASS INDEX: 23.79 KG/M2 | HEIGHT: 58 IN | OXYGEN SATURATION: 96 % | RESPIRATION RATE: 16 BRPM | TEMPERATURE: 97.3 F

## 2024-04-04 PROBLEM — J96.01 ACUTE RESPIRATORY FAILURE WITH HYPOXIA (HCC): Status: ACTIVE | Noted: 2024-04-02

## 2024-04-04 LAB
ANION GAP SERPL CALCULATED.3IONS-SCNC: 9 MMOL/L (ref 4–13)
BASOPHILS # BLD AUTO: 0.01 THOUSANDS/ÂΜL (ref 0–0.1)
BASOPHILS NFR BLD AUTO: 0 % (ref 0–1)
BUN SERPL-MCNC: 23 MG/DL (ref 5–25)
CALCIUM SERPL-MCNC: 8.4 MG/DL (ref 8.4–10.2)
CHLORIDE SERPL-SCNC: 103 MMOL/L (ref 96–108)
CO2 SERPL-SCNC: 25 MMOL/L (ref 21–32)
CREAT SERPL-MCNC: 0.82 MG/DL (ref 0.6–1.3)
EOSINOPHIL # BLD AUTO: 0.01 THOUSAND/ÂΜL (ref 0–0.61)
EOSINOPHIL NFR BLD AUTO: 0 % (ref 0–6)
ERYTHROCYTE [DISTWIDTH] IN BLOOD BY AUTOMATED COUNT: 14.4 % (ref 11.6–15.1)
GFR SERPL CREATININE-BSD FRML MDRD: 64 ML/MIN/1.73SQ M
GLUCOSE SERPL-MCNC: 105 MG/DL (ref 65–140)
HCT VFR BLD AUTO: 26.5 % (ref 34.8–46.1)
HGB BLD-MCNC: 8.5 G/DL (ref 11.5–15.4)
IMM GRANULOCYTES # BLD AUTO: 0.07 THOUSAND/UL (ref 0–0.2)
IMM GRANULOCYTES NFR BLD AUTO: 1 % (ref 0–2)
LYMPHOCYTES # BLD AUTO: 1.4 THOUSANDS/ÂΜL (ref 0.6–4.47)
LYMPHOCYTES NFR BLD AUTO: 14 % (ref 14–44)
MCH RBC QN AUTO: 34 PG (ref 26.8–34.3)
MCHC RBC AUTO-ENTMCNC: 32.1 G/DL (ref 31.4–37.4)
MCV RBC AUTO: 106 FL (ref 82–98)
MONOCYTES # BLD AUTO: 0.58 THOUSAND/ÂΜL (ref 0.17–1.22)
MONOCYTES NFR BLD AUTO: 6 % (ref 4–12)
NEUTROPHILS # BLD AUTO: 8.2 THOUSANDS/ÂΜL (ref 1.85–7.62)
NEUTS SEG NFR BLD AUTO: 79 % (ref 43–75)
NRBC BLD AUTO-RTO: 0 /100 WBCS
PLATELET # BLD AUTO: 422 THOUSANDS/UL (ref 149–390)
PMV BLD AUTO: 8.6 FL (ref 8.9–12.7)
POTASSIUM SERPL-SCNC: 3.7 MMOL/L (ref 3.5–5.3)
RBC # BLD AUTO: 2.5 MILLION/UL (ref 3.81–5.12)
SODIUM SERPL-SCNC: 137 MMOL/L (ref 135–147)
WBC # BLD AUTO: 10.27 THOUSAND/UL (ref 4.31–10.16)

## 2024-04-04 PROCEDURE — 97110 THERAPEUTIC EXERCISES: CPT

## 2024-04-04 PROCEDURE — 80048 BASIC METABOLIC PNL TOTAL CA: CPT | Performed by: NURSE PRACTITIONER

## 2024-04-04 PROCEDURE — 99239 HOSP IP/OBS DSCHRG MGMT >30: CPT | Performed by: STUDENT IN AN ORGANIZED HEALTH CARE EDUCATION/TRAINING PROGRAM

## 2024-04-04 PROCEDURE — 94640 AIRWAY INHALATION TREATMENT: CPT

## 2024-04-04 PROCEDURE — 94664 DEMO&/EVAL PT USE INHALER: CPT

## 2024-04-04 PROCEDURE — 94760 N-INVAS EAR/PLS OXIMETRY 1: CPT

## 2024-04-04 PROCEDURE — 85025 COMPLETE CBC W/AUTO DIFF WBC: CPT | Performed by: NURSE PRACTITIONER

## 2024-04-04 RX ORDER — PREDNISONE 20 MG/1
40 TABLET ORAL DAILY
Start: 2024-04-05 | End: 2024-04-08

## 2024-04-04 RX ORDER — TIOTROPIUM BROMIDE AND OLODATEROL 3.124; 2.736 UG/1; UG/1
2 SPRAY, METERED RESPIRATORY (INHALATION) DAILY
Start: 2024-04-04 | End: 2024-04-11

## 2024-04-04 RX ORDER — ALBUTEROL SULFATE 2.5 MG/3ML
2.5 SOLUTION RESPIRATORY (INHALATION) EVERY 6 HOURS PRN
Status: DISCONTINUED | OUTPATIENT
Start: 2024-04-04 | End: 2024-04-04 | Stop reason: HOSPADM

## 2024-04-04 RX ORDER — POTASSIUM CHLORIDE 20 MEQ/1
20 TABLET, EXTENDED RELEASE ORAL ONCE
Status: COMPLETED | OUTPATIENT
Start: 2024-04-04 | End: 2024-04-04

## 2024-04-04 RX ORDER — LEVALBUTEROL INHALATION SOLUTION 1.25 MG/3ML
1.25 SOLUTION RESPIRATORY (INHALATION)
Status: DISCONTINUED | OUTPATIENT
Start: 2024-04-04 | End: 2024-04-04 | Stop reason: HOSPADM

## 2024-04-04 RX ADMIN — PROPRANOLOL HYDROCHLORIDE 10 MG: 10 TABLET ORAL at 09:15

## 2024-04-04 RX ADMIN — Medication 1 TABLET: at 09:15

## 2024-04-04 RX ADMIN — HEPARIN SODIUM 5000 UNITS: 5000 INJECTION INTRAVENOUS; SUBCUTANEOUS at 17:10

## 2024-04-04 RX ADMIN — BUSPIRONE HYDROCHLORIDE 5 MG: 5 TABLET ORAL at 17:12

## 2024-04-04 RX ADMIN — IPRATROPIUM BROMIDE AND ALBUTEROL SULFATE 3 ML: 2.5; .5 SOLUTION RESPIRATORY (INHALATION) at 13:22

## 2024-04-04 RX ADMIN — IPRATROPIUM BROMIDE 0.5 MG: 0.5 SOLUTION RESPIRATORY (INHALATION) at 19:33

## 2024-04-04 RX ADMIN — IPRATROPIUM BROMIDE AND ALBUTEROL SULFATE 3 ML: 2.5; .5 SOLUTION RESPIRATORY (INHALATION) at 08:28

## 2024-04-04 RX ADMIN — PROPRANOLOL HYDROCHLORIDE 10 MG: 10 TABLET ORAL at 17:10

## 2024-04-04 RX ADMIN — PRAVASTATIN SODIUM 10 MG: 10 TABLET ORAL at 17:10

## 2024-04-04 RX ADMIN — BENZONATATE 100 MG: 100 CAPSULE ORAL at 09:22

## 2024-04-04 RX ADMIN — LEVALBUTEROL HYDROCHLORIDE 1.25 MG: 1.25 SOLUTION RESPIRATORY (INHALATION) at 19:33

## 2024-04-04 RX ADMIN — BUSPIRONE HYDROCHLORIDE 5 MG: 5 TABLET ORAL at 09:15

## 2024-04-04 RX ADMIN — HEPARIN SODIUM 5000 UNITS: 5000 INJECTION INTRAVENOUS; SUBCUTANEOUS at 05:12

## 2024-04-04 RX ADMIN — SENNOSIDES, DOCUSATE SODIUM 2 TABLET: 8.6; 5 TABLET ORAL at 17:10

## 2024-04-04 RX ADMIN — PREDNISONE 40 MG: 20 TABLET ORAL at 09:15

## 2024-04-04 RX ADMIN — POTASSIUM CHLORIDE 20 MEQ: 1500 TABLET, EXTENDED RELEASE ORAL at 09:15

## 2024-04-04 RX ADMIN — IPRATROPIUM BROMIDE AND ALBUTEROL SULFATE 3 ML: 2.5; .5 SOLUTION RESPIRATORY (INHALATION) at 02:24

## 2024-04-04 NOTE — PROGRESS NOTES
"Progress Note - Pulmonary   Chelsey Carson 88 y.o. female MRN: [id number]  Unit/Bed#: -01 Encounter: [id number]      Assessment:  Bronchiolitis   RML Bronchiectasis  Mild intermittent Asthma, not in AE  Recent influenza pneumonia   Hyponatremia     Plan:  Continue scheduled DuoNebs, patient feels they are beneficial  Would likely transition to LAMA/LABA inhaler such as Stiolto on discharge  Continue with prednisone 40 mg 5-day course  Encourage incentive spirometry use, out of bed to chair and ambulation    We will sign off at this time, please call with any questions.    Subjective:   Patient states she continues to feel well this hospitalization.  She denies any fevers, chills, chest pain, productive cough, shortness of breath.  States that she likes the inhaler regimen she is on.    Objective:       Vitals: Blood pressure 136/69, pulse 101, temperature 98 °F (36.7 °C), resp. rate 16, height 4' 10\" (1.473 m), weight 51.4 kg (113 lb 5.1 oz), SpO2 95%.,  Room air, Body mass index is 23.68 kg/m².      Intake/Output Summary (Last 24 hours) at 4/4/2024 1051  Last data filed at 4/4/2024 0723  Gross per 24 hour   Intake 177 ml   Output 625 ml   Net -448 ml         Physical Exam  Physical Exam    Labs: I have personally reviewed pertinent lab results.  Laboratory and Diagnostics  Results from last 7 days   Lab Units 04/04/24  0550 04/03/24  0505 04/02/24  1015   WBC Thousand/uL 10.27* 4.79 7.17   HEMOGLOBIN g/dL 8.5* 8.4* 10.8*   HEMATOCRIT % 26.5* 26.1* 33.8*   PLATELETS Thousands/uL 422* 402* 465*   NEUTROS PCT % 79* 83* 42*   MONOS PCT % 6 2* 14*   EOS PCT % 0 0 8*     Results from last 7 days   Lab Units 04/04/24  0550 04/03/24  0505 04/02/24  1015   SODIUM mmol/L 137 133* 132*   POTASSIUM mmol/L 3.7 4.1 3.8   CHLORIDE mmol/L 103 99 97   CO2 mmol/L 25 24 27   ANION GAP mmol/L 9 10 8   BUN mg/dL 23 25 18   CREATININE mg/dL 0.82 0.89 0.96   CALCIUM mg/dL 8.4 8.1* 9.1   GLUCOSE RANDOM mg/dL 105 159* 94   ALT U/L  " "--   --  14   AST U/L  --   --  16   ALK PHOS U/L  --   --  59   ALBUMIN g/dL  --   --  3.5   TOTAL BILIRUBIN mg/dL  --   --  0.57                   Results from last 7 days   Lab Units 04/02/24  1446   LACTIC ACID mmol/L 1.8                     Results from last 7 days   Lab Units 04/03/24  0505 04/02/24  1446   PROCALCITONIN ng/ml <0.05 <0.05       ABG:       Microbiology:  Urine strep and Legionella are negative    Imaging and other studies: I have personally reviewed pertinent reports.   and I have personally reviewed pertinent films in PACS  CTA chest 4/2/24: BLL tree in bud nodularity, RML bronchiectasis  CTA abd/pelvis 3/8/24: BLL tree in bud nodularity  CT abd/pelvis 9/19/23: BLL tree in bud nodularity     Ron Ramos MD  Pulmonary/Critical Care Fellow PGY-V  [facility]      Disclaimer: Portions of the record may have been created with voice recognition software. Occasional wrong word or \"sound a like\" substitutions may have occurred due to the inherent limitations of voice recognition software. Careful consideration should be taken to recognize, using context, where substitutions have occurred.    "

## 2024-04-04 NOTE — INCIDENTAL FINDINGS
The following findings require follow up:  Radiographic finding   Finding: Bilateral groundglass opacity and esophageal dilatation.   Follow up required: Follow-up with pulmonary and PCP   Follow up should be done within 2 week(s)    Please notify the following clinician to assist with the follow up:   Follow-up with pulmonary and PCP

## 2024-04-04 NOTE — ASSESSMENT & PLAN NOTE
SIRS versus sepsis POA given tachypnea, tachycardia.  Sepsis ruled out as patient's procalcitonin and lactic acid was within normal limit.  CAT scan unremarkable for any concerns of pneumonia.  Plan as above

## 2024-04-04 NOTE — PLAN OF CARE
Problem: PAIN - ADULT  Goal: Verbalizes/displays adequate comfort level or baseline comfort level  Description: Interventions:  - Encourage patient to monitor pain and request assistance  - Assess pain using appropriate pain scale  - Administer analgesics based on type and severity of pain and evaluate response  - Implement non-pharmacological measures as appropriate and evaluate response  - Consider cultural and social influences on pain and pain management  - Notify physician/advanced practitioner if interventions unsuccessful or patient reports new pain  Outcome: Progressing     Problem: INFECTION - ADULT  Goal: Absence or prevention of progression during hospitalization  Description: INTERVENTIONS:  - Assess and monitor for signs and symptoms of infection  - Monitor lab/diagnostic results  - Monitor all insertion sites, i.e. indwelling lines, tubes, and drains  - Monitor endotracheal if appropriate and nasal secretions for changes in amount and color  - [place] appropriate cooling/warming therapies per order  - Administer medications as ordered  - Instruct and encourage patient and family to use good hand hygiene technique  - Identify and instruct in appropriate isolation precautions for identified infection/condition  Outcome: Progressing  Goal: Absence of fever/infection during neutropenic period  Description: INTERVENTIONS:  - Monitor WBC    Outcome: Progressing     Problem: SAFETY ADULT  Goal: Patient will remain free of falls  Description: INTERVENTIONS:  - Educate patient/family on patient safety including physical limitations  - Instruct patient to call for assistance with activity   - Consult OT/PT to assist with strengthening/mobility   - Keep Call bell within reach  - Keep bed low and locked with side rails adjusted as appropriate  - Keep care items and personal belongings within reach  - Initiate and maintain comfort rounds  - Make Fall Risk Sign visible to staff  - Offer Toileting every 2 Hours,  in advance of need  - Initiate/Maintain bed alarm  - Apply yellow socks and bracelet for high fall risk patients  - Consider moving patient to room near nurses station  Outcome: Progressing  Goal: Maintain or return to baseline ADL function  Description: INTERVENTIONS:  -  Assess patient's ability to carry out ADLs; assess patient's baseline for ADL function and identify physical deficits which impact ability to perform ADLs (bathing, care of mouth/teeth, toileting, grooming, dressing, etc.)  - Assess/evaluate cause of self-care deficits   - Assess range of motion  - Assess patient's mobility; develop plan if impaired  - Assess patient's need for assistive devices and provide as appropriate  - Encourage maximum independence but intervene and supervise when necessary  - Involve family in performance of ADLs  - Assess for home care needs following discharge   - Consider OT consult to assist with ADL evaluation and planning for discharge  - Provide patient education as appropriate  Outcome: Progressing  Goal: Maintains/Returns to pre admission functional level  Description: INTERVENTIONS:  - Perform AM-PAC 6 Click Basic Mobility/ Daily Activity assessment daily.  - Set and communicate daily mobility goal to care team and patient/family/caregiver.   - Collaborate with rehabilitation services on mobility goals if consulted  - Stand patient 3 times a day  - Ambulate patient 3 times a day  - Out of bed to chair 3 times a day   - Out of bed for meals 3 times a day  - Out of bed for toileting  - Record patient progress and toleration of activity level   Outcome: Progressing     Problem: DISCHARGE PLANNING  Goal: Discharge to home or other facility with appropriate resources  Description: INTERVENTIONS:  - Identify barriers to discharge w/patient and caregiver  - Arrange for needed discharge resources and transportation as appropriate  - Identify discharge learning needs (meds, wound care, etc.)  - Arrange for interpretive  services to assist at discharge as needed  - Refer to Case Management Department for coordinating discharge planning if the patient needs post-hospital services based on physician/advanced practitioner order or complex needs related to functional status, cognitive ability, or social support system  Outcome: Progressing     Problem: Knowledge Deficit  Goal: Patient/family/caregiver demonstrates understanding of disease process, treatment plan, medications, and discharge instructions  Description: Complete learning assessment and assess knowledge base.  Interventions:  - Provide teaching at level of understanding  - Provide teaching via preferred learning methods  Outcome: Progressing     Problem: Prexisting or High Potential for Compromised Skin Integrity  Goal: Skin integrity is maintained or improved  Description: INTERVENTIONS:  - Identify patients at risk for skin breakdown  - Assess and monitor skin integrity  - Assess and monitor nutrition and hydration status  - Monitor labs   - Assess for incontinence   - Turn and reposition patient  - Assist with mobility/ambulation  - Relieve pressure over bony prominences  - Avoid friction and shearing  - Provide appropriate hygiene as needed including keeping skin clean and dry  - Evaluate need for skin moisturizer/barrier cream  - Collaborate with interdisciplinary team   - Patient/family teaching  - Consider wound care consult   Outcome: Progressing

## 2024-04-04 NOTE — PLAN OF CARE
Problem: PAIN - ADULT  Goal: Verbalizes/displays adequate comfort level or baseline comfort level  Description: Interventions:  - Encourage patient to monitor pain and request assistance  - Assess pain using appropriate pain scale  - Administer analgesics based on type and severity of pain and evaluate response  - Implement non-pharmacological measures as appropriate and evaluate response  - Consider cultural and social influences on pain and pain management  - Notify physician/advanced practitioner if interventions unsuccessful or patient reports new pain  Outcome: Progressing     Problem: INFECTION - ADULT  Goal: Absence or prevention of progression during hospitalization  Description: INTERVENTIONS:  - Assess and monitor for signs and symptoms of infection  - Monitor lab/diagnostic results  - Monitor all insertion sites, i.e. indwelling lines, tubes, and drains  - Monitor endotracheal if appropriate and nasal secretions for changes in amount and color  - [place] appropriate cooling/warming therapies per order  - Administer medications as ordered  - Instruct and encourage patient and family to use good hand hygiene technique  - Identify and instruct in appropriate isolation precautions for identified infection/condition  Outcome: Progressing  Goal: Absence of fever/infection during neutropenic period  Description: INTERVENTIONS:  - Monitor WBC    Outcome: Progressing     Problem: SAFETY ADULT  Goal: Patient will remain free of falls  Description: INTERVENTIONS:  - Educate patient/family on patient safety including physical limitations  - Instruct patient to call for assistance with activity   - Consult OT/PT to assist with strengthening/mobility   - Keep Call bell within reach  - Keep bed low and locked with side rails adjusted as appropriate  - Keep care items and personal belongings within reach  - Initiate and maintain comfort rounds  - Make Fall Risk Sign visible to staff  - Offer Toileting every 2  Hours,  in advance of need  - Initiate/Maintain bed/chair alarm  - Apply yellow socks and bracelet for high fall risk patients  - Consider moving patient to room near nurses station  Outcome: Progressing  Goal: Maintain or return to baseline ADL function  Description: INTERVENTIONS:  -  Assess patient's ability to carry out ADLs; assess patient's baseline for ADL function and identify physical deficits which impact ability to perform ADLs (bathing, care of mouth/teeth, toileting, grooming, dressing, etc.)  - Assess/evaluate cause of self-care deficits   - Assess range of motion  - Assess patient's mobility; develop plan if impaired  - Assess patient's need for assistive devices and provide as appropriate  - Encourage maximum independence but intervene and supervise when necessary  - Involve family in performance of ADLs  - Assess for home care needs following discharge   - Consider OT consult to assist with ADL evaluation and planning for discharge  - Provide patient education as appropriate  Outcome: Progressing  Goal: Maintains/Returns to pre admission functional level  Description: INTERVENTIONS:  - Perform AM-PAC 6 Click Basic Mobility/ Daily Activity assessment daily.  - Set and communicate daily mobility goal to care team and patient/family/caregiver.   - Collaborate with rehabilitation services on mobility goals if consulted  - Out of bed for toileting  - Record patient progress and toleration of activity level   Outcome: Progressing     Problem: DISCHARGE PLANNING  Goal: Discharge to home or other facility with appropriate resources  Description: INTERVENTIONS:  - Identify barriers to discharge w/patient and caregiver  - Arrange for needed discharge resources and transportation as appropriate  - Identify discharge learning needs (meds, wound care, etc.)  - Arrange for interpretive services to assist at discharge as needed  - Refer to Case Management Department for coordinating discharge planning if the patient needs  post-hospital services based on physician/advanced practitioner order or complex needs related to functional status, cognitive ability, or social support system  Outcome: Progressing     Problem: Knowledge Deficit  Goal: Patient/family/caregiver demonstrates understanding of disease process, treatment plan, medications, and discharge instructions  Description: Complete learning assessment and assess knowledge base.  Interventions:  - Provide teaching at level of understanding  - Provide teaching via preferred learning methods  Outcome: Progressing     Problem: Prexisting or High Potential for Compromised Skin Integrity  Goal: Skin integrity is maintained or improved  Description: INTERVENTIONS:  - Identify patients at risk for skin breakdown  - Assess and monitor skin integrity  - Assess and monitor nutrition and hydration status  - Monitor labs   - Assess for incontinence   - Turn and reposition patient  - Assist with mobility/ambulation  - Relieve pressure over bony prominences  - Avoid friction and shearing  - Provide appropriate hygiene as needed including keeping skin clean and dry  - Evaluate need for skin moisturizer/barrier cream  - Collaborate with interdisciplinary team   - Patient/family teaching  - Consider wound care consult   Outcome: Progressing

## 2024-04-04 NOTE — PHYSICAL THERAPY NOTE
PHYSICAL THERAPY NOTE          Patient Name: Chelsey Carson  Today's Date: 4/4/2024 04/04/24 1330   PT Last Visit   PT Visit Date 04/04/24   Note Type   Note Type Treatment   Pain Assessment   Pain Assessment Tool 0-10   Pain Score No Pain   Restrictions/Precautions   Weight Bearing Precautions Per Order No   Other Precautions Multiple lines;Fall Risk;Pain;Cognitive   General   Chart Reviewed Yes   Response to Previous Treatment Patient with no complaints from previous session.   Family/Caregiver Present No   Cognition   Overall Cognitive Status WFL   Arousal/Participation Alert;Responsive;Cooperative   Attention Attends with cues to redirect   Orientation Level Oriented X4   Memory Within functional limits   Following Commands Follows one step commands without difficulty   Subjective   Subjective pt pleasant and cooperative throughout therapy session. pt received supine in bed   Bed Mobility   Additional Comments pt deferred mobility this date due to increased fatigue   Transfers   Additional Comments pt deferred mobility this date due to increased fatigue   Ambulation/Elevation   Ambulation/Elevation Additional Comments pt deferred mobility this date due to increased fatigue   Endurance Deficit   Endurance Deficit Yes   Endurance Deficit Description generalized weakness, decreased exercise tolerance   Activity Tolerance   Activity Tolerance Patient limited by fatigue   Nurse Made Aware RN cleared and updated   Exercises   Quad Sets Supine;15 reps;AROM;Bilateral   Glute Sets Supine;15 reps;AROM;Bilateral   Hip Adduction Supine;15 reps;AROM;Bilateral   Ankle Pumps Supine;15 reps;AROM;Bilateral   Assessment   Prognosis Fair   Problem List Decreased strength;Decreased range of motion;Impaired balance;Decreased endurance;Decreased mobility;Pain   Assessment Patient was received supine in bed . Patient was agreeable to therapy  services today. PT session focused on supine exercises today in order to improve functional mobility and independence. Functional mobility was performed as described above.  Pt was only agreeable to supine exercises this date due to increased fatigue. Pt reports having ambulated previously today and defers attempting again. Pt was educated about the importance and benefits of continued exercises in the hospital. Pt was led through several supine exercises for BLE strengthening and to reduce risk of muscle atrophy. Pt left supine in bed at conclusion of therapy session.  Patient will benefit from continued PT services while in hospital in order to address remaining limitations. The patients AM-[facility] Basic Mobility Inpatient Short From Raw Score is 14 .  Based on AM-PAC scoring and patient presentation, PT currently recommending Level II (Moderate Resource Intensity). Please also refer to the recommendation of the Physical Therapist for safe discharge planning.   Barriers to Discharge Decreased caregiver support;Inaccessible home environment   Goals   Patient Goals to feel better   STG Expiration Date 04/17/24   PT Treatment Day 1   Plan   Treatment/Interventions ADL retraining;Functional transfer training;LE strengthening/ROM;Elevations;Endurance training;Therapeutic exercise;Bed mobility;Gait training;Spoke to nursing;OT   Progress Slow progress, decreased activity tolerance   PT Frequency 2-3x/wk   Discharge Recommendation   Rehab Resource Intensity Level, PT II (Moderate Resource Intensity)   AM-PAC Basic Mobility Inpatient   Turning in Flat Bed Without Bedrails 3   Lying on Back to Sitting on Edge of Flat Bed Without Bedrails 3   Moving Bed to Chair 3   Standing Up From Chair Using Arms 2   Walk in Room 2   Climb 3-5 Stairs With Railing 1   Basic Mobility Inpatient Raw Score 14   Basic Mobility Standardized Score 35.55   [facility] Highest Level Of Mobility   -[facility] Goal 4: Move to chair/commode   -[facility] Achieved  2: Bed activities/Dependent transfer   Education   Education Provided Mobility training   Patient Reinforcement needed   End of Consult   Patient Position at End of Consult Supine;Bed/Chair alarm activated;All needs within reach         Eugene Pruett PT, DPT

## 2024-04-04 NOTE — PLAN OF CARE
Problem: PHYSICAL THERAPY ADULT  Goal: Performs mobility at highest level of function for planned discharge setting.  See evaluation for individualized goals.  Description: Treatment/Interventions: ADL retraining, Functional transfer training, LE strengthening/ROM, Therapeutic exercise, Elevations, Endurance training, Cognitive reorientation, Bed mobility, Gait training, Spoke to nursing, OT          See flowsheet documentation for full assessment, interventions and recommendations.  Outcome: Progressing  Note: Prognosis: Fair  Problem List: Decreased strength, Decreased range of motion, Impaired balance, Decreased endurance, Decreased mobility, Pain  Assessment: Patient was received supine in bed . Patient was agreeable to therapy services today. PT session focused on supine exercises today in order to improve functional mobility and independence. Functional mobility was performed as described above.  Pt was only agreeable to supine exercises this date due to increased fatigue. Pt reports having ambulated previously today and defers attempting again. Pt was educated about the importance and benefits of continued exercises in the hospital. Pt was led through several supine exercises for BLE strengthening and to reduce risk of muscle atrophy. Pt left supine in bed at conclusion of therapy session.  Patient will benefit from continued PT services while in hospital in order to address remaining limitations. The patients AM-PAC Basic Mobility Inpatient Short From Raw Score is 14 .  Based on AM-PAC scoring and patient presentation, PT currently recommending Level II (Moderate Resource Intensity). Please also refer to the recommendation of the Physical Therapist for safe discharge planning.  Barriers to Discharge: Decreased caregiver support, Inaccessible home environment     Rehab Resource Intensity Level, PT: II (Moderate Resource Intensity)    See flowsheet documentation for full assessment.

## 2024-04-04 NOTE — PROGRESS NOTES
Patient:    MRN:  [id number]    [place] Request ID:  [id number]    Level of care reserved:  Skilled Nursing Facility    Partner Reserved:  [facility] [place], [place] [address] [phone]    Clinical needs requested:    Geography searched:  10 miles around [id number]    Start of Service:    Request sent:  2:26pm EDT on 4/3/2024 by Jennifer Whelan    Partner reserved:  11:36am EDT on 4/4/2024 by Kinga Hoang    Choice list shared:  11:36am EDT on 4/4/2024 by Kinga Hoang

## 2024-04-04 NOTE — ASSESSMENT & PLAN NOTE
Presents with acute onset shortness of breath and wheezing following breakfast this morning at nursing facility.  Also reports some right-sided chest tightness. Tachypenic, tachycardic on arrival. Afebrile without leukocytosis.   Acute respiratory failure possibly due to COPD exacerbation versus aspiration pneumonia.  CT chest PE study negative PE but did show Scattered groundglass densities in the both lungs with associated branching tree-in-bud opacities in the both lower lobes, on infectious basis/aspiration as well as pleural parenchymal density seen left base measuring about 7 mm. These changes appear to be somewhat chronic.   Has seen pulmonology at [facility] for chronic cough, no formal diagnosis of COPD/asthma.  Patient was started on IV steroid that has been transitioned to oral, to continue prednisone 5 days course.  Given infectious workup negative, antibiotics have been discontinued.  Evaluated by pulmonary, patient will be discharged on Stiolto, she will also need to follow-up with pulmonary in outpatient setting, discussed above recommendations with patient's son who is agreeable for same.

## 2024-04-04 NOTE — CASE MANAGEMENT
Case Management Discharge Planning Note    Patient name Chelsey Carson  Location /-01 MRN [id number]  : 1935 Date 2024       Current Admission Date: 2024  Current Admission Diagnosis:SOB (shortness of breath)   Patient Active Problem List    Diagnosis Date Noted    SOB (shortness of breath) 2024    SIRS (systemic inflammatory response syndrome) ([facility]) 2024    Chest heaviness 2024    Dysuria 2024    Urinary retention 2024    Tachycardia 2024    Nausea 2024    Macrocytic anemia 2024    Thrombocytosis 2024    Ambulatory dysfunction 2024    Proctitis 2024    Unspecified asthma, uncomplicated 03/15/2024    Loss of appetite 03/15/2024    Anxiety disorder, unspecified 03/15/2024    Tinnitus of both ears 2023    Xerostomia 2023    Mixed conductive and sensorineural hearing loss of left ear with restricted hearing of right ear 2023    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 2023    Do not resuscitate 2023    Nonintractable headache 2023    Abnormal brain MRI 2023    Slow transit constipation 2023    Primary insomnia 2023    Constipation 2023    Tremor 2023    Hyponatremia 2023    Age related osteoporosis 2022    GERD (gastroesophageal reflux disease) 10/10/2022    Stage 3a chronic kidney disease (HCC) 2022    Venous insufficiency 08/10/2022    Other fatigue 2022    COVID-19 2021    Electrolyte abnormality 2021    Hypokalemia 2021    Dry mouth 2019    Chronic maxillary sinusitis 2019    Breast cancer (HCC) 2019    Chronic cough 2019    Perforation of left tympanic membrane 2019    Nonrheumatic aortic valve insufficiency 11/15/2018    Pes anserinus bursitis of right knee 2018    Benign essential HTN 10/19/2017    Aortic valve disorder 2013     Hyperlipidemia 12/19/2012      LOS (days): 2  Geometric Mean LOS (GMLOS) (days): 4.1  Days to GMLOS:2.1     OBJECTIVE:  Risk of Unplanned Readmission Score: 23.3         Current admission status: Inpatient   Preferred Pharmacy:   [id number] - [place], [place] - [address]  [address]  [place] [address]  Phone: [phone] Fax: [phone]    Primary Care Provider: Mariely Fan MD    Primary Insurance: MEDICARE  Secondary Insurance: COMMERCIAL MISCELLANEOUS    DISCHARGE DETAILS:    Discharge planning discussed with:: Patient and son  Freedom of Choice: Yes  Comments - Freedom of Choice: d/c back to [facility] today  CM contacted family/caregiver?: Yes  Were Treatment Team discharge recommendations reviewed with patient/caregiver?: Yes  Did patient/caregiver verbalize understanding of patient care needs?: N/A- going to facility  Were patient/caregiver advised of the risks associated with not following Treatment Team discharge recommendations?: Yes                   Other Referral/Resources/Interventions Provided:  Interventions: Short Term Rehab                                                              Receiving Facility/Agency Phone Number: [facility] REPORT NUMBERS:  Phone: [phone] EXT: [phone]  Facility/Agency Fax Number: [phone]

## 2024-04-04 NOTE — PROGRESS NOTES
Chart review complete the patient admitted 4/2/24 to [facility]. This Admin Coordinator will continue to monitor via chart review.

## 2024-04-04 NOTE — DISCHARGE SUMMARY
[facility]  Discharge- Chelsey Carson 1935, 88 y.o. female MRN: [id number]  Unit/Bed#: [facility] Encounter: [id number]  Primary Care Provider: Mariely Fan MD   Date and time admitted to hospital: 4/2/2024  9:50 AM    * Acute respiratory failure with hypoxia (HCC)  Assessment & Plan  Presents with acute onset shortness of breath and wheezing following breakfast this morning at nursing facility.  Also reports some right-sided chest tightness. Tachypenic, tachycardic on arrival. Afebrile without leukocytosis.   Acute respiratory failure possibly due to COPD exacerbation versus aspiration pneumonia.  CT chest PE study negative PE but did show Scattered groundglass densities in the both lungs with associated branching tree-in-bud opacities in the both lower lobes, on infectious basis/aspiration as well as pleural parenchymal density seen left base measuring about 7 mm. These changes appear to be somewhat chronic.   Has seen pulmonology at [facility] for chronic cough, no formal diagnosis of COPD/asthma.  Patient was started on IV steroid that has been transitioned to oral, to continue prednisone 5 days course.  Given infectious workup negative, antibiotics have been discontinued.  Evaluated by pulmonary, patient will be discharged on Stiolto, she will also need to follow-up with pulmonary in outpatient setting, discussed above recommendations with patient's son who is agreeable for same.    SIRS (systemic inflammatory response syndrome) (HCC)  Assessment & Plan  SIRS versus sepsis POA given tachypnea, tachycardia.  Sepsis ruled out as patient's procalcitonin and lactic acid was within normal limit.  CAT scan unremarkable for any concerns of pneumonia.  Plan as above     Benign essential HTN  Assessment & Plan  BP acceptable  Continue propranolol  Monitor     Urinary retention  Assessment & Plan  At SNF and required 1 straight cath here  Patient denies dysuria but does report  pressure. Urothelial enhancement noted on CT.   Urinary retention protocol  UA negative   Monitor     Proctitis  Assessment & Plan  Recent hospital admission for constipation with large stool burden and proctitis. Has been having regular BM  Continue bowel regimen    Ambulatory dysfunction  Assessment & Plan  Recently discharged from hospital to rehab.  Evaluated by PT/OT, recommended rehab.    Tremor  Assessment & Plan  Chronic, reports this has worsened in her advanced age.  Has seen neurology outpatient.  Propranolol  Fall/safety precautions    Breast cancer (HCC)  Assessment & Plan  S/p ductal carcinoma s/p lumpectomy and RT  Outpatient f/u PRN    Hyperlipidemia  Assessment & Plan  Continue statin        Medical Problems       Resolved Problems  Date Reviewed: 4/4/2024   None       Discharging Physician / Practitioner: Roro Hollingsworth MD  PCP: Mariely Fan MD  Admission Date:   Admission Orders (From admission, onward)       Ordered        04/02/24 1541  Inpatient Admission  Once                          Discharge Date: 04/04/24    Consultations During Hospital Stay:  Pulmonary    Procedures Performed:   none    Significant Findings / Test Results:   CT PE w abd and pelvis:  IMPRESSION:  No pulmonary embolism     Scattered groundglass densities in the both lungs with associated branching tree-in-bud opacities in the both lower lobes, on infectious basis/aspiration  Follow-up at 3 months suggested to demonstrate resolution     A pleural parenchymal density seen left base measuring about 7 mm probably scarring attention at follow-up CT to demonstrate stability     No intra-abdominal fluid collection seen     There is moderate size hiatal hernia with mildly dilated esophagus eval for esophageal dysmotility     Urothelial enhancement seen in the both renal pelvises correlate with urinary evaluation to evaluate for UTI    Incidental Findings:   CAT scan remarkable for scattered groundglass density with associated branching  "tree-in-bud, also noted with moderate-sized hiatal hernia with mildly dilated esophagus.  Will need to follow-up with pulmonary and PCP.    Test Results Pending at Discharge (will require follow up):   no     Outpatient Tests Requested:  no    Complications:  no    Reason for Admission: SOB    Hospital Course:   Chelsey Carson is a 88 y.o. female patient who originally presented to the hospital on 4/2/2024 due to shortness of breath and generalized weakness.  On presentation CT chest was unremarkable for PE but did showed bilateral groundglass opacity.  She also met criteria for SIRS with tachycardia and tachypnea however infectious workup was negative, evaluated by pulmonary no concerns for pneumonia hence antibiotics were discontinued.  She was started on IV steroid that has been transitioned to p.o. to complete 5 days course.  Patient is on as needed inhalers at home, will now be discharged on maintenance inhaler.  She will follow-up with PCP and pulmonary in outpatient setting.  Patient evaluated by PT/OT, recommended discharge back to [facility].    Please see above list of diagnoses and related plan for additional information.     Condition at Discharge: stable    Discharge Day Visit / Exam:   Subjective: Patient seen at bedside, continues with tinnitus that is ongoing since last 20 years but other than that denies any complaints.  Vitals: Blood Pressure: 136/74 (04/04/24 1506)  Pulse: 86 (04/04/24 1506)  Temperature: (!) 97.3 °F (36.3 °C) (04/04/24 1506)  Temp Source: Oral (04/03/24 0753)  Respirations: 16 (04/03/24 2221)  Height: 4' 10\" (147.3 cm) (04/02/24 2233)  Weight - Scale: 51.4 kg (113 lb 5.1 oz) (04/02/24 2233)  SpO2: 96 % (04/04/24 1506)  Exam:   Physical Exam  Constitutional:       Appearance: Normal appearance.   HENT:      Head: Normocephalic and atraumatic.   Cardiovascular:      Rate and Rhythm: Normal rate and regular rhythm.      Pulses: Normal pulses.      Heart sounds: Normal " heart sounds.   Pulmonary:      Effort: Pulmonary effort is normal.      Breath sounds: Normal breath sounds. No wheezing.   Abdominal:      General: Bowel sounds are normal.      Palpations: Abdomen is soft.   Skin:     General: Skin is warm and dry.   Neurological:      Mental Status: She is alert and oriented to person, place, and time. Mental status is at baseline.          Discussion with Family: Updated  (son) via phone.    Discharge instructions/Information to patient and family:   See after visit summary for information provided to patient and family.      Provisions for Follow-Up Care:  See after visit summary for information related to follow-up care and any pertinent home health orders.      Mobility at time of Discharge:   Basic Mobility Inpatient Raw Score: 14  JH-HLM Goal: 4: Move to chair/commode  JH-HLM Achieved: 2: Bed activities/Dependent transfer  HLM Goal NOT achieved. Continue to encourage mobility in post discharge setting.     Disposition:   Assisted Living Facility at [facility]    Planned Readmission: no     Discharge Statement:  I spent 40 minutes discharging the patient. This time was spent on the day of discharge. I had direct contact with the patient on the day of discharge. Greater than 50% of the total time was spent examining patient, answering all patient questions, arranging and discussing plan of care with patient as well as directly providing post-discharge instructions.  Additional time then spent on discharge activities.    Discharge Medications:  See after visit summary for reconciled discharge medications provided to patient and/or family.      **Please Note: This note may have been constructed using a voice recognition system**

## 2024-04-05 ENCOUNTER — PATIENT OUTREACH (OUTPATIENT)
Dept: CASE MANAGEMENT | Facility: OTHER | Age: 89
End: 2024-04-05

## 2024-04-05 ENCOUNTER — TRANSITIONAL CARE MANAGEMENT (OUTPATIENT)
Dept: INTERNAL MEDICINE CLINIC | Facility: CLINIC | Age: 89
End: 2024-04-05

## 2024-04-05 ENCOUNTER — NURSING HOME VISIT (OUTPATIENT)
Dept: GERIATRICS | Facility: OTHER | Age: 89
End: 2024-04-05
Payer: MEDICARE

## 2024-04-05 DIAGNOSIS — E78.5 HYPERLIPIDEMIA, UNSPECIFIED HYPERLIPIDEMIA TYPE: ICD-10-CM

## 2024-04-05 DIAGNOSIS — J96.01 ACUTE RESPIRATORY FAILURE WITH HYPOXIA (HCC): Primary | ICD-10-CM

## 2024-04-05 DIAGNOSIS — F41.9 ANXIETY DISORDER, UNSPECIFIED TYPE: ICD-10-CM

## 2024-04-05 DIAGNOSIS — R26.2 AMBULATORY DYSFUNCTION: ICD-10-CM

## 2024-04-05 DIAGNOSIS — I10 BENIGN ESSENTIAL HTN: ICD-10-CM

## 2024-04-05 PROCEDURE — TCMXX

## 2024-04-05 PROCEDURE — 99305 1ST NF CARE MODERATE MDM 35: CPT | Performed by: INTERNAL MEDICINE

## 2024-04-05 NOTE — PROGRESS NOTES
ADT alert received the patient discharged 4/4/24 to [facility]. This Admin Coordinator will continue to monitor via chart review.

## 2024-04-06 NOTE — PROGRESS NOTES
[facility]  [address]  [place] [address]  [id number]    Nursing Home Admission    NAME: Chelsey Carson  AGE: 88 y.o. SEX: female [phone]    DATE OF ENCOUNTER: 4/5/2024    Assessment/Plan:   Patient’s care was coordinated with nursing facility staff. Recent vitals, labs and updated medications were [facility]. Past Medical, surgical, social, medication and allergy history and patient’s previous records reviewed.     1. Acute respiratory failure with hypoxia (HCC)        2. Benign essential HTN        3. Anxiety disorder, unspecified type        4. Hyperlipidemia, unspecified hyperlipidemia type        5. Ambulatory dysfunction             Acute respiratory failure with hypoxia (HCC)  Pt with hx of cough variant asthma vs ?hx of COPD  Pt had episode of wheezing, tachypnea on day of admission  4/5/2024 07:54 96.0% Room Air   Lungs clear today  Cont prednisone taper from 40 mg  Cont Stiolto Respimat 2 puffs inhaled daily  Cont mometasone furoate 100 mcg/act 2 puffs inhaled prn  Cont benzonatate 100 mg 1 tab po TID prn cough    Benign essential HTN  4/5/2024 20:21 136 / 70 mmHg   BP stable  Cont propanolol 10 mg 1 tab po BID    Anxiety disorder, unspecified  Stable  Pt trying to make phonecalls to her family; pt able to get thru  Cont buspar 5 mg 1 tab po TID  Pt also on propranolol 10 mg 1 tab po BID for recent tachycardia  Pt is also on doxylamine 25 mg 1 tab po QHS prn insomnia  Pt also on mirtazapiine 7.5 mg 1 tab po QHS  Cont care per psych    Hyperlipidemia  Stable  Cont simvastatin 5 mg 1 tab po QHS    Ambulatory dysfunction  PT/OT to eval/tx & help with balance/strength training  Encourage good po intake of solids/liquids  OOB as tolerated (with assistance from staff)  Encourage pt to stay motivated with physical/occupational therapy  Early/Frequent mobilization with assistance from staff/therapy  Fall precaution      Chief Complaint     Here for STR    HPI   Patient is a 88 y.o. female with PMH Anxiety, Hyperlipidemia, Insomnia, Aortic valve disorder, Osteoporosis, GERD and CKD3a.    Pt admitted at [facility] from 4/2/2024-4/4/2024 for acute respiratory failure with hypoxia. Pt had wheezing, tachypnea and chest heaviness after her morning breakfast. Thus pt was admitted for acute respiratory failure with hypoxia. Pt given steriods and nebs. Pt had seen Pulmonary as outpt with abnormal PFTs but no formal diagnosis of COPD/asthma. Pt finally clinically improved and was discharged back to [facility] on 4/4/2024.    Pt seen and examined today. Pt was worried about getting in touch with her family. Pt was able to eventually speak with her son. Pt medically stable at this time.       Past Medical History:   Diagnosis Date    Abnormal laboratory test 08/01/2022    Acute sinusitis 02/13/2013    Age related osteoporosis 11/01/2022    Formatting of this note might be different from the original.   Last Assessment & Plan:     Formatting of this note might be different from the original.    Risedronate  on allergy list but she does not recall what happened    Her kids told her not to get treatment but I recommend it again to her.     Given the possible allergy refer to rheum to discuss treatment options    Breast cancer (HCC)     Cancer (HCC)     Chronic cough 01/13/2017    COVID-19     History of 2019 novel coronavirus disease (COVID-19) 02/15/2021    Hyperlipidemia     Hyponatremia 02/01/2021    Influenza B 03/05/2024    Insomnia 02/17/2021    MCI (mild cognitive impairment) 02/15/2021    Nonrheumatic aortic (valve) stenosis 03/15/2024    Other specified forms of tremor 03/15/2024    Palpitations 03/15/2024    Pneumonia due to COVID-19 virus 02/01/2021    Slow transit constipation 03/15/2024    Stage 3a chronic kidney disease (HCC) 09/27/2023    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     Thrombocytosis, unspecified 03/15/2024       Past Surgical History:   Procedure Laterality Date    BREAST SURGERY      cancer (> 5 years)    HYSTERECTOMY         Social History     Tobacco Use   Smoking Status Never   Smokeless Tobacco Never          Family History   Problem Relation Age of Onset    Breast cancer Mother     Heart disease Father         heart problem    Heart disease Sister         heart problem    Heart disease Brother         heart problem        Allergies   Allergen Reactions    Pravastatin     Risedronate     Paxlovid [Nirmatrelvir-Ritonavir] Nausea Only          Current Outpatient Medications:     acetaminophen (TYLENOL) 325 mg tablet, Take 2 tablets (650 mg total) by mouth every 6 (six) hours as needed for mild pain, headaches or fever, Disp: , Rfl:     Asmanex  MCG/ACT AERO, Inhale 2 puffs if needed, Disp: , Rfl:     benzonatate (TESSALON PERLES) 100 mg capsule, Take 1 capsule (100 mg total) by mouth 3 (three) times a day as needed for cough, Disp: , Rfl:     busPIRone (BUSPAR) 5 mg tablet, Take 1 tablet (5 mg total) by mouth 3 (three) times a day, Disp: 60 tablet, Rfl: 5    Calcium Carb-Cholecalciferol 1000-800 MG-UNIT TABS, Take by mouth, Disp: , Rfl:     doxylamine (Unisom SleepTabs) 25 MG tablet, Take 25 mg by mouth daily at bedtime as needed for sleep, Disp: , Rfl:     guaiFENesin (MUCINEX) 600 mg 12 hr tablet, Take 1 tablet (600 mg total) by mouth every 12 (twelve) hours, Disp: , Rfl:     hydrocortisone (ANUSOL-HC) 2.5 % rectal cream, Apply topically 4 (four) times a day as needed for hemorrhoids, Disp: , Rfl:     metoclopramide (Reglan) 10 mg tablet, Take 1 tablet (10 mg total) by mouth 4 (four) times a day as needed (nausea), Disp: , Rfl:     mirtazapine (REMERON) 7.5 MG tablet, Take 1 tablet (7.5 mg total) by mouth daily at bedtime, Disp: , Rfl:     Multiple Vitamins-Minerals (CENTRUM SILVER 50+WOMEN PO), Take by mouth, Disp: , Rfl:     polyethylene glycol (MIRALAX) 17 g  packet, Take 17 g by mouth 2 (two) times a day, Disp: , Rfl:     predniSONE 20 mg tablet, Take 2 tablets (40 mg total) by mouth daily for 3 doses, Disp: , Rfl:     propranolol (INDERAL) 10 mg tablet, Take 10 mg by mouth 2 (two) times a day, Disp: , Rfl:     senna-docusate sodium (SENOKOT S) 8.6-50 mg per tablet, Take 2 tablets by mouth 2 (two) times a day, Disp: , Rfl:     simvastatin (ZOCOR) 5 MG tablet, Take 1 tablet (5 mg total) by mouth daily at bedtime, Disp: 90 tablet, Rfl: 3    tiotropium-olodaterol (Stiolto Respimat) 2.5-2.5 MCG/ACT inhaler, Inhale 2 puffs daily, Disp: , Rfl:     trimethobenzamide (TIGAN) 100 mg/mL, Inject 2 mL (200 mg total) into a muscle every 6 (six) hours as needed (nausea if unable to take oral pill), Disp: 20 mL, Rfl: 0    Updated list was reviewed in [facility] system of facility.     Vital signs were reviewed in point [facility] care    A 12-point comprehensive review of symptoms was obtained.  Pertinent positives and negatives noted in HPI.   Review of Systems   Respiratory:  Negative for shortness of breath and wheezing.    Cardiovascular:  Negative for chest pain.   Neurological:  Negative for light-headedness and headaches.   Psychiatric/Behavioral:  The patient is nervous/anxious.    All other systems reviewed and are negative.      Physical Exam  Constitutional:       General: She is not in acute distress.     Appearance: She is not ill-appearing.   HENT:      Head: Normocephalic.   Cardiovascular:      Rate and Rhythm: Regular rhythm.   Pulmonary:      Effort: Pulmonary effort is normal. No respiratory distress.      Breath sounds: Normal breath sounds.   Abdominal:      General: Bowel sounds are normal. There is no distension.      Palpations: Abdomen is soft.      Tenderness: There is no abdominal tenderness.   Musculoskeletal:      Right lower leg: No edema.      Left lower leg: No edema.   Neurological:      Mental Status: She is alert and oriented to person, place, and  time.   Psychiatric:         Mood and Affect: Mood is anxious.         Behavior: Behavior normal.         Thought Content: Thought content normal.         Judgment: Judgment normal.           Diagnostic Data     Recent labs and imaging studies were reviewed.     Code Status:    Full  This note was electronically signed by Dr. Dariana KOROMA [facility]

## 2024-04-07 LAB
BACTERIA BLD CULT: NORMAL
BACTERIA BLD CULT: NORMAL

## 2024-04-07 NOTE — ASSESSMENT & PLAN NOTE
PT/OT to eval/tx & help with balance/strength training  Encourage good po intake of solids/liquids  OOB as tolerated (with assistance from staff)  Encourage pt to stay motivated with physical/occupational therapy  Early/Frequent mobilization with assistance from staff/therapy  Fall precaution

## 2024-04-07 NOTE — ASSESSMENT & PLAN NOTE
Stable  Pt trying to make phonecalls to her family; pt able to get thru  Cont buspar 5 mg 1 tab po TID  Pt also on propranolol 10 mg 1 tab po BID for recent tachycardia  Pt is also on doxylamine 25 mg 1 tab po QHS prn insomnia  Pt also on mirtazapiine 7.5 mg 1 tab po QHS  Cont care per psych

## 2024-04-07 NOTE — ASSESSMENT & PLAN NOTE
Pt with hx of cough variant asthma vs ?hx of COPD  Pt had episode of wheezing, tachypnea on day of admission  4/5/2024 07:54 96.0% Room Air   Lungs clear today  Cont prednisone taper from 40 mg  Cont Stiolto Respimat 2 puffs inhaled daily  Cont mometasone furoate 100 mcg/act 2 puffs inhaled prn  Cont benzonatate 100 mg 1 tab po TID prn cough

## 2024-04-10 ENCOUNTER — NURSING HOME VISIT (OUTPATIENT)
Dept: GERIATRICS | Facility: OTHER | Age: 89
End: 2024-04-10
Payer: MEDICARE

## 2024-04-10 VITALS
TEMPERATURE: 97.9 F | RESPIRATION RATE: 38 BRPM | DIASTOLIC BLOOD PRESSURE: 63 MMHG | SYSTOLIC BLOOD PRESSURE: 115 MMHG | HEART RATE: 96 BPM

## 2024-04-10 DIAGNOSIS — J96.01 ACUTE RESPIRATORY FAILURE WITH HYPOXIA (HCC): ICD-10-CM

## 2024-04-10 DIAGNOSIS — R06.82 TACHYPNEA: Primary | ICD-10-CM

## 2024-04-10 DIAGNOSIS — R82.81 PYURIA: ICD-10-CM

## 2024-04-10 PROBLEM — Z66 DO NOT RESUSCITATE: Status: RESOLVED | Noted: 2023-09-21 | Resolved: 2024-04-10

## 2024-04-10 PROBLEM — R07.89 CHEST HEAVINESS: Status: RESOLVED | Noted: 2024-04-02 | Resolved: 2024-04-10

## 2024-04-10 PROBLEM — E87.8 ELECTROLYTE ABNORMALITY: Status: RESOLVED | Noted: 2021-02-26 | Resolved: 2024-04-10

## 2024-04-10 PROBLEM — R00.0 TACHYCARDIA: Status: RESOLVED | Noted: 2024-03-23 | Resolved: 2024-04-10

## 2024-04-10 PROBLEM — U07.1 COVID-19: Status: RESOLVED | Noted: 2021-02-26 | Resolved: 2024-04-10

## 2024-04-10 PROCEDURE — 99309 SBSQ NF CARE MODERATE MDM 30: CPT

## 2024-04-10 RX ORDER — SULFAMETHOXAZOLE AND TRIMETHOPRIM 800; 160 MG/1; MG/1
1 TABLET ORAL EVERY 12 HOURS SCHEDULED
Start: 2024-04-10 | End: 2024-04-11 | Stop reason: ALTCHOICE

## 2024-04-10 NOTE — ASSESSMENT & PLAN NOTE
Patient c/o burning and pain with urination   Reports increased frequency   Labs ordered  UA C&S ordered  Bactrim started empirically

## 2024-04-10 NOTE — PROGRESS NOTES
[facility]  \[facility]\""[facility] [place]  [address], [place], [place]  [phone]  SNF Rehab: POS 31    NAME: Chelsey Carson  AGE: 88 y.o. SEX: female  : 1935     DATE: 4/10/2024    CODE STATUS: CPR     Assessment and Plan:     Problem List Items Addressed This Visit       Acute respiratory failure with hypoxia (HCC)     Recent hospital admission for likely COPD exacerbation   Completed po steroids   Continue inhalers  Now with RR 35-40, consider infectious etiology in the setting of pyuria (see plan)  Monitor respiratory status closely          Tachypnea - Primary     RR 35-40   Recent hospitalization for COPD exacerbation   Differential: COPD vs infectious etiology   CXR ordered  Labs ordered  Patient also complaining of pyuria- starting bactrim empirically          Pyuria     Patient c/o burning and pain with urination   Reports increased frequency   Labs ordered  UA C&S ordered  Bactrim started empirically          Relevant Medications    sulfamethoxazole-trimethoprim (BACTRIM DS) 800-160 mg per tablet          History of Present Illness:     Patient is a 88 y.o. old female being seen at [facility] for follow up of acute and chronic medical conditions. Patient seen and examined while sitting in bed. She is noted to be tachypneic and endorses SOB. She denies chest pain. She reports burning and pain with urination. She also reports increased frequency of urine. Patient with lower abdomen tenderness on palpation.         The following portions of the patient's history were reviewed and updated as appropriate: allergies, current medications, past family history, past medical history, past social history, past surgical history and problem list.     Review of Systems:     Review of Systems   Respiratory:  Positive for shortness of breath.    Genitourinary:  Positive for dysuria and frequency.   All other systems reviewed and are negative.       Problem List:     Patient Active Problem List    Diagnosis    Hyperlipidemia    Pes anserinus bursitis of right knee    Nonrheumatic aortic valve insufficiency    Perforation of left tympanic membrane    Chronic cough    Breast cancer (HCC)    Dry mouth    Chronic maxillary sinusitis    Other fatigue    Venous insufficiency    Stage 3a chronic kidney disease (HCC)    GERD (gastroesophageal reflux disease)    Age related osteoporosis    Tremor    Hyponatremia    Constipation    Slow transit constipation    Primary insomnia    Nonintractable headache    Abnormal brain MRI    Tinnitus of both ears    Xerostomia    Mixed conductive and sensorineural hearing loss of left ear with restricted hearing of right ear    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    Ambulatory dysfunction    Proctitis    Macrocytic anemia    Thrombocytosis    Nausea    Urinary retention    Unspecified asthma, uncomplicated    Hypokalemia    Loss of appetite    Benign essential HTN    Anxiety disorder, unspecified    Aortic valve disorder    Dysuria    Acute respiratory failure with hypoxia (HCC)    SIRS (systemic inflammatory response syndrome) ([facility])    Tachypnea    Pyuria        Objective:     /63   Pulse 96   Temp 97.9 °F (36.6 °C)   Resp (!) 38   LMP  (LMP Unknown)     Physical Exam  Vitals and nursing note reviewed.   Constitutional:       General: She is not in acute distress.     Appearance: She is well-developed.   HENT:      Head: Normocephalic and atraumatic.   Eyes:      Conjunctiva/sclera: Conjunctivae normal.   Cardiovascular:      Rate and Rhythm: Normal rate and regular rhythm.      Heart sounds: No murmur heard.  Pulmonary:      Effort: Tachypnea present.      Breath sounds: Normal breath sounds.   Abdominal:      Palpations: Abdomen is soft.      Tenderness: There is abdominal tenderness.   Musculoskeletal:         General: No swelling.      Cervical back: Neck supple.   Skin:     General: Skin is warm and dry.      Capillary  Refill: Capillary refill takes less than 2 seconds.   Neurological:      Mental Status: She is alert. Mental status is at baseline.   Psychiatric:         Mood and Affect: Mood normal.         Behavior: Behavior normal.         Pertinent Laboratory/Diagnostic Studies:    Laboratory Results: I have personally reviewed the pertinent laboratory results/reports     Radiology/Other Diagnostic Testing Results: I have personally reviewed pertinent reports.      LANI Griffiths  Geriatric Medicine

## 2024-04-10 NOTE — ASSESSMENT & PLAN NOTE
Recent hospital admission for likely COPD exacerbation   Completed po steroids   Continue inhalers  Now with RR 35-40, consider infectious etiology in the setting of pyuria (see plan)  Monitor respiratory status closely

## 2024-04-10 NOTE — ASSESSMENT & PLAN NOTE
RR 35-40   Recent hospitalization for COPD exacerbation   Differential: COPD vs infectious etiology   CXR ordered  Labs ordered  Patient also complaining of pyuria- starting bactrim empirically

## 2024-04-11 ENCOUNTER — PATIENT OUTREACH (OUTPATIENT)
Dept: CASE MANAGEMENT | Facility: OTHER | Age: 89
End: 2024-04-11

## 2024-04-11 ENCOUNTER — OFFICE VISIT (OUTPATIENT)
Dept: PULMONOLOGY | Facility: CLINIC | Age: 89
End: 2024-04-11

## 2024-04-11 VITALS
SYSTOLIC BLOOD PRESSURE: 128 MMHG | HEART RATE: 94 BPM | OXYGEN SATURATION: 96 % | TEMPERATURE: 98.1 F | DIASTOLIC BLOOD PRESSURE: 62 MMHG

## 2024-04-11 DIAGNOSIS — R93.89 ABNORMAL CT OF THE CHEST: Primary | ICD-10-CM

## 2024-04-11 DIAGNOSIS — J41.0 SIMPLE CHRONIC BRONCHITIS (HCC): ICD-10-CM

## 2024-04-11 DIAGNOSIS — J98.4 PNEUMONITIS: ICD-10-CM

## 2024-04-11 PROBLEM — J45.909 UNSPECIFIED ASTHMA, UNCOMPLICATED: Status: RESOLVED | Noted: 2024-03-15 | Resolved: 2024-04-11

## 2024-04-11 PROBLEM — R65.10 SIRS (SYSTEMIC INFLAMMATORY RESPONSE SYNDROME) (HCC): Status: RESOLVED | Noted: 2024-04-02 | Resolved: 2024-04-11

## 2024-04-11 PROBLEM — R06.82 TACHYPNEA: Status: RESOLVED | Noted: 2024-04-10 | Resolved: 2024-04-11

## 2024-04-11 PROBLEM — J96.01 ACUTE RESPIRATORY FAILURE WITH HYPOXIA (HCC): Status: RESOLVED | Noted: 2024-04-02 | Resolved: 2024-04-11

## 2024-04-11 RX ORDER — IPRATROPIUM BROMIDE AND ALBUTEROL SULFATE 2.5; .5 MG/3ML; MG/3ML
3 SOLUTION RESPIRATORY (INHALATION) 3 TIMES DAILY
Qty: 270 ML | Refills: 6 | Status: SHIPPED | OUTPATIENT
Start: 2024-04-11 | End: 2024-11-07

## 2024-04-11 NOTE — ASSESSMENT & PLAN NOTE
4/2 CT scan of the chest does show pneumonitis, questionable aspiration, no current infectious symptoms  Not currently on antibiotics  Would clinically monitor symptoms.  If worsening cough, fever, would have low threshold for initiation of antibiotics.  Otherwise, does require follow-up imaging in about 3 months to reevaluate.  I have ordered a CT scan for early July

## 2024-04-11 NOTE — PROGRESS NOTES
Consultation - Pulmonary Medicine   Chelsey Carson 88 y.o. female MRN: [id number]    New patient referred from the hospital    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HCC)  Patient does seem to have an element of chronic bronchitis with some shortness of breath associated  No formal diagnosis of COPD in the past  Has been on Stiolto and Asmanex with no benefit.  Unclear if she is able to reliably take these medicines however  I recommended that we transition her to DuoNeb 3 times daily to see if there is any benefit in regard to her shortness of breath.    Pneumonitis  4/2 CT scan of the chest does show pneumonitis, questionable aspiration, no current infectious symptoms  Not currently on antibiotics  Would clinically monitor symptoms.  If worsening cough, fever, would have low threshold for initiation of antibiotics.  Otherwise, does require follow-up imaging in about 3 months to reevaluate.  I have ordered a CT scan for early July      Return in about 3 months (around 7/11/2024).      ______________________________________________________________________    HPI:    Chelsey Carson is a 88 y.o. female who presents as a referral from the hospital for pulmonary follow-up.  Although it is documented that she has seen pulmonology, she has only seen [facility] pulmonary group.  She has seen Dr. Webber.  There was a working diagnosis of asthmatic bronchitis.  She has never been seen by our practice.    She was hospitalized for shortness of breath with some acute hypoxic respiratory failure and had abnormal CAT scan with some tree-in-bud changes and findings suggestive of acute pneumonitis, possibly related to aspiration.  Today she reports that she feels a little bit short of breath.  She is tired because she did not sleep last night.  She denies any fever or chills.  No chest pain.  No abdominal pain or GERD.    She is currently in the nursing home for rehabilitation.  She does feel weak.  She does get short of breath  with certain activities.    Review of Systems:    Aside from what is mentioned in the HPI, the review of systems otherwise negative.    Current Medications:    Current Outpatient Medications:     acetaminophen (TYLENOL) 325 mg tablet, Take 2 tablets (650 mg total) by mouth every 6 (six) hours as needed for mild pain, headaches or fever, Disp: , Rfl:     busPIRone (BUSPAR) 5 mg tablet, Take 1 tablet (5 mg total) by mouth 3 (three) times a day, Disp: 60 tablet, Rfl: 5    Calcium Carb-Cholecalciferol 1000-800 MG-UNIT TABS, Take by mouth, Disp: , Rfl:     doxylamine (Unisom SleepTabs) 25 MG tablet, Take 25 mg by mouth daily at bedtime as needed for sleep, Disp: , Rfl:     hydrocortisone (ANUSOL-HC) 2.5 % rectal cream, Apply topically 4 (four) times a day as needed for hemorrhoids, Disp: , Rfl:     ipratropium-albuterol (DUO-NEB) 0.5-2.5 mg/3 mL nebulizer solution, Take 3 mL by nebulization 3 (three) times a day, Disp: 270 mL, Rfl: 6    metoclopramide (Reglan) 10 mg tablet, Take 1 tablet (10 mg total) by mouth 4 (four) times a day as needed (nausea), Disp: , Rfl:     mirtazapine (REMERON) 7.5 MG tablet, Take 1 tablet (7.5 mg total) by mouth daily at bedtime, Disp: , Rfl:     Multiple Vitamins-Minerals (CENTRUM SILVER 50+WOMEN PO), Take by mouth, Disp: , Rfl:     polyethylene glycol (MIRALAX) 17 g packet, Take 17 g by mouth 2 (two) times a day, Disp: , Rfl:     propranolol (INDERAL) 10 mg tablet, Take 10 mg by mouth 2 (two) times a day, Disp: , Rfl:     senna-docusate sodium (SENOKOT S) 8.6-50 mg per tablet, Take 2 tablets by mouth 2 (two) times a day, Disp: , Rfl:     simvastatin (ZOCOR) 5 MG tablet, Take 1 tablet (5 mg total) by mouth daily at bedtime, Disp: 90 tablet, Rfl: 3    trimethobenzamide (TIGAN) 100 mg/mL, Inject 2 mL (200 mg total) into a muscle every 6 (six) hours as needed (nausea if unable to take oral pill), Disp: 20 mL, Rfl: 0    Historical Information   Past Medical History:   Diagnosis Date    Abnormal  laboratory test 08/01/2022    Acute sinusitis 02/13/2013    Age related osteoporosis 11/01/2022    Formatting of this note might be different from the original.   Last Assessment & Plan:     Formatting of this note might be different from the original.    Risedronate  on allergy list but she does not recall what happened    Her kids told her not to get treatment but I recommend it again to her.     Given the possible allergy refer to rheum to discuss treatment options    Breast cancer (HCC)     Cancer (HCC)     Chronic cough 01/13/2017    COVID-19     History of 2019 novel coronavirus disease (COVID-19) 02/15/2021    Hyperlipidemia     Hyponatremia 02/01/2021    Influenza B 03/05/2024    Insomnia 02/17/2021    MCI (mild cognitive impairment) 02/15/2021    Nonrheumatic aortic (valve) stenosis 03/15/2024    Other specified forms of tremor 03/15/2024    Palpitations 03/15/2024    Pneumonia due to COVID-19 virus 02/01/2021    Slow transit constipation 03/15/2024    Stage 3a chronic kidney disease (HCC) 09/27/2023    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    Thrombocytosis, unspecified 03/15/2024     Past Surgical History:   Procedure Laterality Date    BREAST SURGERY      cancer (> 5 years)    HYSTERECTOMY       Social History   Social History     Tobacco Use   Smoking Status Never   Smokeless Tobacco Never       Family History:   Family History   Problem Relation Age of Onset    Breast cancer Mother     Heart disease Father         heart problem    Heart disease Sister         heart problem    Heart disease Brother         heart problem         PhysicalExamination:  Vitals:   /62 (BP Location: Left arm, Patient Position: Sitting, Cuff Size: Standard)   Pulse 94   Temp 98.1 °F (36.7 °C) (Tympanic)   LMP  (LMP Unknown)   SpO2 96%     Gen: Appears fatigued.  Comfortable on room air.  No conversational dyspnea  HEENT:  Conjugate gaze.  sclerae anicteric.  Oropharynx  moist  Neck: Trachea is midline. No JVD. No adenopathy  Chest: Excursion is slightly limited.  Breath sounds slightly distant.  No wheezes or crackles  Cardiac: Regular. no murmur  Abdomen:  benign  Extremities: No edema  Neuro:  Normal speech and mentation      Diagnostic Data:  Labs:  I personally reviewed the most recent laboratory data pertinent to today's visit    Lab Results   Component Value Date    WBC 10.27 (H) 04/04/2024    HGB 8.5 (L) 04/04/2024    HCT 26.5 (L) 04/04/2024     (H) 04/04/2024     (H) 04/04/2024     Lab Results   Component Value Date    GLUCOSE 96 01/13/2015    CALCIUM 8.4 04/04/2024     01/13/2015    K 3.7 04/04/2024    CO2 25 04/04/2024     04/04/2024    BUN 23 04/04/2024    CREATININE 0.82 04/04/2024       Lab Results   Component Value Date    ALT 14 04/02/2024    AST 16 04/02/2024    ALKPHOS 59 04/02/2024    BILITOT 0.60 01/13/2015       Imaging:  I personally reviewed the images on the PAC system pertinent to today's visit  4/2/2024 pulmonary embolism CT scan did not show evidence of pulmonary embolism however there were bilateral lower lobe opacities suggestive for pneumonitis.  Tiny 6 mm nodule also noted      Sebastien Joya MD

## 2024-04-11 NOTE — ASSESSMENT & PLAN NOTE
Patient does seem to have an element of chronic bronchitis with some shortness of breath associated  No formal diagnosis of COPD in the past  Has been on Stiolto and Asmanex with no benefit.  Unclear if she is able to reliably take these medicines however  I recommended that we transition her to DuoNeb 3 times daily to see if there is any benefit in regard to her shortness of breath.

## 2024-04-11 NOTE — PROGRESS NOTES
Chart review complete update obtained from [facility] the patient is currently admitted to SNF for STR. Plan is for the patient to discharge to [facility]. This Admin Coordinator will continue to monitor via chart review.

## 2024-04-12 ENCOUNTER — NURSING HOME VISIT (OUTPATIENT)
Dept: GERIATRICS | Facility: OTHER | Age: 89
End: 2024-04-12

## 2024-04-12 VITALS
TEMPERATURE: 96.9 F | OXYGEN SATURATION: 98 % | SYSTOLIC BLOOD PRESSURE: 126 MMHG | DIASTOLIC BLOOD PRESSURE: 70 MMHG | HEART RATE: 86 BPM

## 2024-04-12 DIAGNOSIS — J41.0 SIMPLE CHRONIC BRONCHITIS (HCC): ICD-10-CM

## 2024-04-12 DIAGNOSIS — I10 BENIGN ESSENTIAL HTN: ICD-10-CM

## 2024-04-12 DIAGNOSIS — J98.4 PNEUMONITIS: ICD-10-CM

## 2024-04-12 DIAGNOSIS — R26.2 AMBULATORY DYSFUNCTION: ICD-10-CM

## 2024-04-12 DIAGNOSIS — N30.00 ACUTE CYSTITIS WITHOUT HEMATURIA: Primary | ICD-10-CM

## 2024-04-12 PROBLEM — R82.81 PYURIA: Status: RESOLVED | Noted: 2024-04-10 | Resolved: 2024-04-12

## 2024-04-12 PROBLEM — N39.0 UTI (URINARY TRACT INFECTION): Status: ACTIVE | Noted: 2024-04-12

## 2024-04-12 NOTE — PROGRESS NOTES
[facility]  [facility] [place]  [address], [place], [place]  [phone]  SNF Rehab: POS 31    NAME: Chelsey Carson  AGE: 88 y.o. SEX: female  : 1935     DATE: 2024    CODE STATUS: CPR     Assessment and Plan:     Problem List Items Addressed This Visit       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HCC)     No formal diagnosis of COPD in the past  At pulm appointment yesterday Stiolto and Asmanex were discontinued, started on DuoNeb 3 times daily   F/u outpatient with pulmonology         Ambulatory dysfunction     Multifactorial  Continue PT/OT  Maintain fall and safety precautions   following for discharge planning           Benign essential HTN     BP stable   Continue propranolol 10 mg BID  Monitor BP         Pneumonitis      CT scan of the chest does show pneumonitis, questionable aspiration, no current infectious symptoms  Seen by Pulm yesterday recommending follow-up imaging in about 3 months to reevaluate- CT order for July          UTI (urinary tract infection) - Primary     UA positive  Initially started on Bactrim  Sensitivities revealed sensitivity to Macrobid  Will change Bactrim to Macrobid, continue for 5 days   Encourage fluids                History of Present Illness:     Patient is a 88 y.o. old female being seen at [facility] for follow up of acute and chronic medical conditions. Patient seen and examined while sitting in chair. Her breathing appears improved since last visit. She is in no acute distress. Patient is irritable on exam and not very cooperative. Denies chest pain or shortness of breath.         The following portions of the patient's history were reviewed and updated as appropriate: allergies, current medications, past family history, past medical history, past social history, past surgical history and problem list.     Review of Systems:     Review of Systems   All other systems reviewed and are negative.       Problem List:     Patient  Active Problem List   Diagnosis    Hyperlipidemia    Pes anserinus bursitis of right knee    Nonrheumatic aortic valve insufficiency    Perforation of left tympanic membrane    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HCC)    Breast cancer (HCC)    Dry mouth    Chronic maxillary sinusitis    Other fatigue    Venous insufficiency    Stage 3a chronic kidney disease (HCC)    GERD (gastroesophageal reflux disease)    Age related osteoporosis    Tremor    Hyponatremia    Constipation    Slow transit constipation    Primary insomnia    Nonintractable headache    Abnormal brain MRI    Tinnitus of both ears    Xerostomia    Mixed conductive and sensorineural hearing loss of left ear with restricted hearing of right ear    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    Ambulatory dysfunction    Proctitis    Macrocytic anemia    Thrombocytosis    Nausea    Urinary retention    Hypokalemia    Loss of appetite    Benign essential HTN    Anxiety disorder, unspecified    Aortic valve disorder    Dysuria    Abnormal CT of the chest    Pneumonitis    UTI (urinary tract infection)        Objective:     /70   Pulse 86   Temp (!) 96.9 °F (36.1 °C)   LMP  (LMP Unknown)   SpO2 98%     Physical Exam  Vitals and nursing note reviewed.   Constitutional:       General: She is not in acute distress.     Appearance: She is well-developed.   HENT:      Head: Normocephalic and atraumatic.   Eyes:      Conjunctiva/sclera: Conjunctivae normal.   Cardiovascular:      Rate and Rhythm: Normal rate and regular rhythm.      Heart sounds: No murmur heard.  Pulmonary:      Effort: Pulmonary effort is normal. No respiratory distress.      Breath sounds: Normal breath sounds.   Abdominal:      Palpations: Abdomen is soft.      Tenderness: There is no abdominal tenderness.   Musculoskeletal:         General: No swelling.      Cervical back: Neck supple.   Skin:     General: Skin is warm and dry.      Capillary Refill:  Capillary refill takes less than 2 seconds.   Neurological:      General: No focal deficit present.      Mental Status: She is alert.      Motor: Weakness present.   Psychiatric:         Mood and Affect: Mood normal.      Comments: irritable         Pertinent Laboratory/Diagnostic Studies:    Laboratory Results: I have personally reviewed the pertinent laboratory results/reports     Radiology/Other Diagnostic Testing Results: I have personally reviewed pertinent reports.      LANI Griffiths  Geriatric Medicine

## 2024-04-12 NOTE — ASSESSMENT & PLAN NOTE
4/2 CT scan of the chest does show pneumonitis, questionable aspiration, no current infectious symptoms  Seen by Pulm yesterday recommending follow-up imaging in about 3 months to reevaluate- CT order for July

## 2024-04-12 NOTE — ASSESSMENT & PLAN NOTE
Multifactorial  Continue PT/OT  Maintain fall and safety precautions   following for discharge planning

## 2024-04-12 NOTE — ASSESSMENT & PLAN NOTE
UA positive  Initially started on Bactrim  Sensitivities revealed sensitivity to Macrobid  Will change Bactrim to Macrobid, continue for 5 days   Encourage fluids

## 2024-04-12 NOTE — ASSESSMENT & PLAN NOTE
No formal diagnosis of COPD in the past  At pulm appointment yesterday Stiolto and Asmanex were discontinued, started on DuoNeb 3 times daily   F/u outpatient with pulmonology

## 2024-04-18 ENCOUNTER — PATIENT OUTREACH (OUTPATIENT)
Dept: CASE MANAGEMENT | Facility: OTHER | Age: 89
End: 2024-04-18

## 2024-04-18 ENCOUNTER — NURSING HOME VISIT (OUTPATIENT)
Dept: GERIATRICS | Facility: OTHER | Age: 89
End: 2024-04-18
Payer: MEDICARE

## 2024-04-18 DIAGNOSIS — I10 BENIGN ESSENTIAL HTN: Primary | ICD-10-CM

## 2024-04-18 DIAGNOSIS — R26.2 AMBULATORY DYSFUNCTION: ICD-10-CM

## 2024-04-18 DIAGNOSIS — E78.5 HYPERLIPIDEMIA, UNSPECIFIED HYPERLIPIDEMIA TYPE: ICD-10-CM

## 2024-04-18 DIAGNOSIS — K21.9 GASTROESOPHAGEAL REFLUX DISEASE WITHOUT ESOPHAGITIS: ICD-10-CM

## 2024-04-18 DIAGNOSIS — M81.0 AGE RELATED OSTEOPOROSIS, UNSPECIFIED PATHOLOGICAL FRACTURE PRESENCE: ICD-10-CM

## 2024-04-18 DIAGNOSIS — F41.9 ANXIETY DISORDER, UNSPECIFIED TYPE: ICD-10-CM

## 2024-04-18 PROCEDURE — 99310 SBSQ NF CARE HIGH MDM 45: CPT | Performed by: INTERNAL MEDICINE

## 2024-04-18 NOTE — PROGRESS NOTES
[facility]  [address] [address] [phone]  [facility]  POS [address]      NAME: Chelsey Carson  AGE: 88 y.o. SEX: female [phone]    DATE OF ENCOUNTER: 4/18/2024    Assessment and Plan     1. Benign essential HTN        2. Gastroesophageal reflux disease without esophagitis        3. Ambulatory dysfunction        4. Anxiety disorder, unspecified type        5. Hyperlipidemia, unspecified hyperlipidemia type        6. Age related osteoporosis, unspecified pathological fracture presence           Benign essential HTN  4/18/2024 20:57 121 / 70 mmHg   BP stable  Cont propanolol 10 mg 1 tab po BID      GERD (gastroesophageal reflux disease)  Stable  Cont omeprazole 20 mg 1 tab po qam    Ambulatory dysfunction  PT/OT to eval/tx & help with balance/strength training  Encourage good po intake of solids/liquids  OOB as tolerated (with assistance from staff)  Encourage pt to stay motivated with physical/occupational therapy  Early/Frequent mobilization with assistance from staff/therapy  Fall precaution     Anxiety disorder, unspecified  Pt very anxious  Pt already on buspar 5 mg 1 tab po TID  Son agrees with psych recommendations  Add ativan 0.25 mg po BID during the day with 0.5 mg at nighttime  Pt also on propanolol 10 mg 1 tab po BID for recent tachycardia  Hold pt's sleep aid doxylamine  Pt also on mirtazapine 7.5 mg 1 tab po QHS    Hyperlipidemia  Stable  Cont simvastatin 5 mg 1 tab po QHS    Age related osteoporosis  10/18/2022 DEXA scan with osteoporosis  Stable  Cont calcium plus vitamin D 500 mg/400 units 2 tabs po daily       Code status: DNR    Chief Complaint     STR term follow-up visit.    History of Present Illness   Pt seen and examined with son at bedside. Pt complains of insomnia. Informed pt the recommendations given by psychiatry. Pt and son in agreement. Liberalized pt's diet. Son says pt may drink chocolate shake.    The following portions of the patient's history  were reviewed and updated as appropriate: allergies, current medications, past family history, past medical history, past social history, past surgical history and problem list.    Review of Systems     Review of Systems   Constitutional:  Negative for chills and fever.   All other systems reviewed and are negative.    A comprehensive review of symptoms was obtained.  Pertinent positives and negatives noted in HPI.     Objective     Vitals: Reviewed in [employer] system. VSS    General: Awake, alert  Head: atraumatic, normocephalic  Cardiovascular: RRR  Lungs: Clear to auscultation bilaterally  Abdomen: nontender/nondistended, +BS  Legs: no cyanosis, clubbing or edema  Feet: +pedal pulses  Skin: clean, dry  Psych: calm, cooperative    Pertinent Laboratory/Diagnostic Studies:  Refer to facility chart.    Current Medications   Medications reviewed and updated in facility chart.     Dulcolax Insert 1 suppository rectally every 72 hours as needed for constipation Administer every 3rd day as needed. 4/4/2024      Fleet Enema Insert 1 unit rectally every 72 hours as needed for constipation Q 3rd day PRN Pharmacy Active 4/4/2024 18:19 4/4/2024   Milk of Magnesia Give 30 ml by mouth every 72 hours as needed for constipation every 3 days as needed Pharmacy Active 4/4/2024 18:19 4/4/2024   Tylenol 325mg (pain) Give 2 tablet by mouth every 4 hours as needed for Mild Pain Pharmacy Active 4/4/2024 18:19 4/4/2024   Tylenol 325mg (fever) Give 2 tablet by mouth every 4 hours as needed for fever Administer 2 tablets (650MG) by mouth every 4 hours as needed for increased temperature. Document temperature (Max 3GM/24HR) Pharmacy Active 4/4/2024 18:20 4/4/2024   BusPIRone 5MG TABS 1 TAB BY MOUTH THREE TIMES DAILY(Indications for Use: Anxiety) Pharmacy Active 4/5/2024 09:00 4/4/2024   SENTRY SENIOR WOMEN 50+ 1 TAB BY MOUTH ONCE DAILY(Indications for Use: Supplement) Pharmacy Active 4/5/2024 09:00 4/4/2024   HYDROCORTISONE CRM 2.5%  RECTAL APPLY TOPICALLY FOUR TIMES DAILY AS NEEDED FOR HEMORRHOIDS Pharmacy Active 4/4/2024 21:52 4/4/2024   METOCLOPRAM TAB 5MG 1 TAB BY MOUTH FOUR TIMES DAILY AS NEEDED FOR NAUSEA Pharmacy Active 4/4/2024 22:00 4/4/2024   MIRTAZAPINE 7.5MG TABS 1 TAB BY MOUTH AT BEDTIME(Indications for Use: Insomnia) Pharmacy Active 4/5/2024 21:00 4/4/2024   POLYETHYLENE GLYCOL 3350 MEASURE 17GMS IN DOSING CAP, MIX WITH SUITABLE LIQUID AND ADMINISTER BY MOUTH TWICE DAILY FOR CONSTIPATION Pharmacy Active 4/5/2024 09:00 4/4/2024   PROPRANOLOL TAB 10MG 1 TAB BY MOUTH TWICE DAILY(Indications for Use: Tachycardia) Pharmacy Active 4/5/2024 09:00 4/4/2024   SENNA-S 8.6-50MG TAB 2 TABS BY MOUTH TWICE DAILY(Indications for Use: Constipation) Pharmacy Active 4/5/2024 09:00 4/4/2024   SIMVASTATIN TAB 5MG 1 TAB BY MOUTH AT BEDTIME(Indications for Use: HLD) Pharmacy Active 4/5/2024 21:00 4/4/2024   MUCUS RELIEF TAB 600MG ER 1 TAB BY MOUTH EVERY 12 HOURS **SWALLOW WHOLE-DO NOT CHEW OR CRUSH**(Indications for Use: cough) Pharmacy Active 4/4/2024 09:00 4/4/2024   CALCIUM+XTRA D 500MG/400UNITS 2 TABS (1000MG/800U) BY MOUTH ONCE DAILY FOR SUPPLEMENT Pharmacy Active 4/5/2024 09:00 4/7/2024   SLEEP-AID TAB 25MG 1 TAB BY MOUTH AT BEDTIME FOR SLEEP(Indications for Use: Sleep Aid) Pharmacy On Hold 4/5/2024 21:00 4/5/2024   IPRATROPIUM/ALBUTEROL NEB SOL 1-VIAL VIA NEBULIZER THREE TIMES DAILY ; (DOCUMENT LUNG SOUNDS AND TIME REQUIRED TO COMPLETE ASSESSMENT AND RESIDENT EDUCATION)(Indications for Use: COPD) Pharmacy Active 4/12/2024 09:00 4/12/2024   LORAZEPAM TAB 0.5MG 1/2 TABLET (0.25MG) BY MOUTH TWICE DAILY (MORNING/AFTERNOON) HOLD IF DROWSY, HOLD SLEEP-AID FOR 5 DAYS(Indications for Use: anxiety) Pharmacy Active 4/18/2024 09:00 4/18/2024   LORAZEPAM TAB 0.5MG 1 TAB BY MOUTH AT BEDTIME HOLD IF DROWSY(Indications for Use: anxiety) Pharmacy Active 4/18/2024 21:00 4/18/2024   OMEPRAZOLE OTC 20MG TBEC 1 TAB BY MOUTH EVERY MORNING *SWALLOW WHOLE. DO NOT CHEW  OR CRUSH.*(Indications for Use: GERD) Pharmacy          Son at bedside. Total time spent 46 minutes in d/w staff about psych recommendations, pt, and pt's son    Dariana Bell MD  Internal Medicine  Senior Care Physician

## 2024-04-18 NOTE — PROGRESS NOTES
Update received from [facility] the patient continues with therapy with a TDD of 4/26/24 to [facility]. This Admin Coordinator will continue to monitor via chart review.

## 2024-04-20 ENCOUNTER — TELEPHONE (OUTPATIENT)
Dept: OTHER | Facility: OTHER | Age: 89
End: 2024-04-20

## 2024-04-21 NOTE — ASSESSMENT & PLAN NOTE
Pt very anxious  Pt already on buspar 5 mg 1 tab po TID  Son agrees with psych recommendations  Add ativan 0.25 mg po BID during the day with 0.5 mg at nighttime  Pt also on propanolol 10 mg 1 tab po BID for recent tachycardia  Hold pt's sleep aid doxylamine  Pt also on mirtazapine 7.5 mg 1 tab po QHS

## 2024-04-21 NOTE — TELEPHONE ENCOUNTER
Nacho from [facility] requesting a call back from the on call.    On call provider notified   
no

## 2024-04-21 NOTE — ASSESSMENT & PLAN NOTE
10/18/2022 DEXA scan with osteoporosis  Stable  Cont calcium plus vitamin D 500 mg/400 units 2 tabs po daily

## 2024-04-22 ENCOUNTER — NURSING HOME VISIT (OUTPATIENT)
Dept: GERIATRICS | Facility: OTHER | Age: 89
End: 2024-04-22
Payer: MEDICARE

## 2024-04-22 DIAGNOSIS — M81.0 AGE RELATED OSTEOPOROSIS, UNSPECIFIED PATHOLOGICAL FRACTURE PRESENCE: ICD-10-CM

## 2024-04-22 DIAGNOSIS — E78.5 HYPERLIPIDEMIA, UNSPECIFIED HYPERLIPIDEMIA TYPE: Primary | ICD-10-CM

## 2024-04-22 DIAGNOSIS — R26.2 AMBULATORY DYSFUNCTION: ICD-10-CM

## 2024-04-22 DIAGNOSIS — F41.9 ANXIETY DISORDER, UNSPECIFIED TYPE: ICD-10-CM

## 2024-04-22 PROCEDURE — 99309 SBSQ NF CARE MODERATE MDM 30: CPT | Performed by: INTERNAL MEDICINE

## 2024-04-22 NOTE — PROGRESS NOTES
[facility]  [address] [address] [phone]  [facility]  POS [address]      NAME: Chelsey Carson  AGE: 88 y.o. SEX: female [phone]    DATE OF ENCOUNTER: 4/22/2024    Assessment and Plan     1. Hyperlipidemia, unspecified hyperlipidemia type        2. Ambulatory dysfunction        3. Anxiety disorder, unspecified type        4. Age related osteoporosis, unspecified pathological fracture presence           Hyperlipidemia  Stable  Cont simvastatin 5 mg 1 tab po qhs    Ambulatory dysfunction  Pt weaker than her baseline given her recent hospitalizations  PT/OT to eval/tx & help with balance/strength training  Encourage good po intake of solids/liquids  OOB as tolerated (with assistance from staff)  Encourage pt to stay motivated with physical/occupational therapy  Early/Frequent mobilization with assistance from staff/therapy  Consider pain meds 45 minutes prior to physical therapy  Fall precaution     Anxiety disorder, unspecified  Stable  Cont buspar 5 mg 1 tab po TID  Cont ativan 0.25 mg in am & afternoon  Cont 0.5 mg 1 tab po QHS  Cont propanolol 10 mg 1 tab po BID for recent tachycardia  Cont mirtazapine 7.5 mg 1 tab po QHS  Cont care per psychiatry    Age related osteoporosis  Osteoporosis seen on 10/18/2022 DEXA scan  Stable  Con calcium plus vitamin D 500 mg/400 units 2 tabs po daily       Code status: DNR    Chief Complaint     STR term follow-up visit.    History of Present Illness   Pt seen and examined. Pt denies any pain. Pt complains of not sleeping well.    The following portions of the patient's history were reviewed and updated as appropriate: allergies, current medications, past family history, past medical history, past social history, past surgical history and problem list.    Review of Systems     Review of Systems   Constitutional:  Negative for chills and fever.   Gastrointestinal:  Negative for constipation.   Genitourinary:  Negative for difficulty  urinating.   Psychiatric/Behavioral:  Positive for confusion and sleep disturbance.      A comprehensive review of symptoms was obtained.  Pertinent positives and negatives noted in HPI.     Objective     Vitals: Reviewed in [employer] system. VSS    General: Awake, alert x 2-3; knows she is in [facility]; Pt knows it is April  Head: atraumatic, normocephalic  Cardiovascular: RRR  Lungs: Clear to auscultation bilaterally  Abdomen: nontender/nondistended, +BS  Legs: no cyanosis, clubbing or edema  Skin: clean, dry  Psych: calm, cooperative    Pertinent Laboratory/Diagnostic Studies:  Refer to facility chart.    Current Medications   Medications reviewed and updated in facility chart.    Dariana Bell MD  Internal Medicine  Senior Care Physician

## 2024-04-25 ENCOUNTER — PATIENT OUTREACH (OUTPATIENT)
Dept: CASE MANAGEMENT | Facility: OTHER | Age: 89
End: 2024-04-25

## 2024-04-25 ENCOUNTER — TRANSITIONAL CARE MANAGEMENT (OUTPATIENT)
Dept: INTERNAL MEDICINE CLINIC | Facility: CLINIC | Age: 89
End: 2024-04-25

## 2024-04-25 ENCOUNTER — TELEPHONE (OUTPATIENT)
Dept: OTHER | Facility: OTHER | Age: 89
End: 2024-04-25

## 2024-04-25 ENCOUNTER — NURSING HOME VISIT (OUTPATIENT)
Dept: GERIATRICS | Facility: OTHER | Age: 89
End: 2024-04-25
Payer: MEDICARE

## 2024-04-25 DIAGNOSIS — R06.82 TACHYPNEA: ICD-10-CM

## 2024-04-25 DIAGNOSIS — F41.9 ANXIETY DISORDER, UNSPECIFIED TYPE: ICD-10-CM

## 2024-04-25 DIAGNOSIS — J96.01 ACUTE RESPIRATORY FAILURE WITH HYPOXIA (HCC): Primary | ICD-10-CM

## 2024-04-25 DIAGNOSIS — F41.9 ANXIETY DISORDER, UNSPECIFIED TYPE: Primary | ICD-10-CM

## 2024-04-25 DIAGNOSIS — I10 BENIGN ESSENTIAL HTN: ICD-10-CM

## 2024-04-25 DIAGNOSIS — E78.5 HYPERLIPIDEMIA, UNSPECIFIED HYPERLIPIDEMIA TYPE: ICD-10-CM

## 2024-04-25 DIAGNOSIS — K21.9 GASTROESOPHAGEAL REFLUX DISEASE WITHOUT ESOPHAGITIS: ICD-10-CM

## 2024-04-25 PROCEDURE — 99316 NF DSCHRG MGMT 30 MIN+: CPT | Performed by: INTERNAL MEDICINE

## 2024-04-25 RX ORDER — LORAZEPAM 0.5 MG/1
0.5 TABLET ORAL ONCE
Qty: 1 TABLET | Refills: 0 | Status: SHIPPED | OUTPATIENT
Start: 2024-04-25 | End: 2024-04-25

## 2024-04-25 NOTE — ASSESSMENT & PLAN NOTE
Stable  Cont buspar 5 mg 1 tab po TID  Cont ativan 0.25 mg in am & afternoon  Cont 0.5 mg 1 tab po QHS  Cont propanolol 10 mg 1 tab po BID for recent tachycardia  Cont mirtazapine 7.5 mg 1 tab po QHS  Cont care per psychiatry

## 2024-04-25 NOTE — PROGRESS NOTES
"[address] [address]  [phone]  DISCHARGE SUMMARY  Facility: [facility]  POS: 31: SNF/Short Term Rehab    NAME: Chelsey Carson  AGE: 88 y.o. SEX: female  DATE OF ADMISSION: 4/5/2024 DATE OF DISCHARGE:4/26/2024 DISCHARGE DISPOSITION: stable    Reason for admission: Patient was admitted from [facility] for rehabilitation after hospitalization for acute respiratory failure with hypoxia.  Per my HPI,     \"Patient is a 88 y.o. female with PMH Anxiety, Hyperlipidemia, Insomnia, Aortic valve disorder, Osteoporosis, GERD and CKD3a.     Pt admitted at [facility] from 4/2/2024-4/4/2024 for acute respiratory failure with hypoxia. Pt had wheezing, tachypnea and chest heaviness after her morning breakfast. Thus pt was admitted for acute respiratory failure with hypoxia. Pt given steriods and nebs. Pt had seen Pulmonary as outpt with abnormal PFTs but no formal diagnosis of COPD/asthma. Pt finally clinically improved and was discharged back to [facility] on 4/4/2024.\"    Admission Diagnoses:    1. Acute respiratory failure with hypoxia (HCC)          2. Benign essential HTN          3. Anxiety disorder, unspecified type          4. Hyperlipidemia, unspecified hyperlipidemia type          5. Ambulatory dysfunction         Discharge Diagnoses:   Problem List Items Addressed This Visit       Hyperlipidemia     Stable  Cont simvastatin 5 mg 1 tab po QHS         GERD (gastroesophageal reflux disease)     Stable  Cont omeprazole 20 mg 1 tab po daily         Benign essential HTN     /70  Stable  Cont propanolol 10 mg 1 tab po BID         Anxiety disorder, unspecified     Pt with chronic anxiety/insomnia  Pt evaluated by inhouse psychiatry team  Family requested dc am lorazepam 0.25 mg so discontinued  Cont lorazepam 0.25 mg in the afternoon  Cont lorazepam 0.5 mg 1 tab po QHS scripts for both given as pt going to [facility]  Cont buspar 5 mg 1 tab po TID  Cont mirtazapine 7.5 " mg 1 tab po QHS  Cont propanolol 10 mg 1 tab po BID for recent tachycardia  Pt appropiate for outa         RESOLVED: Acute respiratory failure with hypoxia (HCC) - Primary     Resolved  Pt with clear lungs  Pt saturating 96% on room air  Stable  Cont home nebulizers         RESOLVED: Tachypnea     Resolved  HR stable now  EKG HR 80s normal sinus; no acute ST-T elev/dep per my read  Cont propanolol 10 mg 1 tab po BID for rate control; pt also has anxiety                Course of stay: Patient was admitted to [facility] for rehabilitation due to recent hospitalizations. Significant events during the stay include E.R. visits and readmission. The patient participated in PT/OT. Pt had EKG done during her SNF stay which was normal sinus and HR 80s. Pt medically stable for discharge.    Discharge Medications: See discharge medication list which was reviewed and signed.    Dulcolax Insert 1 suppository rectally every 72 hours as needed for constipation Administer every 3rd day as needed. 4/4/2024      Fleet Enema Insert 1 unit rectally every 72 hours as needed for constipation Q 3rd day PRN Pharmacy Active 4/4/2024 18:19 4/4/2024   Milk of Magnesia Give 30 ml by mouth every 72 hours as needed for constipation every 3 days as needed Pharmacy Active 4/4/2024 18:19 4/4/2024   Tylenol 325mg (pain) Give 2 tablet by mouth every 4 hours as needed for Mild Pain Pharmacy Active 4/4/2024 18:19 4/4/2024   Tylenol 325mg (fever) Give 2 tablet by mouth every 4 hours as needed for fever Administer 2 tablets (650MG) by mouth every 4 hours as needed for increased temperature. Document temperature (Max 3GM/24HR) Pharmacy Active 4/4/2024 18:20 4/4/2024   BusPIRone 5MG TABS 1 TAB BY MOUTH THREE TIMES DAILY(Indications for Use: Anxiety) Pharmacy Active 4/5/2024 09:00 4/4/2024   SENTRY SENIOR WOMEN 50+ 1 TAB BY MOUTH ONCE DAILY(Indications for Use: Supplement) Pharmacy Active 4/5/2024 09:00 4/4/2024   HYDROCORTISONE CRM 2.5% RECTAL APPLY  TOPICALLY FOUR TIMES DAILY AS NEEDED FOR HEMORRHOIDS Pharmacy Active 4/4/2024 21:52 4/4/2024   METOCLOPRAM TAB 5MG 1 TAB BY MOUTH FOUR TIMES DAILY AS NEEDED FOR NAUSEA Pharmacy Active 4/4/2024 22:00 4/4/2024   MIRTAZAPINE 7.5MG TABS 1 TAB BY MOUTH AT BEDTIME(Indications for Use: Insomnia) Pharmacy Active 4/5/2024 21:00 4/4/2024   POLYETHYLENE GLYCOL 3350 MEASURE 17GMS IN DOSING CAP, MIX WITH SUITABLE LIQUID AND ADMINISTER BY MOUTH TWICE DAILY FOR CONSTIPATION Pharmacy Active 4/5/2024 09:00 4/4/2024   PROPRANOLOL TAB 10MG 1 TAB BY MOUTH TWICE DAILY(Indications for Use: Tachycardia) Pharmacy Active 4/5/2024 09:00 4/4/2024   SENNA-S 8.6-50MG TAB 2 TABS BY MOUTH TWICE DAILY(Indications for Use: Constipation) Pharmacy Active 4/5/2024 09:00 4/4/2024   SIMVASTATIN TAB 5MG 1 TAB BY MOUTH AT BEDTIME(Indications for Use: HLD) Pharmacy Active 4/5/2024 21:00 4/4/2024   MUCUS RELIEF TAB 600MG ER 1 TAB BY MOUTH EVERY 12 HOURS **SWALLOW WHOLE-DO NOT CHEW OR CRUSH**(Indications for Use: cough) Pharmacy Active 4/4/2024 09:00 4/4/2024   CALCIUM+XTRA D 500MG/400UNITS 2 TABS (1000MG/800U) BY MOUTH ONCE DAILY FOR SUPPLEMENT Pharmacy Active 4/5/2024 09:00 4/7/2024   IPRATROPIUM/ALBUTEROL NEB SOL 1-VIAL VIA NEBULIZER THREE TIMES DAILY ; (DOCUMENT LUNG SOUNDS AND TIME REQUIRED TO COMPLETE ASSESSMENT AND RESIDENT EDUCATION)(Indications for Use: COPD) Pharmacy Active 4/12/2024 09:00 4/12/2024   LORAZEPAM TAB 0.5MG 1 TAB BY MOUTH AT BEDTIME HOLD IF DROWSY(Indications for Use: anxiety) Pharmacy Active 4/18/2024 21:00 4/24/2024   OMEPRAZOLE OTC 20MG TBEC 1 TAB BY MOUTH EVERY MORNING *SWALLOW WHOLE. DO NOT CHEW OR CRUSH.*(Indications for Use: GERD) Pharmacy Active 4/19/2024 07:00 4/20/2024   LORAZEPAM TAB 0.5MG 1/2 TABLET (0.25MG) BY MOUTH ONCE DAILY IN THE AFTERNOON) HOLD IF DROWSY, HOLD SLEEP-AID FOR 5 DAYS(Indications for Use: anxiety) Pharmacy Active         Status at time of discharge: Stable    Today's Visit: 4/25/2024    Subjective: Pt  feels ready to go tomorrow. When I mentioned home, she knew she was going to assisted living tomorrow. Pt alert and oriented.    Vitals:  Weight: 107.4 4/18/2024 13:15        Blood Pressure: 134 /  70 4/25/2024 09:28        Temperature: 97.5 4/25/2024 09:28        Pulse: 76 4/25/2024 09:28        Respirations: 18 4/24/2024 18:54        Blood Sugar:           O2 sats: 96.0 % 4/25/2024 09:28        Height: 58.0 3/27/2024 15:21        Pain Level: 0 4/25/2024 09:20         Exam: Physical Exam  Constitutional:       General: She is not in acute distress.     Appearance: She is not ill-appearing.   HENT:      Head: Normocephalic.   Cardiovascular:      Rate and Rhythm: Rhythm irregular.      Heart sounds: Normal heart sounds.   Pulmonary:      Effort: No respiratory distress.      Breath sounds: Normal breath sounds. No wheezing.   Abdominal:      General: Bowel sounds are normal. There is no distension.      Palpations: Abdomen is soft.      Tenderness: There is no abdominal tenderness.   Musculoskeletal:      Right lower leg: No edema.      Left lower leg: No edema.   Neurological:      Mental Status: She is alert. Mental status is at baseline.   Psychiatric:         Mood and Affect: Mood normal.         Behavior: Behavior normal.         Thought Content: Thought content normal.       EKG HR 80s normal sinus; no acute ST-T elevation/depression per my read    Discussion with patient/family and further instructions:  -Fall precautions  -Aspiration precautions  -Bleeding precautions  -Monitor for signs/symptoms of infection  -Medication list was reviewed and signed  -DME form was completed    Follow-up Recommendations: Please follow-up with your primary care physician within 7-10 days of discharge to review medication changes and current status.     Problem List Follow-up Recommendations:      I have spent 32 minutes with Patient  today in which greater than 50% of this time was spent in counseling/coordination of care  regarding Counseling / Coordination of care, Documenting in the medical record, Communicating with other healthcare professionals , and signing all physical scripts. I also wrote for lorazepam 0.25 mg in the afternoon & 0.5 mg at QHS on a hardscript .    Dariana Bell MD  Internal Medicine  [facility]

## 2024-04-25 NOTE — ASSESSMENT & PLAN NOTE
Pt weaker than her baseline given her recent hospitalizations  PT/OT to eval/tx & help with balance/strength training  Encourage good po intake of solids/liquids  OOB as tolerated (with assistance from staff)  Encourage pt to stay motivated with physical/occupational therapy  Early/Frequent mobilization with assistance from staff/therapy  Consider pain meds 45 minutes prior to physical therapy  Fall precaution

## 2024-04-25 NOTE — PROGRESS NOTES
Update obtained from [facility] the patient has a LCD of 4/25/24 and will convert to private pay. I have removed myself from the care team, updated the Care Coordination note, and closed the episode.

## 2024-04-25 NOTE — ASSESSMENT & PLAN NOTE
Osteoporosis seen on 10/18/2022 DEXA scan  Stable  Con calcium plus vitamin D 500 mg/400 units 2 tabs po daily

## 2024-04-26 NOTE — ASSESSMENT & PLAN NOTE
Pt with chronic anxiety/insomnia  Pt evaluated by inhouse psychiatry team  Family requested dc am lorazepam 0.25 mg so discontinued  Cont lorazepam 0.25 mg in the afternoon  Cont lorazepam 0.5 mg 1 tab po QHS scripts for both given as pt going to [facility]  Cont buspar 5 mg 1 tab po TID  Cont mirtazapine 7.5 mg 1 tab po QHS  Cont propanolol 10 mg 1 tab po BID for recent tachycardia  Pt appropiate for outa

## 2024-04-26 NOTE — ASSESSMENT & PLAN NOTE
Resolved  HR stable now  EKG HR 80s normal sinus; no acute ST-T elev/dep per my read  Cont propanolol 10 mg 1 tab po BID for rate control; pt also has anxiety

## 2024-04-28 ENCOUNTER — TELEPHONE (OUTPATIENT)
Dept: OTHER | Facility: OTHER | Age: 89
End: 2024-04-28

## 2024-04-28 NOTE — TELEPHONE ENCOUNTER
"Ayla ANGUIANO stated, \" The resident had a fall, no injuries and was found kneeling between bed and wheel chair. She denies pain and hitting her head. She is complaining of burning while urinating and also yesterday she had orders for miralax and Senna which resulted in 3 loose stools. I need an order for holding the medication and or change the frequency of the medication.\"      Paged on call VIA TT  "

## 2024-04-29 DIAGNOSIS — K59.01 SLOW TRANSIT CONSTIPATION: ICD-10-CM

## 2024-04-29 DIAGNOSIS — K59.00 CONSTIPATION, UNSPECIFIED CONSTIPATION TYPE: Primary | ICD-10-CM

## 2024-04-29 RX ORDER — AMOXICILLIN 250 MG
2 CAPSULE ORAL DAILY PRN
Qty: 30 TABLET | Refills: 1 | Status: SHIPPED | OUTPATIENT
Start: 2024-04-29

## 2024-04-29 RX ORDER — POLYETHYLENE GLYCOL 3350 17 G/17G
17 POWDER, FOR SOLUTION ORAL DAILY PRN
Qty: 225 G | Refills: 1 | Status: SHIPPED | OUTPATIENT
Start: 2024-04-29 | End: 2024-05-29

## 2024-05-12 PROBLEM — N39.0 UTI (URINARY TRACT INFECTION): Status: RESOLVED | Noted: 2024-04-12 | Resolved: 2024-05-12

## 2024-05-22 ENCOUNTER — TELEPHONE (OUTPATIENT)
Age: 89
End: 2024-05-22

## 2024-05-22 NOTE — TELEPHONE ENCOUNTER
Nurse is requesting Dr.Faiza Bell  Please call [facility] to verify admission orders for patient. Call back #[phone].  Thank you   OB

## 2024-05-23 ENCOUNTER — NURSING HOME VISIT (OUTPATIENT)
Dept: GERIATRICS | Facility: OTHER | Age: 89
End: 2024-05-23
Payer: MEDICARE

## 2024-05-23 DIAGNOSIS — K59.01 SLOW TRANSIT CONSTIPATION: ICD-10-CM

## 2024-05-23 DIAGNOSIS — F05 DELIRIUM DUE TO ANOTHER MEDICAL CONDITION: ICD-10-CM

## 2024-05-23 DIAGNOSIS — R26.2 AMBULATORY DYSFUNCTION: ICD-10-CM

## 2024-05-23 DIAGNOSIS — K21.9 GASTROESOPHAGEAL REFLUX DISEASE WITHOUT ESOPHAGITIS: ICD-10-CM

## 2024-05-23 DIAGNOSIS — S72.144D CLOSED NONDISPLACED INTERTROCHANTERIC FRACTURE OF RIGHT FEMUR WITH ROUTINE HEALING, SUBSEQUENT ENCOUNTER: Primary | ICD-10-CM

## 2024-05-23 PROCEDURE — 99305 1ST NF CARE MODERATE MDM 35: CPT | Performed by: INTERNAL MEDICINE

## 2024-05-23 NOTE — PROGRESS NOTES
[facility] [place]  [address]  [place] [address]  [id number]    Nursing Home Admission    NAME: Chelsey Carson  AGE: 88 y.o. SEX: female [phone]    DATE OF ENCOUNTER: 5/23/2024    Assessment/Plan:   Patient’s care was coordinated with nursing facility staff. Recent vitals, labs and updated medications were reviewed [facility]. Past Medical, surgical, social, medication and allergy history and patient’s previous records reviewed.     1. Closed nondisplaced intertrochanteric fracture of right femur with routine healing, subsequent encounter        2. Delirium due to another medical condition        3. Gastroesophageal reflux disease without esophagitis        4. Slow transit constipation        5. Ambulatory dysfunction             Closed nondisplaced intertrochanteric fracture of right femur (HCC)  Pt s/p ORIF of Rt femur on 5/15/2024  Ortho deemed WBAT RLE  Pt too agitated for therapy or full exam  Cont prn pain meds    Delirium due to another medical condition  Pt known to me and staff; pt not at baseline; pt with known inpatient delirium  Pt at increased risk of delirium given age and dementia  Place on delirium precautions  Monitor sleep/wake cycle  Avoid deliriogenic agents  Redirect, reorient and provide distraction techniques  Minimize night time interruptions  Assist with adls such as bathroom  Monitor BM and for any signs of urinary retention  Dim lights, close blinds, and reduce noise such as television/radio  Give pain medication 45 minutes prior to physical therapy  Encourage good po intake of fluids/liquids  Encourage family participation to help with familiar environment/faces  Early ambulation/mobilization   Follow TADA approach:  Tolerate behavior that is not harmful  Anticipate needs & assist with adls/iadls  Do NOT agitate with confused pt & give positive reinforcements    GERD (gastroesophageal reflux  disease)  Stable  Cont ppi    Slow transit constipation  Pt with hx of constipation  Monitor BM  Cont aggressive stool softners    Ambulatory dysfunction  Pt s/p fall & Rt hip fracture  S/p ORIF on 5/15/2024 of Rt hip  PT/OT to eval/tx & help with balance/strength training  Encourage good po intake of solids/liquids  OOB as tolerated (with assistance from staff)  Encourage pt to stay motivated with physical/occupational therapy  Early/Frequent mobilization with assistance from staff/therapy  Consider pain meds 45 minutes prior to physical therapy  Fall precaution      Chief Complaint     Here for STR    HPI   Patient is a 88 y.o. female with PMH HTN, CKD3a, GERD, Osteoporosis, Anxiety, Hyperlipidemia and insomnia.    Pt admitted at [facility] from 5/14/2024-5/22/2024 for closed displaced intertrochanteric fx of Rt femur. Pt had delirium during the hospitalization. Ortho evaluated the pt and pt underwent ORIF on 5/15/2024. Ortho indicated pt WBAT on RLE. Pt then discharged to [facility] on 5/22/2024.    Pt seen and examined. Pt still delirious and combative with staff. Pt very forgetful and agitated. Pt not taking in po pills.  Spoke to son in person who is agreeable to send pt to the E.R.      Past Medical History:   Diagnosis Date    Abnormal laboratory test 08/01/2022    Acute sinusitis 02/13/2013    Age related osteoporosis 11/01/2022    Formatting of this note might be different from the original.   Last Assessment & Plan:     Formatting of this note might be different from the original.    Risedronate  on allergy list but she does not recall what happened    Her kids told her not to get treatment but I recommend it again to her.     Given the possible allergy refer to rheum to discuss treatment options    Breast cancer (HCC)     Cancer (HCC)     Chronic cough 01/13/2017    COVID-19     History of 2019 novel coronavirus disease (COVID-19) 02/15/2021    Hyperlipidemia     Hyponatremia 02/01/2021     Influenza B 03/05/2024    Insomnia 02/17/2021    MCI (mild cognitive impairment) 02/15/2021    Nonrheumatic aortic (valve) stenosis 03/15/2024    Other specified forms of tremor 03/15/2024    Palpitations 03/15/2024    Pneumonia due to COVID-19 virus 02/01/2021    Slow transit constipation 03/15/2024    Stage 3a chronic kidney disease (HCC) 09/27/2023    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    Thrombocytosis, unspecified 03/15/2024       Past Surgical History:   Procedure Laterality Date    BREAST SURGERY      cancer (> 5 years)    HYSTERECTOMY         Social History     Tobacco Use   Smoking Status Never   Smokeless Tobacco Never          Family History   Problem Relation Age of Onset    Breast cancer Mother     Heart disease Father         heart problem    Heart disease Sister         heart problem    Heart disease Brother         heart problem        Allergies   Allergen Reactions    Pravastatin     Risedronate     Paxlovid [Nirmatrelvir-Ritonavir] Nausea Only     Updated list was reviewed in [facility] care system of facility.     See med rec in PCC.    Vital signs were reviewed in point [facility] care    A 12-point comprehensive review of symptoms was obtained.  Pertinent positives and negatives noted in HPI.   Review of Systems   Unable to perform ROS: Psychiatric disorder   Psychiatric/Behavioral:  Positive for agitation.    Pt too agitated & delirious    Physical Exam  Constitutional:       General: She is not in acute distress.     Appearance: She is not ill-appearing.   HENT:      Head: Normocephalic.   Cardiovascular:      Rate and Rhythm: Regular rhythm.      Heart sounds: Normal heart sounds.   Pulmonary:      Effort: No respiratory distress.      Breath sounds: Normal breath sounds. No wheezing.   Abdominal:      General: Bowel sounds are normal. There is no distension.      Palpations: Abdomen is soft.      Tenderness: There is no abdominal tenderness.    Neurological:      Mental Status: She is alert. She is disoriented.   Psychiatric:         Mood and Affect: Mood is elated. Affect is angry.         Behavior: Behavior is agitated, aggressive, hyperactive and combative.         Thought Content: Thought content is paranoid and delusional.         Cognition and Memory: She exhibits impaired recent memory.         Judgment: Judgment is impulsive.       Diagnostic Data   Recent labs and imaging studies were reviewed.     This note was electronically signed by Dr. Dariana Bell  [facility]

## 2024-05-26 PROBLEM — S72.144A CLOSED NONDISPLACED INTERTROCHANTERIC FRACTURE OF RIGHT FEMUR (HCC): Status: ACTIVE | Noted: 2024-05-14

## 2024-05-26 PROBLEM — F05 DELIRIUM DUE TO ANOTHER MEDICAL CONDITION: Status: ACTIVE | Noted: 2024-05-16

## 2024-05-27 NOTE — ASSESSMENT & PLAN NOTE
Pt s/p ORIF of Rt femur on 5/15/2024  Ortho deemed WBAT RLE  Pt too agitated for therapy or full exam  Cont prn pain meds

## 2024-05-27 NOTE — ASSESSMENT & PLAN NOTE
Pt known to me and staff; pt not at baseline; pt with known inpatient delirium  Pt at increased risk of delirium given age and dementia  Place on delirium precautions  Monitor sleep/wake cycle  Avoid deliriogenic agents  Redirect, reorient and provide distraction techniques  Minimize night time interruptions  Assist with adls such as bathroom  Monitor BM and for any signs of urinary retention  Dim lights, close blinds, and reduce noise such as television/radio  Give pain medication 45 minutes prior to physical therapy  Encourage good po intake of fluids/liquids  Encourage family participation to help with familiar environment/faces  Early ambulation/mobilization   Follow TADA approach:  Tolerate behavior that is not harmful  Anticipate needs & assist with adls/iadls  Do NOT agitate with confused pt & give positive reinforcements

## 2024-05-27 NOTE — ASSESSMENT & PLAN NOTE
Pt s/p fall & Rt hip fracture  S/p ORIF on 5/15/2024 of Rt hip  PT/OT to eval/tx & help with balance/strength training  Encourage good po intake of solids/liquids  OOB as tolerated (with assistance from staff)  Encourage pt to stay motivated with physical/occupational therapy  Early/Frequent mobilization with assistance from staff/therapy  Consider pain meds 45 minutes prior to physical therapy  Fall precaution

## 2024-06-14 ENCOUNTER — APPOINTMENT (EMERGENCY)
Dept: RADIOLOGY | Facility: HOSPITAL | Age: 89
DRG: 871 | End: 2024-06-14
Payer: MEDICARE

## 2024-06-14 ENCOUNTER — HOSPITAL ENCOUNTER (INPATIENT)
Facility: HOSPITAL | Age: 89
LOS: 5 days | Discharge: NURSING, REHAB OR LONG-TERM CARE | DRG: 871 | End: 2024-06-19
Attending: EMERGENCY MEDICINE | Admitting: STUDENT IN AN ORGANIZED HEALTH CARE EDUCATION/TRAINING PROGRAM
Payer: MEDICARE

## 2024-06-14 DIAGNOSIS — F41.9 ANXIETY: ICD-10-CM

## 2024-06-14 DIAGNOSIS — F05 DELIRIUM DUE TO ANOTHER MEDICAL CONDITION: ICD-10-CM

## 2024-06-14 DIAGNOSIS — R41.82 ALTERED MENTAL STATUS: Primary | ICD-10-CM

## 2024-06-14 DIAGNOSIS — N39.0 URINARY TRACT INFECTION: ICD-10-CM

## 2024-06-14 DIAGNOSIS — R51.9 NONINTRACTABLE HEADACHE, UNSPECIFIED CHRONICITY PATTERN, UNSPECIFIED HEADACHE TYPE: ICD-10-CM

## 2024-06-14 PROBLEM — N30.00 ACUTE CYSTITIS WITHOUT HEMATURIA: Status: ACTIVE | Noted: 2024-06-14

## 2024-06-14 PROBLEM — N30.01 ACUTE CYSTITIS WITH HEMATURIA: Status: ACTIVE | Noted: 2024-06-14

## 2024-06-14 PROBLEM — D64.9 ANEMIA: Status: ACTIVE | Noted: 2024-03-15

## 2024-06-14 LAB
ALBUMIN SERPL BCG-MCNC: 3.5 G/DL (ref 3.5–5)
ALP SERPL-CCNC: 80 U/L (ref 34–104)
ALT SERPL W P-5'-P-CCNC: 7 U/L (ref 7–52)
ANION GAP SERPL CALCULATED.3IONS-SCNC: 10 MMOL/L (ref 4–13)
APTT PPP: 34 SECONDS (ref 23–37)
AST SERPL W P-5'-P-CCNC: 13 U/L (ref 13–39)
ATRIAL RATE: 123 BPM
BACTERIA UR QL AUTO: ABNORMAL /HPF
BASOPHILS # BLD AUTO: 0.04 THOUSANDS/ÂΜL (ref 0–0.1)
BASOPHILS NFR BLD AUTO: 1 % (ref 0–1)
BILIRUB SERPL-MCNC: 0.42 MG/DL (ref 0.2–1)
BILIRUB UR QL STRIP: NEGATIVE
BUN SERPL-MCNC: 25 MG/DL (ref 5–25)
CALCIUM SERPL-MCNC: 8.9 MG/DL (ref 8.4–10.2)
CHLORIDE SERPL-SCNC: 103 MMOL/L (ref 96–108)
CLARITY UR: ABNORMAL
CO2 SERPL-SCNC: 24 MMOL/L (ref 21–32)
COLOR UR: YELLOW
CREAT SERPL-MCNC: 0.99 MG/DL (ref 0.6–1.3)
EOSINOPHIL # BLD AUTO: 0.04 THOUSAND/ÂΜL (ref 0–0.61)
EOSINOPHIL NFR BLD AUTO: 1 % (ref 0–6)
ERYTHROCYTE [DISTWIDTH] IN BLOOD BY AUTOMATED COUNT: 16.9 % (ref 11.6–15.1)
GFR SERPL CREATININE-BSD FRML MDRD: 51 ML/MIN/1.73SQ M
GLUCOSE SERPL-MCNC: 112 MG/DL (ref 65–140)
GLUCOSE UR STRIP-MCNC: NEGATIVE MG/DL
HCT VFR BLD AUTO: 33.3 % (ref 34.8–46.1)
HGB BLD-MCNC: 10.8 G/DL (ref 11.5–15.4)
HGB UR QL STRIP.AUTO: ABNORMAL
IMM GRANULOCYTES # BLD AUTO: 0.03 THOUSAND/UL (ref 0–0.2)
IMM GRANULOCYTES NFR BLD AUTO: 0 % (ref 0–2)
INR PPP: 1.19 (ref 0.84–1.19)
KETONES UR STRIP-MCNC: ABNORMAL MG/DL
LACTATE SERPL-SCNC: 2 MMOL/L (ref 0.5–2)
LACTATE SERPL-SCNC: 2.9 MMOL/L (ref 0.5–2)
LEUKOCYTE ESTERASE UR QL STRIP: ABNORMAL
LYMPHOCYTES # BLD AUTO: 1.19 THOUSANDS/ÂΜL (ref 0.6–4.47)
LYMPHOCYTES NFR BLD AUTO: 16 % (ref 14–44)
MCH RBC QN AUTO: 33.8 PG (ref 26.8–34.3)
MCHC RBC AUTO-ENTMCNC: 32.4 G/DL (ref 31.4–37.4)
MCV RBC AUTO: 104 FL (ref 82–98)
MONOCYTES # BLD AUTO: 0.96 THOUSAND/ÂΜL (ref 0.17–1.22)
MONOCYTES NFR BLD AUTO: 13 % (ref 4–12)
NEUTROPHILS # BLD AUTO: 5.08 THOUSANDS/ÂΜL (ref 1.85–7.62)
NEUTS SEG NFR BLD AUTO: 69 % (ref 43–75)
NITRITE UR QL STRIP: NEGATIVE
NON-SQ EPI CELLS URNS QL MICRO: ABNORMAL /HPF
NRBC BLD AUTO-RTO: 0 /100 WBCS
P AXIS: 77 DEGREES
PH UR STRIP.AUTO: 7 [PH]
PLATELET # BLD AUTO: 243 THOUSANDS/UL (ref 149–390)
PMV BLD AUTO: 9.2 FL (ref 8.9–12.7)
POTASSIUM SERPL-SCNC: 3.9 MMOL/L (ref 3.5–5.3)
PR INTERVAL: 170 MS
PROCALCITONIN SERPL-MCNC: <0.05 NG/ML
PROT SERPL-MCNC: 6.6 G/DL (ref 6.4–8.4)
PROT UR STRIP-MCNC: ABNORMAL MG/DL
PROTHROMBIN TIME: 15 SECONDS (ref 11.6–14.5)
QRS AXIS: 44 DEGREES
QRSD INTERVAL: 68 MS
QT INTERVAL: 300 MS
QTC INTERVAL: 429 MS
RBC # BLD AUTO: 3.2 MILLION/UL (ref 3.81–5.12)
RBC #/AREA URNS AUTO: ABNORMAL /HPF
SODIUM SERPL-SCNC: 137 MMOL/L (ref 135–147)
SP GR UR STRIP.AUTO: 1.01 (ref 1–1.03)
T WAVE AXIS: 69 DEGREES
UROBILINOGEN UR STRIP-ACNC: <2 MG/DL
VENTRICULAR RATE: 123 BPM
WBC # BLD AUTO: 7.34 THOUSAND/UL (ref 4.31–10.16)
WBC #/AREA URNS AUTO: ABNORMAL /HPF

## 2024-06-14 PROCEDURE — 99285 EMERGENCY DEPT VISIT HI MDM: CPT

## 2024-06-14 PROCEDURE — 87040 BLOOD CULTURE FOR BACTERIA: CPT

## 2024-06-14 PROCEDURE — 71045 X-RAY EXAM CHEST 1 VIEW: CPT

## 2024-06-14 PROCEDURE — 72125 CT NECK SPINE W/O DYE: CPT

## 2024-06-14 PROCEDURE — 85730 THROMBOPLASTIN TIME PARTIAL: CPT

## 2024-06-14 PROCEDURE — 99291 CRITICAL CARE FIRST HOUR: CPT | Performed by: EMERGENCY MEDICINE

## 2024-06-14 PROCEDURE — 96367 TX/PROPH/DG ADDL SEQ IV INF: CPT

## 2024-06-14 PROCEDURE — 85025 COMPLETE CBC W/AUTO DIFF WBC: CPT

## 2024-06-14 PROCEDURE — 80053 COMPREHEN METABOLIC PANEL: CPT

## 2024-06-14 PROCEDURE — 96375 TX/PRO/DX INJ NEW DRUG ADDON: CPT

## 2024-06-14 PROCEDURE — 93010 ELECTROCARDIOGRAM REPORT: CPT | Performed by: INTERNAL MEDICINE

## 2024-06-14 PROCEDURE — 99222 1ST HOSP IP/OBS MODERATE 55: CPT | Performed by: INTERNAL MEDICINE

## 2024-06-14 PROCEDURE — 87086 URINE CULTURE/COLONY COUNT: CPT

## 2024-06-14 PROCEDURE — 73502 X-RAY EXAM HIP UNI 2-3 VIEWS: CPT

## 2024-06-14 PROCEDURE — 84145 PROCALCITONIN (PCT): CPT

## 2024-06-14 PROCEDURE — 36415 COLL VENOUS BLD VENIPUNCTURE: CPT

## 2024-06-14 PROCEDURE — 96372 THER/PROPH/DIAG INJ SC/IM: CPT

## 2024-06-14 PROCEDURE — 93005 ELECTROCARDIOGRAM TRACING: CPT

## 2024-06-14 PROCEDURE — 96365 THER/PROPH/DIAG IV INF INIT: CPT

## 2024-06-14 PROCEDURE — 83605 ASSAY OF LACTIC ACID: CPT

## 2024-06-14 PROCEDURE — 70450 CT HEAD/BRAIN W/O DYE: CPT

## 2024-06-14 PROCEDURE — 87186 SC STD MICRODIL/AGAR DIL: CPT

## 2024-06-14 PROCEDURE — 85610 PROTHROMBIN TIME: CPT

## 2024-06-14 PROCEDURE — 81001 URINALYSIS AUTO W/SCOPE: CPT

## 2024-06-14 PROCEDURE — 87077 CULTURE AEROBIC IDENTIFY: CPT

## 2024-06-14 RX ORDER — BUSPIRONE HYDROCHLORIDE 5 MG/1
5 TABLET ORAL 2 TIMES DAILY
Status: DISCONTINUED | OUTPATIENT
Start: 2024-06-14 | End: 2024-06-19 | Stop reason: HOSPADM

## 2024-06-14 RX ORDER — IPRATROPIUM BROMIDE 21 UG/1
1 SPRAY, METERED NASAL DAILY
COMMUNITY

## 2024-06-14 RX ORDER — MIDAZOLAM HYDROCHLORIDE 2 MG/2ML
0.5 INJECTION, SOLUTION INTRAMUSCULAR; INTRAVENOUS ONCE
Status: COMPLETED | OUTPATIENT
Start: 2024-06-14 | End: 2024-06-14

## 2024-06-14 RX ORDER — DIVALPROEX SODIUM 250 MG/1
250 TABLET, EXTENDED RELEASE ORAL DAILY
COMMUNITY

## 2024-06-14 RX ORDER — ACETAMINOPHEN 325 MG/1
650 TABLET ORAL EVERY 6 HOURS PRN
Status: DISCONTINUED | OUTPATIENT
Start: 2024-06-14 | End: 2024-06-19 | Stop reason: HOSPADM

## 2024-06-14 RX ORDER — SODIUM CHLORIDE, SODIUM GLUCONATE, SODIUM ACETATE, POTASSIUM CHLORIDE, MAGNESIUM CHLORIDE, SODIUM PHOSPHATE, DIBASIC, AND POTASSIUM PHOSPHATE .53; .5; .37; .037; .03; .012; .00082 G/100ML; G/100ML; G/100ML; G/100ML; G/100ML; G/100ML; G/100ML
500 INJECTION, SOLUTION INTRAVENOUS ONCE
Status: COMPLETED | OUTPATIENT
Start: 2024-06-14 | End: 2024-06-14

## 2024-06-14 RX ORDER — DIVALPROEX SODIUM 500 MG/1
500 TABLET, DELAYED RELEASE ORAL
COMMUNITY

## 2024-06-14 RX ORDER — ASPIRIN 81 MG/1
81 TABLET, CHEWABLE ORAL DAILY
COMMUNITY

## 2024-06-14 RX ORDER — OLANZAPINE 10 MG/2ML
5 INJECTION, POWDER, FOR SOLUTION INTRAMUSCULAR ONCE
Status: COMPLETED | OUTPATIENT
Start: 2024-06-14 | End: 2024-06-14

## 2024-06-14 RX ORDER — DIVALPROEX SODIUM 500 MG/1
500 TABLET, DELAYED RELEASE ORAL
Status: DISCONTINUED | OUTPATIENT
Start: 2024-06-14 | End: 2024-06-19 | Stop reason: HOSPADM

## 2024-06-14 RX ORDER — GUAIFENESIN 600 MG/1
1200 TABLET, EXTENDED RELEASE ORAL EVERY 12 HOURS SCHEDULED
COMMUNITY

## 2024-06-14 RX ORDER — ENOXAPARIN SODIUM 100 MG/ML
40 INJECTION SUBCUTANEOUS DAILY
Status: DISCONTINUED | OUTPATIENT
Start: 2024-06-15 | End: 2024-06-19 | Stop reason: HOSPADM

## 2024-06-14 RX ORDER — OLANZAPINE 2.5 MG/1
2.5 TABLET, FILM COATED ORAL DAILY
COMMUNITY

## 2024-06-14 RX ORDER — OLANZAPINE 2.5 MG/1
2.5 TABLET, FILM COATED ORAL DAILY
Status: DISCONTINUED | OUTPATIENT
Start: 2024-06-15 | End: 2024-06-19 | Stop reason: HOSPADM

## 2024-06-14 RX ORDER — MIDAZOLAM HYDROCHLORIDE 2 MG/2ML
4 INJECTION, SOLUTION INTRAMUSCULAR; INTRAVENOUS ONCE
Status: DISCONTINUED | OUTPATIENT
Start: 2024-06-14 | End: 2024-06-14

## 2024-06-14 RX ORDER — GUAIFENESIN 600 MG/1
1200 TABLET, EXTENDED RELEASE ORAL EVERY 12 HOURS SCHEDULED
Status: DISCONTINUED | OUTPATIENT
Start: 2024-06-14 | End: 2024-06-19 | Stop reason: HOSPADM

## 2024-06-14 RX ORDER — PANTOPRAZOLE SODIUM 40 MG/1
40 TABLET, DELAYED RELEASE ORAL DAILY
Status: DISCONTINUED | OUTPATIENT
Start: 2024-06-15 | End: 2024-06-19 | Stop reason: HOSPADM

## 2024-06-14 RX ORDER — PANTOPRAZOLE SODIUM 40 MG/1
40 TABLET, DELAYED RELEASE ORAL DAILY
COMMUNITY

## 2024-06-14 RX ORDER — MIDAZOLAM HYDROCHLORIDE 2 MG/2ML
2 INJECTION, SOLUTION INTRAMUSCULAR; INTRAVENOUS ONCE
Status: COMPLETED | OUTPATIENT
Start: 2024-06-14 | End: 2024-06-14

## 2024-06-14 RX ORDER — ASPIRIN 81 MG/1
81 TABLET, CHEWABLE ORAL DAILY
Status: DISCONTINUED | OUTPATIENT
Start: 2024-06-15 | End: 2024-06-19 | Stop reason: HOSPADM

## 2024-06-14 RX ORDER — IPRATROPIUM BROMIDE 21 UG/1
1 SPRAY, METERED NASAL DAILY
Status: DISCONTINUED | OUTPATIENT
Start: 2024-06-14 | End: 2024-06-14

## 2024-06-14 RX ORDER — IPRATROPIUM BROMIDE AND ALBUTEROL SULFATE 2.5; .5 MG/3ML; MG/3ML
3 SOLUTION RESPIRATORY (INHALATION) EVERY 8 HOURS PRN
COMMUNITY

## 2024-06-14 RX ORDER — DIVALPROEX SODIUM 250 MG/1
250 TABLET, EXTENDED RELEASE ORAL DAILY
Status: DISCONTINUED | OUTPATIENT
Start: 2024-06-15 | End: 2024-06-19 | Stop reason: HOSPADM

## 2024-06-14 RX ORDER — LANOLIN ALCOHOL/MO/W.PET/CERES
1 CREAM (GRAM) TOPICAL
Status: DISCONTINUED | OUTPATIENT
Start: 2024-06-15 | End: 2024-06-19 | Stop reason: HOSPADM

## 2024-06-14 RX ORDER — IPRATROPIUM BROMIDE AND ALBUTEROL SULFATE 2.5; .5 MG/3ML; MG/3ML
3 SOLUTION RESPIRATORY (INHALATION) EVERY 8 HOURS PRN
Status: DISCONTINUED | OUTPATIENT
Start: 2024-06-14 | End: 2024-06-15

## 2024-06-14 RX ADMIN — MIDAZOLAM 2 MG: 1 INJECTION INTRAMUSCULAR; INTRAVENOUS at 09:54

## 2024-06-14 RX ADMIN — CEFTRIAXONE SODIUM 1000 MG: 10 INJECTION, POWDER, FOR SOLUTION INTRAVENOUS at 14:24

## 2024-06-14 RX ADMIN — MIDAZOLAM 0.5 MG: 1 INJECTION INTRAMUSCULAR; INTRAVENOUS at 12:00

## 2024-06-14 RX ADMIN — OLANZAPINE 5 MG: 10 INJECTION, POWDER, FOR SOLUTION INTRAMUSCULAR at 13:23

## 2024-06-14 RX ADMIN — SODIUM CHLORIDE, SODIUM GLUCONATE, SODIUM ACETATE, POTASSIUM CHLORIDE, MAGNESIUM CHLORIDE, SODIUM PHOSPHATE, DIBASIC, AND POTASSIUM PHOSPHATE 500 ML: .53; .5; .37; .037; .03; .012; .00082 INJECTION, SOLUTION INTRAVENOUS at 11:24

## 2024-06-14 NOTE — ASSESSMENT & PLAN NOTE
"Likely secondary to toxic metabolic encephalopathy in setting of acute infectious process.  I.e. UTI  Discussed at length with patient's son, he reports of declining of cognitive function approximately 15 to 16 weeks ago where during this time she has been in and out of the hospital and in rehab.  Prior to that she had been residing alone at home for the most part independently where she was still paying her own bills.  Though she did have an aide that came in approximately 4 hours a day.  He reports that there has been no formal diagnosis of dementia  More recently since being at present living facility she has been started on psychiatric medications per patient's son to \"control her mood\", including Zyprexa, buspirone and Depakote.  Apparently there she has been having periods of sundowning and at times hitting staff.  He reports that since being on the present regimen that she is on her mood has been much improved with much less agitated periods.  Her son states that she will now be residing permanently at the assisted living facility as they realize she no longer can live independently   Will consult geriatrics  Continue on psych meds  Status post CT head, no acute intracranial pathology  Check folate/vitamin B12, RPR.  Status post TSH in euthyroid state  "

## 2024-06-14 NOTE — ASSESSMENT & PLAN NOTE
Actually meeting SIRS criteria given tachycardia and tachypnea.  Positive lactic acidosis thus severe sepsis, present on admission  Source secondary to acute cystitis  Continue IV ceftriaxone  Follow-up urine culture  Follow-up blood culture  Lactic acidosis now resolved

## 2024-06-14 NOTE — ASSESSMENT & PLAN NOTE
Secondary to recent history of hip fracture status post ORIF, on 5/15/2024 at [facility]  Status post x-ray of hip: Status post right femoral neck internal fixation.  In comparison to an outside CT from 5/23/2024, an intertrochanteric fracture with medially displaced lesser trochanter is grossly unchanged.  PT/OT eval  Fall precautions  Patient denies hip pain

## 2024-06-14 NOTE — H&P
"[facility]  H&P  Name: Chelsey Carson 88 y.o. female I MRN: [id number]  Unit/Bed#: ED 21 I Date of Admission: 6/14/2024   Date of Service: 6/14/2024 I Hospital Day: 0      Assessment & Plan   * Delirium due to another medical condition  Assessment & Plan  Likely secondary to toxic metabolic encephalopathy in setting of acute infectious process.  I.e. UTI  Discussed at length with patient's son, he reports of declining of cognitive function approximately 15 to 16 weeks ago where during this time she has been in and out of the hospital and in rehab.  Prior to that she had been residing alone at home for the most part independently where she was still paying her own bills.  Though she did have an aide that came in approximately 4 hours a day.  He reports that there has been no formal diagnosis of dementia  More recently since being at present living facility she has been started on psychiatric medications per patient's son to \"control her mood\", including Zyprexa, buspirone and Depakote.  Apparently there she has been having periods of sundowning and at times hitting staff.  He reports that since being on the present regimen that she is on her mood has been much improved with much less agitated periods.  Her son states that she will now be residing permanently at the assisted living facility as they realize she no longer can live independently   Will consult geriatrics  Continue on psych meds  Status post CT head, no acute intracranial pathology  Check folate/vitamin B12, RPR.  Status post TSH in euthyroid state    Acute cystitis with hematuria  Assessment & Plan  Actually meeting SIRS criteria given tachycardia and tachypnea.  Positive lactic acidosis thus severe sepsis, present on admission  Source secondary to acute cystitis  Continue IV ceftriaxone  Follow-up urine culture  Follow-up blood culture  Lactic acidosis now resolved    Ambulatory dysfunction  Assessment & " Plan  Secondary to recent history of hip fracture status post ORIF, on 5/15/2024 at [facility]  Status post x-ray of hip: Status post right femoral neck internal fixation.  In comparison to an outside CT from 5/23/2024, an intertrochanteric fracture with medially displaced lesser trochanter is grossly unchanged.  PT/OT eval  Fall precautions  Patient denies hip pain    Anemia  Assessment & Plan  Macrocytic  Actually improved from prior H/H  No sign of acute blood loss  Follow-up folate/vitamin B12       VTE Prophylaxis: Enoxaparin (Lovenox)  / sequential compression device   Code Status: DNR/DNI as per patient's son  POLST: There is no POLST form on file for this patient (pre-hospital)  Discussion with family: Plan of care discussed with patient's son at length via phone    Anticipated Length of Stay:  Patient will be admitted on an Inpatient basis with an anticipated length of stay of more than 2 midnights.   Justification for Hospital Stay: Toxic metabolic encephalopathy, acute cystitis, severe sepsis    Total Time for Visit, including Counseling / Coordination of Care: 60 minutes.  Greater than 50% of this total time spent on direct patient counseling and coordination of care.    Chief Complaint:   Found on the floor, with altered mental status    History of Present Illness:    History from patient is limited therefore obtained via chart review  Chelsey Carson is a 88 y.o. female medical history significant for ambulatory dysfunction status post recent femur fracture status post ORIF, presently residing at the [facility] who presents with having been found on the floor and with altered mental status.  Apparently patient was found on the floor by staff, when tried to help her she was kicking and screaming at the staff.  Per son he states that the was a question a few good days ago that patient may have possible urinary tract infection but did not receive results of UA..    Presentation to the ER patient  extremely agitated.  She received a dose of Versed and Zyprexa.  She is more calm now though at times has periods where she is yelling.    Review of Systems:    Review of Systems   Unable to perform ROS: Mental status change       Past Medical and Surgical History:     Past Medical History:   Diagnosis Date    Abnormal laboratory test 08/01/2022    Acute sinusitis 02/13/2013    Age related osteoporosis 11/01/2022    Formatting of this note might be different from the original.   Last Assessment & Plan:     Formatting of this note might be different from the original.    Risedronate  on allergy list but she does not recall what happened    Her kids told her not to get treatment but I recommend it again to her.     Given the possible allergy refer to rheum to discuss treatment options    Breast cancer (HCC)     Cancer (HCC)     Chronic cough 01/13/2017    COVID-19     History of 2019 novel coronavirus disease (COVID-19) 02/15/2021    Hyperlipidemia     Hyponatremia 02/01/2021    Influenza B 03/05/2024    Insomnia 02/17/2021    MCI (mild cognitive impairment) 02/15/2021    Nonrheumatic aortic (valve) stenosis 03/15/2024    Other specified forms of tremor 03/15/2024    Palpitations 03/15/2024    Pneumonia due to COVID-19 virus 02/01/2021    Slow transit constipation 03/15/2024    Stage 3a chronic kidney disease (HCC) 09/27/2023    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 Authorizing Provider   aspirin 81 mg chewable tablet Chew 81 mg daily   Yes Historical Provider, MD   divalproex sodium (DEPAKOTE ER) 250 mg 24 hr tablet Take 250 mg by mouth daily   Yes Historical Provider, MD   divalproex sodium (DEPAKOTE) 500 mg DR tablet  Take 500 mg by mouth daily at bedtime   Yes Historical Provider, MD   guaiFENesin (MUCINEX) 600 mg 12 hr tablet Take 1,200 mg by mouth every 12 (twelve) hours   Yes Historical Provider, MD   ipratropium (ATROVENT) 0.03 % nasal spray 1 spray into each nostril in the morning   Yes Historical Provider, MD   ipratropium-albuterol (DUO-NEB) 0.5-2.5 mg/3 mL nebulizer solution Take 3 mL by nebulization every 8 (eight) hours as needed for wheezing or shortness of breath   Yes Historical Provider, MD   OLANZapine (ZyPREXA) 2.5 mg tablet Take 2.5 mg by mouth daily   Yes Historical Provider, MD   pantoprazole (PROTONIX) 40 mg tablet Take 40 mg by mouth daily   Yes Historical Provider, MD   acetaminophen (TYLENOL) 325 mg tablet Take 2 tablets (650 mg total) by mouth every 6 (six) hours as needed for mild pain, headaches or fever 3/27/24   Leesa Andrew PA-C   busPIRone (BUSPAR) 5 mg tablet Take 1 tablet (5 mg total) by mouth 3 (three) times a day  Patient taking differently: Take 5 mg by mouth 2 (two) times a day 10/11/23   Mariely Fan MD   Calcium Carb-Cholecalciferol 1000-800 MG-UNIT TABS Take 1 tablet by mouth in the morning    Historical Provider, MD   LORazepam (Ativan) 0.5 mg tablet Take 1 tablet (0.5 mg total) by mouth 1 (one) time for 1 dose  Patient not taking: Reported on 6/14/2024 4/25/24 4/25/24  LANI Berrios   polyethylene glycol (GLYCOLAX) 17 GM/SCOOP powder Take 17 g by mouth daily as needed (constipation)  Patient not taking: Reported on 6/14/2024 4/29/24 5/29/24  Mariely Fan MD   doxylamine (Unisom SleepTabs) 25 MG tablet Take 25 mg by mouth daily at bedtime as needed for sleep  6/14/24  Historical Provider, MD   hydrocortisone (ANUSOL-HC) 2.5 % rectal cream Apply topically 4 (four) times a day as needed for hemorrhoids 3/27/24 6/14/24  Leesa Andrew PA-C   ipratropium-albuterol (DUO-NEB) 0.5-2.5 mg/3 mL nebulizer solution Take 3 mL by nebulization 3 (three) times a day 4/11/24 6/14/24  Sebastien Joya  MD   metoclopramide (Reglan) 10 mg tablet Take 1 tablet (10 mg total) by mouth 4 (four) times a day as needed (nausea) 3/27/24 6/14/24  Leesa Andrew PA-C   mirtazapine (REMERON) 7.5 MG tablet Take 1 tablet (7.5 mg total) by mouth daily at bedtime  Patient taking differently: Take 15 mg by mouth daily at bedtime 3/27/24 6/14/24  Leesa Andrew PA-C   Multiple Vitamins-Minerals (CENTRUM SILVER 50+WOMEN PO) Take by mouth  6/14/24  Historical Provider, MD   propranolol (INDERAL) 10 mg tablet Take 10 mg by mouth 2 (two) times a day  6/14/24  Historical Provider, MD   senna-docusate sodium (SENOKOT S) 8.6-50 mg per tablet Take 2 tablets by mouth daily as needed for constipation 4/29/24 6/14/24  Mariely Fan MD   simvastatin (ZOCOR) 5 MG tablet Take 1 tablet (5 mg total) by mouth daily at bedtime 1/13/23 6/14/24  Mariely Fan MD   trimethobenzamide (TIGAN) 100 mg/mL Inject 2 mL (200 mg total) into a muscle every 6 (six) hours as needed (nausea if unable to take oral pill) 3/27/24 6/14/24  Leesa Andrew PA-C     I have reveiwed home medications using records provided by [facility].    Allergies:   Allergies   Allergen Reactions    Pravastatin     Risedronate     Paxlovid [Nirmatrelvir-Ritonavir] Nausea Only       Social History:     Marital Status:    Occupation:   Patient Pre-hospital Living Situation: Resides at facility  Patient Pre-hospital Level of Mobility: Requires assistance  Patient Pre-hospital Diet Restrictions: None  Substance Use History:   Social History     Substance and Sexual Activity   Alcohol Use Not Currently    Alcohol/week: 1.0 standard drink of alcohol    Types: 1 Glasses of wine per week     Social History     Tobacco Use   Smoking Status Never   Smokeless Tobacco Never     Social History     Substance and Sexual Activity   Drug Use No       Family History:    Family History   Problem Relation Age of Onset    Breast cancer Mother     Heart disease Father         heart problem    Heart disease Sister          heart problem    Heart disease Brother         heart problem       Physical Exam:     Vitals:   Blood Pressure: 136/80 (06/14/24 1420)  Pulse: (!) 110 (06/14/24 1420)  Temperature: 97.9 °F (36.6 °C) (06/14/24 0933)  Temp Source: Oral (06/14/24 0933)  Respirations: 18 (06/14/24 1420)  SpO2: 97 % (06/14/24 1420)    Physical Exam  Constitutional:       Comments: Oriented to person, date    Cardiovascular:      Rate and Rhythm: Normal rate and regular rhythm.      Pulses: Normal pulses.      Heart sounds: Normal heart sounds. No murmur heard.  Pulmonary:      Effort: Pulmonary effort is normal. No respiratory distress.      Breath sounds: Normal breath sounds. No wheezing or rales.   Abdominal:      General: Abdomen is flat. Bowel sounds are normal. There is no distension.      Tenderness: There is no abdominal tenderness. There is no guarding.   Musculoskeletal:         General: Normal range of motion.      Cervical back: Normal range of motion and neck supple.      Right lower leg: No edema.      Left lower leg: No edema.   Skin:     General: Skin is warm and dry.   Neurological:      General: No focal deficit present.      Mental Status: She is alert.      Cranial Nerves: No cranial nerve deficit.      Motor: No weakness.      Comments: Confused         Additional Data:     Lab Results: I have personally reviewed pertinent reports.      Results from last 7 days   Lab Units 06/14/24  1007   WBC Thousand/uL 7.34   HEMOGLOBIN g/dL 10.8*   HEMATOCRIT % 33.3*   PLATELETS Thousands/uL 243   SEGS PCT % 69   LYMPHO PCT % 16   MONO PCT % 13*   EOS PCT % 1     Results from last 7 days   Lab Units 06/14/24  1007   SODIUM mmol/L 137   POTASSIUM mmol/L 3.9   CHLORIDE mmol/L 103   CO2 mmol/L 24   BUN mg/dL 25   CREATININE mg/dL 0.99   ANION GAP mmol/L 10   CALCIUM mg/dL 8.9   ALBUMIN g/dL 3.5   TOTAL BILIRUBIN mg/dL 0.42   ALK PHOS U/L 80   ALT U/L 7   AST U/L 13   GLUCOSE RANDOM mg/dL 112     Results from last 7 days   Lab  Units 06/14/24  1007   INR  1.19             Results from last 7 days   Lab Units 06/14/24  1343 06/14/24  1007   LACTIC ACID mmol/L 2.0 2.9*   PROCALCITONIN ng/ml  --  <0.05       Imaging: I have personally reviewed pertinent reports.      XR hip/pelv 2-3 vws right if performed   Final Result by Andrés Pelaez MD (06/14 1209)      Status post right femoral neck internal fixation.   In comparison to an outside CT from 5/23/2024, an intertrochanteric fracture with medially displaced lesser trochanter is grossly unchanged.                  Workstation performed: [id number]         XR chest portable   Final Result by Andrés Pelaez MD (06/14 1108)      No acute cardiopulmonary disease.            Workstation performed: [id number]         CT head without contrast   Final Result by Kenji Macedo MD (06/14 1104)      No acute intracranial abnormality.                  Workstation performed: [id number]         CT spine cervical without contrast   Final Result by Kenji Macedo MD (06/14 1107)      No cervical spine fracture or traumatic malalignment.                  Workstation performed: [id number]             EKG, Pathology, and Other Studies Reviewed on Admission:   EKG: Sinus rhythm with first-degree AV block, normal axis, low voltage    Allscripts / Epic Records Reviewed: Yes     ** Please Note: This note has been constructed using a voice recognition system. **

## 2024-06-14 NOTE — ED NOTES
Bladder scanned patient showing 70 ml. Will re-scan in a little to see if we can straight cath.      Tomasa Hyman RN  06/14/24 [id number]

## 2024-06-14 NOTE — ED PROVIDER NOTES
Emergency Department Trauma Note  Chelsey Carson 88 y.o. female MRN: [id number]  Unit/Bed#: ED 21/ED 21 Encounter: [id number]      Trauma Alert: Trauma Acuity: C  Model of Arrival: Mode of Arrival: ALS via    Trauma Team: Current Providers  Attending Provider: Lilly Scott MD  Attending Provider: Jeanine Hennessy DO  Attending Provider: Kelly Phillip DO  Registered Nurse: Tomasa Hyman RN  Registered Nurse: Jessica Patel RN  Resident: Tong Diana DO  ED Technician: Loretta Kent  Registered Nurse: Melida Stratton RN  Resident: Renaldo St MD  Consultants:     None      History of Present Illness     Chief Complaint:   Chief Complaint   Patient presents with    Altered Mental Status     Patient coming in from Nursing Facility for unwitnessed fall on ASA. Patient is also complaining of burning with urination.      HPI:  Chelsey Carson is a 88 y.o. female who presents status post unwitnessed fall.  Patient is on aspirin.  Patient complains of right hip pain.  Patient also reports that she had burning on urination yesterday.  Per nursing home staff, patient is normally cooperative.  Staff reports that this morning, patient was found on her back screaming.  She then began to hit some of the staff.  She normally does not do this per staff.  On chart review, patient has a history of chronic hyponatremia, closed displaced intertrochanteric fracture of right hip s/p ORIF 5/15. .  Mechanism:Details of Incident: Fall Injury Date: 06/14/24 Injury Time: 0933      HPI  Review of Systems   Unable to perform ROS: Mental status change   Constitutional:  Negative for chills and fever.   Psychiatric/Behavioral:  Positive for agitation and behavioral problems.        Historical Information     Immunizations:   Immunization History   Administered Date(s) Administered    COVID-19 PFIZER VACCINE 0.3 ML IM 05/21/2021, 06/16/2021, 12/15/2021    INFLUENZA 09/29/2014, 10/19/2015, 10/26/2016,  11/08/2017, 10/09/2018, 09/14/2020, 11/16/2021    Influenza Quadrivalent Preservative Free 3 years and older IM 10/19/2011    Influenza, high dose seasonal 0.7 mL 10/02/2019, 10/10/2022    Tdap 03/28/2018    Tuberculin Skin Test 02/06/2021, 02/15/2021, 09/20/2023       Past Medical History:   Diagnosis Date    Abnormal laboratory test 08/01/2022    Acute sinusitis 02/13/2013    Age related osteoporosis 11/01/2022    Formatting of this note might be different from the original.   Last Assessment & Plan:     Formatting of this note might be different from the original.    Risedronate  on allergy list but she does not recall what happened    Her kids told her not to get treatment but I recommend it again to her.     Given the possible allergy refer to rheum to discuss treatment options    Breast cancer (HCC)     Cancer (HCC)     Chronic cough 01/13/2017    COVID-19     History of 2019 novel coronavirus disease (COVID-19) 02/15/2021    Hyperlipidemia     Hyponatremia 02/01/2021    Influenza B 03/05/2024    Insomnia 02/17/2021    MCI (mild cognitive impairment) 02/15/2021    Nonrheumatic aortic (valve) stenosis 03/15/2024    Other specified forms of tremor 03/15/2024    Palpitations 03/15/2024    Pneumonia due to COVID-19 virus 02/01/2021    Slow transit constipation 03/15/2024    Stage 3a chronic kidney disease (HCC) 09/27/2023    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    Thrombocytosis, unspecified 03/15/2024       Family History   Problem Relation Age of Onset    Breast cancer Mother     Heart disease Father         heart problem    Heart disease Sister         heart problem    Heart disease Brother         heart problem     Past Surgical History:   Procedure Laterality Date    BREAST SURGERY      cancer (> 5 years)    HYSTERECTOMY       Social History     Tobacco Use    Smoking status: Never    Smokeless tobacco: Never   Vaping Use    Vaping status: Never Used   Substance Use  Topics    Alcohol use: Not Currently     Alcohol/week: 1.0 standard drink of alcohol     Types: 1 Glasses of wine per week    Drug use: No     E-Cigarette/Vaping    E-Cigarette Use Never User      E-Cigarette/Vaping Substances    Nicotine No     THC No     CBD No     Flavoring No        Family History: non-contributory    Meds/Allergies   Prior to Admission Medications   Prescriptions Last Dose Informant Patient Reported? Taking?   Calcium Carb-Cholecalciferol 1000-800 MG-UNIT TABS  Self, Outside Facility (Specify) Yes No   Sig: Take 1 tablet by mouth in the morning   LORazepam (Ativan) 0.5 mg tablet Not Taking  No No   Sig: Take 1 tablet (0.5 mg total) by mouth 1 (one) time for 1 dose   Patient not taking: Reported on 2024   OLANZapine (ZyPREXA) 2.5 mg tablet   Yes Yes   Sig: Take 2.5 mg by mouth daily   acetaminophen (TYLENOL) 325 mg tablet  Self, Outside Facility (Specify) No No   Sig: Take 2 tablets (650 mg total) by mouth every 6 (six) hours as needed for mild pain, headaches or fever   aspirin 81 mg chewable tablet   Yes Yes   Sig: Chew 81 mg daily   busPIRone (BUSPAR) 5 mg tablet  Self, Outside Facility (Specify) No No   Sig: Take 1 tablet (5 mg total) by mouth 3 (three) times a day   Patient taking differently: Take 5 mg by mouth 2 (two) times a day   divalproex sodium (DEPAKOTE ER) 250 mg 24 hr tablet   Yes Yes   Sig: Take 250 mg by mouth daily   divalproex sodium (DEPAKOTE) 500 mg DR tablet   Yes Yes   Sig: Take 500 mg by mouth daily at bedtime   guaiFENesin (MUCINEX) 600 mg 12 hr tablet   Yes Yes   Sig: Take 1,200 mg by mouth every 12 (twelve) hours   ipratropium (ATROVENT) 0.03 % nasal spray   Yes Yes   Si spray into each nostril in the morning   ipratropium-albuterol (DUO-NEB) 0.5-2.5 mg/3 mL nebulizer solution   Yes Yes   Sig: Take 3 mL by nebulization every 8 (eight) hours as needed for wheezing or shortness of breath   pantoprazole (PROTONIX) 40 mg tablet   Yes Yes   Sig: Take 40 mg by mouth  daily   polyethylene glycol (GLYCOLAX) 17 GM/SCOOP powder Not Taking  No No   Sig: Take 17 g by mouth daily as needed (constipation)   Patient not taking: Reported on 6/14/2024      Facility-Administered Medications: None       Allergies   Allergen Reactions    Pravastatin     Risedronate     Paxlovid [Nirmatrelvir-Ritonavir] Nausea Only       PHYSICAL EXAM    Objective   Vitals:   First set: Temperature: 97.9 °F (36.6 °C) (06/14/24 0933)  Pulse: (!) 120 (06/14/24 0933)  Respirations: 16 (06/14/24 0933)  Blood Pressure: 156/89 (06/14/24 0933)  SpO2: 97 % (06/14/24 0933)    Primary Survey:   (A) Airway: intact  (B) Breathing: clear bilateral breath sounds  (C) Circulation: Pulses:   normal  (D) Disabliity:  GCS Total:  15  (E) Expose:  Completed    Secondary Survey: (Click on Physical Exam tab above)  Physical Exam  Vitals and nursing note reviewed.   Constitutional:       General: She is not in acute distress.     Appearance: She is well-developed and normal weight. She is not ill-appearing, toxic-appearing or diaphoretic.   HENT:      Head: Normocephalic and atraumatic.      Mouth/Throat:      Mouth: Mucous membranes are moist.   Eyes:      Extraocular Movements: Extraocular movements intact.   Cardiovascular:      Rate and Rhythm: Regular rhythm. Tachycardia present.      Heart sounds: Normal heart sounds.   Pulmonary:      Effort: Pulmonary effort is normal.      Breath sounds: Normal breath sounds.   Abdominal:      Palpations: Abdomen is soft.      Tenderness: There is no abdominal tenderness.   Musculoskeletal:      Cervical back: Neck supple.      Comments: Tenderness to palpation of the right hip   Skin:     General: Skin is warm and dry.      Capillary Refill: Capillary refill takes less than 2 seconds.   Neurological:      Mental Status: She is alert. She is confused.      GCS: GCS eye subscore is 4. GCS verbal subscore is 5. GCS motor subscore is 6.         Cervical spine cleared by clinical criteria? Yes      Invasive Devices       Peripheral Intravenous Line  Duration             Peripheral IV 06/14/24 Distal;Right;Ventral (anterior) Forearm <1 day                    Lab Results:   Results Reviewed       Procedure Component Value Units Date/Time    Blood culture #1 [[id number]] Collected: 06/14/24 1343    Lab Status: Preliminary result Specimen: Blood from Arm, Left Updated: 06/14/24 1701     Blood Culture Received in Microbiology Lab. Culture in Progress.    Blood culture #2 [[id number]] Collected: 06/14/24 1007    Lab Status: Preliminary result Specimen: Blood from Arm, Right Updated: 06/14/24 1501     Blood Culture Received in Microbiology Lab. Culture in Progress.    Lactic acid 2 Hours [[id number]]  (Normal) Collected: 06/14/24 1343    Lab Status: Final result Specimen: Blood from Arm, Left Updated: 06/14/24 1427     LACTIC ACID 2.0 mmol/L     Narrative:      Result may be elevated if tourniquet was used during collection.    Urine Microscopic [[id number]]  (Abnormal) Collected: 06/14/24 1341    Lab Status: Final result Specimen: Urine, Straight Cath Updated: 06/14/24 1359     RBC, UA 4-10 /hpf      WBC, UA Innumerable /hpf      Epithelial Cells None Seen /hpf      Bacteria, UA Moderate /hpf     Urine culture [[id number]] Collected: 06/14/24 1341    Lab Status: In process Specimen: Urine, Straight Cath Updated: 06/14/24 1359    UA w Reflex to Microscopic w Reflex to Culture [[id number]]  (Abnormal) Collected: 06/14/24 1341    Lab Status: Final result Specimen: Urine, Straight Cath Updated: 06/14/24 1355     Color, UA Yellow     Clarity, UA Turbid     Specific Gravity, UA 1.014     pH, UA 7.0     Leukocytes, UA Large     Nitrite, UA Negative     Protein, UA 50 (1+) mg/dl      Glucose, UA Negative mg/dl      Ketones, UA Trace mg/dl      Urobilinogen, UA <2.0 mg/dl      Bilirubin, UA Negative     Occult Blood, UA Small    Protime-INR [[id number]]  (Abnormal) Collected: 06/14/24 1007    Lab Status: Final result Specimen:  Blood from Arm, Right Updated: 06/14/24 1048     Protime 15.0 seconds      INR 1.19    APTT [[id number]]  (Normal) Collected: 06/14/24 1007    Lab Status: Final result Specimen: Blood from Arm, Right Updated: 06/14/24 1048     PTT 34 seconds     Procalcitonin [[id number]]  (Normal) Collected: 06/14/24 1007    Lab Status: Final result Specimen: Blood from Arm, Right Updated: 06/14/24 1044     Procalcitonin <0.05 ng/ml     Comprehensive metabolic panel [[id number]] Collected: 06/14/24 1007    Lab Status: Final result Specimen: Blood from Arm, Right Updated: 06/14/24 1036     Sodium 137 mmol/L      Potassium 3.9 mmol/L      Chloride 103 mmol/L      CO2 24 mmol/L      ANION GAP 10 mmol/L      BUN 25 mg/dL      Creatinine 0.99 mg/dL      Glucose 112 mg/dL      Calcium 8.9 mg/dL      AST 13 U/L      ALT 7 U/L      Alkaline Phosphatase 80 U/L      Total Protein 6.6 g/dL      Albumin 3.5 g/dL      Total Bilirubin 0.42 mg/dL      eGFR 51 ml/min/1.73sq m     Narrative:      National Kid[employer] Disease Foundation guidelines for Chronic Kidney Disease (CKD):     Stage 1 with normal or high GFR (GFR > 90 mL/min/1.73 square meters)    Stage 2 Mild CKD (GFR = 60-89 mL/min/1.73 square meters)    Stage 3A Moderate CKD (GFR = 45-59 mL/min/1.73 square meters)    Stage 3B Moderate CKD (GFR = 30-44 mL/min/1.73 square meters)    Stage 4 Severe CKD (GFR = 15-29 mL/min/1.73 square meters)    Stage 5 End Stage CKD (GFR <15 mL/min/1.73 square meters)  Note: GFR calculation is accurate only with a steady state creatinine    Lactic acid [[id number]]  (Abnormal) Collected: 06/14/24 1007    Lab Status: Final result Specimen: Blood from Arm, Right Updated: 06/14/24 1035     LACTIC ACID 2.9 mmol/L     Narrative:      Result may be elevated if tourniquet was used during collection.    CBC and differential [[id number]]  (Abnormal) Collected: 06/14/24 1007    Lab Status: Final result Specimen: Blood from Arm, Right Updated: 06/14/24 1019     WBC 7.34  Thousand/uL      RBC 3.20 Million/uL      Hemoglobin 10.8 g/dL      Hematocrit 33.3 %       fL      MCH 33.8 pg      MCHC 32.4 g/dL      RDW 16.9 %      MPV 9.2 fL      Platelets 243 Thousands/uL      nRBC 0 /100 WBCs      Segmented % 69 %      Immature Grans % 0 %      Lymphocytes % 16 %      Monocytes % 13 %      Eosinophils Relative 1 %      Basophils Relative 1 %      Absolute Neutrophils 5.08 Thousands/µL      Absolute Immature Grans 0.03 Thousand/uL      Absolute Lymphocytes 1.19 Thousands/µL      Absolute Monocytes 0.96 Thousand/µL      Eosinophils Absolute 0.04 Thousand/µL      Basophils Absolute 0.04 Thousands/µL                    Imaging Studies:   Direct to CT: No  XR hip/pelv 2-3 vws right if performed   Final Result by Andrés Pelaez MD (06/14 1209)      Status post right femoral neck internal fixation.   In comparison to an outside CT from 5/23/2024, an intertrochanteric fracture with medially displaced lesser trochanter is grossly unchanged.                  Workstation performed: [id number]         XR chest portable   Final Result by Andrés Pelaez MD (06/14 1108)      No acute cardiopulmonary disease.            Workstation performed: [id number]         CT head without contrast   Final Result by Kenji Macedo MD (06/14 1104)      No acute intracranial abnormality.                  Workstation performed: [id number]         CT spine cervical without contrast   Final Result by Kenji Macedo MD (06/14 1107)      No cervical spine fracture or traumatic malalignment.                  Workstation performed: [id number]               Procedures  Procedures         ED Course  ED Course as of 06/14/24 1739   Fri Jun 14, 2024   1029 WBC: 7.34  Within normal limits   1047 LACTIC ACID(!): 2.9  Patient has history of aortic regurgitation.  Will start fluid resuscitation with 500 cc Isolyte bolus   1047 Procalcitonin: <0.05   1121 CT head without contrast  No acute intracranial  abnormality   1122 CT spine cervical without contrast  No acute fracture or malalignment   1150 I called [facility]'s facility [facility]. She was found on floor this morning laying on back. Per staff, there is a change in mental status.  Patient is normally cooperative, but today she was not.  She also had hitting behaviors and was screaming.    1254 Nurse will BladderScan    1302 Patient too agitated to obtain straight cath.  Will administer 5 mg Zyprexa IM   1416 Bacteria, UA(!): Moderate  Will treat with IV antibiotics and admit   1429 LACTIC ACID: 2.0           Medical Decision Making  88-year-old female presents to ED status post unwitnessed fall, with mental status change and agitation.  On exam, patient is afebrile, tachycardic.  Patient is confused and agitated.  Heart tachycardic but regular rhythm.  Lungs clear to auscultation bilaterally.  Abdomen soft nontender.  Right hip with tenderness to palpation.  Differential diagnosis: Altered mental status etiology I suspect infectious based on patient's history of burning dysuria.  I doubt stroke, electrolyte abnormality.  Rule out fracture.  Plan: CT head and C-spine, chest x-ray, right hip x-ray.  Will obtain CBC, BMP, lactate, blood cultures x 2, urinalysis.  Will start with 2 mg IM Versed for agitation.  This patient will be admitted to [facility] if trauma workup negative.  Please see ED course for additional information.    Amount and/or Complexity of Data Reviewed  Labs: ordered. Decision-making details documented in ED Course.  Radiology: ordered. Decision-making details documented in ED Course.    Risk  Prescription drug management.  Decision regarding hospitalization.                Disposition  [facility] Transfer: No  Final diagnoses:   Altered mental status   Urinary tract infection     Time reflects when diagnosis was documented in both MDM as applicable and the Disposition within this note       Time User Action Codes Description Comment     6/14/2024  2:19 PM Tong Diana [R41.82] Altered mental status     6/14/2024  2:19 PM Tong Diana [N39.0] Urinary tract infection     6/14/2024  3:19 PM Kelly Phillip [F05] Delirium due to another medical condition           ED Disposition       ED Disposition   Admit    Condition   Stable    Date/Time   Fri Jun 14, 2024  2:54 PM    Comment   Case was discussed with SLIM and the patient's admission status was agreed to be Admission Status: inpatient status to the service of Dr. Jeanine Hennessy .               Follow-up Information    None       Patient's Medications   Discharge Prescriptions    No medications on file     No discharge procedures on file.    PDMP Review         Value Time User    PDMP Reviewed  Yes 4/25/2024  9:01 PM LANI Berrios            ED Provider  Electronically Signed by           Tong Diana DO  06/14/24 [id number]

## 2024-06-14 NOTE — Clinical Note
Case was discussed with JOSE and the patient's admission status was agreed to be Admission Status: inpatient status to the service of Dr. Kaur

## 2024-06-14 NOTE — ASSESSMENT & PLAN NOTE
Macrocytic  Actually improved from prior H/H  No sign of acute blood loss  Follow-up folate/vitamin B12

## 2024-06-15 LAB
ANION GAP SERPL CALCULATED.3IONS-SCNC: 9 MMOL/L (ref 4–13)
BUN SERPL-MCNC: 16 MG/DL (ref 5–25)
CALCIUM SERPL-MCNC: 8.6 MG/DL (ref 8.4–10.2)
CHLORIDE SERPL-SCNC: 103 MMOL/L (ref 96–108)
CO2 SERPL-SCNC: 27 MMOL/L (ref 21–32)
CREAT SERPL-MCNC: 0.83 MG/DL (ref 0.6–1.3)
ERYTHROCYTE [DISTWIDTH] IN BLOOD BY AUTOMATED COUNT: 17.2 % (ref 11.6–15.1)
FOLATE SERPL-MCNC: 18.9 NG/ML
GFR SERPL CREATININE-BSD FRML MDRD: 63 ML/MIN/1.73SQ M
GLUCOSE SERPL-MCNC: 147 MG/DL (ref 65–140)
HCT VFR BLD AUTO: 33.9 % (ref 34.8–46.1)
HGB BLD-MCNC: 10.8 G/DL (ref 11.5–15.4)
MCH RBC QN AUTO: 32.9 PG (ref 26.8–34.3)
MCHC RBC AUTO-ENTMCNC: 31.9 G/DL (ref 31.4–37.4)
MCV RBC AUTO: 103 FL (ref 82–98)
PLATELET # BLD AUTO: 227 THOUSANDS/UL (ref 149–390)
PMV BLD AUTO: 9.6 FL (ref 8.9–12.7)
POTASSIUM SERPL-SCNC: 3.4 MMOL/L (ref 3.5–5.3)
RBC # BLD AUTO: 3.28 MILLION/UL (ref 3.81–5.12)
SODIUM SERPL-SCNC: 139 MMOL/L (ref 135–147)
TREPONEMA PALLIDUM IGG+IGM AB [PRESENCE] IN SERUM OR PLASMA BY IMMUNOASSAY: NORMAL
VIT B12 SERPL-MCNC: 529 PG/ML (ref 180–914)
WBC # BLD AUTO: 5.76 THOUSAND/UL (ref 4.31–10.16)

## 2024-06-15 PROCEDURE — 97167 OT EVAL HIGH COMPLEX 60 MIN: CPT

## 2024-06-15 PROCEDURE — 82607 VITAMIN B-12: CPT | Performed by: INTERNAL MEDICINE

## 2024-06-15 PROCEDURE — 85027 COMPLETE CBC AUTOMATED: CPT | Performed by: INTERNAL MEDICINE

## 2024-06-15 PROCEDURE — 80048 BASIC METABOLIC PNL TOTAL CA: CPT | Performed by: INTERNAL MEDICINE

## 2024-06-15 PROCEDURE — 82746 ASSAY OF FOLIC ACID SERUM: CPT | Performed by: INTERNAL MEDICINE

## 2024-06-15 PROCEDURE — 99232 SBSQ HOSP IP/OBS MODERATE 35: CPT | Performed by: FAMILY MEDICINE

## 2024-06-15 PROCEDURE — 86780 TREPONEMA PALLIDUM: CPT | Performed by: INTERNAL MEDICINE

## 2024-06-15 RX ORDER — OLANZAPINE 10 MG/2ML
2.5 INJECTION, POWDER, FOR SOLUTION INTRAMUSCULAR ONCE
Status: COMPLETED | OUTPATIENT
Start: 2024-06-15 | End: 2024-06-15

## 2024-06-15 RX ORDER — WATER 10 ML/10ML
INJECTION INTRAMUSCULAR; INTRAVENOUS; SUBCUTANEOUS
Status: COMPLETED
Start: 2024-06-15 | End: 2024-06-15

## 2024-06-15 RX ORDER — WATER 10 ML/10ML
INJECTION INTRAMUSCULAR; INTRAVENOUS; SUBCUTANEOUS
Status: DISPENSED
Start: 2024-06-15 | End: 2024-06-15

## 2024-06-15 RX ORDER — POTASSIUM CHLORIDE 20 MEQ/1
40 TABLET, EXTENDED RELEASE ORAL ONCE
Status: COMPLETED | OUTPATIENT
Start: 2024-06-15 | End: 2024-06-15

## 2024-06-15 RX ORDER — OLANZAPINE 10 MG/2ML
2.5 INJECTION, POWDER, FOR SOLUTION INTRAMUSCULAR EVERY 6 HOURS PRN
Status: DISCONTINUED | OUTPATIENT
Start: 2024-06-15 | End: 2024-06-16

## 2024-06-15 RX ADMIN — GUAIFENESIN 1200 MG: 600 TABLET, EXTENDED RELEASE ORAL at 20:48

## 2024-06-15 RX ADMIN — CEFTRIAXONE 1000 MG: 1 INJECTION, POWDER, FOR SOLUTION INTRAMUSCULAR; INTRAVENOUS at 16:06

## 2024-06-15 RX ADMIN — BUSPIRONE HYDROCHLORIDE 5 MG: 5 TABLET ORAL at 17:06

## 2024-06-15 RX ADMIN — Medication 1 TABLET: at 10:32

## 2024-06-15 RX ADMIN — OLANZAPINE 2.5 MG: 10 INJECTION, POWDER, FOR SOLUTION INTRAMUSCULAR at 20:48

## 2024-06-15 RX ADMIN — BUSPIRONE HYDROCHLORIDE 5 MG: 5 TABLET ORAL at 10:29

## 2024-06-15 RX ADMIN — GUAIFENESIN 1200 MG: 600 TABLET, EXTENDED RELEASE ORAL at 10:32

## 2024-06-15 RX ADMIN — PANTOPRAZOLE SODIUM 40 MG: 40 TABLET, DELAYED RELEASE ORAL at 04:46

## 2024-06-15 RX ADMIN — OLANZAPINE 2.5 MG: 10 INJECTION, POWDER, LYOPHILIZED, FOR SOLUTION INTRAMUSCULAR at 05:01

## 2024-06-15 RX ADMIN — ASPIRIN 81 MG CHEWABLE TABLET 81 MG: 81 TABLET CHEWABLE at 10:29

## 2024-06-15 RX ADMIN — DIVALPROEX SODIUM 500 MG: 500 TABLET, DELAYED RELEASE ORAL at 20:48

## 2024-06-15 RX ADMIN — POTASSIUM CHLORIDE 40 MEQ: 1500 TABLET, EXTENDED RELEASE ORAL at 20:48

## 2024-06-15 RX ADMIN — ENOXAPARIN SODIUM 40 MG: 40 INJECTION SUBCUTANEOUS at 10:36

## 2024-06-15 RX ADMIN — DIVALPROEX SODIUM 250 MG: 250 TABLET, EXTENDED RELEASE ORAL at 10:29

## 2024-06-15 RX ADMIN — WATER: 1 INJECTION INTRAMUSCULAR; INTRAVENOUS; SUBCUTANEOUS at 05:03

## 2024-06-15 RX ADMIN — OLANZAPINE 2.5 MG: 2.5 TABLET, FILM COATED ORAL at 10:29

## 2024-06-15 NOTE — ASSESSMENT & PLAN NOTE
Secondary to recent history of hip fracture status post ORIF, on 5/15/2024 at [facility]  Status post x-ray of hip: Status post right femoral neck internal fixation.  In comparison to an outside CT from 5/23/2024, an intertrochanteric fracture with medially displaced lesser trochanter is grossly unchanged.  PT/OT eval  Fall precautions  Patient denies hip pain

## 2024-06-15 NOTE — PLAN OF CARE
Problem: OCCUPATIONAL THERAPY ADULT  Goal: Performs self-care activities at highest level of function for planned discharge setting.  See evaluation for individualized goals.  Description: Treatment Interventions: ADL retraining, Functional transfer training, UE strengthening/ROM, Endurance training, Cognitive reorientation, Patient/family training, Equipment evaluation/education, Compensatory technique education, Activityengagement, Energy conservation          See flowsheet documentation for full assessment, interventions and recommendations.   Note: Limitation: Decreased ADL status, Decreased UE ROM, Decreased UE strength, Decreased Safe judgement during ADL, Decreased cognition, Decreased endurance, Decreased fine motor control, Decreased high-level ADLs  Prognosis: Fair  Assessment: x  Pt is a 87 yo female seen for OT eval s/p adm to [facility] on 6/14/24 w/ Pt found on floor at her [facility] dx'd w/ delirium due to another medical condition. Per EMR, Pt has recent history of R hip fx s/p ORIF 5/15/[facility] at [facility]. Current imaging (-) for additional trauma to hip.  has a past medical history of Abnormal laboratory test (08/01/2022), Acute sinusitis (02/13/2013), Age related osteoporosis (11/01/2022), Breast cancer ([facility]), Cancer ([facility]), Chronic cough (01/13/2017), COVID-19, History of 2019 novel coronavirus disease (COVID-19) (02/15/2021), Hyperlipidemia, Hyponatremia (02/01/2021), Influenza B (03/05/2024), Insomnia (02/17/2021), MCI (mild cognitive impairment) (02/15/2021), Nonrheumatic aortic (valve) stenosis (03/15/2024), Other specified forms of tremor (03/15/2024), Palpitations (03/15/2024), Pneumonia due to COVID-19 virus (02/01/2021), Slow transit constipation (03/15/2024), Stage 3a chronic kidney disease ([facility]) (09/27/2023), 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 Pt with active OT orders and activity as tolerated orders. Pt is a poor  historian and is unable to recall PLOF or home set up information. Will require further clarification from CM to A w/ safe dispo planning. Pt is currently demonstrating the following occupational deficits: Min A grooming, Mod A UB self care, Max A LB self care, Total A toileting, Max A bed mobility, STS, and taking few steps at EOB w/ bear hug transfer technique. These deficits that are impacting pt's baseline areas of occupation are a result of the following impairments: pain, endurance, activity tolerance, functional mobility, forward functional reach, balance, functional standing tolerance, unsupportive home environment, decreased I w/ ADLS/IADLS, strength, cognitive impairments, decreased safety awareness, and decreased insight into deficits. The following Occupational Performance Areas to address include: eating, grooming, bathing/shower, toilet hygiene, dressing, medication management, health maintenance, functional mobility, and clothing management.  Based on the aforementioned OT evaluation, functional performance deficits, and assessments, pt has been identified as a high complexity evaluation. Recommend level II Moderate Resource Intensity upon D/C. The patient's raw score on the -PAC Daily Activity Inpatient Short Form is 13. A raw score of less than 19 suggests the patient may benefit from discharge to post-acute rehabilitation services. Please refer to the recommendation of the Occupational Therapist for safe discharge planning. Pt to continue to benefit from acute immediate OT services to address the following goals 2-3x/week to  w/in 10-14 days:     Rehab Resource Intensity Level, OT: (S) II (Moderate Resource Intensity) (rehab vs return to facility w/ home OT pending further clarification from CM regarding Pt's PLOF, home set up, and social support.)

## 2024-06-15 NOTE — ASSESSMENT & PLAN NOTE
Actually meeting SIRS criteria given tachycardia and tachypnea.  Positive lactic acidosis thus severe sepsis, present on admission  Source secondary to acute cystitis  Continue IV ceftriaxone  Follow-up urine culture-urine culture is growing E. coli.  Follow-up on the final sensitivity  Follow-up blood culture.  Remained negative  Lactic acidosis now resolved

## 2024-06-15 NOTE — RESPIRATORY THERAPY NOTE
RT Protocol Note  Chelsey Carson 88 y.o. female MRN: [id number]  [facility] Encounter: [id number]    Assessment    Principal Problem:    Delirium due to another medical condition  Active Problems:    Ambulatory dysfunction    Anemia    Acute cystitis with hematuria      Home Pulmonary Medications:  none       Past Medical History:   Diagnosis Date    Abnormal laboratory test 08/01/2022    Acute sinusitis 02/13/2013    Age related osteoporosis 11/01/2022    Formatting of this note might be different from the original.   Last Assessment & Plan:     Formatting of this note might be different from the original.    Risedronate  on allergy list but she does not recall what happened    Her kids told her not to get treatment but I recommend it again to her.     Given the possible allergy refer to rheum to discuss treatment options    Breast cancer (HCC)     Cancer (HCC)     Chronic cough 01/13/2017    COVID-19     History of 2019 novel coronavirus disease (COVID-19) 02/15/2021    Hyperlipidemia     Hyponatremia 02/01/2021    Influenza B 03/05/2024    Insomnia 02/17/2021    MCI (mild cognitive impairment) 02/15/2021    Nonrheumatic aortic (valve) stenosis 03/15/2024    Other specified forms of tremor 03/15/2024    Palpitations 03/15/2024    Pneumonia due to COVID-19 virus 02/01/2021    Slow transit constipation 03/15/2024    Stage 3a chronic kidney disease (HCC) 09/27/2023    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    Thrombocytosis, unspecified 03/15/2024     Social History     Socioeconomic History    Marital status:      Spouse name: None    Number of children: None    Years of education: None    Highest education level: None   Occupational History    None   Tobacco Use    Smoking status: Never    Smokeless tobacco: Never   Vaping Use    Vaping status: Never Used   Substance and Sexual Activity    Alcohol use: Not Currently     Alcohol/week: 1.0 standard drink of  alcohol     Types: 1 Glasses of wine per week    Drug use: No    Sexual activity: Not Currently   Other Topics Concern    None   Social History Narrative    None     Social Determinants of Health     Financial Resource Strain: Low Risk  (5/23/2024)    Received from [facility]    Overall Financial Resource Strain (CARDIA)     Difficulty of Paying Living Expenses: Not very hard   Food Insecurity: No Food Insecurity (5/23/2024)    Received from [facility]    Hunger Vital Sign     Worried About Running Out of Food in the Last Year: Never true     Ran Out of Food in the Last Year: Never true   Transportation Needs: No Transportation Needs (5/23/2024)    Received from [facility]    PRAPARE - Transportation     Lack of Transportation (Medical): No     Lack of Transportation (Non-Medical): No   Physical Activity: Not on file   Stress: Not on file   Social Connections: Not on file   Intimate Partner Violence: Not At Risk (5/23/2024)    Received from [facility]    Humiliation, Afraid, Rape, and Kick questionnaire     Fear of Current or Ex-Partner: No     Emotionally Abused: No     Physically Abused: No     Sexually Abused: No   Housing Stability: Low Risk  (5/14/2024)    [facility], [facility]    Housing Stability Vital Sign     In the last 12 months, was there a time when you were not able to pay the mortgage or rent on time?: No     In the past 12 months, how many times have you moved where you were living?: 1     At any time in the past 12 months, were you homeless or living in a shelter (including now)?: No       Subjective         Objective    Physical Exam:   Assessment Type: Assess only  General Appearance: Alert, Awake  Respiratory Pattern: Normal, Symmetrical, Spontaneous  Chest Assessment: Chest expansion symmetrical  Bilateral Breath Sounds: Clear    Vitals:  Blood pressure 135/71, pulse 103,  temperature 98.3 °F (36.8 °C), resp. rate (P) 20, SpO2 98%.          Imaging and other studies: I have personally reviewed pertinent reports.            Plan    Respiratory Plan: No distress/Pulmonary history, Discontinue Protocol        Resp Comments: (P) pt assessed per resp protocol. pt was level B trauma, unwitnessed fall. Pt offers no pulmonary history or pulmonary med use. Pt has clear breath sounds, sp02 94%, chest xr offers no findings. Will d/c protocol at this time.

## 2024-06-15 NOTE — PROGRESS NOTES
[facility]  Progress Note  Name: Chelsey Carson I  MRN: [id number]  Unit/Bed#: [facility] 906-01 I Date of Admission: 6/14/2024   Date of Service: 6/15/2024 I Hospital Day: 1    Assessment & Plan   Acute cystitis with hematuria  Assessment & Plan  Actually meeting SIRS criteria given tachycardia and tachypnea.  Positive lactic acidosis thus severe sepsis, present on admission  Source secondary to acute cystitis  Continue IV ceftriaxone  Follow-up urine culture-urine culture is growing E. coli.  Follow-up on the final sensitivity  Follow-up blood culture.  Remained negative  Lactic acidosis now resolved    Anemia  Assessment & Plan  Macrocytic  Actually improved from prior H/H  No sign of acute blood loss  Follow-up folate/vitamin [id number] normal limits    Ambulatory dysfunction  Assessment & Plan  Secondary to recent history of hip fracture status post ORIF, on 5/15/2024 at [facility]  Status post x-ray of hip: Status post right femoral neck internal fixation.  In comparison to an outside CT from 5/23/2024, an intertrochanteric fracture with medially displaced lesser trochanter is grossly unchanged.  PT/OT eval  Fall precautions  Patient denies hip pain    * Delirium due to another medical condition  Assessment & Plan  Likely secondary to toxic metabolic encephalopathy in setting of acute infectious process.  I.e. UTI  Discussed at length with patient's son, he reports of declining of cognitive function approximately 15 to 16 weeks ago where during this time she has been in and out of the hospital and in rehab.  Prior to that she had been residing alone at home for the most part independently where she was still paying her own bills.  Though she did have an aide that came in approximately 4 hours a day.  He reports that there has been no formal diagnosis of dementia  More recently since being at present living facility she has been started on psychiatric medications  "per patient's son to \"control her mood\", including Zyprexa, buspirone and Depakote.  Apparently there she has been having periods of sundowning and at times hitting staff.  He reports that since being on the present regimen that she is on her mood has been much improved with much less agitated periods.  Her son states that she will now be residing permanently at the assisted living facility as they realize she no longer can live independently   Will consult geriatrics  Continue on psych meds  Status post CT head, no acute intracranial pathology  Check folate/vitamin B12, RPR.-Within normal limits. status post TSH in euthyroid state               VTE Pharmacologic Prophylaxis:   Moderate Risk (Score 3-4) - Pharmacological DVT Prophylaxis Ordered: enoxaparin (Lovenox).    Mobility:   Basic Mobility Inpatient Raw Score: 9  -M Goal: 3: Sit at edge of bed  JH-HLM Achieved: 2: Bed activities/Dependent transfer  JH-HLM Goal achieved. Continue to encourage appropriate mobility.    Patient Centered Rounds: I performed bedside rounds with nursing staff today.   Discussions with Specialists or Other Care Team Provider:     Education and Discussions with Family / Patient: Attempted to update  (son) via phone. Left voicemail.     Total Time Spent on Date of Encounter in care of patient: 35 mins. This time was spent on one or more of the following: performing physical exam; counseling and coordination of care; obtaining or reviewing history; documenting in the medical record; reviewing/ordering tests, medications or procedures; communicating with other healthcare professionals and discussing with patient's family/caregivers.    Current Length of Stay: 1 day(s)  Current Patient Status: Inpatient   Certification Statement: The patient will continue to require additional inpatient hospital stay due to pending urine culture  Discharge Plan: Anticipate discharge in 24-48 hrs to long-term care facility    Code Status: " Level 3 - DNAR and DNI    Subjective:   Patient seen and examined.  Nursing reported that patient was sundowning overnight.  Required one-time dose of Zyprexa  By reviewing the records patient was recently started on Depakote and Zyprexa  Objective:     Vitals:   Temp (24hrs), Av.5 °F (36.9 °C), Min:98.3 °F (36.8 °C), Max:98.6 °F (37 °C)    Temp:  [98.3 °F (36.8 °C)-98.6 °F (37 °C)] 98.6 °F (37 °C)  HR:  [] 111  Resp:  [16-20] 16  BP: (128-141)/(68-71) 128/71  SpO2:  [95 %-98 %] 96 %  There is no height or weight on file to calculate BMI.     Input and Output Summary (last 24 hours):     Intake/Output Summary (Last 24 hours) at 6/15/2024 1908  Last data filed at 6/15/2024 0900  Gross per 24 hour   Intake 120 ml   Output --   Net 120 ml       Physical Exam:   Physical Exam  Constitutional:       General: She is not in acute distress.     Appearance: She is not ill-appearing.   HENT:      Head: Normocephalic.      Nose: Nose normal.   Eyes:      General: No scleral icterus.  Cardiovascular:      Rate and Rhythm: Normal rate and regular rhythm.      Heart sounds: No murmur heard.  Pulmonary:      Effort: Pulmonary effort is normal.   Abdominal:      General: Bowel sounds are normal.   Skin:     General: Skin is warm.   Neurological:      General: No focal deficit present.      Mental Status: She is alert. Mental status is at baseline.      Comments: Patient seen and examined.  Patient is oriented to self.  Does not know where she is.  Able to tell me the month as            Additional Data:     Labs:  Results from last 7 days   Lab Units 06/15/24  0456 24  1007   WBC Thousand/uL 5.76 7.34   HEMOGLOBIN g/dL 10.8* 10.8*   HEMATOCRIT % 33.9* 33.3*   PLATELETS Thousands/uL 227 243   SEGS PCT %  --  69   LYMPHO PCT %  --  16   MONO PCT %  --  13*   EOS PCT %  --  1     Results from last 7 days   Lab Units 06/15/24  0456 24  1007   SODIUM mmol/L 139 137   POTASSIUM mmol/L 3.4* 3.9   CHLORIDE mmol/L  103 103   CO2 mmol/L 27 24   BUN mg/dL 16 25   CREATININE mg/dL 0.83 0.99   ANION GAP mmol/L 9 10   CALCIUM mg/dL 8.6 8.9   ALBUMIN g/dL  --  3.5   TOTAL BILIRUBIN mg/dL  --  0.42   ALK PHOS U/L  --  80   ALT U/L  --  7   AST U/L  --  13   GLUCOSE RANDOM mg/dL 147* 112     Results from last 7 days   Lab Units 06/14/24  1007   INR  1.19             Results from last 7 days   Lab Units 06/14/24  1343 06/14/24  1007   LACTIC ACID mmol/L 2.0 2.9*   PROCALCITONIN ng/ml  --  <0.05       Lines/Drains:  Invasive Devices       Peripheral Intravenous Line  Duration             Peripheral IV 06/14/24 Distal;Right;Ventral (anterior) Forearm 1 day                          Imaging: Reports reviewed CT head.    Recent Cultures (last 7 days):   Results from last 7 days   Lab Units 06/14/24  1343 06/14/24  1341 06/14/24  1007   BLOOD CULTURE  No Growth at 24 hrs.  --  No Growth at 24 hrs.   URINE CULTURE   --  >100,000 cfu/ml Escherichia coli*  --        Last 24 Hours Medication List:   Current Facility-Administered Medications   Medication Dose Route Frequency Provider Last Rate    acetaminophen  650 mg Oral Q6H PRN Kelly Phillip, DO      aspirin  81 mg Oral Daily Kelly Phillip, DO      busPIRone  5 mg Oral BID Kelly Phillip, DO      calcium carbonate-vitamin D  1 tablet Oral Daily With Breakfast Kelly Phillip, DO      cefTRIAXone  1,000 mg Intravenous Q24H Kelly Phillip, DO 1,000 mg (06/15/24 1606)    divalproex sodium  250 mg Oral Daily Kelly Phillip, DO      divalproex sodium  500 mg Oral HS Kelly Phillip, DO      enoxaparin  40 mg Subcutaneous Daily Kelly Phillip, DO      guaiFENesin  1,200 mg Oral Q12H UMAIR Kelly Phillip, DO      OLANZapine  2.5 mg Intramuscular Q6H PRN Isabela Carranza MD      OLANZapine  2.5 mg Oral Daily Isabela Carranza MD      pantoprazole  40 mg Oral Daily Kelly Haylee Phillip,       potassium chloride  40 mEq Oral  Once Isabela Carranza MD          Today, Patient Was Seen By: Isabela Carranza MD    **Please Note: This note may have been constructed using a voice recognition system.**

## 2024-06-15 NOTE — ASSESSMENT & PLAN NOTE
Macrocytic  Actually improved from prior H/H  No sign of acute blood loss  Follow-up folate/vitamin [id number] normal limits

## 2024-06-15 NOTE — OCCUPATIONAL THERAPY NOTE
Occupational Therapy Evaluation      Chelsey Carson    6/15/2024    Principal Problem:    Delirium due to another medical condition  Active Problems:    Ambulatory dysfunction    Anemia    Acute cystitis with hematuria      Past Medical History:   Diagnosis Date    Abnormal laboratory test 08/01/2022    Acute sinusitis 02/13/2013    Age related osteoporosis 11/01/2022    Formatting of this note might be different from the original.   Last Assessment & Plan:     Formatting of this note might be different from the original.    Risedronate  on allergy list but she does not recall what happened    Her kids told her not to get treatment but I recommend it again to her.     Given the possible allergy refer to rheum to discuss treatment options    Breast cancer (HCC)     Cancer (HCC)     Chronic cough 01/13/2017    COVID-19     History of 2019 novel coronavirus disease (COVID-19) 02/15/2021    Hyperlipidemia     Hyponatremia 02/01/2021    Influenza B 03/05/2024    Insomnia 02/17/2021    MCI (mild cognitive impairment) 02/15/2021    Nonrheumatic aortic (valve) stenosis 03/15/2024    Other specified forms of tremor 03/15/2024    Palpitations 03/15/2024    Pneumonia due to COVID-19 virus 02/01/2021    Slow transit constipation 03/15/2024    Stage 3a chronic kidney disease (HCC) 09/27/2023    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    Thrombocytosis, unspecified 03/15/2024       Past Surgical History:   Procedure Laterality Date    BREAST SURGERY      cancer (> 5 years)    HYSTERECTOMY          06/15/24 1052   OT Last Visit   OT Visit Date 06/15/24   Note Type   Note type Evaluation   Pain Assessment   Pain Assessment Tool 0-10   Pain Score No Pain   Restrictions/Precautions   Weight Bearing Precautions Per Order No   Other Precautions Cognitive;Chair Alarm;Bed Alarm;Multiple lines;Telemetry;Fall Risk;Pain;Hard of hearing  (recent history of hip fracture status post ORIF, on 5/15/2024 at  "[facility]   Home Living   Type of Home Assisted living  (the [facility])   Additional Comments Pt is a poor historian and is unable to recall PLOF or home set up information. Will require further clarification from CM to A w/ safe dispo planning.   Prior Function   Lives With Facility staff   Receives Help From Personal care attendant   Falls in the last 6 months 1 to 4  (at least 1 fall, leading to current admit)   Comments Pt is a poor historian and is unable to recall PLOF or home set up information. Will require further clarification from CM to A w/ safe dispo planning.   Lifestyle   Autonomy Pt is a poor historian and is unable to recall PLOF or home set up information. Will require further clarification from CM to A w/ safe dispo planning.   Reciprocal Relationships Pt is a poor historian and is unable to recall PLOF or home set up information. Will require further clarification from CM to A w/ safe dispo planning.   Service to Others Pt is a poor historian and is unable to recall PLOF or home set up information. Will require further clarification from CM to A w/ safe dispo planning.   Intrinsic Gratification Pt is a poor historian and is unable to recall PLOF or home set up information. Will require further clarification from CM to A w/ safe dispo planning.   Subjective   Subjective \"I'd love to give you a hug every day\"   ADL   Where Assessed Edge of bed   Eating Assistance 4  Minimal Assistance   Grooming Assistance 4  Minimal Assistance   UB Bathing Assistance 3  Moderate Assistance   LB Bathing Assistance 2  Maximal Assistance   UB Dressing Assistance 3  Moderate Assistance   LB Dressing Assistance 2  Maximal Assistance   Toileting Assistance  1  Total Assistance   Bed Mobility   Supine to Sit 2  Maximal assistance   Additional items Assist x 1;Increased time required;LE management;Verbal cues;Bedrails;HOB elevated   Sit to Supine 2  Maximal assistance   Additional items Assist x 1;Increased " time required;LE management;Verbal cues;Bedrails;HOB elevated   Additional Comments Pt laying supine in bed upon OT arrival. Pt returned laying supine in bed w/ bed alarm activated and all needs in reach s/p OT session   Transfers   Sit to Stand 2  Maximal assistance   Additional items Assist x 1;Increased time required;Verbal cues;Armrests   Stand to Sit 2  Maximal assistance   Additional items Assist x 1;Increased time required;Verbal cues;Armrests   Additional Comments Transfers w/ Ax1. Therapist utilized bear hug transfer technique this day.   Functional Mobility   Functional Mobility 2  Maximal assistance   Additional Comments Pt demonstrated ability to perform few lateral steps at EOB w/ Max Ax1 from therapist utilizing bear hug technique.   Additional items Hand hold assistance   Balance   Static Sitting Poor +   Dynamic Sitting Poor   Static Standing Poor -   Dynamic Standing Poor -   Ambulatory Poor -   Activity Tolerance   Activity Tolerance Patient limited by fatigue;Patient limited by pain;Treatment limited secondary to medical complications (Comment)   Nurse Made Aware RN cleared Pt for OT session   RUE Assessment   RUE Assessment WFL   LUE Assessment   LUE Assessment WFL   Hand Function   Gross Motor Coordination Functional   Cognition   Overall Cognitive Status Impaired   Arousal/Participation Alert;Cooperative   Attention Attends with cues to redirect   Orientation Level Oriented to person;Disoriented to place;Disoriented to time;Disoriented to situation   Memory Decreased recall of precautions;Decreased recall of recent events;Decreased short term memory   Following Commands Follows one step commands with increased time or repetition   Comments Pt presents pleasantly confused and is cooperative for therapy. Per RN, Pt experiences sundowning. Pt required VC for safety and sequencing t/o session.   Assessment   Limitation Decreased ADL status;Decreased UE ROM;Decreased UE strength;Decreased Safe  judgement during ADL;Decreased cognition;Decreased endurance;Decreased fine motor control;Decreased high-level ADLs   Prognosis Fair   Assessment Pt is a 87 yo female seen for OT eval s/p adm to [facility] on 6/14/24 w/ Pt found on floor at her [facility] dx'd w/ delirium due to another medical condition. Per EMR, Pt has recent history of R hip fx s/p ORIF 5/15/24 at [facility]. Current imaging (-) for additional trauma to hip.  has a past medical history of Abnormal laboratory test (08/01/2022), Acute sinusitis (02/13/2013), Age related osteoporosis (11/01/2022), Breast cancer (HCC), Cancer (HCC), Chronic cough (01/13/2017), COVID-19, History of 2019 novel coronavirus disease (COVID-19) (02/15/2021), Hyperlipidemia, Hyponatremia (02/01/2021), Influenza B (03/05/2024), Insomnia (02/17/2021), MCI (mild cognitive impairment) (02/15/2021), Nonrheumatic aortic (valve) stenosis (03/15/2024), Other specified forms of tremor (03/15/2024), Palpitations (03/15/2024), Pneumonia due to COVID-19 virus (02/01/2021), Slow transit constipation (03/15/2024), Stage 3a chronic kidney disease (HCC) (09/27/2023), 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 Pt with active OT orders and activity as tolerated orders. Pt is a poor historian and is unable to recall PLOF or home set up information. Will require further clarification from CM to A w/ safe dispo planning. Pt is currently demonstrating the following occupational deficits: Min A grooming, Mod A UB self care, Max A LB self care, Total A toileting, Max A bed mobility, STS, and taking few steps at EOB w/ bear hug transfer technique. These deficits that are impacting pt's baseline areas of occupation are a result of the following impairments: pain, endurance, activity tolerance, functional mobility, forward functional reach, balance, functional standing tolerance, unsupportive home environment, decreased I w/ ADLS/IADLS,  strength, cognitive impairments, decreased safety awareness, and decreased insight into deficits. The following Occupational Performance Areas to address include: eating, grooming, bathing/shower, toilet hygiene, dressing, medication management, health maintenance, functional mobility, and clothing management.  Based on the aforementioned OT evaluation, functional performance deficits, and assessments, pt has been identified as a high complexity evaluation. Recommend level II Moderate Resource Intensity upon D/C. The patient's raw score on the AM-PAC Daily Activity Inpatient Short Form is 13. A raw score of less than 19 suggests the patient may benefit from discharge to post-acute rehabilitation services. Please refer to the recommendation of the Occupational Therapist for safe discharge planning. Pt to continue to benefit from acute immediate OT services to address the following goals 2-3x/week to  w/in 10-14 days:   Goals   Patient Goals To rest   LTG Time Frame 10-   Long Term Goal #1 Refer to goals below   Plan   Treatment Interventions ADL retraining;Functional transfer training;UE strengthening/ROM;Endurance training;Cognitive reorientation;Patient/family training;Equipment evaluation/education;Compensatory technique education;Activityengagement;Energy conservation   Goal Expiration Date 24   OT Frequency 2-3x/wk   Discharge Recommendation   Rehab Resource Intensity Level, OT (S)  II (Moderate Resource Intensity)  (rehab vs return to facility w/ home OT pending further clarification from CM regarding Pt's PLOF, home set up, and social support.)   [facility] Daily Activity Inpatient   Lower Body Dressing 2   Bathing 2   Toileting 1   Upper Body Dressing 2   Grooming 3   Eating 3   Daily Activity Raw Score 13   Daily Activity Standardized Score (Calc for Raw Score >=11) 32.03   AM-[facility] Applied Cognition Inpatient   Following a Speech/Presentation 2   Understanding Ordinary Conversation 3   Taking  Medications 1   Remembering Where Things Are Placed or Put Away 1   Remembering List of 4-5 Errands 1   Taking Care of Complicated Tasks 1   Applied Cognition Raw Score 9   Applied Cognition Standardized Score 22.48         GOALS    1) Pt will improve activity tolerance to G for min 30 min txment sessions for increase engagement in functional tasks    2) Pt will complete UB/LB dressing/self care w/ mod I using adaptive device and DME as needed    3) Pt will complete bathing w/ Mod I w/ use of AE and DME as needed    4) Pt will complete toileting w/ mod I w/ G hygiene/thoroughness using DME as needed    5) Pt will improve functional transfers to Mod I on/off all surfaces using DME as needed w/ G balance/safety     6) Pt will improve functional mobility during ADL/IADL/leisure tasks to Mod I using DME as needed w/ G balance/safety     7) Pt will participate in simulated IADL management task to increase independence to Mod I w/ G safety and endurance    8) Pt will be attentive 100% of the time during ongoing cognitive assessment w/ G participation to assist w/ safe d/c planning/recommendations    9) Pt will demonstrate G carryover of pt/caregiver education and training as appropriate w/o cues w/ good tolerance to increase safety during functional tasks    10) Pt will demonstrate 100% carryover of energy conservation techniques t/o functional I/ADL/leisure tasks w/o cues s/p skilled education to increase endurance during functional tasks           Sofia Kurtz MS, OTR/L

## 2024-06-15 NOTE — ASSESSMENT & PLAN NOTE
"Likely secondary to toxic metabolic encephalopathy in setting of acute infectious process.  I.e. UTI  Discussed at length with patient's son, he reports of declining of cognitive function approximately 15 to 16 weeks ago where during this time she has been in and out of the hospital and in rehab.  Prior to that she had been residing alone at home for the most part independently where she was still paying her own bills.  Though she did have an aide that came in approximately 4 hours a day.  He reports that there has been no formal diagnosis of dementia  More recently since being at present living facility she has been started on psychiatric medications per patient's son to \"control her mood\", including Zyprexa, buspirone and Depakote.  Apparently there she has been having periods of sundowning and at times hitting staff.  He reports that since being on the present regimen that she is on her mood has been much improved with much less agitated periods.  Her son states that she will now be residing permanently at the assisted living facility as they realize she no longer can live independently   Will consult geriatrics  Continue on psych meds  Status post CT head, no acute intracranial pathology  Check folate/vitamin B12, RPR.-Within normal limits. status post TSH in euthyroid state  "

## 2024-06-15 NOTE — PLAN OF CARE
Problem: Potential for Falls  Goal: Patient will remain free of falls  Description: INTERVENTIONS:  - Educate patient/family on patient safety including physical limitations  - Instruct patient to call for assistance with activity   - Consult OT/PT to assist with strengthening/mobility   - Keep Call bell within reach  - Keep bed low and locked with side rails adjusted as appropriate  - Keep care items and personal belongings within reach  - Initiate and maintain comfort rounds  - Make Fall Risk Sign visible to staff  - Offer Toileting every 2 Hours, in advance of need  - Initiate/Maintain bed/chair alarm  - Obtain necessary fall risk management equipment: chair alarm, yellow socks/bracelet  - Apply yellow socks and bracelet for high fall risk patients  Outcome: Progressing     Problem: PAIN - ADULT  Goal: Verbalizes/displays adequate comfort level or baseline comfort level  Description: Interventions:  - Encourage patient to monitor pain and request assistance  - Assess pain using appropriate pain scale (e.g.0-10)  - Administer analgesics based on type and severity of pain and evaluate response  - Implement non-pharmacological measures as appropriate and evaluate response  - Consider cultural and social influences on pain and pain management  - Notify physician/advanced practitioner if interventions unsuccessful or patient reports new pain  Outcome: Progressing     Problem: INFECTION - ADULT  Goal: Absence or prevention of progression during hospitalization  Description: INTERVENTIONS:  - Assess and monitor for signs and symptoms of infection  - Monitor lab/diagnostic results  - Monitor all insertion sites, i.e. IV  - Conrad appropriate cooling/warming therapies per order  - Administer medications as ordered  - Instruct and encourage patient and family to use good hand hygiene technique  - Identify and instruct in appropriate isolation precautions for identified infection/condition  Outcome: Progressing      Problem: GENITOURINARY - ADULT  Goal: Maintains or returns to baseline urinary function  Description: INTERVENTIONS:  - Assess urinary function  - Encourage oral fluids to ensure adequate hydration if ordered  - Administer IV fluids as ordered to ensure adequate hydration  - Administer ordered medications as needed  - Offer frequent toileting  - Follow urinary retention protocol if ordered  Outcome: Progressing

## 2024-06-16 LAB
ANION GAP SERPL CALCULATED.3IONS-SCNC: 9 MMOL/L (ref 4–13)
BACTERIA UR CULT: ABNORMAL
BUN SERPL-MCNC: 13 MG/DL (ref 5–25)
CALCIUM SERPL-MCNC: 8.5 MG/DL (ref 8.4–10.2)
CHLORIDE SERPL-SCNC: 105 MMOL/L (ref 96–108)
CO2 SERPL-SCNC: 25 MMOL/L (ref 21–32)
CREAT SERPL-MCNC: 0.8 MG/DL (ref 0.6–1.3)
ERYTHROCYTE [DISTWIDTH] IN BLOOD BY AUTOMATED COUNT: 16.9 % (ref 11.6–15.1)
GFR SERPL CREATININE-BSD FRML MDRD: 66 ML/MIN/1.73SQ M
GLUCOSE SERPL-MCNC: 91 MG/DL (ref 65–140)
HCT VFR BLD AUTO: 32 % (ref 34.8–46.1)
HGB BLD-MCNC: 10.1 G/DL (ref 11.5–15.4)
MCH RBC QN AUTO: 32.9 PG (ref 26.8–34.3)
MCHC RBC AUTO-ENTMCNC: 31.6 G/DL (ref 31.4–37.4)
MCV RBC AUTO: 104 FL (ref 82–98)
PLATELET # BLD AUTO: 207 THOUSANDS/UL (ref 149–390)
PMV BLD AUTO: 9.3 FL (ref 8.9–12.7)
POTASSIUM SERPL-SCNC: 4.1 MMOL/L (ref 3.5–5.3)
RBC # BLD AUTO: 3.07 MILLION/UL (ref 3.81–5.12)
SODIUM SERPL-SCNC: 139 MMOL/L (ref 135–147)
WBC # BLD AUTO: 4.52 THOUSAND/UL (ref 4.31–10.16)

## 2024-06-16 PROCEDURE — 80048 BASIC METABOLIC PNL TOTAL CA: CPT | Performed by: FAMILY MEDICINE

## 2024-06-16 PROCEDURE — 85027 COMPLETE CBC AUTOMATED: CPT | Performed by: FAMILY MEDICINE

## 2024-06-16 PROCEDURE — 99232 SBSQ HOSP IP/OBS MODERATE 35: CPT | Performed by: FAMILY MEDICINE

## 2024-06-16 RX ORDER — OLANZAPINE 2.5 MG/1
2.5 TABLET, FILM COATED ORAL
Status: DISCONTINUED | OUTPATIENT
Start: 2024-06-16 | End: 2024-06-19 | Stop reason: HOSPADM

## 2024-06-16 RX ORDER — CEPHALEXIN 500 MG/1
500 CAPSULE ORAL EVERY 6 HOURS SCHEDULED
Status: DISCONTINUED | OUTPATIENT
Start: 2024-06-16 | End: 2024-06-18

## 2024-06-16 RX ADMIN — DIVALPROEX SODIUM 500 MG: 500 TABLET, DELAYED RELEASE ORAL at 20:10

## 2024-06-16 RX ADMIN — GUAIFENESIN 1200 MG: 600 TABLET, EXTENDED RELEASE ORAL at 08:39

## 2024-06-16 RX ADMIN — DIVALPROEX SODIUM 250 MG: 250 TABLET, EXTENDED RELEASE ORAL at 08:39

## 2024-06-16 RX ADMIN — ENOXAPARIN SODIUM 40 MG: 40 INJECTION SUBCUTANEOUS at 08:39

## 2024-06-16 RX ADMIN — CEPHALEXIN 500 MG: 500 CAPSULE ORAL at 18:29

## 2024-06-16 RX ADMIN — OLANZAPINE 2.5 MG: 2.5 TABLET, FILM COATED ORAL at 08:39

## 2024-06-16 RX ADMIN — CEPHALEXIN 500 MG: 500 CAPSULE ORAL at 23:34

## 2024-06-16 RX ADMIN — ACETAMINOPHEN 650 MG: 325 TABLET, FILM COATED ORAL at 13:41

## 2024-06-16 RX ADMIN — Medication 1 TABLET: at 08:39

## 2024-06-16 RX ADMIN — GUAIFENESIN 1200 MG: 600 TABLET, EXTENDED RELEASE ORAL at 20:10

## 2024-06-16 RX ADMIN — OLANZAPINE 2.5 MG: 2.5 TABLET, FILM COATED ORAL at 21:37

## 2024-06-16 RX ADMIN — BUSPIRONE HYDROCHLORIDE 5 MG: 5 TABLET ORAL at 08:39

## 2024-06-16 RX ADMIN — BUSPIRONE HYDROCHLORIDE 5 MG: 5 TABLET ORAL at 17:02

## 2024-06-16 RX ADMIN — ASPIRIN 81 MG CHEWABLE TABLET 81 MG: 81 TABLET CHEWABLE at 08:39

## 2024-06-16 RX ADMIN — CEFTRIAXONE 1000 MG: 1 INJECTION, POWDER, FOR SOLUTION INTRAMUSCULAR; INTRAVENOUS at 15:32

## 2024-06-16 NOTE — CASE MANAGEMENT
Case Management Assessment & Discharge Planning Note    Patient name Chelsey Carson  Location [facility] 906/[facility] 906-01 MRN [id number]  : 1935 Date 2024       Current Admission Date: 2024  Current Admission Diagnosis:Delirium due to another medical condition   Patient Active Problem List    Diagnosis Date Noted Date Diagnosed    Acute cystitis with hematuria 2024     Delirium due to another medical condition 2024     Closed nondisplaced intertrochanteric fracture of right femur (HCC) 2024     Abnormal CT of the chest 2024     Pneumonitis 2024     Dysuria 2024     Urinary retention 2024     Nausea 2024     Macrocytic anemia 2024     Thrombocytosis 2024     Ambulatory dysfunction 2024     Proctitis 2024     Anemia 03/15/2024     Loss of appetite 03/15/2024     Anxiety disorder, unspecified 03/15/2024     Tinnitus of both ears 2023     Xerostomia 2023     Mixed conductive and sensorineural hearing loss of left ear with restricted hearing of right ear 2023     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 2023     Nonintractable headache 2023     Abnormal brain MRI 2023     Slow transit constipation 2023     Primary insomnia 2023     Constipation 2023     Tremor 2023     Hyponatremia 2023     Age related osteoporosis 2022     GERD (gastroesophageal reflux disease) 10/10/2022     Stage 3a chronic kidney disease (HCC) 2022     Venous insufficiency 08/10/2022     Other fatigue 2022     Hypokalemia 2021     Dry mouth 2019     Chronic maxillary sinusitis 2019     Breast cancer ([facility]) 2019     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[facility]) 2019     Perforation of left tympanic membrane 2019     Nonrheumatic aortic valve insufficiency 11/15/2018     Pes anserinus bursitis of right knee 2018      Benign essential HTN 10/19/2017     Aortic valve disorder 08/19/2013     Hyperlipidemia 12/19/2012       LOS (days): 2  Geometric Mean LOS (GMLOS) (days):   Days to GMLOS:     OBJECTIVE:    Risk of Unplanned Readmission Score: 27.45         Current admission status: Inpatient       Preferred Pharmacy:   [id number] - [place], [place] - [address]  [address]  [place] [address]  Phone: [phone] Fax: [phone]    Primary Care Provider: Mariely Fan MD    Primary Insurance: MEDICARE  Secondary Insurance: COMMERCIAL MISCELLANEOUS    ASSESSMENT:  Active Health Care Proxies    There are no active Health Care Proxies on file.                 Readmission Root Cause  30 Day Readmission: No    Patient Information  Admitted from:: Facility  Mental Status: Confused  Assessment information provided by:: Son  Support Systems: Son, Family members  Home entry access options. Select all that apply.: No steps to enter home  Type of Current Residence: Facility  Upon entering residence, is there a bedroom on the main floor (no further steps)?: Yes  Upon entering residence, is there a bathroom on the main floor (no further steps)?: Yes  Living Arrangements: Other (Comment) (Resides at [facility])    Activities of Daily Living Prior to Admission  Functional Status: Assistance  Completes ADLs independently?: No  Level of ADL dependence: Assistance  Ambulates independently?: No  Level of ambulatory dependence: Assistance  Does patient use assisted devices?: Yes  Assisted Devices (DME) used: Walker, Wheelchair  Does patient currently own DME?: Yes  What DME does the patient currently own?: Walker, Wheelchair  Does the patient have a history of Short-Term Rehab?: Yes ([facility])         Patient Information Continued  Income Source: SSI/SSD  Does patient have prescription coverage?: Yes  Does patient receive dialysis treatments?: No  Does patient have a history of substance abuse?:  No  Does patient have a history of Mental Health Diagnosis?: No                Social Determinants of Health (SDOH)      Flowsheet Row Most Recent Value   Housing Stability    In the last 12 months, was there a time when you were not able to pay the mortgage or rent on time? N   In the past 12 months, how many times have you moved where you were living? 1   At any time in the past 12 months, were you homeless or living in a shelter (including now)? N   Transportation Needs    In the past 12 months, has lack of transportation kept you from medical appointments or from getting medications? no   In the past 12 months, has lack of transportation kept you from meetings, work, or from getting things needed for daily living? No   Food Insecurity    Within the past 12 months, you worried that your food would run out before you got the money to buy more. Never true   Within the past 12 months, the food you bought just didn't last and you didn't have money to get more. Never true   Utilities    In the past 12 months has the electric, gas, oil, or water company threatened to shut off services in your home? No            DISCHARGE DETAILS:    Discharge planning discussed with:: ian Kim  Freedom of Choice: Yes     CM contacted family/caregiver?: Yes  Were Treatment Team discharge recommendations reviewed with patient/caregiver?: Yes  Did patient/caregiver verbalize understanding of patient care needs?: Yes  Were patient/caregiver advised of the risks associated with not following Treatment Team discharge recommendations?: Yes    Contacts  Patient Contacts: Julio-pt's son.  Relationship to Patient:: Family  Contact Method: Phone  Phone Number: [phone]  Reason/Outcome: Emergency Contact, Discharge Planning       TC back from pt's son Julio. Pt resides at [facility] on [address] in [place] [phone]. Son states pt needs assist of 1 for adl's. They take pt to dining room & help to bath room. Assist of 1  to w/c & they push pt.  Was ast [facility] in past for STR. Discussed rhb vs return to [facility]. Plan is to await PT notes & f/u with facility to see if pt can return. He would prefer her to return rather than go to STR.  Updated SLIM.     Called [facility] & confirmed pt is at personal care home level of care.

## 2024-06-16 NOTE — ED ATTENDING ATTESTATION
6/14/2024   ILilly MD, saw and evaluated the patient. I have discussed the patient with the resident/non-physician practitioner and agree with the resident's/non-physician practitioner's findings, Plan of Care, and MDM as documented in the resident's/non-physician practitioner's note, except where noted. All available labs and Radiology studies were reviewed.  I was present for key portions of any procedure(s) performed by the resident/non-physician practitioner and I was immediately available to provide assistance.       At this point I agree with the current assessment done in the Emergency Department.  I have conducted an independent evaluation of this patient a history and physical is as follows:    Chief Complaint   Patient presents with    Altered Mental Status     Patient coming in from Nursing Facility for unwitnessed fall on ASA. Patient is also complaining of burning with urination.      88-year-old female with past medical history of cognitive impairment/dementia, breast cancer, CKD, presenting after an unwitnessed fall.  Patient is on aspirin daily.  This morning, patient was found down on her back, screaming and having some degree of aggressive behavior with staff.  She is usually cooperative and this is not usual behavior for her.  Patient complains of pain in her right hip.        Physical Exam  ED Triage Vitals   Temperature Pulse Respirations Blood Pressure SpO2   06/14/24 0933 06/14/24 0933 06/14/24 0933 06/14/24 0933 06/14/24 0933   97.9 °F (36.6 °C) (!) 120 16 156/89 97 %      Temp Source Heart Rate Source Patient Position - Orthostatic VS BP Location FiO2 (%)   06/14/24 0933 06/14/24 0933 06/14/24 0933 -- --   Oral Monitor Lying        Pain Score       06/14/24 0934       4           Vital signs and nursing notes reviewed    CONSTITUTIONAL: Frail female appearing stated age resting in bed, vociferous, vocally and physically aggressive towards staff  HEENT: atraumatic, normocephalic.  Sclera anicteric, conjunctiva are not injected. Moist oral mucosa  CARDIOVASCULAR/CHEST: Tachycardic, no M/R/G. 2+ radial pulses  PULMONARY: Breathing comfortably on RA. Breath sounds are equal and clear to auscultation  ABDOMEN: non-distended. BS present, normoactive. Non-tender  MSK: moves all extremities, no deformities, no peripheral edema, no calf asymmetry  NEURO: Awake, alert, and oriented to self only.  GCS 14 due to confusion.  Face symmetric. Moves all extremities spontaneously. No focal neurologic deficits  SKIN: Warm, appears well-perfused  MENTAL STATUS: Vociferous, shouting out, suspicious of staff.  Patient is confused.      Labs and Imaging  Labs Reviewed   CBC AND DIFFERENTIAL - Abnormal       Result Value Ref Range Status    WBC 7.34  4.31 - 10.16 Thousand/uL Final    RBC 3.20 (*) 3.81 - 5.12 Million/uL Final    Hemoglobin 10.8 (*) 11.5 - 15.4 g/dL Final    Hematocrit 33.3 (*) 34.8 - 46.1 % Final     (*) 82 - 98 fL Final    MCH 33.8  26.8 - 34.3 pg Final    MCHC 32.4  31.4 - 37.4 g/dL Final    RDW 16.9 (*) 11.6 - 15.1 % Final    MPV 9.2  8.9 - 12.7 fL Final    Platelets 243  149 - 390 Thousands/uL Final    nRBC 0  /100 WBCs Final    Segmented % 69  43 - 75 % Final    Immature Grans % 0  0 - 2 % Final    Lymphocytes % 16  14 - 44 % Final    Monocytes % 13 (*) 4 - 12 % Final    Eosinophils Relative 1  0 - 6 % Final    Basophils Relative 1  0 - 1 % Final    Absolute Neutrophils 5.08  1.85 - 7.62 Thousands/µL Final    Absolute Immature Grans 0.03  0.00 - 0.20 Thousand/uL Final    Absolute Lymphocytes 1.19  0.60 - 4.47 Thousands/µL Final    Absolute Monocytes 0.96  0.17 - 1.22 Thousand/µL Final    Eosinophils Absolute 0.04  0.00 - 0.61 Thousand/µL Final    Basophils Absolute 0.04  0.00 - 0.10 Thousands/µL Final   LACTIC ACID, PLASMA (W/REFLEX IF RESULT > 2.0) - Abnormal    LACTIC ACID 2.9 (*) 0.5 - 2.0 mmol/L Final    Narrative:     Result may be elevated if tourniquet was used during collection.    PROTIME-INR - Abnormal    Protime 15.0 (*) 11.6 - 14.5 seconds Final    INR 1.19  0.84 - 1.19 Final   UA W REFLEX TO MICROSCOPIC WITH REFLEX TO CULTURE - Abnormal    Color, UA Yellow   Final    Clarity, UA Turbid   Final    Specific Gravity, UA 1.014  1.003 - 1.030 Final    pH, UA 7.0  4.5, 5.0, 5.5, 6.0, 6.5, 7.0, 7.5, 8.0 Final    Leukocytes, UA Large (*) Negative Final    Nitrite, UA Negative  Negative Final    Protein, UA 50 (1+) (*) Negative mg/dl Final    Glucose, UA Negative  Negative mg/dl Final    Ketones, UA Trace (*) Negative mg/dl Final    Urobilinogen, UA <2.0  <2.0 mg/dl mg/dl Final    Bilirubin, UA Negative  Negative Final    Occult Blood, UA Small (*) Negative Final   URINE MICROSCOPIC - Abnormal    RBC, UA 4-10 (*) None Seen, 1-2 /hpf Final    WBC, UA Innumerable (*) None Seen, 1-2 /hpf Final    Epithelial Cells None Seen  None Seen, Occasional /hpf Final    Bacteria, UA Moderate (*) None Seen, Occasional /hpf Final   PROCALCITONIN TEST - Normal    Procalcitonin <0.05  <=0.25 ng/ml Final    Comment: Suspected Lower Respiratory Tract Infection (LRTI):  - LESS than or EQUAL to 0.25 ng/mL:   low likelihood for bacterial LRTI; antibiotics DISCOURAGED.  - GREATER than 0.25 ng/mL:   increased likelihood for bacterial LRTI; antibiotics ENCOURAGED.    Suspected Sepsis:  - Strongly consider initiating antibiotics in ALL UNSTABLE patients.  - LESS than or EQUAL to 0.5 ng/mL:   low likelihood for bacterial sepsis; antibiotics DISCOURAGED.  - GREATER than 0.5 ng/mL:   increased likelihood for bacterial sepsis; antibiotics ENCOURAGED.  - GREATER than 2 ng/mL:   high risk for severe sepsis / septic shock; antibiotics strongly ENCOURAGED.    Decisions on antibiotic use should not be based solely on Procalcitonin (PCT) levels. If PCT is low but uncertainty exists with stopping antibiotics, repeat PCT in 6-24 hours to confirm the low level. If antibiotics are administered (regardless if initial PCT was high or  low), repeat PCT every 1-2 days to consider early antibiotic cessation (when GREATER than 80% decrease from the peak OR when PCT drops below designated cutoffs, whichever comes first), so long as the infection is NOT one that typically requires prolonged treatment durations (e.g., bone/joint infections, endocarditis, Staph. aureus bacteremia).    Situations of FALSE-POSITIVE Procalcitonin values:  1) Newborns < 72 hours old  2) Massive stress from severe trauma / burns, major surgery, acute pancreatitis, cardiogenic / hemorrhagic shock, sickle cell crisis, or other organ perfusion abnormalities  3) Malaria and some Candidal infections  4) Treatment with agents that stimulate cytokines (e.g., OKT3, anti-lymphocyte globulins, alemtuzumab, IL-2, granulocyte transfusion [NOT GCSFs])  5) Chronic renal disease causes elevated baseline levels (consider GREATER than 0.75 ng/mL as an abnormal cut-off); initiating HD/CRRT may cause transient decreases  6) Paraneoplastic syndromes from medullary thyroid or SCLC, some forms of vasculitis, and acute [id number] disease    Situations of FALSE-NEGATIVE Procalcitonin values:  1) Too early in clinical course for PCT to have reached its peak (may repeat in 6-24 hours to confirm low level)  2) Localized infection WITHOUT systemic (SIRS / sepsis) response (e.g., an abscess, osteomyelitis, cystitis)  3) Mycobacteria (e.g., Tuberculosis, MAC)  4) Cystic fibrosis exacerbations     APTT - Normal    PTT 34  23 - 37 seconds Final    Comment: Therapeutic Heparin Range =  60-90 seconds   LACTIC ACID 2 HOUR - Normal    LACTIC ACID 2.0  0.5 - 2.0 mmol/L Final    Narrative:     Result may be elevated if tourniquet was used during collection.   COMPREHENSIVE METABOLIC PANEL    Sodium 137  135 - 147 mmol/L Final    Potassium 3.9  3.5 - 5.3 mmol/L Final    Chloride 103  96 - 108 mmol/L Final    CO2 24  21 - 32 mmol/L Final    ANION GAP 10  4 - 13 mmol/L Final    BUN 25  5 - 25 mg/dL Final     Creatinine 0.99  0.60 - 1.30 mg/dL Final    Comment: Standardized to IDMS reference method    Glucose 112  65 - 140 mg/dL Final    Comment: If the patient is fasting, the ADA then defines impaired fasting glucose as > 100 mg/dL and diabetes as > or equal to 123 mg/dL.    Calcium 8.9  8.4 - 10.2 mg/dL Final    AST 13  13 - 39 U/L Final    ALT 7  7 - 52 U/L Final    Comment: Specimen collection should occur prior to Sulfasalazine administration due to the potential for falsely depressed results.     Alkaline Phosphatase 80  34 - 104 U/L Final    Total Protein 6.6  6.4 - 8.4 g/dL Final    Albumin 3.5  3.5 - 5.0 g/dL Final    Total Bilirubin 0.42  0.20 - 1.00 mg/dL Final    Comment: Use of this assay is not recommended for patients undergoing treatment with eltrombopag due to the potential for falsely elevated results.  N-acetyl-p-benzoquinone imine (metabolite of Acetaminophen) will generate erroneously low results in samples for patients that have taken an overdose of Acetaminophen.    eGFR 51  ml/min/1.73sq m Final    Narrative:     National Kidney Disease Foundation guidelines for Chronic Kidney Disease (CKD):     Stage 1 with normal or high GFR (GFR > 90 mL/min/1.73 square meters)    Stage 2 Mild CKD (GFR = 60-89 mL/min/1.73 square meters)    Stage 3A Moderate CKD (GFR = 45-59 mL/min/1.73 square meters)    Stage 3B Moderate CKD (GFR = 30-44 mL/min/1.73 square meters)    Stage 4 Severe CKD (GFR = 15-29 mL/min/1.73 square meters)    Stage 5 End Stage CKD (GFR <15 mL/min/1.73 square meters)  Note: GFR calculation is accurate only with a steady state creatinine       XR hip/pelv 2-3 vws right if performed   Final Result      Status post right femoral neck internal fixation.   In comparison to an outside CT from 5/23/2024, an intertrochanteric fracture with medially displaced lesser trochanter is grossly unchanged.                  Workstation performed: [id number]         XR chest portable   Final Result      No acute  cardiopulmonary disease.            Workstation performed: [id number]         CT head without contrast   Final Result      No acute intracranial abnormality.                  Workstation performed: [id number]         CT spine cervical without contrast   Final Result      No cervical spine fracture or traumatic malalignment.                  Workstation performed: [id number]               Procedures  ECG 12 Lead Documentation Only    Date/Time: 6/14/2024 9:41 AM    Performed by: Lilly Scott MD  Authorized by: Lilly Scott MD    Comments:      Sinus tachycardia, ventricular rate 123, OR interval 170, QRS 68, QTc 429, normal axis, there are ST depressions in lateral precordial and inferior leads, there is no STEMI, overall, no significant change from prior EKG dated 4/2/2024.  CriticalCare Time    Date/Time: 6/14/2024 12:00 PM    Performed by: Lilly Scott MD  Authorized by: Lilly Scott MD    Critical care provider statement:     Critical care time (minutes):  42    Critical care time was exclusive of:  Separately billable procedures and treating other patients and teaching time    Critical care was necessary to treat or prevent imminent or life-threatening deterioration of the following conditions:  CNS failure or compromise and circulatory failure    Critical care was time spent personally by me on the following activities:  Obtaining history from patient or surrogate, examination of patient, review of old charts, re-evaluation of patient's condition, ordering and review of laboratory studies, ordering and review of radiographic studies, ordering and performing treatments and interventions, evaluation of patient's response to treatment, development of treatment plan with patient or surrogate and discussions with consultants          ED Course  Medications   midazolam (VERSED) injection 2 mg (2 mg Intramuscular Given 6/14/24 0954)   multi-electrolyte (ISOLYTE-S PH 7.4) bolus 500 mL (0 mL Intravenous Stopped  6/14/24 1254)   midazolam (VERSED) injection 0.5 mg (0.5 mg Intravenous Given 6/14/24 1200)   OLANZapine (ZyPREXA) IM injection 5 mg (5 mg Intramuscular Given 6/14/24 1323)   ceftriaxone (ROCEPHIN) 1 g/50 mL in dextrose IVPB (0 mg Intravenous Stopped 6/14/24 1656)     88-year-old female presenting with altered mental status and after  being found down on the floor of her nursing home.  Vital signs reviewed, tachycardic, afebrile and within normal limits otherwise.  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  Since patient is on aspirin and was found down, patient evaluated as a level C trauma by the emergency department staff.  Unfortunately, patient is vocally and physically aggressive towards staff, requiring careful sedation.  Versed administered to permit obtaining IV access and to permit imaging.  EKG to my interpretation as above.  CT head and CT C-spine are as above, no acute traumatic injuries.  Chest x-ray to my review is without infiltrate to suggest pneumonia.  Given right-sided hip pain, x-ray of pelvis and right hip obtained, this revealing no acute fracture or dislocation, revealing no hardware malfunction from prior ORIF.  Labs reveal CBC with anemia 10.8, no leukocytosis, lactate is elevated at 2.9, CMP is reassuring.  Procalcitonin is normal.  UA is with large leukocytes and moderate bacteria, suggestive of UTI.  500 mL Isolyte bolus as well as ceftriaxone administered.  Patient is requiring additional doses of careful sedation due to agitation that poses threat to self and staff.  Patient admitted to internal medicine for further evaluation and treatment.

## 2024-06-16 NOTE — PLAN OF CARE
Problem: Potential for Falls  Goal: Patient will remain free of falls  Description: INTERVENTIONS:  - Educate patient/family on patient safety including physical limitations  - Instruct patient to call for assistance with activity   - Consult OT/PT to assist with strengthening/mobility   - Keep Call bell within reach  - Keep bed low and locked with side rails adjusted as appropriate  - Keep care items and personal belongings within reach  - Initiate and maintain comfort rounds  - Make Fall Risk Sign visible to staff  - Offer Toileting every 2 Hours, in advance of need  - Initiate/Maintain bed/chair alarm  - Obtain necessary fall risk management equipment: chair alarm, yellow socks/bracelet  - Apply yellow socks and bracelet for high fall risk patients  Outcome: Progressing     Problem: PAIN - ADULT  Goal: Verbalizes/displays adequate comfort level or baseline comfort level  Description: Interventions:  - Encourage patient to monitor pain and request assistance  - Assess pain using appropriate pain scale (e.g.0-10)  - Administer analgesics based on type and severity of pain and evaluate response  - Implement non-pharmacological measures as appropriate and evaluate response  - Consider cultural and social influences on pain and pain management  - Notify physician/advanced practitioner if interventions unsuccessful or patient reports new pain  Outcome: Progressing     Problem: INFECTION - ADULT  Goal: Absence or prevention of progression during hospitalization  Description: INTERVENTIONS:  - Assess and monitor for signs and symptoms of infection  - Monitor lab/diagnostic results  - Monitor all insertion sites, i.e. IV  - [place] appropriate cooling/warming therapies per order  - Administer medications as ordered  - Instruct and encourage patient and family to use good hand hygiene technique  - Identify and instruct in appropriate isolation precautions for identified infection/condition  Outcome: Progressing      Problem: GENITOURINARY - ADULT  Goal: Maintains or returns to baseline urinary function  Description: INTERVENTIONS:  - Assess urinary function  - Encourage oral fluids to ensure adequate hydration if ordered  - Administer IV fluids as ordered to ensure adequate hydration  - Administer ordered medications as needed  - Offer frequent toileting  - Follow urinary retention protocol if ordered  Outcome: Progressing     Problem: Prexisting or High Potential for Compromised Skin Integrity  Goal: Skin integrity is maintained or improved  Description: INTERVENTIONS:  - Identify patients at risk for skin breakdown  - Assess and monitor skin integrity  - Assess and monitor nutrition and hydration status  - Monitor labs   - Assess for incontinence   - Turn and reposition patient  - Assist with mobility/ambulation  - Relieve pressure over bony prominences  - Avoid friction and shearing  - Provide appropriate hygiene as needed including keeping skin clean and dry  - Evaluate need for skin moisturizer/barrier cream  - Collaborate with interdisciplinary team   - Patient/family teaching  - Consider wound care consult   Outcome: Progressing

## 2024-06-16 NOTE — PLAN OF CARE
Problem: Potential for Falls  Goal: Patient will remain free of falls  Description: INTERVENTIONS:  - Educate patient/family on patient safety including physical limitations  - Instruct patient to call for assistance with activity   - Consult OT/PT to assist with strengthening/mobility   - Keep Call bell within reach  - Keep bed low and locked with side rails adjusted as appropriate  - Keep care items and personal belongings within reach  - Initiate and maintain comfort rounds  - Make Fall Risk Sign visible to staff  - Offer Toileting every 2 Hours, in advance of need  - Initiate/Maintain bed/chair alarm  - Obtain necessary fall risk management equipment: chair alarm, yellow socks/bracelet  - Apply yellow socks and bracelet for high fall risk patients  Outcome: Progressing     Problem: INFECTION - ADULT  Goal: Absence or prevention of progression during hospitalization  Description: INTERVENTIONS:  - Assess and monitor for signs and symptoms of infection  - Monitor lab/diagnostic results  - Monitor all insertion sites, i.e. IV  - [place] appropriate cooling/warming therapies per order  - Administer medications as ordered  - Instruct and encourage patient and family to use good hand hygiene technique  - Identify and instruct in appropriate isolation precautions for identified infection/condition  Outcome: Progressing     Problem: GENITOURINARY - ADULT  Goal: Maintains or returns to baseline urinary function  Description: INTERVENTIONS:  - Assess urinary function  - Encourage oral fluids to ensure adequate hydration if ordered  - Administer IV fluids as ordered to ensure adequate hydration  - Administer ordered medications as needed  - Offer frequent toileting  - Follow urinary retention protocol if ordered  Outcome: Progressing     Problem: Prexisting or High Potential for Compromised Skin Integrity  Goal: Skin integrity is maintained or improved  Description: INTERVENTIONS:  - Identify patients at risk for skin  breakdown  - Assess and monitor skin integrity  - Assess and monitor nutrition and hydration status  - Monitor labs   - Assess for incontinence   - Turn and reposition patient  - Assist with mobility/ambulation  - Relieve pressure over bony prominences  - Avoid friction and shearing  - Provide appropriate hygiene as needed including keeping skin clean and dry  - Evaluate need for skin moisturizer/barrier cream  - Collaborate with interdisciplinary team   - Patient/family teaching  - Consider wound care consult   Outcome: Progressing

## 2024-06-16 NOTE — ASSESSMENT & PLAN NOTE
Secondary to recent history of hip fracture status post ORIF, on 5/15/2024 at [facility]  Status post x-ray of hip: Status post right femoral neck internal fixation.  In comparison to an outside CT from 5/23/2024, an intertrochanteric fracture with medially displaced lesser trochanter is grossly unchanged.  PT/OT eval  Fall precautions  Patient denies hip pain  Case management had a discussion with patient's son.  He would like the patient to be discharged back to the previous facility.  Will contact the facility tomorrow and likely DC by tomorrow if remains stable

## 2024-06-16 NOTE — CASE MANAGEMENT
Case Management Discharge Planning Note    Patient name Chelsey Carson  Location [facility] 906/[facility] 906-01 MRN [id number]  : 1935 Date 2024       Current Admission Date: 2024  Current Admission Diagnosis:Delirium due to another medical condition   Patient Active Problem List    Diagnosis Date Noted Date Diagnosed    Acute cystitis with hematuria 2024     Delirium due to another medical condition 2024     Closed nondisplaced intertrochanteric fracture of right femur (HCC) 2024     Abnormal CT of the chest 2024     Pneumonitis 2024     Dysuria 2024     Urinary retention 2024     Nausea 2024     Macrocytic anemia 2024     Thrombocytosis 2024     Ambulatory dysfunction 2024     Proctitis 2024     Anemia 03/15/2024     Loss of appetite 03/15/2024     Anxiety disorder, unspecified 03/15/2024     Tinnitus of both ears 2023     Xerostomia 2023     Mixed conductive and sensorineural hearing loss of left ear with restricted hearing of right ear 2023     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 2023     Nonintractable headache 2023     Abnormal brain MRI 2023     Slow transit constipation 2023     Primary insomnia 2023     Constipation 2023     Tremor 2023     Hyponatremia 2023     Age related osteoporosis 2022     GERD (gastroesophageal reflux disease) 10/10/2022     Stage 3a chronic kidney disease (HCC) 2022     Venous insufficiency 08/10/2022     Other fatigue 2022     Hypokalemia 2021     Dry mouth 2019     Chronic maxillary sinusitis 2019     Breast cancer ([facility]) 2019     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[facility]) 2019     Perforation of left tympanic membrane 2019     Nonrheumatic aortic valve insufficiency 11/15/2018     Pes anserinus bursitis of right knee 2018     Benign  essential HTN 10/19/2017     Aortic valve disorder 08/19/2013     Hyperlipidemia 12/19/2012       LOS (days): 2  Geometric Mean LOS (GMLOS) (days):   Days to GMLOS:     OBJECTIVE:  Risk of Unplanned Readmission Score: 27.45         Current admission status: Inpatient   Preferred Pharmacy:   [id number] - [place], [place] - [address]   [address] [place] [address]  Phone: [phone] Fax: [phone]    Primary Care Provider: Mariely Fan MD    Primary Insurance: MEDICARE  Secondary Insurance: COMMERCIAL MISCELLANEOUS    DISCHARGE DETAILS:           Additional Comments: Left VM for pt's son Julio for care coordination.

## 2024-06-16 NOTE — ASSESSMENT & PLAN NOTE
Macrocytic  Actually improved from prior H/H  No sign of acute blood loss  Follow-up folate/vitamin [id number] normal limits

## 2024-06-16 NOTE — PLAN OF CARE
Problem: Potential for Falls  Goal: Patient will remain free of falls  Description: INTERVENTIONS:  - Educate patient/family on patient safety including physical limitations  - Instruct patient to call for assistance with activity   - Consult OT/PT to assist with strengthening/mobility   - Keep Call bell within reach  - Keep bed low and locked with side rails adjusted as appropriate  - Keep care items and personal belongings within reach  - Initiate and maintain comfort rounds  - Make Fall Risk Sign visible to staff  - Offer Toileting every 2 Hours, in advance of need  - Initiate/Maintain bed/chair alarm  - Obtain necessary fall risk management equipment: chair alarm, yellow socks/bracelet  - Apply yellow socks and bracelet for high fall risk patients  Outcome: Progressing     Problem: Prexisting or High Potential for Compromised Skin Integrity  Goal: Skin integrity is maintained or improved  Description: INTERVENTIONS:  - Identify patients at risk for skin breakdown  - Assess and monitor skin integrity  - Assess and monitor nutrition and hydration status  - Monitor labs   - Assess for incontinence   - Turn and reposition patient  - Assist with mobility/ambulation  - Relieve pressure over bony prominences  - Avoid friction and shearing  - Provide appropriate hygiene as needed including keeping skin clean and dry  - Evaluate need for skin moisturizer/barrier cream  - Collaborate with interdisciplinary team   - Patient/family teaching  - Consider wound care consult   Outcome: Progressing      Hepatobiliary and Pancreatic Surgery Progress Note    CC: necrotizing pancreatitis    Subjective: Patient states that is still having diarrhea that occurs after eating. He denies any abdominal pain and is having greasy stools. He is only taking 12k units of creon. He also has iron deficiency anemia and undergoes periodic transfusions. He sees Dr. Ministerio Stokes for this. He has had an extensive GI workup for this. 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 Thank you for the consultation and allowing me to take part in Mr. Antonio Wall care. Please send a copy of my note to Dr.'s Eric Umaña, and Belen Winter    Thank you for the consultation and allowing me to take part in Mr. Antonio Wall care.     Gely Turk 3/25/2021 12:39 PM

## 2024-06-16 NOTE — ASSESSMENT & PLAN NOTE
"Likely secondary to toxic metabolic encephalopathy in setting of acute infectious process.  I.e. UTI  Discussed at length with patient's son, he reports of declining of cognitive function approximately 15 to 16 weeks ago where during this time she has been in and out of the hospital and in rehab.  Prior to that she had been residing alone at home for the most part independently where she was still paying her own bills.  Though she did have an aide that came in approximately 4 hours a day.  He reports that there has been no formal diagnosis of dementia  More recently since being at present living facility she has been started on psychiatric medications per patient's son to \"control her mood\", including Zyprexa, buspirone and Depakote.  Apparently there she has been having periods of sundowning and at times hitting staff.  He reports that since being on the present regimen that she is on her mood has been much improved with much less agitated periods.  Her son states that she will now be residing permanently at the assisted living facility as they realize she no longer can live independently   Will consult geriatrics  Continue on psych meds  Status post CT head, no acute intracranial pathology  Check folate/vitamin B12, RPR.-Within normal limits. status post TSH in euthyroid state  Continue with outpatient medication.  "

## 2024-06-16 NOTE — PROGRESS NOTES
[facility]  Progress Note  Name: Chelsey Carson I  MRN: [id number]  Unit/Bed#: [facility] 906-01 I Date of Admission: 6/14/2024   Date of Service: 6/16/2024 I Hospital Day: 2    Assessment & Plan   Acute cystitis with hematuria  Assessment & Plan  Actually meeting SIRS criteria given tachycardia and tachypnea.  Positive lactic acidosis thus severe sepsis, present on admission  Source secondary to acute cystitis  Continue IV ceftriaxone  Follow-up urine culture-urine culture is growing E. coli.  -Transition to Keflex per sensitivity  Follow-up blood culture.  Remained negative  Lactic acidosis now resolved    Anemia  Assessment & Plan  Macrocytic  Actually improved from prior H/H  No sign of acute blood loss  Follow-up folate/vitamin T24-[id number] normal limits    Ambulatory dysfunction  Assessment & Plan  Secondary to recent history of hip fracture status post ORIF, on 5/15/2024 at [facility]  Status post x-ray of hip: Status post right femoral neck internal fixation.  In comparison to an outside CT from 5/23/2024, an intertrochanteric fracture with medially displaced lesser trochanter is grossly unchanged.  PT/OT eval  Fall precautions  Patient denies hip pain  Case management had a discussion with patient's son.  He would like the patient to be discharged back to the previous facility.  Will contact the facility tomorrow and likely DC by tomorrow if remains stable    * Delirium due to another medical condition  Assessment & Plan  Likely secondary to toxic metabolic encephalopathy in setting of acute infectious process.  I.e. UTI  Discussed at length with patient's son, he reports of declining of cognitive function approximately 15 to 16 weeks ago where during this time she has been in and out of the hospital and in rehab.  Prior to that she had been residing alone at home for the most part independently where she was still paying her own bills.  Though she did have an  "aide that came in approximately 4 hours a day.  He reports that there has been no formal diagnosis of dementia  More recently since being at present living facility she has been started on psychiatric medications per patient's son to \"control her mood\", including Zyprexa, buspirone and Depakote.  Apparently there she has been having periods of sundowning and at times hitting staff.  He reports that since being on the present regimen that she is on her mood has been much improved with much less agitated periods.  Her son states that she will now be residing permanently at the assisted living facility as they realize she no longer can live independently   Will consult geriatrics  Continue on psych meds  Status post CT head, no acute intracranial pathology  Check folate/vitamin B12, RPR.-Within normal limits. status post TSH in euthyroid state  Continue with outpatient medication.               VTE Pharmacologic Prophylaxis:   Moderate Risk (Score 3-4) - Pharmacological DVT Prophylaxis Ordered: enoxaparin (Lovenox).    Mobility:   Basic Mobility Inpatient Raw Score: 13  JH-HLM Goal: 4: Move to chair/commode  JH-HLM Achieved: 4: Move to chair/commode  JH-HLM Goal achieved. Continue to encourage appropriate mobility.    Patient Centered Rounds: I performed bedside rounds with nursing staff today.   Discussions with Specialists or Other Care Team Provider:     Education and Discussions with Family / Patient: Attempted to update  (son) via phone. Left voicemail.     Total Time Spent on Date of Encounter in care of patient: 35 mins. This time was spent on one or more of the following: performing physical exam; counseling and coordination of care; obtaining or reviewing history; documenting in the medical record; reviewing/ordering tests, medications or procedures; communicating with other healthcare professionals and discussing with patient's family/caregivers.    Current Length of Stay: 2 day(s)  Current " Patient Status: Inpatient   Certification Statement: The patient will continue to require additional inpatient hospital stay due to pending discharge planning  Discharge Plan: Anticipate discharge tomorrow to assisted living facility    Code Status: Level 3 - DNAR and DNI    Subjective:   Patient seen and examined.  Denies any specific complaints.  Nursing reported that patient required Zyprexa overnight    Objective:     Vitals:   Temp (24hrs), Av.2 °F (36.8 °C), Min:97.5 °F (36.4 °C), Max:98.9 °F (37.2 °C)    Temp:  [97.5 °F (36.4 °C)-98.9 °F (37.2 °C)] 97.5 °F (36.4 °C)  HR:  [90-99] 90  Resp:  [14-18] 14  BP: (123-129)/(68-70) 123/68  SpO2:  [95 %-98 %] 98 %  There is no height or weight on file to calculate BMI.     Input and Output Summary (last 24 hours):     Intake/Output Summary (Last 24 hours) at 2024 1723  Last data filed at 2024 0848  Gross per 24 hour   Intake 120 ml   Output 417 ml   Net -297 ml       Physical Exam:   Physical Exam  Constitutional:       General: She is not in acute distress.  HENT:      Head: Normocephalic.      Nose: Nose normal.   Eyes:      General: No scleral icterus.  Cardiovascular:      Rate and Rhythm: Normal rate and regular rhythm.      Heart sounds: No murmur heard.  Pulmonary:      Effort: Pulmonary effort is normal. No respiratory distress.   Abdominal:      General: Bowel sounds are normal. There is no distension.   Musculoskeletal:         General: Normal range of motion.   Skin:     General: Skin is warm.   Neurological:      Mental Status: She is alert. Mental status is at baseline.      Comments: Patient is oriented to self          Additional Data:     Labs:  Results from last 7 days   Lab Units 24  0449 06/15/24  0456 24  1007   WBC Thousand/uL 4.52   < > 7.34   HEMOGLOBIN g/dL 10.1*   < > 10.8*   HEMATOCRIT % 32.0*   < > 33.3*   PLATELETS Thousands/uL 207   < > 243   SEGS PCT %  --   --  69   LYMPHO PCT %  --   --  16   MONO PCT %  --    --  13*   EOS PCT %  --   --  1    < > = values in this interval not displayed.     Results from last 7 days   Lab Units 06/16/24  0449 06/15/24  0456 06/14/24  1007   SODIUM mmol/L 139   < > 137   POTASSIUM mmol/L 4.1   < > 3.9   CHLORIDE mmol/L 105   < > 103   CO2 mmol/L 25   < > 24   BUN mg/dL 13   < > 25   CREATININE mg/dL 0.80   < > 0.99   ANION GAP mmol/L 9   < > 10   CALCIUM mg/dL 8.5   < > 8.9   ALBUMIN g/dL  --   --  3.5   TOTAL BILIRUBIN mg/dL  --   --  0.42   ALK PHOS U/L  --   --  80   ALT U/L  --   --  7   AST U/L  --   --  13   GLUCOSE RANDOM mg/dL 91   < > 112    < > = values in this interval not displayed.     Results from last 7 days   Lab Units 06/14/24  1007   INR  1.19             Results from last 7 days   Lab Units 06/14/24  1343 06/14/24  1007   LACTIC ACID mmol/L 2.0 2.9*   PROCALCITONIN ng/ml  --  <0.05       Lines/Drains:  Invasive Devices       Peripheral Intravenous Line  Duration             Peripheral IV 06/14/24 Distal;Right;Ventral (anterior) Forearm 2 days                          Imaging: No pertinent imaging reviewed.    Recent Cultures (last 7 days):   Results from last 7 days   Lab Units 06/14/24  1343 06/14/24  1341 06/14/24  1007   BLOOD CULTURE  No Growth at 48 hrs.  --  No Growth at 48 hrs.   URINE CULTURE   --  >100,000 cfu/ml Escherichia coli*  --        Last 24 Hours Medication List:   Current Facility-Administered Medications   Medication Dose Route Frequency Provider Last Rate    acetaminophen  650 mg Oral Q6H PRN Kelly Phillip, DO      aspirin  81 mg Oral Daily Kelly Phillip, DO      busPIRone  5 mg Oral BID Kelly Phillip, DO      calcium carbonate-vitamin D  1 tablet Oral Daily With Breakfast Kelly Phillip, DO      cephalexin  500 mg Oral Q6H UMAIR Isabela Carranza MD      divalproex sodium  250 mg Oral Daily Kellyabel Phillip, DO      divalproex sodium  500 mg Oral HS Kelly Phillip, DO      enoxaparin  40 mg  Subcutaneous Daily Kelly Phillip DO      guaiFENesin  1,200 mg Oral Q12H UMAIR Kelly Phillip DO      OLANZapine  2.5 mg Intramuscular Q6H PRN Isabela Carranza MD      OLANZapine  2.5 mg Oral Daily Isabela Carranza MD      pantoprazole  40 mg Oral Daily Kelly Phillip DO          Today, Patient Was Seen By: Isabela Carranza MD    **Please Note: This note may have been constructed using a voice recognition system.**

## 2024-06-16 NOTE — ASSESSMENT & PLAN NOTE
Actually meeting SIRS criteria given tachycardia and tachypnea.  Positive lactic acidosis thus severe sepsis, present on admission  Source secondary to acute cystitis  Continue IV ceftriaxone  Follow-up urine culture-urine culture is growing E. coli.  -Transition to Keflex per sensitivity  Follow-up blood culture.  Remained negative  Lactic acidosis now resolved

## 2024-06-17 PROCEDURE — 97163 PT EVAL HIGH COMPLEX 45 MIN: CPT

## 2024-06-17 PROCEDURE — 99232 SBSQ HOSP IP/OBS MODERATE 35: CPT | Performed by: FAMILY MEDICINE

## 2024-06-17 PROCEDURE — 97530 THERAPEUTIC ACTIVITIES: CPT

## 2024-06-17 PROCEDURE — 99222 1ST HOSP IP/OBS MODERATE 55: CPT | Performed by: NURSE PRACTITIONER

## 2024-06-17 PROCEDURE — 97535 SELF CARE MNGMENT TRAINING: CPT

## 2024-06-17 RX ADMIN — DIVALPROEX SODIUM 500 MG: 500 TABLET, DELAYED RELEASE ORAL at 19:22

## 2024-06-17 RX ADMIN — OLANZAPINE 2.5 MG: 2.5 TABLET, FILM COATED ORAL at 19:24

## 2024-06-17 RX ADMIN — CEPHALEXIN 500 MG: 500 CAPSULE ORAL at 19:23

## 2024-06-17 RX ADMIN — PANTOPRAZOLE SODIUM 40 MG: 40 TABLET, DELAYED RELEASE ORAL at 05:42

## 2024-06-17 RX ADMIN — CEPHALEXIN 500 MG: 500 CAPSULE ORAL at 05:42

## 2024-06-17 NOTE — CASE MANAGEMENT
Case Management Discharge Planning Note    Patient name Chelsey Carson  Location [facility] 906/[facility] 906-01 MRN [id number]  : 1935 Date 2024       Current Admission Date: 2024  Current Admission Diagnosis:Delirium due to another medical condition   Patient Active Problem List    Diagnosis Date Noted Date Diagnosed    Acute cystitis with hematuria 2024     Delirium due to another medical condition 2024     Closed nondisplaced intertrochanteric fracture of right femur (HCC) 2024     Abnormal CT of the chest 2024     Pneumonitis 2024     Dysuria 2024     Urinary retention 2024     Nausea 2024     Macrocytic anemia 2024     Thrombocytosis 2024     Ambulatory dysfunction 2024     Proctitis 2024     Anemia 03/15/2024     Loss of appetite 03/15/2024     Anxiety disorder, unspecified 03/15/2024     Tinnitus of both ears 2023     Xerostomia 2023     Mixed conductive and sensorineural hearing loss of left ear with restricted hearing of right ear 2023     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 2023     Nonintractable headache 2023     Abnormal brain MRI 2023     Slow transit constipation 2023     Primary insomnia 2023     Constipation 2023     Tremor 2023     Hyponatremia 2023     Age related osteoporosis 2022     GERD (gastroesophageal reflux disease) 10/10/2022     Stage 3a chronic kidney disease (HCC) 2022     Venous insufficiency 08/10/2022     Other fatigue 2022     Hypokalemia 2021     Dry mouth 2019     Chronic maxillary sinusitis 2019     Breast cancer ([facility]) 2019     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[facility]) 2019     Perforation of left tympanic membrane 2019     Nonrheumatic aortic valve insufficiency 11/15/2018     Pes anserinus bursitis of right knee 2018     Benign  essential HTN 10/19/2017     Aortic valve disorder 08/19/2013     Hyperlipidemia 12/19/2012       LOS (days): 3  Geometric Mean LOS (GMLOS) (days):   Days to GMLOS:     OBJECTIVE:  Risk of Unplanned Readmission Score: 26.8         Current admission status: Inpatient   Preferred Pharmacy:   [id number] - [place], [place] - [address]  [address]  [place] [address]  Phone: [phone] Fax: [phone]    Primary Care Provider: Mariely Fan MD    Primary Insurance: MEDICARE  Secondary Insurance: COMMERCIAL MISCELLANEOUS    DISCHARGE DETAILS:    Discharge planning discussed with:: Pt's son Julio  Freedom of Choice: Yes  Comments - Freedom of Choice: Pt's son requesting pt return to the [facility]  CM contacted family/caregiver?: Yes  Were Treatment Team discharge recommendations reviewed with patient/caregiver?: Yes  Did patient/caregiver verbalize understanding of patient care needs?: N/A- going to facility  Were patient/caregiver advised of the risks associated with not following Treatment Team discharge recommendations?: Yes    Contacts  Patient Contacts: Julio-pt's son.  Relationship to Patient:: Family  Contact Method: Phone  Phone Number: [phone]  Reason/Outcome: Emergency Contact, Discharge Planning    Requested Home Health Care         Is the patient interested in HHC at discharge?: No    DME Referral Provided  Referral made for DME?: No    Other Referral/Resources/Interventions Provided:  Interventions: Facility Return  Referral Comments: Pt's son requesting pt to return to her facility at the [facility] vs going to [facility] on discharge.  CM left VM for Tammi at the [facility] regarding PT/OT notes and if they can accomodate pt as an assist x2.         Treatment Team Recommendation: Facility Return  Discharge Destination Plan:: Facility Return             CM left VM for Tammi at the [facility] regarding pt and to fax PT/OT notes.      CM spoke with pt's son Julio regarding update and  PT/OT recommendation.  Pt's son informed CM that the [facility] has accommodated pt in the past when she required assist x2.  Pt's son prefers pt return to her facility on discharge as he is uncertain what pt would get out of rehab due to cognition, CM verbalized understanding.  CM waiting on [facility] to confirm that they can accept pt back as an assist x2, pt at baseline is an assist x1.  CM will continue to follow for discharge planning needs.

## 2024-06-17 NOTE — CONSULTS
Consultation - Geriatrics   Chelsey Carson 88 y.o. female MRN: [id number]  Unit/Bed#: [facility] 906-01 Encounter: [id number]      Assessment/Plan  Encephalopathy/delirium  Multifactorial: UTI in the setting of prolonged delirium and underlying cognitive impairment  Baseline: alert and oriented x 2  Provide frequent redirection, reorientation, distraction techniques  Avoid deliriogenic medications such as tramadol, benzodiazepines, anticholinergics,  Benadryl  Treat pain, See geriatric pain protocol  Monitor for constipation and urinary retention  Encourage early and frequent moblization, OOB  Encourage Hydration/ Nutrition  Implement sleep hygiene, limit night time interuptions, group activities    Ambulatory dysfunction  At risk for falls secondary to age, hx of fall, gait instability, polypharmacy, visual impairment  Fall precautions  PT/OT  Increased physical exercise, recommend balance and strengthening exercises  Rehab post hospitalization    UTI  Urine culture (6/15/24) positive greater than [id number] ecoli  Continue keflex  Encourage po fluid intake  Maintain good hygiene practices    Anxiety/psychosis  Seen by psych at rehab in May  Started on depakote/buspar/zyprexa  Continue medications    Frailty  Clinical Frail Scale: 6- Moderately Frail  Albumin 3.5, maintain protein in diet  PT/OT  Continue supportive care     Forgetfulness   Hx of forgetfulness, no formal work up  Increased agitation and confusion over past several months  Underlying cognitive impairment can worsen the setting of delirium  TSH 2.507 (3/16/24)  Vitamin B 12 level 529 (6/15/24)  CT head (6/14/24) mild microangiopathic changes  Keep physically, mentally and socially active     Insomnia  Related to hospitalization  First line is behavioral therapy  Avoid sedative hypnotics such as benzodiazepines and benadryl  Encourage staying awake during the day  Encourage daytime activity, morning exercise  Decrease or eliminate day time naps  Avoid  caffeine especially during late afternoon and evening hours  Establish a night time routine    Home medication review  Medication list from [facility] Physician orders  Asa 81 mg po daily  Buspar 5 mg po BID  Calcium 500 mg po BID  Divalproex 250 mg po daily  Divalproex 500 mg po QHS  Ipratropium   0.03% nasal spray 1 spray each nostril daily  Metoprolol 25 mg po q12  Mucus relief 600 mg po Q12  Olanzapine 2.5 mg po Q am  Pantoprazole 40 mg po daily    PRN  APAP 500 mg po Q 6  Iprat-albuterol TID                History of Present Illness   Physician Requesting Consult: Isabela Carranza MD  Reason for Consult / Principal Problem: delirium  Hx and PE limited by: Na  HPI: Chelsey Carson is a 88 y.o. year old female who presents with altered mental status and being found on the floor at her facility, the [facility]. She is sent to ED for further evaluation. She is found to have at UTI.    She has HTN, GERD, anxiety, psychosis    Over the past few months she has been in and out of the hospital and several rehabs. Review of records she is noted to have had delirium, increased confusion and agitation. She was seen by psychiatry and had several medication adjustment.     She is lying in bed, calm and cooperative. Per nursing she was anxious earlier ringing the call bell frequently      Inpatient consult to Gerontology  Consult performed by: LANI Loyola  Consult ordered by: Kelly Phillip DO          Review of Systems   Constitutional:  Negative for unexpected weight change.   HENT:  Negative for hearing loss.    Eyes:  Negative for visual disturbance.   Respiratory:  Negative for cough.    Cardiovascular:  Negative for chest pain.   Gastrointestinal:  Negative for constipation.   Musculoskeletal:  Positive for gait problem.   Neurological:  Negative for weakness.   Psychiatric/Behavioral:  Negative for sleep disturbance.        Historical Information   Past Medical History:   Diagnosis Date    Abnormal  laboratory test 08/01/2022    Acute sinusitis 02/13/2013    Age related osteoporosis 11/01/2022    Formatting of this note might be different from the original.   Last Assessment & Plan:     Formatting of this note might be different from the original.    Risedronate  on allergy list but she does not recall what happened    Her kids told her not to get treatment but I recommend it again to her.     Given the possible allergy refer to rheum to discuss treatment options    Breast cancer (HCC)     Cancer (HCC)     Chronic cough 01/13/2017    COVID-19     History of 2019 novel coronavirus disease (COVID-19) 02/15/2021    Hyperlipidemia     Hyponatremia 02/01/2021    Influenza B 03/05/2024    Insomnia 02/17/2021    MCI (mild cognitive impairment) 02/15/2021    Nonrheumatic aortic (valve) stenosis 03/15/2024    Other specified forms of tremor 03/15/2024    Palpitations 03/15/2024    Pneumonia due to COVID-19 virus 02/01/2021    Slow transit constipation 03/15/2024    Stage 3a chronic kidney disease (HCC) 09/27/2023    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    Thrombocytosis, unspecified 03/15/2024     Past Surgical History:   Procedure Laterality Date    BREAST SURGERY      cancer (> 5 years)    HYSTERECTOMY       Social History   Social History     Substance and Sexual Activity   Alcohol Use Not Currently    Alcohol/week: 1.0 standard drink of alcohol    Types: 1 Glasses of wine per week     Social History     Substance and Sexual Activity   Drug Use No     Social History     Tobacco Use   Smoking Status Never   Smokeless Tobacco Never         Family History:   Family History   Problem Relation Age of Onset    Breast cancer Mother     Heart disease Father         heart problem    Heart disease Sister         heart problem    Heart disease Brother         heart problem       Meds/Allergies   Current meds:   Current Facility-Administered Medications   Medication Dose Route Frequency     acetaminophen (TYLENOL) tablet 650 mg  650 mg Oral Q6H PRN    aspirin chewable tablet 81 mg  81 mg Oral Daily    busPIRone (BUSPAR) tablet 5 mg  5 mg Oral BID    calcium carbonate-vitamin D 500 mg-5 mcg tablet 1 tablet  1 tablet Oral Daily With Breakfast    cephalexin (KEFLEX) capsule 500 mg  500 mg Oral Q6H UMAIR    divalproex sodium (DEPAKOTE ER) 24 hr tablet 250 mg  250 mg Oral Daily    divalproex sodium (DEPAKOTE) DR tablet 500 mg  500 mg Oral HS    enoxaparin (LOVENOX) subcutaneous injection 40 mg  40 mg Subcutaneous Daily    guaiFENesin (MUCINEX) 12 hr tablet 1,200 mg  1,200 mg Oral Q12H UMAIR    OLANZapine (ZyPREXA) tablet 2.5 mg  2.5 mg Oral Daily    OLANZapine (ZyPREXA) tablet 2.5 mg  2.5 mg Oral HS PRN    pantoprazole (PROTONIX) EC tablet 40 mg  40 mg Oral Daily        Allergies   Allergen Reactions    Pravastatin     Risedronate     Paxlovid [Nirmatrelvir-Ritonavir] Nausea Only       Objective   Vitals: Blood pressure 131/100, pulse 104, temperature 98.2 °F (36.8 °C), resp. rate 17, SpO2 97%.,There is no height or weight on file to calculate BMI.      Physical Exam  Vitals and nursing note reviewed.   HENT:      Head: Normocephalic.      Nose: No congestion.      Mouth/Throat:      Mouth: Mucous membranes are moist.   Eyes:      General:         Right eye: No discharge.         Left eye: No discharge.   Cardiovascular:      Rate and Rhythm: Normal rate and regular rhythm.   Pulmonary:      Effort: Pulmonary effort is normal.      Breath sounds: Normal breath sounds.   Abdominal:      General: Bowel sounds are normal.      Palpations: Abdomen is soft.   Musculoskeletal:         General: Normal range of motion.   Skin:     General: Skin is warm and dry.   Neurological:      Mental Status: She is alert. Mental status is at baseline.   Psychiatric:         Mood and Affect: Mood normal.         Lab Results:   Results from last 7 days   Lab Units 06/16/24  0449   WBC Thousand/uL 4.52   HEMOGLOBIN g/dL 10.1*    HEMATOCRIT % 32.0*   PLATELETS Thousands/uL 207        Results from last 7 days   Lab Units 06/16/24  0449 06/15/24  0456 06/14/24  1007   POTASSIUM mmol/L 4.1   < > 3.9   CHLORIDE mmol/L 105   < > 103   CO2 mmol/L 25   < > 24   BUN mg/dL 13   < > 25   CREATININE mg/dL 0.80   < > 0.99   CALCIUM mg/dL 8.5   < > 8.9   ALK PHOS U/L  --   --  80   ALT U/L  --   --  7   AST U/L  --   --  13    < > = values in this interval not displayed.       Imaging Studies: I have personally reviewed pertinent films in PACS  EKG, Pathology, and Other Studies: I have personally reviewed pertinent films in PACS  VTE Prophylaxis: Sequential compression device (Venodyne)     Code Status: Level 3 - DNAR and DNI

## 2024-06-17 NOTE — ASSESSMENT & PLAN NOTE
Macrocytic  Actually improved from prior H/H  No sign of acute blood loss  Follow-up folate/vitamin [id number] normal limits

## 2024-06-17 NOTE — PROGRESS NOTES
[facility]  Progress Note  Name: Chelsey Carson I  MRN: [id number]  Unit/Bed#: [facility] 906-01 I Date of Admission: 6/14/2024   Date of Service: 6/17/2024 I Hospital Day: 3    Assessment & Plan   Acute cystitis with hematuria  Assessment & Plan  Actually meeting SIRS criteria given tachycardia and tachypnea.  Positive lactic acidosis thus severe sepsis, present on admission  Source secondary to acute cystitis  Continue IV ceftriaxone  Follow-up urine culture-urine culture is growing E. coli.  -Transition to Keflex per sensitivity  Follow-up blood culture.  Remained negative  Lactic acidosis now resolved  Finish a total of 5-day course of antibiotics    Anemia  Assessment & Plan  Macrocytic  Actually improved from prior H/H  No sign of acute blood loss  Follow-up folate/vitamin [id number] normal limits    Ambulatory dysfunction  Assessment & Plan  Secondary to recent history of hip fracture status post ORIF, on 5/15/2024 at [facility]  Status post x-ray of hip: Status post right femoral neck internal fixation.  In comparison to an outside CT from 5/23/2024, an intertrochanteric fracture with medially displaced lesser trochanter is grossly unchanged.  PT/OT eval  Fall precautions  Patient denies hip pain  Case management had a discussion with patient's son.  He would like the patient to be discharged back to the previous facility.    Case management contacted the facility.  Has not had an answer from the facility regarding whether she will be able to come back or not     * Delirium due to another medical condition  Assessment & Plan  Likely secondary to toxic metabolic encephalopathy in setting of acute infectious process.  I.e. UTI  Discussed at length with patient's son, he reports of declining of cognitive function approximately 15 to 16 weeks ago where during this time she has been in and out of the hospital and in rehab.  Prior to that she had been residing alone  "at home for the most part independently where she was still paying her own bills.  Though she did have an aide that came in approximately 4 hours a day.  He reports that there has been no formal diagnosis of dementia  More recently since being at present living facility she has been started on psychiatric medications per patient's son to \"control her mood\", including Zyprexa, buspirone and Depakote.  Apparently there she has been having periods of sundowning and at times hitting staff.  He reports that since being on the present regimen that she is on her mood has been much improved with much less agitated periods.  Her son states that she will now be residing permanently at the assisted living facility as they realize she no longer can live independently   Will consult geriatrics  Continue on psych meds  Status post CT head, no acute intracranial pathology  Check folate/vitamin B12, RPR.-Within normal limits. status post TSH in euthyroid state  Continue with outpatient medication.  As needed Zyprexa for agitation               VTE Pharmacologic Prophylaxis:   Moderate Risk (Score 3-4) - Pharmacological DVT Prophylaxis Ordered: enoxaparin (Lovenox).    Mobility:   Basic Mobility Inpatient Raw Score: 11  -[facility] Goal: 4: Move to chair/commode  -HLM Achieved: 5: Stand (1 or more minutes)      Patient Centered Rounds: I performed bedside rounds with nursing staff today.   Discussions with Specialists or Other Care Team Provider:     Education and Discussions with Family / Patient: Attempted to update  (son) via phone. Left voicemail.     Total Time Spent on Date of Encounter in care of patient: 35 mins. This time was spent on one or more of the following: performing physical exam; counseling and coordination of care; obtaining or reviewing history; documenting in the medical record; reviewing/ordering tests, medications or procedures; communicating with other healthcare professionals and discussing with " patient's family/caregivers.    Current Length of Stay: 3 day(s)  Current Patient Status: Inpatient   Certification Statement: The patient will continue to require additional inpatient hospital stay due to discharge planning  Discharge Plan: Anticipate discharge tomorrow to prior assisted or independent living facility.    Code Status: Level 3 - DNAR and DNI    Subjective:   Patient seen and examined.  Discussed with case management in the morning rounds.  Patient is cleared medically for discharge.  Case management contacted the facility.  Unable to get an answer from the regarding whether she should be able to come back to the facility or needs rehab since she is in assist of 2 and previously she was only in assist of 1  Objective:     Vitals:   Temp (24hrs), Av.1 °F (36.7 °C), Min:97.2 °F (36.2 °C), Max:98.9 °F (37.2 °C)    Temp:  [97.2 °F (36.2 °C)-98.9 °F (37.2 °C)] 98.9 °F (37.2 °C)  HR:  [] 113  Resp:  [16-17] 17  BP: (107-134)/() 107/74  SpO2:  [95 %-97 %] 95 %  There is no height or weight on file to calculate BMI.     Input and Output Summary (last 24 hours):     Intake/Output Summary (Last 24 hours) at 2024 1819  Last data filed at 2024 0900  Gross per 24 hour   Intake 290 ml   Output 317 ml   Net -27 ml       Physical Exam:   Physical Exam  Constitutional:       General: She is not in acute distress.  HENT:      Head: Normocephalic and atraumatic.      Nose: Nose normal.   Eyes:      General: No scleral icterus.  Cardiovascular:      Rate and Rhythm: Normal rate and regular rhythm.      Pulses: Normal pulses.   Pulmonary:      Comments: Decreased breath sounds bilateral  Abdominal:      General: There is no distension.      Tenderness: There is no abdominal tenderness.   Musculoskeletal:         General: No swelling. Normal range of motion.   Skin:     General: Skin is warm.      Coloration: Skin is not jaundiced.   Neurological:      Mental Status: She is alert. Mental status  is at baseline.          Additional Data:     Labs:  Results from last 7 days   Lab Units 06/16/24  0449 06/15/24  0456 06/14/24  1007   WBC Thousand/uL 4.52   < > 7.34   HEMOGLOBIN g/dL 10.1*   < > 10.8*   HEMATOCRIT % 32.0*   < > 33.3*   PLATELETS Thousands/uL 207   < > 243   SEGS PCT %  --   --  69   LYMPHO PCT %  --   --  16   MONO PCT %  --   --  13*   EOS PCT %  --   --  1    < > = values in this interval not displayed.     Results from last 7 days   Lab Units 06/16/24  0449 06/15/24  0456 06/14/24  1007   SODIUM mmol/L 139   < > 137   POTASSIUM mmol/L 4.1   < > 3.9   CHLORIDE mmol/L 105   < > 103   CO2 mmol/L 25   < > 24   BUN mg/dL 13   < > 25   CREATININE mg/dL 0.80   < > 0.99   ANION GAP mmol/L 9   < > 10   CALCIUM mg/dL 8.5   < > 8.9   ALBUMIN g/dL  --   --  3.5   TOTAL BILIRUBIN mg/dL  --   --  0.42   ALK PHOS U/L  --   --  80   ALT U/L  --   --  7   AST U/L  --   --  13   GLUCOSE RANDOM mg/dL 91   < > 112    < > = values in this interval not displayed.     Results from last 7 days   Lab Units 06/14/24  1007   INR  1.19             Results from last 7 days   Lab Units 06/14/24  1343 06/14/24  1007   LACTIC ACID mmol/L 2.0 2.9*   PROCALCITONIN ng/ml  --  <0.05       Lines/Drains:  Invasive Devices       Peripheral Intravenous Line  Duration             Peripheral IV 06/14/24 Distal;Right;Ventral (anterior) Forearm 3 days                          Imaging: No pertinent imaging reviewed.    Recent Cultures (last 7 days):   Results from last 7 days   Lab Units 06/14/24  1343 06/14/24  1341 06/14/24  1007   BLOOD CULTURE  No Growth at 72 hrs.  --  No Growth at 72 hrs.   URINE CULTURE   --  >100,000 cfu/ml Escherichia coli*  --        Last 24 Hours Medication List:   Current Facility-Administered Medications   Medication Dose Route Frequency Provider Last Rate    acetaminophen  650 mg Oral Q6H PRN Kelly Haylee Kenji, DO      aspirin  81 mg Oral Daily Kelly Haylee Phillip, DO      busPIRone  5 mg Oral  BID Kelly Haylee Phillip, DO      calcium carbonate-vitamin D  1 tablet Oral Daily With Breakfast Kelly Phillip, DO      cephalexin  500 mg Oral Q6H [facility] Isabela Carranza MD      divalproex sodium  250 mg Oral Daily Kelly Haylee Kenji, DO      divalproex sodium  500 mg Oral HS Kelly Haylee Phillip, DO      enoxaparin  40 mg Subcutaneous Daily Kellyabel Phillip, DO      guaiFENesin  1,200 mg Oral Q12H UMAIR Kelly Phillip, DO      OLANZapine  2.5 mg Oral Daily Isabela Carranza MD      OLANZapine  2.5 mg Oral HS PRN Isabela Carranza MD      pantoprazole  40 mg Oral Daily Kelly Phillip, DO          Today, Patient Was Seen By: Isabela Carranza MD    **Please Note: This note may have been constructed using a voice recognition system.**

## 2024-06-17 NOTE — ASSESSMENT & PLAN NOTE
"Likely secondary to toxic metabolic encephalopathy in setting of acute infectious process.  I.e. UTI  Discussed at length with patient's son, he reports of declining of cognitive function approximately 15 to 16 weeks ago where during this time she has been in and out of the hospital and in rehab.  Prior to that she had been residing alone at home for the most part independently where she was still paying her own bills.  Though she did have an aide that came in approximately 4 hours a day.  He reports that there has been no formal diagnosis of dementia  More recently since being at present living facility she has been started on psychiatric medications per patient's son to \"control her mood\", including Zyprexa, buspirone and Depakote.  Apparently there she has been having periods of sundowning and at times hitting staff.  He reports that since being on the present regimen that she is on her mood has been much improved with much less agitated periods.  Her son states that she will now be residing permanently at the assisted living facility as they realize she no longer can live independently   Will consult geriatrics  Continue on psych meds  Status post CT head, no acute intracranial pathology  Check folate/vitamin B12, RPR.-Within normal limits. status post TSH in euthyroid state  Continue with outpatient medication.  As needed Zyprexa for agitation  "

## 2024-06-17 NOTE — PLAN OF CARE
Problem: Potential for Falls  Goal: Patient will remain free of falls  Description: INTERVENTIONS:  - Educate patient/family on patient safety including physical limitations  - Instruct patient to call for assistance with activity   - Consult OT/PT to assist with strengthening/mobility   - Keep Call bell within reach  - Keep bed low and locked with side rails adjusted as appropriate  - Keep care items and personal belongings within reach  - Initiate and maintain comfort rounds  - Make Fall Risk Sign visible to staff  - Offer Toileting every 2 Hours, in advance of need  - Initiate/Maintain bed/chair alarm  - Obtain necessary fall risk management equipment: chair alarm, yellow socks/bracelet  - Apply yellow socks and bracelet for high fall risk patients  Outcome: Progressing     Problem: INFECTION - ADULT  Goal: Absence or prevention of progression during hospitalization  Description: INTERVENTIONS:  - Assess and monitor for signs and symptoms of infection  - Monitor lab/diagnostic results  - Monitor all insertion sites, i.e. IV  - [place] appropriate cooling/warming therapies per order  - Administer medications as ordered  - Instruct and encourage patient and family to use good hand hygiene technique  - Identify and instruct in appropriate isolation precautions for identified infection/condition  Outcome: Progressing

## 2024-06-17 NOTE — OCCUPATIONAL THERAPY NOTE
Occupational Therapy Progress Note     Patient Name: Chelsey Carson  Today's Date: 6/17/2024  Problem List  Principal Problem:    Delirium due to another medical condition  Active Problems:    Ambulatory dysfunction    Anemia    Acute cystitis with hematuria       06/17/24 0857   OT Last Visit   OT Visit Date 06/17/24   Note Type   Note Type Treatment   Pain Assessment   Pain Assessment Tool 0-10   Pain Score No Pain   Restrictions/Precautions   Weight Bearing Precautions Per Order No   Other Precautions Cognitive;Chair Alarm;Bed Alarm;Multiple lines;Fall Risk  (recent hx of hip fx s/p ORIF on 5/15/2024 at [facility])   Lifestyle   Autonomy Per chart review, pt requires A for ADLs and IADLs and Ax1 for fxnl mobility at baseline; - driving.   Reciprocal Relationships Pt lives w/ supportive facility staff at [facility]; supportive son.   Service to Others Pt is retired.   Intrinsic Gratification none stated; will continue to assess   ADL   Where Assessed Edge of bed   Grooming Assistance 5  Supervision/Setup   Grooming Deficit Setup;Verbal cueing;Increased time to complete;Wash/dry hands;Wash/dry face;Brushing hair   UB Bathing Assistance 4  Minimal Assistance   UB Bathing Deficit Setup;Verbal cueing;Increased time to complete;Chest;Right arm;Left arm;Abdomen   UB Bathing Comments Pt required A for thoroughness.   LB Bathing Assistance 3  Moderate Assistance   LB Bathing Deficit Setup;Verbal cueing;Supervision/safety;Increased time to complete;Perineal area;Buttocks;Right upper leg;Left upper leg;Right lower leg including foot;Left lower leg including foot   LB Bathing Comments Pt required A to wash b/l lower legs, feet, and buttocks; able to wash b/l thighs and perineal area w/o physical assistance.   UB Dressing Assistance 4  Minimal Assistance   UB Dressing Deficit Setup;Verbal cueing;Increased time to complete;Thread RUE;Thread LUE;Pull around back;Fasteners   LB Dressing Assistance 2  Maximal  Assistance   LB Dressing Deficit Setup;Don/doff R sock;Don/doff L sock   Bed Mobility   Supine to Sit 3  Moderate assistance   Additional items Assist x 1;HOB elevated;Bedrails;Increased time required;Verbal cues;LE management   Sit to Supine 3  Moderate assistance   Additional items Assist x 1;Increased time required;Verbal cues;LE management   Additional Comments Pt supine in bed at end of OT tx session w/ all needs within reach and alarm activated.   Transfers   Sit to Stand 3  Moderate assistance   Additional items Assist x 2;Increased time required;Verbal cues   Stand to Sit 3  Moderate assistance   Additional items Assist x 2;Increased time required;Verbal cues   Additional Comments w/ b/l HHA for support   Functional Mobility   Functional Mobility 3  Moderate assistance   Additional Comments Pt took two steps along EOB w/ mod Ax2 using b/l HHA for support.   Additional items Hand hold assistance   Cognition   Overall Cognitive Status Impaired   Arousal/Participation Alert;Responsive;Cooperative   Attention Attends with cues to redirect   Orientation Level Oriented to person;Oriented to place;Disoriented to time  (Pt generally oriented to place as she stated she was in the hospital; unable to recall name of hospital.)   Memory Decreased recall of precautions;Decreased recall of recent events;Decreased short term memory   Following Commands Follows one step commands with increased time or repetition   Comments Pt was pleasantly confused, cooperative, and willing to participate in OT tx session w/ encouragement and reassurance from therapists.   Activity Tolerance   Activity Tolerance Patient limited by fatigue;Other (Comment)  (impaired cognition)   Medical Staff Made Aware RN clearance prior to session; PTniecy, due to pt's medical complexity and multiple comobidities   Assessment   Assessment Pt seen for skilled OT treatment session from 0938 to 0957 w/ interventions focusing on ADL participation,  activity tolerance, sitting tolerance, sitting balance, standing tolerance, standing balance, bed mobility , transfer skills, and fxnl mobility. Pt was agreeable and willing to participate in session. Pt engaged in the following tasks: S for grooming tasks seated EOB, min A for UB ADLs, and mod-max A for LB ADLs. Pt also required mod Ax1 for bed mobility and mod Ax2 for transfers and fxnl mobility w/ b/l HHA. In comparison to previous session, pt continues to require consistent levels of physical assistance for ADLs and mobility tasks at this time. Pt required encouragement and reassurance from therapists to participate in tx session. Pt continues to be functioning below baseline level as occupational performance remains limited by decreased ADL status, decreased activity tolerance, decreased endurance, decreased sitting tolerance, decreased sitting balance, decreased standing tolerance, decreased standing balance, decreased transfer skills, decreased fxnl mobility, decreased safety awareness, decreased insight into deficits, generalized weakness , and impaired cognition . From OT standpoint, recommend Level II (Moderate Resource Intensity) at time of d/c. Pt will benefit from continued OT treatment while in acute care to address deficits as defined above and maximize level of functional independence with ADLs and functional mobility. Pt supine in bed w/ alarm activated and all needs met at end of session.   Plan   Treatment Interventions ADL retraining;Functional transfer training;UE strengthening/ROM;Endurance training;Cognitive reorientation;Patient/family training;Equipment evaluation/education;Compensatory technique education;Continued evaluation;Energy conservation;Activityengagement   Goal Expiration Date 06/29/24   OT Treatment Day 1   OT Frequency 2-3x/wk   Discharge Recommendation   Rehab Resource Intensity Level, OT II (Moderate Resource Intensity)   AM-PAC Daily Activity Inpatient   Lower Body Dressing 2    Bathing 2   Toileting 2   Upper Body Dressing 3   Grooming 3   Eating 3   Daily Activity Raw Score 15   Daily Activity Standardized Score (Calc for Raw Score >=11) 34.69   AM-PAC Applied Cognition Inpatient   Following a Speech/Presentation 2   Understanding Ordinary Conversation 4   Taking Medications 1   Remembering Where Things Are Placed or Put Away 2   Remembering List of 4-5 Errands 1   Taking Care of Complicated Tasks 1   Applied Cognition Raw Score 11   Applied Cognition Standardized Score 27.03       The patient's raw score on the AM-PAC Daily Activity Inpatient Short Form is 15. A raw score of less than 19 suggests the patient may benefit from discharge to post-acute rehabilitation services. Please refer to the recommendation of the Occupational Therapist for safe discharge planning.    JH Curry, OTR/L

## 2024-06-17 NOTE — ASSESSMENT & PLAN NOTE
Actually meeting SIRS criteria given tachycardia and tachypnea.  Positive lactic acidosis thus severe sepsis, present on admission  Source secondary to acute cystitis  Continue IV ceftriaxone  Follow-up urine culture-urine culture is growing E. coli.  -Transition to Keflex per sensitivity  Follow-up blood culture.  Remained negative  Lactic acidosis now resolved  Finish a total of 5-day course of antibiotics

## 2024-06-17 NOTE — PLAN OF CARE
Problem: PHYSICAL THERAPY ADULT  Goal: Performs mobility at highest level of function for planned discharge setting.  See evaluation for individualized goals.  Description: Treatment/Interventions: ADL retraining, Functional transfer training, LE strengthening/ROM, Elevations, Therapeutic exercise, Endurance training, Bed mobility, Gait training, Spoke to nursing, OT          See flowsheet documentation for full assessment, interventions and recommendations.  Note: Prognosis: Fair  Problem List: Decreased strength, Decreased endurance, Decreased range of motion, Impaired balance, Decreased mobility, Decreased cognition, Impaired judgement, Decreased safety awareness  Assessment: PT orders received and acknowledged. Patient was seen today for high complexity PT evaluation. High complexity evaluation due to Ongoing medical management for primary dx, Decreased activity tolerance compared to baseline, Fall risk, Increased assistance needed from caregiver at current time, Ongoing telemetry monitoring, Cog status, Continuous pulse oximetry monitoring  Patient is a 88 y.o. female  who was admitted to [facility] on 6/14/2024  with Delirium due to another medical condition . Pt presents to [facility] after being found on the floor with altered mental status . Patient performed functional mobility as described above.  At baseline, pt resides with facility staff in [facility] and was requiring assist prior to hospital admission. Currently, upon initial examination, pt  is requiring mod assist x1 for bed mobility skills;  mod assist x2 for functional transfers and  mod assist x2 for ambulation with no AD.  Patient currently presents below baseline with limitations in gait, balance, and transfers. Patient will benefit from continued PT services while in hospital in order to address remaining limitations. The patients AM-PAC Basic Mobility Inpatient Short From Raw Score is 11 . Based on AM-PAC scoring and patient presentation, PT  currently recommending Level II (Moderate Resource Intensity). Please also refer to the recommendation of the Physical Therapist for safe discharge planning.  Barriers to Discharge: Decreased caregiver support, Inaccessible home environment     Rehab Resource Intensity Level, PT: II (Moderate Resource Intensity)    See flowsheet documentation for full assessment.

## 2024-06-17 NOTE — ASSESSMENT & PLAN NOTE
Secondary to recent history of hip fracture status post ORIF, on 5/15/2024 at [facility]  Status post x-ray of hip: Status post right femoral neck internal fixation.  In comparison to an outside CT from 5/23/2024, an intertrochanteric fracture with medially displaced lesser trochanter is grossly unchanged.  PT/OT eval  Fall precautions  Patient denies hip pain  Case management had a discussion with patient's son.  He would like the patient to be discharged back to the previous facility.    Case management contacted the facility.  Has not had an answer from the facility regarding whether she will be able to come back or not

## 2024-06-17 NOTE — PHYSICAL THERAPY NOTE
Physical Therapy Evaluation     Patient's Name: Chelsey Carson    Admitting Diagnosis  Altered mental status [R41.82]  Urinary tract infection [N39.0]  Delirium due to another medical condition [F05]  AMS (altered mental status) [R41.82]    Problem List  Patient Active Problem List   Diagnosis    Hyperlipidemia    Pes anserinus bursitis of right knee    Nonrheumatic aortic valve insufficiency    Perforation of left tympanic membrane    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HCC)    Breast cancer (HCC)    Dry mouth    Chronic maxillary sinusitis    Other fatigue    Venous insufficiency    Stage 3a chronic kidney disease (HCC)    GERD (gastroesophageal reflux disease)    Age related osteoporosis    Tremor    Hyponatremia    Constipation    Slow transit constipation    Primary insomnia    Nonintractable headache    Abnormal brain MRI    Tinnitus of both ears    Xerostomia    Mixed conductive and sensorineural hearing loss of left ear with restricted hearing of right ear    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    Ambulatory dysfunction    Proctitis    Macrocytic anemia    Thrombocytosis    Nausea    Urinary retention    Anemia    Hypokalemia    Loss of appetite    Benign essential HTN    Anxiety disorder, unspecified    Aortic valve disorder    Dysuria    Abnormal CT of the chest    Pneumonitis    Closed nondisplaced intertrochanteric fracture of right femur (HCC)    Delirium due to another medical condition    Acute cystitis with hematuria       Past Medical History  Past Medical History:   Diagnosis Date    Abnormal laboratory test 08/01/2022    Acute sinusitis 02/13/2013    Age related osteoporosis 11/01/2022    Formatting of this note might be different from the original.   Last Assessment & Plan:     Formatting of this note might be different from the original.    Risedronate  on allergy list but she does not recall what happened    Her kids told her not to get treatment but  I recommend it again to her.     Given the possible allergy refer to rheum to discuss treatment options    Breast cancer (HCC)     Cancer (HCC)     Chronic cough 01/13/2017    COVID-19     History of 2019 novel coronavirus disease (COVID-19) 02/15/2021    Hyperlipidemia     Hyponatremia 02/01/2021    Influenza B 03/05/2024    Insomnia 02/17/2021    MCI (mild cognitive impairment) 02/15/2021    Nonrheumatic aortic (valve) stenosis 03/15/2024    Other specified forms of tremor 03/15/2024    Palpitations 03/15/2024    Pneumonia due to COVID-19 virus 02/01/2021    Slow transit constipation 03/15/2024    Stage 3a chronic kidney disease (HCC) 09/27/2023    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    Thrombocytosis, unspecified 03/15/2024       Past Surgical History  Past Surgical History:   Procedure Laterality Date    BREAST SURGERY      cancer (> 5 years)    HYSTERECTOMY            06/17/24 0859   PT Last Visit   PT Visit Date 06/17/24   Note Type   Note type Evaluation   Pain Assessment   Pain Assessment Tool 0-10   Pain Score No Pain   Restrictions/Precautions   Weight Bearing Precautions Per Order No   Other Precautions Cognitive;Chair Alarm;Bed Alarm;Fall Risk;Hard of hearing   Home Living   Type of Home Assisted living  ([facility])   Additional Comments Pt poor historian, unable to provide home living information   Prior Function   Comments pt poor historian, unable to provide prior function information   General   Family/Caregiver Present No   Cognition   Overall Cognitive Status Impaired   Arousal/Participation Alert   Attention Attends with cues to redirect   Orientation Level Oriented to person;Disoriented to place;Disoriented to time;Disoriented to situation   Memory Decreased recall of precautions;Decreased recall of recent events;Decreased short term memory   Following Commands Follows one step commands with increased time or repetition   Subjective   Subjective pt mostly  pleasant and cooperative throughout therapy session. pt received supine in bed   RLE Assessment   RLE Assessment X  (3+/5 grossly)   LLE Assessment   LLE Assessment X  (3+/5 grossly)   Bed Mobility   Supine to Sit 3  Moderate assistance   Additional items Assist x 1;Increased time required;Verbal cues;LE management   Sit to Supine 3  Moderate assistance   Additional items Assist x 1;Increased time required;Verbal cues;LE management   Transfers   Sit to Stand 3  Moderate assistance   Additional items Assist x 2;Increased time required;Verbal cues   Stand to Sit 3  Moderate assistance   Additional items Assist x 2;Increased time required;Verbal cues   Additional Comments transfers w BL HHA   Ambulation/Elevation   Gait pattern Improper Weight shift;Wide NENITA;Forward Flexion;Decreased foot clearance;Inconsistent viet;Short stride;Excessively slow   Gait Assistance 3  Moderate assist   Additional items Assist x 2;Verbal cues;Tactile cues   Distance 2 steps to the L   Balance   Static Sitting Fair   Dynamic Sitting Fair -   Static Standing Poor +   Dynamic Standing Poor   Ambulatory Poor -   Endurance Deficit   Endurance Deficit Yes   Endurance Deficit Description generalized weakness, decreased exercise tolerance   Activity Tolerance   Activity Tolerance Patient limited by fatigue   Medical Staff Made Aware alia Reinoso due to medical complexity and multiple comorbidities   Nurse Made Aware RN cleared and updated   Assessment   Prognosis Fair   Problem List Decreased strength;Decreased endurance;Decreased range of motion;Impaired balance;Decreased mobility;Decreased cognition;Impaired judgement;Decreased safety awareness   Assessment PT orders received and acknowledged. Patient was seen today for high complexity PT evaluation. High complexity evaluation due to Ongoing medical management for primary dx, Decreased activity tolerance compared to baseline, Fall risk, Increased assistance needed from caregiver at  current time, Ongoing telemetry monitoring, Cog status, Continuous pulse oximetry monitoring  Patient is a 88 y.o. female  who was admitted to [facility] on 6/14/2024  with Delirium due to another medical condition . Pt presents to [facility] after being found on the floor with altered mental status . Patient performed functional mobility as described above.  At baseline, pt resides with facility staff in [facility] and was requiring assist prior to hospital admission. Currently, upon initial examination, pt  is requiring mod assist x1 for bed mobility skills;  mod assist x2 for functional transfers and  mod assist x2 for ambulation with no AD.  Patient currently presents below baseline with limitations in gait, balance, and transfers. Patient will benefit from continued PT services while in hospital in order to address remaining limitations. The patients AM-PAC Basic Mobility Inpatient Short From Raw Score is 11 . Based on AM-PAC scoring and patient presentation, PT currently recommending Level II (Moderate Resource Intensity). Please also refer to the recommendation of the Physical Therapist for safe discharge planning.   Barriers to Discharge Decreased caregiver support;Inaccessible home environment   Goals   Patient Goals to get better   STG Expiration Date 07/01/24   Short Term Goal #1 Patient will be able to perform bed mobility tasks with  modified independent   in order to improve overall functional mobility and assist in safe d/c. STG 2 Patient will sit EOB for at least 25 minutes at  modified independent   level in order to strengthen abdominal musculature and assist in future transfers and ambulation. STG 3 Patient will be able to perform functional transfer with  min assist x1 in order to improve overall functional mobility and assist in safe discharge. STG 4 Patient will be able to ambulate at least 50 feet with least restrictive device with  min assist x1 assist in order to improve overall functional  mobility and assist in safe discharge. STG 5 Patient will improve sitting/standing static/dynamic balance 1/2 grade in order to improve functional mobility and assist in safe discharge. STG 6 Patient will improve LE strength by 1/2 grade in order to improve functional mobility and assist in safe discharge.   PT Treatment Day 0   Plan   Treatment/Interventions ADL retraining;Functional transfer training;LE strengthening/ROM;Elevations;Therapeutic exercise;Endurance training;Bed mobility;Gait training;Spoke to nursing;OT   PT Frequency 3-5x/wk   Discharge Recommendation   Rehab Resource Intensity Level, PT II (Moderate Resource Intensity)   AM-PAC Basic Mobility Inpatient   Turning in Flat Bed Without Bedrails 3   Lying on Back to Sitting on Edge of Flat Bed Without Bedrails 3   Moving Bed to Chair 2   Standing Up From Chair Using Arms 1   Walk in Room 1   Climb 3-5 Stairs With Railing 1   Basic Mobility Inpatient Raw Score 11   Basic Mobility Standardized Score 30.25   [facility] Highest Level Of Mobility   -HL Goal 4: Move to chair/commode   -HLM Achieved 5: Stand (1 or more minutes)   Additional Treatment Session   Start Time 0844   End Time 0859   Treatment Assessment At conclusion of therapy session. pt requested assistance with hygiene. pt was assisted back to EOB and was able to complete several self care acitivities with weight shifting and reaching outside of NENITA. pt occasionally requires increased assist to maintain balance at EOB. afterwards, pt was assisted back to supine and made comfortable in bed.   Additional Treatment Day 0   End of Consult   Patient Position at End of Consult Supine;Bed/Chair alarm activated;All needs within reach         Eugene Pruett, PT

## 2024-06-17 NOTE — PLAN OF CARE
Problem: OCCUPATIONAL THERAPY ADULT  Goal: Performs self-care activities at highest level of function for planned discharge setting.  See evaluation for individualized goals.  Description: Treatment Interventions: ADL retraining, Functional transfer training, UE strengthening/ROM, Endurance training, Cognitive reorientation, Patient/family training, Equipment evaluation/education, Compensatory technique education, Activityengagement, Energy conservation          See flowsheet documentation for full assessment, interventions and recommendations.   Note: Limitation: Decreased ADL status, Decreased UE ROM, Decreased UE strength, Decreased Safe judgement during ADL, Decreased cognition, Decreased endurance, Decreased fine motor control, Decreased high-level ADLs  Prognosis: Fair  Assessment: Pt seen for skilled OT treatment session from 0938 to 0957 w/ interventions focusing on ADL participation, activity tolerance, sitting tolerance, sitting balance, standing tolerance, standing balance, bed mobility , transfer skills, and fxnl mobility. Pt was agreeable and willing to participate in session. Pt engaged in the following tasks: S for grooming tasks seated EOB, min A for UB ADLs, and mod-max A for LB ADLs. Pt also required mod Ax1 for bed mobility and mod Ax2 for transfers and fxnl mobility w/ b/l HHA. In comparison to previous session, pt continues to require consistent levels of physical assistance for ADLs and mobility tasks at this time. Pt required encouragement and reassurance from therapists to participate in tx session. Pt continues to be functioning below baseline level as occupational performance remains limited by decreased ADL status, decreased activity tolerance, decreased endurance, decreased sitting tolerance, decreased sitting balance, decreased standing tolerance, decreased standing balance, decreased transfer skills, decreased fxnl mobility, decreased safety awareness, decreased insight into deficits,  generalized weakness , and impaired cognition . From OT standpoint, recommend Level II (Moderate Resource Intensity) at time of d/c. Pt will benefit from continued OT treatment while in acute care to address deficits as defined above and maximize level of functional independence with ADLs and functional mobility. Pt supine in bed w/ alarm activated and all needs met at end of session.     Rehab Resource Intensity Level, OT: II (Moderate Resource Intensity)

## 2024-06-18 PROCEDURE — 99232 SBSQ HOSP IP/OBS MODERATE 35: CPT | Performed by: STUDENT IN AN ORGANIZED HEALTH CARE EDUCATION/TRAINING PROGRAM

## 2024-06-18 PROCEDURE — 99232 SBSQ HOSP IP/OBS MODERATE 35: CPT | Performed by: NURSE PRACTITIONER

## 2024-06-18 RX ADMIN — CEPHALEXIN 500 MG: 500 CAPSULE ORAL at 14:06

## 2024-06-18 RX ADMIN — GUAIFENESIN 1200 MG: 600 TABLET, EXTENDED RELEASE ORAL at 08:59

## 2024-06-18 RX ADMIN — OLANZAPINE 2.5 MG: 2.5 TABLET, FILM COATED ORAL at 09:00

## 2024-06-18 RX ADMIN — CEPHALEXIN 500 MG: 500 CAPSULE ORAL at 09:00

## 2024-06-18 RX ADMIN — BUSPIRONE HYDROCHLORIDE 5 MG: 5 TABLET ORAL at 17:08

## 2024-06-18 RX ADMIN — DIVALPROEX SODIUM 250 MG: 250 TABLET, EXTENDED RELEASE ORAL at 09:00

## 2024-06-18 RX ADMIN — ASPIRIN 81 MG CHEWABLE TABLET 81 MG: 81 TABLET CHEWABLE at 09:00

## 2024-06-18 RX ADMIN — CEPHALEXIN 500 MG: 500 CAPSULE ORAL at 01:52

## 2024-06-18 RX ADMIN — PANTOPRAZOLE SODIUM 40 MG: 40 TABLET, DELAYED RELEASE ORAL at 09:00

## 2024-06-18 RX ADMIN — ENOXAPARIN SODIUM 40 MG: 40 INJECTION SUBCUTANEOUS at 09:07

## 2024-06-18 RX ADMIN — Medication 1 TABLET: at 08:59

## 2024-06-18 RX ADMIN — DIVALPROEX SODIUM 500 MG: 500 TABLET, DELAYED RELEASE ORAL at 20:28

## 2024-06-18 RX ADMIN — GUAIFENESIN 1200 MG: 600 TABLET, EXTENDED RELEASE ORAL at 20:28

## 2024-06-18 RX ADMIN — BUSPIRONE HYDROCHLORIDE 5 MG: 5 TABLET ORAL at 09:00

## 2024-06-18 NOTE — ASSESSMENT & PLAN NOTE
Stable, no indication for transfusion at this time.    Results from last 7 days   Lab Units 06/16/24  0449 06/15/24  0456 06/14/24  1007   HEMOGLOBIN g/dL 10.1* 10.8* 10.8*   MCV fL 104* 103* 104*   RDW % 16.9* 17.2* 16.9*   FOLATE ng/mL  --  18.9  --    VITAMIN B 12 pg/mL  --  529  --

## 2024-06-18 NOTE — PLAN OF CARE
Problem: Potential for Falls  Goal: Patient will remain free of falls  Description: INTERVENTIONS:  - Educate patient/family on patient safety including physical limitations  - Instruct patient to call for assistance with activity   - Consult OT/PT to assist with strengthening/mobility   - Keep Call bell within reach  - Keep bed low and locked with side rails adjusted as appropriate  - Keep care items and personal belongings within reach  - Initiate and maintain comfort rounds  - Make Fall Risk Sign visible to staff  - Offer Toileting every 2 Hours, in advance of need  - Initiate/Maintain bed/chair alarm  - Obtain necessary fall risk management equipment: chair alarm, yellow socks/bracelet  - Apply yellow socks and bracelet for high fall risk patients  Outcome: Progressing     Problem: PAIN - ADULT  Goal: Verbalizes/displays adequate comfort level or baseline comfort level  Description: Interventions:  - Encourage patient to monitor pain and request assistance  - Assess pain using appropriate pain scale (e.g.0-10)  - Administer analgesics based on type and severity of pain and evaluate response  - Implement non-pharmacological measures as appropriate and evaluate response  - Consider cultural and social influences on pain and pain management  - Notify physician/advanced practitioner if interventions unsuccessful or patient reports new pain  Outcome: Progressing     Problem: INFECTION - ADULT  Goal: Absence or prevention of progression during hospitalization  Description: INTERVENTIONS:  - Assess and monitor for signs and symptoms of infection  - Monitor lab/diagnostic results  - Monitor all insertion sites, i.e. IV  - [place] appropriate cooling/warming therapies per order  - Administer medications as ordered  - Instruct and encourage patient and family to use good hand hygiene technique  - Identify and instruct in appropriate isolation precautions for identified infection/condition  Outcome: Progressing      Problem: GENITOURINARY - ADULT  Goal: Maintains or returns to baseline urinary function  Description: INTERVENTIONS:  - Assess urinary function  - Encourage oral fluids to ensure adequate hydration if ordered  - Administer IV fluids as ordered to ensure adequate hydration  - Administer ordered medications as needed  - Offer frequent toileting  - Follow urinary retention protocol if ordered  Outcome: Progressing     Problem: Prexisting or High Potential for Compromised Skin Integrity  Goal: Skin integrity is maintained or improved  Description: INTERVENTIONS:  - Identify patients at risk for skin breakdown  - Assess and monitor skin integrity  - Assess and monitor nutrition and hydration status  - Monitor labs   - Assess for incontinence   - Turn and reposition patient  - Assist with mobility/ambulation  - Relieve pressure over bony prominences  - Avoid friction and shearing  - Provide appropriate hygiene as needed including keeping skin clean and dry  - Evaluate need for skin moisturizer/barrier cream  - Collaborate with interdisciplinary team   - Patient/family teaching  - Consider wound care consult   Outcome: Progressing

## 2024-06-18 NOTE — PROGRESS NOTES
Pastoral Care Progress Note    2024  Patient: Chelsey Carson : 1935  Admission Date & Time: 2024  MRN: [id number] CSN: [id number]       met with pt at request of nurse. Pt stated that she was lonely scared and no body was caring for her. Pt wanted help getting out of the bed and did not understand why she was unable to do so. Pt expressed wanting to talk to son, which  helped to do. Pt seemed more at peace after the call and expressed gratitude for the help she received.  will check back at another time give the opportunity and remains available.                   24 1500   Clinical Encounter Type   Visited With Patient   Referral From Nurse   Referral To    Worship Encounters   Worship Needs Prayer

## 2024-06-18 NOTE — ASSESSMENT & PLAN NOTE
Completed antibiotic treatment for acute cystitis. Monitor off antibiotics. Cultures growing E. Coli.

## 2024-06-18 NOTE — PROGRESS NOTES
Progress Note - Chelsey Carson 88 y.o. female MRN: [id number]    Unit/Bed#: [facility] 906-01 Encounter: [id number]      Assessment/Plan:  Encephalopathy/delirium  Multifactorial: UTI in the setting of prolonged delirium and underlying cognitive impairment  Baseline: alert and oriented x 2  Provide frequent redirection, reorientation, distraction techniques  Avoid deliriogenic medications such as tramadol, benzodiazepines, anticholinergics,  Benadryl  Treat pain, See geriatric pain protocol  Monitor for constipation and urinary retention  Encourage early and frequent moblization, OOB  Encourage Hydration/ Nutrition  Implement sleep hygiene, limit night time interuptions, group activities     Ambulatory dysfunction  At risk for falls secondary to age, hx of fall, gait instability, polypharmacy, visual impairment  Fall precautions  PT/OT  Increased physical exercise, recommend balance and strengthening exercises  Rehab post hospitalization     UTI  Urine culture (6/15/24) positive greater than [id number] ecoli  Continue keflex  Encourage po fluid intake  Maintain good hygiene practices     Anxiety/psychosis  Seen by psych at rehab in May  Started on depakote/buspar/zyprexa  Continue medications     Frailty  Clinical Frail Scale: 6- Moderately Frail  Albumin 3.5, maintain protein in diet  PT/OT  Continue supportive care     Forgetfulness   Hx of forgetfulness, no formal work up  Increased agitation and confusion over past several months  Underlying cognitive impairment can worsen the setting of delirium  TSH 2.507 (3/16/24)  Vitamin B 12 level 529 (6/15/24)  CT head (6/14/24) mild microangiopathic changes  Keep physically, mentally and socially active     Insomnia  Related to hospitalization  First line is behavioral therapy  Avoid sedative hypnotics such as benzodiazepines and benadryl  Encourage staying awake during the day  Encourage daytime activity, morning exercise  Decrease or eliminate day time naps  Avoid caffeine  especially during late afternoon and evening hours  Establish a night time routine        Subjective:   She is in bed, she states she wants to see therapy.   Rounded with nursing and case management.   Last BM 6/17/24    Objective:     Vitals: Blood pressure 129/76, pulse 88, temperature 98.3 °F (36.8 °C), resp. rate 17, SpO2 95%.,There is no height or weight on file to calculate BMI.      Intake/Output Summary (Last 24 hours) at 6/18/2024 1011  Last data filed at 6/18/2024 0700  Gross per 24 hour   Intake 120 ml   Output 434 ml   Net -314 ml       Physical Exam:   General : NAD  HEENT : MMM   Heart : Normal rate, no murmur rub or gallop  Lungs : CTA no wheezes, rales or rhonchi  Abdomen : Soft, NT/ND, BS auscultated in all 4 quads.   Ext :  no edema  Skin : Pink, warm, dry, age appropriate turgor and mobility  Neuro : Nonfocal  Psych : Alert and O x 2    Invasive Devices       Peripheral Intravenous Line  Duration             Peripheral IV 06/14/24 Distal;Right;Ventral (anterior) Forearm 4 days                    Lab, Imaging and other studies: I have personally reviewed pertinent films in PACS  VTE Pharmacologic Prophylaxis: Sequential compression device (Venodyne)   VTE Mechanical Prophylaxis: sequential compression device

## 2024-06-18 NOTE — PROGRESS NOTES
"[facility]  Progress Note  Name: Chelsey Carson I  MRN: [id number]  Unit/Bed#: [facility] 906-01 I Date of Admission: 6/14/2024   Date of Service: 6/18/2024 I Hospital Day: 4    Assessment & Plan   * Delirium due to another medical condition  Assessment & Plan  Delirium likely secondary to toxic metabolic encephalopathy due to UTI in the setting of poor cognitive reserve.    Background:  Son reports of declining of cognitive function approximately 15 to 16 weeks ago where during this time she has been in and out of the hospital and in rehab.  Prior to that she had been residing alone at home for the most part independently where she was still paying her own bills.  Though she did have an aide that came in approximately 4 hours a day.  He reports that there has been no formal diagnosis of dementia  More recently since being at present living facility she has been started on psychiatric medications per patient's son to \"control her mood\", including Zyprexa, buspirone and Depakote.  Apparently there she has been having periods of sundowning and at times hitting staff.  He reports that since being on the present regimen that she is on her mood has been much improved with much less agitated periods.  Her son states that she will now be residing permanently at the assisted living facility as they realize she no longer can live independently     Plan:  Appreciate geriatrics recommendations. Continue Buspar, Zyprexa, and Depakote  CT head without acute abnormalities  Awaiting safe discharge plan from case management    Acute cystitis with hematuria  Assessment & Plan  Completed antibiotic treatment for acute cystitis. Monitor off antibiotics. Cultures growing E. Coli.    Anemia  Assessment & Plan  Stable, no indication for transfusion at this time.    Results from last 7 days   Lab Units 06/16/24  0449 06/15/24  0456 06/14/24  1007   HEMOGLOBIN g/dL 10.1* 10.8* 10.8*   MCV fL 104* 103* " 104*   RDW % 16.9* 17.2* 16.9*   FOLATE ng/mL  --  18.9  --    VITAMIN B 12 pg/mL  --  529  --          Ambulatory dysfunction  Assessment & Plan  Background  Secondary to recent history of hip fracture status post ORIF, on 5/15/2024 at [facility]  Status post x-ray of hip: Status post right femoral neck internal fixation.  In comparison to an outside CT from 2024, an intertrochanteric fracture with medially displaced lesser trochanter is grossly unchanged.    Plan:  PT/OT recommending rehab  Awaiting safe discharge plan from case management. Medically stable for discharge           VTE Pharmacologic Prophylaxis:   Pharmacologic: Enoxaparin (Lovenox)  Mechanical VTE Prophylaxis in Place: Yes    Discussions with Specialists or Other Care Team Provider: nursing    Education and Discussions with Family / Patient: patient, called son to give an update but no answer    Current Length of Stay: 4 day(s)    Current Patient Status: Inpatient   Certification Statement: The patient will continue to require additional inpatient hospital stay due to Awaiting safe discharge plan    Discharge Plan: 24 hours    Code Status: Level 3 - DNAR and DNI      Subjective:   Patient seen and examined at bedside. No acute events overnight.     Objective:     Vitals:   Temp (24hrs), Av.5 °F (36.9 °C), Min:98.3 °F (36.8 °C), Max:98.6 °F (37 °C)    Temp:  [98.3 °F (36.8 °C)-98.6 °F (37 °C)] 98.6 °F (37 °C)  HR:  [88] 88  Resp:  [16] 16  BP: (110-129)/(76-84) 110/84  There is no height or weight on file to calculate BMI.     Input and Output Summary (last 24 hours):       Intake/Output Summary (Last 24 hours) at 2024 1555  Last data filed at 2024 0700  Gross per 24 hour   Intake 120 ml   Output 434 ml   Net -314 ml       Physical Exam:     Physical Exam  Vitals reviewed.   Constitutional:       General: She is not in acute distress.  HENT:      Head: Normocephalic.      Nose: Nose normal.      Mouth/Throat:       Mouth: Mucous membranes are moist.   Eyes:      General: No scleral icterus.  Cardiovascular:      Rate and Rhythm: Normal rate.   Pulmonary:      Effort: Pulmonary effort is normal. No respiratory distress.   Abdominal:      Palpations: Abdomen is soft.      Tenderness: There is no abdominal tenderness.   Skin:     General: Skin is warm.   Neurological:      Mental Status: She is alert. Mental status is at baseline.   Psychiatric:         Mood and Affect: Mood normal.         Behavior: Behavior normal.       Additional Data:     Labs:    Results from last 7 days   Lab Units 06/16/24  0449 06/15/24  0456 06/14/24  1007   WBC Thousand/uL 4.52   < > 7.34   HEMOGLOBIN g/dL 10.1*   < > 10.8*   HEMATOCRIT % 32.0*   < > 33.3*   PLATELETS Thousands/uL 207   < > 243   SEGS PCT %  --   --  69   LYMPHO PCT %  --   --  16   MONO PCT %  --   --  13*   EOS PCT %  --   --  1    < > = values in this interval not displayed.     Results from last 7 days   Lab Units 06/16/24  0449 06/15/24  0456 06/14/24  1007   SODIUM mmol/L 139   < > 137   POTASSIUM mmol/L 4.1   < > 3.9   CHLORIDE mmol/L 105   < > 103   CO2 mmol/L 25   < > 24   BUN mg/dL 13   < > 25   CREATININE mg/dL 0.80   < > 0.99   ANION GAP mmol/L 9   < > 10   CALCIUM mg/dL 8.5   < > 8.9   ALBUMIN g/dL  --   --  3.5   TOTAL BILIRUBIN mg/dL  --   --  0.42   ALK PHOS U/L  --   --  80   ALT U/L  --   --  7   AST U/L  --   --  13   GLUCOSE RANDOM mg/dL 91   < > 112    < > = values in this interval not displayed.     Results from last 7 days   Lab Units 06/14/24  1007   INR  1.19             Results from last 7 days   Lab Units 06/14/24  1343 06/14/24  1007   LACTIC ACID mmol/L 2.0 2.9*   PROCALCITONIN ng/ml  --  <0.05           * I Have Reviewed All Lab Data Listed Above.  * Additional Pertinent Lab Tests Reviewed: All Labs For Current Hospital Admission Reviewed    Mobility:  Basic Mobility Inpatient Raw Score: 13  [facility] Goal: 4: Move to chair/commode  [facility] Achieved: 2: Bed  activities/Dependent transfer    Lines:   Invasive Devices       Peripheral Intravenous Line  Duration             Peripheral IV 06/14/24 Distal;Right;Ventral (anterior) Forearm 4 days                       Imaging:    Imaging Reports Reviewed Today Include: ct head, ct spine, xr hip right, xr chest    Recent Cultures (last 7 days):     Results from last 7 days   Lab Units 06/14/24  1343 06/14/24  1341 06/14/24  1007   BLOOD CULTURE  No Growth at 72 hrs.  --  No Growth After 4 Days.   URINE CULTURE   --  >100,000 cfu/ml Escherichia coli*  --        Last 24 Hours Medication List:   Current Facility-Administered Medications   Medication Dose Route Frequency Provider Last Rate    acetaminophen  650 mg Oral Q6H PRN Kelly Haylee Kenji, DO      aspirin  81 mg Oral Daily Kelly Haylee Kenji, DO      busPIRone  5 mg Oral BID Kelly Haylee Kenji, DO      calcium carbonate-vitamin D  1 tablet Oral Daily With Breakfast Kelly Naiduunmi Kenji, DO      divalproex sodium  250 mg Oral Daily Kelly Haylee Kenji, DO      divalproex sodium  500 mg Oral HS Kelly Haylee Kenji, DO      enoxaparin  40 mg Subcutaneous Daily Kelly Haylee Phillip, DO      guaiFENesin  1,200 mg Oral Q12H UMAIR Kellyabel Phillip, DO      OLANZapine  2.5 mg Oral Daily Isabela Carranza MD      OLANZapine  2.5 mg Oral HS PRN Isabela Carranza MD      pantoprazole  40 mg Oral Daily Kellyabel Phillip, DO          Today, Patient Was Seen By: Darion Mcfadden MD    ** Please Note: Dictation voice to text software may have been used in the creation of this document. **

## 2024-06-18 NOTE — ASSESSMENT & PLAN NOTE
"Delirium likely secondary to toxic metabolic encephalopathy due to UTI in the setting of poor cognitive reserve.    Background:  Son reports of declining of cognitive function approximately 15 to 16 weeks ago where during this time she has been in and out of the hospital and in rehab.  Prior to that she had been residing alone at home for the most part independently where she was still paying her own bills.  Though she did have an aide that came in approximately 4 hours a day.  He reports that there has been no formal diagnosis of dementia  More recently since being at present living facility she has been started on psychiatric medications per patient's son to \"control her mood\", including Zyprexa, buspirone and Depakote.  Apparently there she has been having periods of sundowning and at times hitting staff.  He reports that since being on the present regimen that she is on her mood has been much improved with much less agitated periods.  Her son states that she will now be residing permanently at the assisted living facility as they realize she no longer can live independently     Plan:  Appreciate geriatrics recommendations. Continue Buspar, Zyprexa, and Depakote  CT head without acute abnormalities  Awaiting safe discharge plan from case management  "

## 2024-06-18 NOTE — ASSESSMENT & PLAN NOTE
Background  Secondary to recent history of hip fracture status post ORIF, on 5/15/2024 at [facility]  Status post x-ray of hip: Status post right femoral neck internal fixation.  In comparison to an outside CT from 5/23/2024, an intertrochanteric fracture with medially displaced lesser trochanter is grossly unchanged.    Plan:  PT/OT recommending rehab  Awaiting safe discharge plan from case management. Medically stable for discharge

## 2024-06-18 NOTE — PROGRESS NOTES
24 1300   Clinical Encounter Type   Visited With Patient   Referral From Patient   Episcopal Encounters   Episcopal Needs Prayer      Pastoral Care Progress Note    2024  Patient: Chelsey Carson : 1935  Admission Date & Time: 2024  MRN: [id number] CSN: [id number]          Fr. Taylor visit .

## 2024-06-18 NOTE — CASE MANAGEMENT
Case Management Progress Note    Patient name Chelsey Carson  Location [facility] 906/[facility] 906 MRN [id number]  : 1935 Date 2024       LOS (days): 4  Geometric Mean LOS (GMLOS) (days):   Days to GMLOS:        OBJECTIVE:        Current admission status: Inpatient  Preferred Pharmacy:   [id number] - [place], [place] - [address] [address]  Phone: [phone] Fax: [phone]    Primary Care Provider: Mariely Fan MD    Primary Insurance: MEDICARE  Secondary Insurance: COMMERCIAL MISCELLANEOUS    PROGRESS NOTE: CM left VM for Tammi at the [facility] regarding pt and if they can accommodate pt at this time.

## 2024-06-18 NOTE — CASE MANAGEMENT
Case Management Progress Note    Patient name Chelsey Carson  Location [facility] 906/[facility] 906 MRN [id number]  : 1935 Date 2024       LOS (days): 4  Geometric Mean LOS (GMLOS) (days):   Days to GMLOS:        OBJECTIVE:        Current admission status: Inpatient  Preferred Pharmacy:   [id number] - [place], [place] - [address]  [address] [address]  Phone: [phone] Fax: [phone]    Primary Care Provider: Mariely Fan MD    Primary Insurance: MEDICARE  Secondary Insurance: COMMERCIAL MISCELLANEOUS    PROGRESS NOTE: CM faxed PT/OT notes to the [facility] in [place] for review.

## 2024-06-18 NOTE — CASE MANAGEMENT
Case Management Progress Note    Patient name Chelsey Carson  Location [facility] 906/[facility] 906 MRN [id number]  : 1935 Date 2024       LOS (days): 4  Geometric Mean LOS (GMLOS) (days):   Days to GMLOS:        OBJECTIVE:        Current admission status: Inpatient  Preferred Pharmacy:   [id number] - [place], [place] - [address]  [address] [place] [address]  Phone: [phone] Fax: [phone]    Primary Care Provider: Mariely Fan MD    Primary Insurance: MEDICARE  Secondary Insurance: COMMERCIAL MISCELLANEOUS    PROGRESS NOTE: CM left VM for Tammi at the [facility] regarding pt and if they are able to accommodate pt at this time.  CM will continue to follow for discharge planning needs.

## 2024-06-19 ENCOUNTER — TRANSITIONAL CARE MANAGEMENT (OUTPATIENT)
Dept: INTERNAL MEDICINE CLINIC | Facility: CLINIC | Age: 89
End: 2024-06-19

## 2024-06-19 VITALS
RESPIRATION RATE: 17 BRPM | HEART RATE: 96 BPM | OXYGEN SATURATION: 94 % | DIASTOLIC BLOOD PRESSURE: 63 MMHG | SYSTOLIC BLOOD PRESSURE: 132 MMHG | TEMPERATURE: 97.5 F

## 2024-06-19 LAB
BACTERIA BLD CULT: NORMAL
BACTERIA BLD CULT: NORMAL

## 2024-06-19 PROCEDURE — TCMXX

## 2024-06-19 PROCEDURE — 97112 NEUROMUSCULAR REEDUCATION: CPT

## 2024-06-19 PROCEDURE — 97530 THERAPEUTIC ACTIVITIES: CPT

## 2024-06-19 PROCEDURE — 97535 SELF CARE MNGMENT TRAINING: CPT

## 2024-06-19 PROCEDURE — 99232 SBSQ HOSP IP/OBS MODERATE 35: CPT | Performed by: NURSE PRACTITIONER

## 2024-06-19 PROCEDURE — 99239 HOSP IP/OBS DSCHRG MGMT >30: CPT | Performed by: FAMILY MEDICINE

## 2024-06-19 RX ADMIN — OLANZAPINE 2.5 MG: 2.5 TABLET, FILM COATED ORAL at 08:28

## 2024-06-19 RX ADMIN — ASPIRIN 81 MG CHEWABLE TABLET 81 MG: 81 TABLET CHEWABLE at 08:28

## 2024-06-19 RX ADMIN — PANTOPRAZOLE SODIUM 40 MG: 40 TABLET, DELAYED RELEASE ORAL at 06:07

## 2024-06-19 RX ADMIN — Medication 1 TABLET: at 07:43

## 2024-06-19 RX ADMIN — ENOXAPARIN SODIUM 40 MG: 40 INJECTION SUBCUTANEOUS at 08:28

## 2024-06-19 RX ADMIN — GUAIFENESIN 1200 MG: 600 TABLET, EXTENDED RELEASE ORAL at 07:43

## 2024-06-19 RX ADMIN — DIVALPROEX SODIUM 250 MG: 250 TABLET, EXTENDED RELEASE ORAL at 08:27

## 2024-06-19 RX ADMIN — BUSPIRONE HYDROCHLORIDE 5 MG: 5 TABLET ORAL at 08:27

## 2024-06-19 NOTE — ASSESSMENT & PLAN NOTE
Stable, no indication for transfusion at this time.    Results from last 7 days   Lab Units 06/16/24  0449 06/15/24  0456 06/14/24  1007   HEMOGLOBIN g/dL 10.1* 10.8* 10.8*   MCV fL 104* 103* 104*   RDW % 16.9* 17.2* 16.9*   FOLATE ng/mL  --  18.9  --    VITAMIN B 12 pg/mL  --  529  --        Stable no further evaluation is required at this time

## 2024-06-19 NOTE — PLAN OF CARE
Problem: OCCUPATIONAL THERAPY ADULT  Goal: Performs self-care activities at highest level of function for planned discharge setting.  See evaluation for individualized goals.  Description: Treatment Interventions: ADL retraining, Functional transfer training, Endurance training, Patient/family training, Equipment evaluation/education, Compensatory technique education, Continued evaluation, Energy conservation, Activityengagement          See flowsheet documentation for full assessment, interventions and recommendations.   Note: Limitation: Decreased ADL status, Decreased UE ROM, Decreased UE strength, Decreased Safe judgement during ADL, Decreased cognition, Decreased endurance, Decreased fine motor control, Decreased high-level ADLs  Prognosis: Fair  Assessment: Pt seen for skilled OT treatment session from 0944 to 1024 w/ interventions focusing on ADL participation, activity tolerance, sitting tolerance, sitting balance, standing tolerance, standing balance, bed mobility , transfer skills, and fxnl mobility. Pt was agreeable and willing to participate in session. Pt engaged in the following tasks: S for grooming tasks seated EOB, min A for UB ADLs, mod-max A for LB ADLs, and max A for toileting. Pt also required mod Ax1 for bed mobility and mod Ax2 for transfers and few steps from EOB > chair w/ b/l HHA for support. In comparison to previous session, pt demonstrated improvements in mobility tasks as she was able to participate in OOB mobility today. Pt also demonstrated increased activity tolerance as she was able to completed x3 STS transfers and stand pivot transfer today. Pt continues to be functioning below baseline level as occupational performance remains limited by decreased ADL status, decreased activity tolerance, decreased endurance, decreased sitting tolerance, decreased sitting balance, decreased standing tolerance, decreased standing balance, decreased transfer skills, decreased fxnl mobility, and  impaired cognition . From OT standpoint, recommend Level II (Moderate Resource Intensity) at time of d/c. Pt will benefit from continued OT treatment while in acute care to address deficits as defined above and maximize level of functional independence with ADLs and functional mobility. Pt seated OOB in chair w/ alarm activated and all needs met at end of session.     Rehab Resource Intensity Level, OT: II (Moderate Resource Intensity)

## 2024-06-19 NOTE — OCCUPATIONAL THERAPY NOTE
Occupational Therapy Progress Note     Patient Name: Chelsey Carson  Today's Date: 6/19/2024  Problem List  Principal Problem:    Delirium due to another medical condition  Active Problems:    Ambulatory dysfunction    Anemia    Acute cystitis with hematuria       06/19/24 1024   OT Last Visit   OT Visit Date 06/19/24   Note Type   Note Type Treatment   Pain Assessment   Pain Assessment Tool 0-10   Pain Score No Pain   Restrictions/Precautions   Weight Bearing Precautions Per Order No   Other Precautions Cognitive;Chair Alarm;Bed Alarm;Fall Risk;Hard of hearing   Lifestyle   Autonomy Per chart review, pt requires A for ADLs and IADLs and Ax1 for fxnl mobility at baseline; - driving.   Reciprocal Relationships Pt lives w/ supportive facility staff at [facility]; supportive son.   Service to Others Pt is retired.   Intrinsic Gratification none stated; will continue to assess   ADL   Where Assessed Edge of bed   Grooming Assistance 5  Supervision/Setup   Grooming Deficit Setup;Verbal cueing;Increased time to complete;Wash/dry face;Teeth care   UB Bathing Assistance 4  Minimal Assistance   UB Bathing Deficit Setup;Verbal cueing;Increased time to complete;Chest;Right arm;Left arm;Abdomen   UB Bathing Comments Pt required A for thoroughness.   LB Bathing Assistance 3  Moderate Assistance   LB Bathing Deficit Setup;Verbal cueing;Increased time to complete;Perineal area;Buttocks;Right upper leg;Left upper leg;Right lower leg including foot;Left lower leg including foot   LB Bathing Comments Pt required A to wash b/l lower legs, feet, and buttocks; able to wash b/l thighs and perineal area w/o physical assistance.   UB Dressing Assistance 4  Minimal Assistance   UB Dressing Deficit Setup;Verbal cueing;Increased time to complete;Thread RUE;Thread LUE;Pull around back;Fasteners   LB Dressing Assistance 2  Maximal Assistance   LB Dressing Deficit Setup;Don/doff R sock;Don/doff L sock   Toileting Assistance  2  Maximal  Assistance   Toileting Deficit Setup;Supervison/safety;Increased time to complete;Perineal hygiene   Toileting Comments Pt incontinent of bowel upon therapist's arrival; required A w/ hygiene while standing w/ Ax2.   Bed Mobility   Supine to Sit 3  Moderate assistance   Additional items Assist x 1;HOB elevated;Bedrails;Increased time required;Verbal cues;LE management   Sit to Supine Unable to assess   Additional Comments Pt required min Ax1 to maintain sitting balance due to fatigue. Pt seated OOB in chair at end of OT tx session w/ all needs within reach and alarm activated.   Transfers   Sit to Stand 3  Moderate assistance   Additional items Assist x 2;Increased time required;Verbal cues   Stand to Sit 3  Moderate assistance   Additional items Assist x 2;Increased time required;Verbal cues   Stand pivot 3  Moderate assistance   Additional items Assist x 2;Increased time required;Verbal cues   Additional Comments Pt completed x3 STS transfers from EOB and SPT from EOB > chair w/ mod Ax2 using b/l HHA for support.   Functional Mobility   Functional Mobility 3  Moderate assistance   Additional Comments Pt took few steps from EOB to chair w/ mod Ax2 using b/l HHA for support.   Additional items Hand hold assistance   Cognition   Overall Cognitive Status Impaired   Arousal/Participation Alert;Responsive;Cooperative   Attention Attends with cues to redirect   Following Commands Follows one step commands with increased time or repetition   Comments Pt was pleasantly confused, cooperative, and willing to participate in OT tx session.   Activity Tolerance   Activity Tolerance Patient limited by fatigue;Other (Comment)  (impaired cognition)   Medical Staff Made Aware RN clearance prior to session; PTEugene, due to pt's medical complexity and multiple comobidities   Assessment   Assessment Pt seen for skilled OT treatment session from 0944 to 1024 w/ interventions focusing on ADL participation, activity tolerance,  sitting tolerance, sitting balance, standing tolerance, standing balance, bed mobility , transfer skills, and fxnl mobility. Pt was agreeable and willing to participate in session. Pt engaged in the following tasks: S for grooming tasks seated EOB, min A for UB ADLs, mod-max A for LB ADLs, and max A for toileting. Pt also required mod Ax1 for bed mobility and mod Ax2 for transfers and few steps from EOB > chair w/ b/l HHA for support. In comparison to previous session, pt demonstrated improvements in mobility tasks as she was able to participate in OOB mobility today. Pt also demonstrated increased activity tolerance as she was able to completed x3 STS transfers and stand pivot transfer today. Pt continues to be functioning below baseline level as occupational performance remains limited by decreased ADL status, decreased activity tolerance, decreased endurance, decreased sitting tolerance, decreased sitting balance, decreased standing tolerance, decreased standing balance, decreased transfer skills, decreased fxnl mobility, and impaired cognition . From OT standpoint, recommend Level II (Moderate Resource Intensity) at time of d/c. Pt will benefit from continued OT treatment while in acute care to address deficits as defined above and maximize level of functional independence with ADLs and functional mobility. Pt seated OOB in chair w/ alarm activated and all needs met at end of session.   Plan   Treatment Interventions ADL retraining;Functional transfer training;Endurance training;Patient/family training;Equipment evaluation/education;Compensatory technique education;Continued evaluation;Energy conservation;Activityengagement   Goal Expiration Date 06/29/24   OT Treatment Day 2   OT Frequency 2-3x/wk   Discharge Recommendation   Rehab Resource Intensity Level, OT II (Moderate Resource Intensity)   AM-PAC Daily Activity Inpatient   Lower Body Dressing 2   Bathing 2   Toileting 2   Upper Body Dressing 3   Grooming 3    Eating 3   Daily Activity Raw Score 15   Daily Activity Standardized Score (Calc for Raw Score >=11) 34.69   AM-PAC Applied Cognition Inpatient   Following a Speech/Presentation 2   Understanding Ordinary Conversation 4   Taking Medications 1   Remembering Where Things Are Placed or Put Away 2   Remembering List of 4-5 Errands 1   Taking Care of Complicated Tasks 1   Applied Cognition Raw Score 11   Applied Cognition Standardized Score 27.03       The patient's raw score on the AM-PAC Daily Activity Inpatient Short Form is 15. A raw score of less than 19 suggests the patient may benefit from discharge to post-acute rehabilitation services. Please refer to the recommendation of the Occupational Therapist for safe discharge planning.    JH Curry, OTR/L

## 2024-06-19 NOTE — ASSESSMENT & PLAN NOTE
Background  Secondary to recent history of hip fracture status post ORIF, on 5/15/2024 at [facility]  Status post x-ray of hip: Status post right femoral neck internal fixation.  In comparison to an outside CT from 5/23/2024, an intertrochanteric fracture with medially displaced lesser trochanter is grossly unchanged.    Plan:  PT/OT recommending rehab  Awaiting safe discharge plan from case management. Medically stable for discharge

## 2024-06-19 NOTE — PHYSICAL THERAPY NOTE
PHYSICAL THERAPY NOTE          Patient Name: Chelsey Carson  Today's Date: 6/19/2024 06/19/24 1023   PT Last Visit   PT Visit Date 06/19/24   Note Type   Note Type Treatment   Pain Assessment   Pain Assessment Tool 0-10   Pain Score No Pain   Restrictions/Precautions   Weight Bearing Precautions Per Order No   Other Precautions Cognitive;Chair Alarm;Bed Alarm;Fall Risk;Hard of hearing   General   Chart Reviewed Yes   Response to Previous Treatment Patient with no complaints from previous session.   Family/Caregiver Present No   Cognition   Overall Cognitive Status Impaired   Arousal/Participation Alert;Responsive;Cooperative   Attention Attends with cues to redirect   Orientation Level Oriented to person;Oriented to place   Memory Decreased recall of precautions;Decreased recall of recent events;Decreased short term memory   Following Commands Follows one step commands with increased time or repetition   Subjective   Subjective pt pleasantly confused and cooperative throughout therapy session. pt received supine in bed   Bed Mobility   Supine to Sit 3  Moderate assistance   Additional items Assist x 1;Increased time required;Verbal cues;LE management   Sit to Supine Unable to assess   Transfers   Sit to Stand 3  Moderate assistance   Additional items Assist x 2;Increased time required;Verbal cues   Stand to Sit 3  Moderate assistance   Additional items Assist x 2;Increased time required;Verbal cues   Additional Comments transfers w BL HHA   Ambulation/Elevation   Gait pattern Improper Weight shift;Decreased foot clearance;Narrow NENITA;Forward Flexion;Shuffling;Short stride  (excissvely small, rapid steps)   Gait Assistance 3  Moderate assist   Additional items Assist x 2;Verbal cues;Tactile cues   Assistive Device None   Distance 3' to bedside recliner   Balance   Static Sitting Fair +   Dynamic Sitting Fair   Static Standing  Fair -   Dynamic Standing Poor +   Ambulatory Poor   Endurance Deficit   Endurance Deficit Yes   Endurance Deficit Description generalized weakness, decreased exercise tolerance   Activity Tolerance   Activity Tolerance Patient limited by fatigue   Medical Staff Made Aware OT Bhavani, co-treat due to medical complxity and multiple comorbidities   Nurse Made Aware RN cleared and updated   Exercises   Neuro re-ed static/dynamic seated balance at EOB for approx 15 minutes, weight shifting, reaching outside of NENITA   Assessment   Prognosis Fair   Problem List Decreased strength;Decreased endurance;Decreased range of motion;Impaired balance;Decreased mobility;Decreased cognition;Impaired judgement;Decreased safety awareness   Assessment Patient was received supine in bed . Patient was agreeable to therapy services today. PT session focused on balance and transfers today in order to improve functional mobility and independence. Functional mobility was performed as described above.  Pt required increased encouragement to participate in therapy services. Pt displays increased anxiety with mobility. Upon transfer to EOB, pt was able to complete several reaching and weight shifting activities. Pt requires markedly increased time to complete functional mobility this date due to increased fatigue. Upon transfer to bedside recliner, pt was made comfortable and left with callbell within reach and chair alarm active.  Patient will benefit from continued PT services while in hospital in order to address remaining limitations. The patients AM-PAC Basic Mobility Inpatient Short From Raw Score is 11 .  Based on AM-PAC scoring and patient presentation, PT currently recommending Level II (Moderate Resource Intensity). Please also refer to the recommendation of the Physical Therapist for safe discharge planning.   Barriers to Discharge Decreased caregiver support;Inaccessible home environment   Goals   Patient Goals to lay down   STG  Expiration Date 07/01/24   PT Treatment Day 1   Plan   Treatment/Interventions ADL retraining;Functional transfer training;LE strengthening/ROM;Elevations;Therapeutic exercise;Endurance training;Gait training;Bed mobility;Spoke to nursing;OT   Progress Slow progress, decreased activity tolerance   PT Frequency 3-5x/wk   Discharge Recommendation   Rehab Resource Intensity Level, PT II (Moderate Resource Intensity)   AM-PAC Basic Mobility Inpatient   Turning in Flat Bed Without Bedrails 3   Lying on Back to Sitting on Edge of Flat Bed Without Bedrails 3   Moving Bed to Chair 2   Standing Up From Chair Using Arms 1   Walk in Room 1   Climb 3-5 Stairs With Railing 1   Basic Mobility Inpatient Raw Score 11   Basic Mobility Standardized Score 30.25   [facility] Highest Level Of Mobility   -HLM Goal 4: Move to chair/commode   -[facility] Achieved 4: Move to chair/commode   Education   Education Provided Mobility training   Patient Reinforcement needed   End of Consult   Patient Position at End of Consult Bedside chair;Bed/Chair alarm activated;All needs within reach       Eugene Pruett PT, DPT

## 2024-06-19 NOTE — PLAN OF CARE
Problem: Potential for Falls  Goal: Patient will remain free of falls  Description: INTERVENTIONS:  - Educate patient/family on patient safety including physical limitations  - Instruct patient to call for assistance with activity   - Consult OT/PT to assist with strengthening/mobility   - Keep Call bell within reach  - Keep bed low and locked with side rails adjusted as appropriate  - Keep care items and personal belongings within reach  - Initiate and maintain comfort rounds  - Make Fall Risk Sign visible to staff  - Offer Toileting every 2 Hours, in advance of need  - Initiate/Maintain bed/chair alarm  - Obtain necessary fall risk management equipment: chair alarm, yellow socks/bracelet  - Apply yellow socks and bracelet for high fall risk patients  Outcome: Progressing     Problem: PAIN - ADULT  Goal: Verbalizes/displays adequate comfort level or baseline comfort level  Description: Interventions:  - Encourage patient to monitor pain and request assistance  - Assess pain using appropriate pain scale (e.g.0-10)  - Administer analgesics based on type and severity of pain and evaluate response  - Implement non-pharmacological measures as appropriate and evaluate response  - Consider cultural and social influences on pain and pain management  - Notify physician/advanced practitioner if interventions unsuccessful or patient reports new pain  Outcome: Progressing     Problem: INFECTION - ADULT  Goal: Absence or prevention of progression during hospitalization  Description: INTERVENTIONS:  - Assess and monitor for signs and symptoms of infection  - Monitor lab/diagnostic results  - Monitor all insertion sites, i.e. IV  - [place] appropriate cooling/warming therapies per order  - Administer medications as ordered  - Instruct and encourage patient and family to use good hand hygiene technique  - Identify and instruct in appropriate isolation precautions for identified infection/condition  Outcome: Progressing      Problem: GENITOURINARY - ADULT  Goal: Maintains or returns to baseline urinary function  Description: INTERVENTIONS:  - Assess urinary function  - Encourage oral fluids to ensure adequate hydration if ordered  - Administer IV fluids as ordered to ensure adequate hydration  - Administer ordered medications as needed  - Offer frequent toileting  - Follow urinary retention protocol if ordered  Outcome: Progressing     Problem: Prexisting or High Potential for Compromised Skin Integrity  Goal: Skin integrity is maintained or improved  Description: INTERVENTIONS:  - Identify patients at risk for skin breakdown  - Assess and monitor skin integrity  - Assess and monitor nutrition and hydration status  - Monitor labs   - Assess for incontinence   - Turn and reposition patient  - Assist with mobility/ambulation  - Relieve pressure over bony prominences  - Avoid friction and shearing  - Provide appropriate hygiene as needed including keeping skin clean and dry  - Evaluate need for skin moisturizer/barrier cream  - Collaborate with interdisciplinary team   - Patient/family teaching  - Consider wound care consult   Outcome: Progressing

## 2024-06-19 NOTE — PROGRESS NOTES
Progress Note - Chelsey Carson 88 y.o. female MRN: [id number]    Unit/Bed#: [facility] 906-01 Encounter: [id number]      Assessment/Plan:  Encephalopathy/delirium  Appears to be resolved  Multifactorial: UTI in the setting of prolonged delirium and underlying cognitive impairment  Baseline: alert and oriented x 2  Provide frequent redirection, reorientation, distraction techniques  Avoid deliriogenic medications such as tramadol, benzodiazepines, anticholinergics,  Benadryl  Treat pain, See geriatric pain protocol  Monitor for constipation and urinary retention  Encourage early and frequent moblization, OOB  Encourage Hydration/ Nutrition  Implement sleep hygiene, limit night time interuptions, group activities     Ambulatory dysfunction  At risk for falls secondary to age, hx of fall, gait instability, polypharmacy, visual impairment  Fall precautions  PT/OT  Increased physical exercise, recommend balance and strengthening exercises  Rehab post hospitalization     UTI  Urine culture (6/15/24) positive greater than [id number] ecoli  Completed course of abx  Encourage po fluid intake  Maintain good hygiene practices     Anxiety/psychosis  Seen by psych at rehab in May  Started on depakote/buspar/zyprexa  Continue medications     Frailty  Clinical Frail Scale: 6- Moderately Frail  Albumin 3.5, maintain protein in diet  PT/OT  Continue supportive care     Forgetfulness   Hx of forgetfulness, no formal work up  Increased agitation and confusion over past several months  Underlying cognitive impairment can worsen the setting of delirium  TSH 2.507 (3/16/24)  Vitamin B 12 level 529 (6/15/24)  CT head (6/14/24) mild microangiopathic changes  Keep physically, mentally and socially active     Insomnia  Related to hospitalization  First line is behavioral therapy  Avoid sedative hypnotics such as benzodiazepines and benadryl  Encourage staying awake during the day  Encourage daytime activity, morning exercise  Decrease or eliminate  day time naps  Avoid caffeine especially during late afternoon and evening hours  Establish a night time routine        Subjective:   She is anticipating discharge today    Objective:     Vitals: Blood pressure 132/63, pulse 96, temperature 97.5 °F (36.4 °C), resp. rate 17, SpO2 94%.,There is no height or weight on file to calculate BMI.    No intake or output data in the 24 hours ending 06/19/24 1117      Physical Exam:   General : NAD  HEENT : MMM   Heart : Normal rate, no murmur rub or gallop  Lungs : CTA no wheezes, rales or rhonchi  Abdomen : Soft, NT/ND, BS auscultated in all 4 quads.   Ext :  no edema  Skin : Pink, warm, dry, age appropriate turgor and mobility  Neuro : Nonfocal  Psych : Alert and O x 2    Invasive Devices       Peripheral Intravenous Line  Duration             Peripheral IV 06/14/24 Distal;Right;Ventral (anterior) Forearm 5 days                    Lab, Imaging and other studies: I have personally reviewed pertinent films in PACS  VTE Pharmacologic Prophylaxis: Sequential compression device (Venodyne)   VTE Mechanical Prophylaxis: sequential compression device

## 2024-06-19 NOTE — CASE MANAGEMENT
Case Management Discharge Planning Note    Patient name Chelsey Carson  Location [facility] 906/[facility] 906-01 MRN [id number]  : 1935 Date 2024       Current Admission Date: 2024  Current Admission Diagnosis:Delirium due to another medical condition   Patient Active Problem List    Diagnosis Date Noted Date Diagnosed    Acute cystitis with hematuria 2024     Delirium due to another medical condition 2024     Closed nondisplaced intertrochanteric fracture of right femur (HCC) 2024     Abnormal CT of the chest 2024     Pneumonitis 2024     Dysuria 2024     Urinary retention 2024     Nausea 2024     Macrocytic anemia 2024     Thrombocytosis 2024     Ambulatory dysfunction 2024     Proctitis 2024     Anemia 03/15/2024     Loss of appetite 03/15/2024     Anxiety disorder, unspecified 03/15/2024     Tinnitus of both ears 2023     Xerostomia 2023     Mixed conductive and sensorineural hearing loss of left ear with restricted hearing of right ear 2023     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 2023     Nonintractable headache 2023     Abnormal brain MRI 2023     Slow transit constipation 2023     Primary insomnia 2023     Constipation 2023     Tremor 2023     Hyponatremia 2023     Age related osteoporosis 2022     GERD (gastroesophageal reflux disease) 10/10/2022     Stage 3a chronic kidney disease (HCC) 2022     Venous insufficiency 08/10/2022     Other fatigue 2022     Hypokalemia 2021     Dry mouth 2019     Chronic maxillary sinusitis 2019     Breast cancer ([facility]) 2019     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[facility]) 2019     Perforation of left tympanic membrane 2019     Nonrheumatic aortic valve insufficiency 11/15/2018     Pes anserinus bursitis of right knee 2018     Benign  essential HTN 10/19/2017     Aortic valve disorder 08/19/2013     Hyperlipidemia 12/19/2012       LOS (days): 5  Geometric Mean LOS (GMLOS) (days):   Days to GMLOS:     OBJECTIVE:  Risk of Unplanned Readmission Score: 25.09         Current admission status: Inpatient   Preferred Pharmacy:   [id number] - [place], [place] - [address]  [address] [place] [address]  Phone: [phone] Fax: [phone]    Primary Care Provider: Mariely Fan MD    Primary Insurance: MEDICARE  Secondary Insurance: COMMERCIAL MISCELLANEOUS    DISCHARGE DETAILS:    Discharge planning discussed with:: Pt's son Julio and daughter in law Amanda  Freedom of Choice: Yes  Comments - Freedom of Choice: Pt's son and daughter in law requesting pt return to the [facility]  CM contacted family/caregiver?: Yes  Were Treatment Team discharge recommendations reviewed with patient/caregiver?: Yes  Did patient/caregiver verbalize understanding of patient care needs?: N/A- going to facility  Were patient/caregiver advised of the risks associated with not following Treatment Team discharge recommendations?: Yes    Contacts  Patient Contacts: Julio Butler/Amanda Butler-daughter in law  Relationship to Patient:: Family  Contact Method: Phone  Phone Number: [phone]  Reason/Outcome: Emergency Contact, Discharge Planning    Requested Home Health Care         Is the patient interested in HHC at discharge?: No    DME Referral Provided  Referral made for DME?: No    Other Referral/Resources/Interventions Provided:  Interventions: Facility Return  Referral Comments: Pt to return to the [facility]         Treatment Team Recommendation: Facility Return  Discharge Destination Plan:: Facility Return  Transport at Discharge : [facility]     Number/Name of Dispatcher: Roundtrip  Transported by (Company and Unit #): [facility]  ETA of Transport (Date): 06/19/24  ETA of Transport (Time): 1230     Transfer Mode:  Jackie  Accompanied by: EMS personnel     IMM Given (Date):: 06/19/24  IMM Given to:: Family (Verbal with pt's son Julio and daughter in law Amanda via phone)          Accepting Facility Name, City & State : [facility]  Receiving Facility/Agency Phone Number: [phone]              IMM reviewed with  via phone with pt's son Julio and daughter in law Amanda ,  pt's son Julio and daughter in law Amanda  agrees with discharge determination.

## 2024-06-19 NOTE — DISCHARGE SUMMARY
"[facility]  Discharge- Chelsey Carson 1935, 88 y.o. female MRN: [id number]  [facility] Encounter: [id number]  Primary Care Provider: Mariely Fan MD   Date and time admitted to hospital: 6/14/2024  9:27 AM    Ambulatory dysfunction  Assessment & Plan  Background  Secondary to recent history of hip fracture status post ORIF, on 5/15/2024 at [facility]  Status post x-ray of hip: Status post right femoral neck internal fixation.  In comparison to an outside CT from 5/23/2024, an intertrochanteric fracture with medially displaced lesser trochanter is grossly unchanged.    Plan:  PT/OT recommending rehab  Awaiting safe discharge plan from case management. Medically stable for discharge    * Delirium due to another medical condition  Assessment & Plan  Delirium likely secondary to toxic metabolic encephalopathy due to UTI in the setting of poor cognitive reserve.    Background:  Son reports of declining of cognitive function approximately 15 to 16 weeks ago where during this time she has been in and out of the hospital and in rehab.  Prior to that she had been residing alone at home for the most part independently where she was still paying her own bills.  Though she did have an aide that came in approximately 4 hours a day.  He reports that there has been no formal diagnosis of dementia  More recently since being at present living facility she has been started on psychiatric medications per patient's son to \"control her mood\", including Zyprexa, buspirone and Depakote.  Apparently there she has been having periods of sundowning and at times hitting staff.  He reports that since being on the present regimen that she is on her mood has been much improved with much less agitated periods.  Her son states that she will now be residing permanently at the assisted living facility as they realize she no longer can live independently     Plan:  Appreciate " geriatrics recommendations. Continue Buspar, Zyprexa, and Depakote  CT head without acute abnormalities  Awaiting safe discharge plan from case management    Acute cystitis with hematuria  Assessment & Plan  Completed antibiotic treatment for acute cystitis. Monitor off antibiotics. Cultures growing E. Coli.    Anemia  Assessment & Plan  Stable, no indication for transfusion at this time.    Results from last 7 days   Lab Units 06/16/24  0449 06/15/24  0456 06/14/24  1007   HEMOGLOBIN g/dL 10.1* 10.8* 10.8*   MCV fL 104* 103* 104*   RDW % 16.9* 17.2* 16.9*   FOLATE ng/mL  --  18.9  --    VITAMIN B 12 pg/mL  --  529  --        Stable no further evaluation is required at this time        Medical Problems       Resolved Problems  Date Reviewed: 6/18/2024   None       Discharging Physician / Practitioner: Kenji Fitzgerald MD  PCP: Mariely aFn MD  Admission Date:   Admission Orders (From admission, onward)       Ordered        06/14/24 1455  INPATIENT ADMISSION  Once                          Discharge Date: 06/19/24    Consultations During Hospital Stay:  Geriatric medicine    Procedures Performed:   None    Significant Findings / Test Results:   CT head  No acute intracranial abnormality.   CT spine  No cervical spine fracture or traumatic malalignment.     Incidental Findings:   None     Test Results Pending at Discharge (will require follow up):   None     Outpatient Tests Requested:  None    Complications:  None    Reason for Admission: Delirium    Hospital Course:   Chelsey Carson is a 88 y.o. female patient who originally presented to the hospital on 6/14/2024 due to delirium due to UTI.  Patient started on ceftriaxone and completed a course of IV antibiotics.  Patient also had a full workup for other causes of delirium including a geriatric consult.  Geriatric medicine optimized patient's medications.  Delirium has resolved.  Patient is medically cleared for discharge back to facility.        Please see above  list of diagnoses and related plan for additional information.     Condition at Discharge: stable    Discharge Day Visit / Exam:   Subjective: This is a very pleasant 88-year-old female who was seen and evaluated today at bedside.  Patient denies any acute complaints this time.  Vitals: Blood Pressure: 132/63 (06/19/24 0721)  Pulse: 96 (06/19/24 0725)  Temperature: 97.5 °F (36.4 °C) (06/19/24 0725)  Temp Source: Oral (06/14/24 0933)  Respirations: 17 (06/19/24 0721)  SpO2: 94 % (06/19/24 0725)  Exam:   Physical Exam  Vitals reviewed.   Constitutional:       General: She is not in acute distress.     Appearance: She is not ill-appearing.   HENT:      Head: Normocephalic.   Eyes:      Conjunctiva/sclera: Conjunctivae normal.   Cardiovascular:      Rate and Rhythm: Normal rate and regular rhythm.   Pulmonary:      Effort: Pulmonary effort is normal.   Abdominal:      General: Abdomen is flat.      Palpations: Abdomen is soft.      Tenderness: There is no abdominal tenderness.   Skin:     General: Skin is warm and dry.   Neurological:      Mental Status: She is alert. Mental status is at baseline.          Discussion with Family:  CM contacted family .     Discharge instructions/Information to patient and family:   See after visit summary for information provided to patient and family.      Provisions for Follow-Up Care:  See after visit summary for information related to follow-up care and any pertinent home health orders.      Mobility at time of Discharge:   Basic Mobility Inpatient Raw Score: 11  -HLM Goal: 4: Move to chair/commode  JH-HLM Achieved: 2: Bed activities/Dependent transfer  HLM Goal NOT achieved. Continue to encourage mobility in post discharge setting.     Disposition:   Assisted Living Facility at [facility]    Planned Readmission: None     Discharge Statement:  I spent 45 minutes discharging the patient. This time was spent on the day of discharge. I had direct contact with the patient on the day of  discharge. Greater than 50% of the total time was spent examining patient, answering all patient questions, arranging and discussing plan of care with patient as well as directly providing post-discharge instructions.  Additional time then spent on discharge activities.    Discharge Medications:  See after visit summary for reconciled discharge medications provided to patient and/or family.      **Please Note: This note may have been constructed using a voice recognition system**

## 2024-06-21 NOTE — PROGRESS NOTES
"Spiritual Care Progress Note    2024  Patient: Chelsey Carson : 1935  Admission Date & Time: 2024  MRN: [id number] CSN: [id number]      Followed-up with the pt and she appeared confused and agitated. Pt claims she is \"being held here\" against her will and that she is not receiving proper care. Provided active listening and gently encouraged adherence to treatment plan. No further needs were expressed at this time.    Chaplains still remain available.       24 1000   Clinical Encounter Type   Visited With Patient   Routine Visit Follow-up            "

## 2024-07-03 ENCOUNTER — TELEPHONE (OUTPATIENT)
Dept: PULMONOLOGY | Facility: CLINIC | Age: 89
End: 2024-07-03

## 2024-07-03 NOTE — TELEPHONE ENCOUNTER
Called patient to schedule the 3M FU MID JULY CT CHEST TEST ORDERED from the recall list and spoke to patients son shahriar and he requested if we can call back in a week to schedule appointment.

## 2024-07-14 PROBLEM — N30.01 ACUTE CYSTITIS WITH HEMATURIA: Status: RESOLVED | Noted: 2024-06-14 | Resolved: 2024-07-14

## 2024-07-23 ENCOUNTER — HOSPITAL ENCOUNTER (OUTPATIENT)
Dept: RADIOLOGY | Age: 89
Discharge: HOME | End: 2024-07-23
Payer: MEDICARE

## 2024-07-23 DIAGNOSIS — R93.89 ABNORMAL CT OF THE CHEST: ICD-10-CM

## 2024-07-23 DIAGNOSIS — J98.4 PNEUMONITIS: ICD-10-CM

## 2024-07-23 PROCEDURE — 71250 CT THORAX DX C-: CPT

## 2024-07-26 ENCOUNTER — TELEPHONE (OUTPATIENT)
Age: 89
End: 2024-07-26

## 2024-09-01 ENCOUNTER — HOSPITAL ENCOUNTER (INPATIENT)
Facility: HOSPITAL | Age: 89
LOS: 4 days | Discharge: HOSPICE | DRG: 884 | End: 2024-09-05
Attending: EMERGENCY MEDICINE | Admitting: INTERNAL MEDICINE
Payer: MEDICARE

## 2024-09-01 ENCOUNTER — APPOINTMENT (EMERGENCY)
Dept: RADIOLOGY | Facility: HOSPITAL | Age: 89
DRG: 884 | End: 2024-09-01
Payer: MEDICARE

## 2024-09-01 DIAGNOSIS — K59.00 CONSTIPATION, UNSPECIFIED CONSTIPATION TYPE: ICD-10-CM

## 2024-09-01 DIAGNOSIS — G31.84 MILD COGNITIVE IMPAIRMENT OF UNCERTAIN OR UNKNOWN ETIOLOGY: ICD-10-CM

## 2024-09-01 DIAGNOSIS — R33.9 URINARY RETENTION: ICD-10-CM

## 2024-09-01 DIAGNOSIS — R53.1 ASTHENIA: Primary | ICD-10-CM

## 2024-09-01 DIAGNOSIS — R41.0 DELIRIUM: ICD-10-CM

## 2024-09-01 DIAGNOSIS — F41.9 ANXIETY: ICD-10-CM

## 2024-09-01 DIAGNOSIS — R51.9 NONINTRACTABLE HEADACHE, UNSPECIFIED CHRONICITY PATTERN, UNSPECIFIED HEADACHE TYPE: ICD-10-CM

## 2024-09-01 LAB
ANION GAP SERPL CALCULATED.3IONS-SCNC: 6 MMOL/L (ref 4–13)
BACTERIA UR QL AUTO: ABNORMAL /HPF
BASOPHILS # BLD AUTO: 0.02 THOUSANDS/ÂΜL (ref 0–0.1)
BASOPHILS NFR BLD AUTO: 0 % (ref 0–1)
BILIRUB UR QL STRIP: NEGATIVE
BUN SERPL-MCNC: 28 MG/DL (ref 5–25)
CALCIUM SERPL-MCNC: 8.7 MG/DL (ref 8.4–10.2)
CHLORIDE SERPL-SCNC: 102 MMOL/L (ref 96–108)
CLARITY UR: CLEAR
CO2 SERPL-SCNC: 29 MMOL/L (ref 21–32)
COLOR UR: YELLOW
CREAT SERPL-MCNC: 1 MG/DL (ref 0.6–1.3)
EOSINOPHIL # BLD AUTO: 0.13 THOUSAND/ÂΜL (ref 0–0.61)
EOSINOPHIL NFR BLD AUTO: 2 % (ref 0–6)
ERYTHROCYTE [DISTWIDTH] IN BLOOD BY AUTOMATED COUNT: 13.2 % (ref 11.6–15.1)
FLUAV RNA RESP QL NAA+PROBE: NEGATIVE
FLUBV RNA RESP QL NAA+PROBE: NEGATIVE
GFR SERPL CREATININE-BSD FRML MDRD: 50 ML/MIN/1.73SQ M
GLUCOSE SERPL-MCNC: 112 MG/DL (ref 65–140)
GLUCOSE UR STRIP-MCNC: NEGATIVE MG/DL
HCT VFR BLD AUTO: 37 % (ref 34.8–46.1)
HGB BLD-MCNC: 12 G/DL (ref 11.5–15.4)
HGB UR QL STRIP.AUTO: NEGATIVE
IMM GRANULOCYTES # BLD AUTO: 0.03 THOUSAND/UL (ref 0–0.2)
IMM GRANULOCYTES NFR BLD AUTO: 0 % (ref 0–2)
KETONES UR STRIP-MCNC: ABNORMAL MG/DL
LEUKOCYTE ESTERASE UR QL STRIP: ABNORMAL
LYMPHOCYTES # BLD AUTO: 2.13 THOUSANDS/ÂΜL (ref 0.6–4.47)
LYMPHOCYTES NFR BLD AUTO: 24 % (ref 14–44)
MCH RBC QN AUTO: 33.9 PG (ref 26.8–34.3)
MCHC RBC AUTO-ENTMCNC: 32.4 G/DL (ref 31.4–37.4)
MCV RBC AUTO: 105 FL (ref 82–98)
MONOCYTES # BLD AUTO: 0.89 THOUSAND/ÂΜL (ref 0.17–1.22)
MONOCYTES NFR BLD AUTO: 10 % (ref 4–12)
MUCOUS THREADS UR QL AUTO: ABNORMAL
NEUTROPHILS # BLD AUTO: 5.76 THOUSANDS/ÂΜL (ref 1.85–7.62)
NEUTS SEG NFR BLD AUTO: 64 % (ref 43–75)
NITRITE UR QL STRIP: NEGATIVE
NON-SQ EPI CELLS URNS QL MICRO: ABNORMAL /HPF
NRBC BLD AUTO-RTO: 0 /100 WBCS
PH UR STRIP.AUTO: 6.5 [PH]
PLATELET # BLD AUTO: 272 THOUSANDS/UL (ref 149–390)
PMV BLD AUTO: 9.5 FL (ref 8.9–12.7)
POTASSIUM SERPL-SCNC: 4.4 MMOL/L (ref 3.5–5.3)
PROT UR STRIP-MCNC: ABNORMAL MG/DL
RBC # BLD AUTO: 3.54 MILLION/UL (ref 3.81–5.12)
RBC #/AREA URNS AUTO: ABNORMAL /HPF
RSV RNA RESP QL NAA+PROBE: NEGATIVE
SARS-COV-2 RNA RESP QL NAA+PROBE: NEGATIVE
SODIUM SERPL-SCNC: 137 MMOL/L (ref 135–147)
SP GR UR STRIP.AUTO: 1.02 (ref 1–1.03)
TSH SERPL DL<=0.05 MIU/L-ACNC: 3.81 UIU/ML (ref 0.45–4.5)
UROBILINOGEN UR STRIP-ACNC: <2 MG/DL
WBC # BLD AUTO: 8.96 THOUSAND/UL (ref 4.31–10.16)
WBC #/AREA URNS AUTO: ABNORMAL /HPF

## 2024-09-01 PROCEDURE — 84443 ASSAY THYROID STIM HORMONE: CPT

## 2024-09-01 PROCEDURE — 80164 ASSAY DIPROPYLACETIC ACD TOT: CPT

## 2024-09-01 PROCEDURE — 80048 BASIC METABOLIC PNL TOTAL CA: CPT

## 2024-09-01 PROCEDURE — 82140 ASSAY OF AMMONIA: CPT

## 2024-09-01 PROCEDURE — 80076 HEPATIC FUNCTION PANEL: CPT

## 2024-09-01 PROCEDURE — 0241U HB NFCT DS VIR RESP RNA 4 TRGT: CPT

## 2024-09-01 PROCEDURE — 99285 EMERGENCY DEPT VISIT HI MDM: CPT | Performed by: EMERGENCY MEDICINE

## 2024-09-01 PROCEDURE — 85025 COMPLETE CBC W/AUTO DIFF WBC: CPT

## 2024-09-01 PROCEDURE — 70450 CT HEAD/BRAIN W/O DYE: CPT

## 2024-09-01 PROCEDURE — 81001 URINALYSIS AUTO W/SCOPE: CPT

## 2024-09-01 PROCEDURE — 93005 ELECTROCARDIOGRAM TRACING: CPT

## 2024-09-01 PROCEDURE — 36415 COLL VENOUS BLD VENIPUNCTURE: CPT

## 2024-09-01 PROCEDURE — 99285 EMERGENCY DEPT VISIT HI MDM: CPT

## 2024-09-01 PROCEDURE — 71045 X-RAY EXAM CHEST 1 VIEW: CPT

## 2024-09-01 RX ORDER — ACETAMINOPHEN 325 MG/1
650 TABLET ORAL EVERY 6 HOURS PRN
Status: DISCONTINUED | OUTPATIENT
Start: 2024-09-01 | End: 2024-09-01

## 2024-09-01 RX ORDER — IPRATROPIUM BROMIDE AND ALBUTEROL SULFATE 2.5; .5 MG/3ML; MG/3ML
3 SOLUTION RESPIRATORY (INHALATION) EVERY 8 HOURS PRN
Status: DISCONTINUED | OUTPATIENT
Start: 2024-09-01 | End: 2024-09-03

## 2024-09-01 RX ORDER — PANTOPRAZOLE SODIUM 40 MG/1
40 TABLET, DELAYED RELEASE ORAL DAILY
Status: DISCONTINUED | OUTPATIENT
Start: 2024-09-02 | End: 2024-09-05 | Stop reason: HOSPADM

## 2024-09-01 RX ORDER — ASPIRIN 81 MG/1
81 TABLET, CHEWABLE ORAL DAILY
Status: DISCONTINUED | OUTPATIENT
Start: 2024-09-02 | End: 2024-09-05 | Stop reason: HOSPADM

## 2024-09-01 RX ORDER — ACETAMINOPHEN 325 MG/1
650 TABLET ORAL EVERY 6 HOURS PRN
Status: DISCONTINUED | OUTPATIENT
Start: 2024-09-01 | End: 2024-09-05 | Stop reason: HOSPADM

## 2024-09-02 PROBLEM — G31.84 MILD COGNITIVE IMPAIRMENT OF UNCERTAIN OR UNKNOWN ETIOLOGY: Status: ACTIVE | Noted: 2021-02-15

## 2024-09-02 LAB
25(OH)D3 SERPL-MCNC: 36.9 NG/ML (ref 30–100)
ALBUMIN SERPL BCG-MCNC: 3.1 G/DL (ref 3.5–5)
ALBUMIN SERPL BCG-MCNC: 3.2 G/DL (ref 3.5–5)
ALP SERPL-CCNC: 63 U/L (ref 34–104)
ALP SERPL-CCNC: 67 U/L (ref 34–104)
ALT SERPL W P-5'-P-CCNC: 8 U/L (ref 7–52)
ALT SERPL W P-5'-P-CCNC: 9 U/L (ref 7–52)
AMMONIA PLAS-SCNC: 34 UMOL/L (ref 18–72)
ANION GAP SERPL CALCULATED.3IONS-SCNC: 7 MMOL/L (ref 4–13)
APTT PPP: 36 SECONDS (ref 23–34)
AST SERPL W P-5'-P-CCNC: 12 U/L (ref 13–39)
AST SERPL W P-5'-P-CCNC: 14 U/L (ref 13–39)
ATRIAL RATE: 90 BPM
BASOPHILS # BLD AUTO: 0.03 THOUSANDS/ÂΜL (ref 0–0.1)
BASOPHILS NFR BLD AUTO: 0 % (ref 0–1)
BILIRUB DIRECT SERPL-MCNC: 0.04 MG/DL (ref 0–0.2)
BILIRUB SERPL-MCNC: 0.28 MG/DL (ref 0.2–1)
BILIRUB SERPL-MCNC: 0.34 MG/DL (ref 0.2–1)
BUN SERPL-MCNC: 28 MG/DL (ref 5–25)
CALCIUM ALBUM COR SERPL-MCNC: 9.2 MG/DL (ref 8.3–10.1)
CALCIUM SERPL-MCNC: 8.6 MG/DL (ref 8.4–10.2)
CHLORIDE SERPL-SCNC: 102 MMOL/L (ref 96–108)
CO2 SERPL-SCNC: 30 MMOL/L (ref 21–32)
CREAT SERPL-MCNC: 0.85 MG/DL (ref 0.6–1.3)
EOSINOPHIL # BLD AUTO: 0.18 THOUSAND/ÂΜL (ref 0–0.61)
EOSINOPHIL NFR BLD AUTO: 2 % (ref 0–6)
ERYTHROCYTE [DISTWIDTH] IN BLOOD BY AUTOMATED COUNT: 13.2 % (ref 11.6–15.1)
FOLATE SERPL-MCNC: 8.6 NG/ML
GFR SERPL CREATININE-BSD FRML MDRD: 61 ML/MIN/1.73SQ M
GLUCOSE SERPL-MCNC: 94 MG/DL (ref 65–140)
HCT VFR BLD AUTO: 34.6 % (ref 34.8–46.1)
HGB BLD-MCNC: 11.1 G/DL (ref 11.5–15.4)
IMM GRANULOCYTES # BLD AUTO: 0.02 THOUSAND/UL (ref 0–0.2)
IMM GRANULOCYTES NFR BLD AUTO: 0 % (ref 0–2)
INR PPP: 1.07 (ref 0.85–1.19)
LYMPHOCYTES # BLD AUTO: 2.84 THOUSANDS/ÂΜL (ref 0.6–4.47)
LYMPHOCYTES NFR BLD AUTO: 37 % (ref 14–44)
MAGNESIUM SERPL-MCNC: 2 MG/DL (ref 1.9–2.7)
MCH RBC QN AUTO: 33.9 PG (ref 26.8–34.3)
MCHC RBC AUTO-ENTMCNC: 32.1 G/DL (ref 31.4–37.4)
MCV RBC AUTO: 106 FL (ref 82–98)
MONOCYTES # BLD AUTO: 0.76 THOUSAND/ÂΜL (ref 0.17–1.22)
MONOCYTES NFR BLD AUTO: 10 % (ref 4–12)
NEUTROPHILS # BLD AUTO: 3.81 THOUSANDS/ÂΜL (ref 1.85–7.62)
NEUTS SEG NFR BLD AUTO: 51 % (ref 43–75)
NRBC BLD AUTO-RTO: 0 /100 WBCS
P AXIS: 72 DEGREES
PLATELET # BLD AUTO: 261 THOUSANDS/UL (ref 149–390)
PMV BLD AUTO: 9.6 FL (ref 8.9–12.7)
POTASSIUM SERPL-SCNC: 4.4 MMOL/L (ref 3.5–5.3)
PR INTERVAL: 178 MS
PROT SERPL-MCNC: 6 G/DL (ref 6.4–8.4)
PROT SERPL-MCNC: 6.2 G/DL (ref 6.4–8.4)
PROTHROMBIN TIME: 14.2 SECONDS (ref 12.3–15)
QRS AXIS: 61 DEGREES
QRSD INTERVAL: 72 MS
QT INTERVAL: 358 MS
QTC INTERVAL: 437 MS
RBC # BLD AUTO: 3.27 MILLION/UL (ref 3.81–5.12)
SODIUM SERPL-SCNC: 139 MMOL/L (ref 135–147)
T WAVE AXIS: 73 DEGREES
VALPROATE SERPL-MCNC: 91 UG/ML (ref 50–100)
VENTRICULAR RATE: 90 BPM
VIT B12 SERPL-MCNC: 536 PG/ML (ref 180–914)
WBC # BLD AUTO: 7.64 THOUSAND/UL (ref 4.31–10.16)

## 2024-09-02 PROCEDURE — 87086 URINE CULTURE/COLONY COUNT: CPT

## 2024-09-02 PROCEDURE — 85610 PROTHROMBIN TIME: CPT | Performed by: INTERNAL MEDICINE

## 2024-09-02 PROCEDURE — 80053 COMPREHEN METABOLIC PANEL: CPT | Performed by: INTERNAL MEDICINE

## 2024-09-02 PROCEDURE — 99223 1ST HOSP IP/OBS HIGH 75: CPT | Performed by: INTERNAL MEDICINE

## 2024-09-02 PROCEDURE — 93010 ELECTROCARDIOGRAM REPORT: CPT | Performed by: INTERNAL MEDICINE

## 2024-09-02 PROCEDURE — 82746 ASSAY OF FOLIC ACID SERUM: CPT | Performed by: INTERNAL MEDICINE

## 2024-09-02 PROCEDURE — 85730 THROMBOPLASTIN TIME PARTIAL: CPT | Performed by: INTERNAL MEDICINE

## 2024-09-02 PROCEDURE — 82607 VITAMIN B-12: CPT | Performed by: INTERNAL MEDICINE

## 2024-09-02 PROCEDURE — 85025 COMPLETE CBC W/AUTO DIFF WBC: CPT | Performed by: INTERNAL MEDICINE

## 2024-09-02 PROCEDURE — 36415 COLL VENOUS BLD VENIPUNCTURE: CPT | Performed by: INTERNAL MEDICINE

## 2024-09-02 PROCEDURE — 82306 VITAMIN D 25 HYDROXY: CPT

## 2024-09-02 PROCEDURE — 83735 ASSAY OF MAGNESIUM: CPT | Performed by: INTERNAL MEDICINE

## 2024-09-02 RX ORDER — OLANZAPINE 2.5 MG/1
2.5 TABLET, FILM COATED ORAL ONCE
Status: COMPLETED | OUTPATIENT
Start: 2024-09-02 | End: 2024-09-02

## 2024-09-02 RX ORDER — DIVALPROEX SODIUM 250 MG/1
250 TABLET, EXTENDED RELEASE ORAL DAILY
Status: DISCONTINUED | OUTPATIENT
Start: 2024-09-02 | End: 2024-09-05 | Stop reason: HOSPADM

## 2024-09-02 RX ORDER — DIVALPROEX SODIUM 500 MG/1
500 TABLET, DELAYED RELEASE ORAL
Status: DISCONTINUED | OUTPATIENT
Start: 2024-09-02 | End: 2024-09-05 | Stop reason: HOSPADM

## 2024-09-02 RX ORDER — ENOXAPARIN SODIUM 100 MG/ML
30 INJECTION SUBCUTANEOUS DAILY
Status: DISCONTINUED | OUTPATIENT
Start: 2024-09-03 | End: 2024-09-05 | Stop reason: HOSPADM

## 2024-09-02 RX ORDER — OLANZAPINE 2.5 MG/1
2.5 TABLET, FILM COATED ORAL DAILY
Status: DISCONTINUED | OUTPATIENT
Start: 2024-09-02 | End: 2024-09-05 | Stop reason: HOSPADM

## 2024-09-02 RX ORDER — BUSPIRONE HYDROCHLORIDE 5 MG/1
5 TABLET ORAL 2 TIMES DAILY
Status: DISCONTINUED | OUTPATIENT
Start: 2024-09-02 | End: 2024-09-05 | Stop reason: HOSPADM

## 2024-09-02 RX ORDER — POTASSIUM CHLORIDE 1.5 G/1.58G
10 POWDER, FOR SOLUTION ORAL DAILY
COMMUNITY

## 2024-09-02 RX ORDER — OLANZAPINE 5 MG/1
2.5 TABLET, ORALLY DISINTEGRATING ORAL EVERY 6 HOURS PRN
Status: DISCONTINUED | OUTPATIENT
Start: 2024-09-02 | End: 2024-09-05 | Stop reason: HOSPADM

## 2024-09-02 RX ADMIN — Medication 1000 MG: at 07:53

## 2024-09-02 RX ADMIN — OLANZAPINE 2.5 MG: 5 TABLET, ORALLY DISINTEGRATING ORAL at 21:12

## 2024-09-02 RX ADMIN — ASPIRIN 81 MG CHEWABLE TABLET 81 MG: 81 TABLET CHEWABLE at 10:02

## 2024-09-02 RX ADMIN — BUSPIRONE HYDROCHLORIDE 5 MG: 5 TABLET ORAL at 17:09

## 2024-09-02 RX ADMIN — PANTOPRAZOLE SODIUM 40 MG: 40 TABLET, DELAYED RELEASE ORAL at 10:02

## 2024-09-02 RX ADMIN — DIVALPROEX SODIUM 250 MG: 250 TABLET, EXTENDED RELEASE ORAL at 10:02

## 2024-09-02 RX ADMIN — BUSPIRONE HYDROCHLORIDE 5 MG: 5 TABLET ORAL at 10:02

## 2024-09-02 RX ADMIN — DIVALPROEX SODIUM 500 MG: 500 TABLET, DELAYED RELEASE ORAL at 21:12

## 2024-09-02 RX ADMIN — OLANZAPINE 2.5 MG: 2.5 TABLET, FILM COATED ORAL at 11:57

## 2024-09-02 RX ADMIN — OLANZAPINE 2.5 MG: 2.5 TABLET, FILM COATED ORAL at 10:05

## 2024-09-02 NOTE — PLAN OF CARE
Problem: PAIN - ADULT  Goal: Verbalizes/displays adequate comfort level or baseline comfort level  Description: Interventions:  - Encourage patient to monitor pain and request assistance  - Assess pain using appropriate pain scale  - Administer analgesics based on type and severity of pain and evaluate response  - Implement non-pharmacological measures as appropriate and evaluate response  - Consider cultural and social influences on pain and pain management  - Notify physician/advanced practitioner if interventions unsuccessful or patient reports new pain  Outcome: Progressing     Problem: INFECTION - ADULT  Goal: Absence or prevention of progression during hospitalization  Description: INTERVENTIONS:  - Assess and monitor for signs and symptoms of infection  - Monitor lab/diagnostic results  - Monitor all insertion sites, i.e. indwelling lines, tubes, and drains  - Monitor endotracheal if appropriate and nasal secretions for changes in amount and color  - [place] appropriate cooling/warming therapies per order  - Administer medications as ordered  - Instruct and encourage patient and family to use good hand hygiene technique  - Identify and instruct in appropriate isolation precautions for identified infection/condition  Outcome: Progressing  Goal: Absence of fever/infection during neutropenic period  Description: INTERVENTIONS:  - Monitor WBC    Outcome: Progressing     Problem: SAFETY ADULT  Goal: Patient will remain free of falls  Description: INTERVENTIONS:  - Educate patient/family on patient safety including physical limitations  - Instruct patient to call for assistance with activity   - Consult OT/PT to assist with strengthening/mobility   - Keep Call bell within reach  - Keep bed low and locked with side rails adjusted as appropriate  - Keep care items and personal belongings within reach  - Initiate and maintain comfort rounds  - Make Fall Risk Sign visible to staff    - Apply yellow socks and  bracelet for high fall risk patients  - Consider moving patient to room near nurses station  Outcome: Progressing  Goal: Maintain or return to baseline ADL function  Description: INTERVENTIONS:  -  Assess patient's ability to carry out ADLs; assess patient's baseline for ADL function and identify physical deficits which impact ability to perform ADLs (bathing, care of mouth/teeth, toileting, grooming, dressing, etc.)  - Assess/evaluate cause of self-care deficits   - Assess range of motion  - Assess patient's mobility; develop plan if impaired  - Assess patient's need for assistive devices and provide as appropriate  - Encourage maximum independence but intervene and supervise when necessary  - Involve family in performance of ADLs  - Assess for home care needs following discharge   - Consider OT consult to assist with ADL evaluation and planning for discharge  - Provide patient education as appropriate  Outcome: Progressing  Goal: Maintains/Returns to pre admission functional level  Description: INTERVENTIONS:  - Perform AM-PAC 6 Click Basic Mobility/ Daily Activity assessment daily.  - Set and communicate daily mobility goal to care team and patient/family/caregiver.   - Collaborate with rehabilitation services on mobility goals if consulted    - Out of bed for toileting  - Record patient progress and toleration of activity level   Outcome: Progressing     Problem: DISCHARGE PLANNING  Goal: Discharge to home or other facility with appropriate resources  Description: INTERVENTIONS:  - Identify barriers to discharge w/patient and caregiver  - Arrange for needed discharge resources and transportation as appropriate  - Identify discharge learning needs (meds, wound care, etc.)  - Arrange for interpretive services to assist at discharge as needed  - Refer to Case Management Department for coordinating discharge planning if the patient needs post-hospital services based on physician/advanced practitioner order or  complex needs related to functional status, cognitive ability, or social support system  Outcome: Progressing

## 2024-09-02 NOTE — H&P
"[facility]  H&P  Name: Chelsey Carson 88 y.o. female I MRN: [id number]  Unit/Bed#: ED 03 I Date of Admission: 9/1/2024   Date of Service: 9/2/2024 I Hospital Day: 1      Assessment & Plan   Mild cognitive impairment of uncertain or unknown etiology  Assessment & Plan  Per nursing staff at [facility], pt has been more confused and weak the last 3 days, eating with her hands, saying \"nonsensical\" things, so she was brought to the ED despite pt stating she has no complaints and does not want to go to the hospital  [facility] in ED with \"mild chronic small vessel ischemic changes\" but no other intracranial abnormalities  Pt does have a history of mild cognitive impairment as well as a history of episodes of sundowning and getting aggressive with staff, on chart review she was placed on Depakote, Zyprexa and Buspirone for mood stabilization  CXR and UA unremarkable, pt afebrile, normotensive, WBC WNL without any complaints of urinary F/U/D or cough/SOB/CP; ammonia WNL; glucose is normal   Consider progression and worsening of cognitive impairment - consult OT for cognitive screening   Consider geriatrics consult for further recommendations   Continue to monitor fever curve and hemodynamics while off abx     Benign essential HTN  Assessment & Plan  Appears pt was previously taking propranolol but this has since been dc'd   HR appears controlled at this time    GERD (gastroesophageal reflux disease)  Assessment & Plan  Continue protonix 40mg daily           VTE Pharmacologic Prophylaxis: VTE Score: 4 Moderate Risk (Score 3-4) - Pharmacological DVT Prophylaxis Ordered: enoxaparin (Lovenox).  Code Status: Level 3 - DNAR and DNI  Discussion with family: Attempted to update  (son and daughter in law) via phone. Unable to contact.    Anticipated Length of Stay: Patient will be admitted on an inpatient basis with an anticipated length of stay of greater than 2 midnights " secondary to Cognitive evaluation, further possible infectious workup.    Total Time Spent on Date of Encounter in care of patient: 30 mins. This time was spent on one or more of the following: performing physical exam; counseling and coordination of care; obtaining or reviewing history; documenting in the medical record; reviewing/ordering tests, medications or procedures; communicating with other healthcare professionals and discussing with patient's family/caregivers.    Chief Complaint: Per [facility] pt with weakness and AMS over last 3 days    History of Present Illness:  Chelsey Carson is a 88 y.o. female with a PMH of hyperlipidemia, GERD, CKD, mild cognitive impairment who presents with weakness and AMS per [facility]. The staff felt that over the last 3 days pt has been weaker and not making sense when she speaks. On discussion with the pt, she states she has just been eating less and not wanting to move around as much. She denies any infectious signs or symptoms, denies urinary complaints, SOB, CP, N/V/D.  CTH normal as documented above.  Labs all within normal limits. Will consult OT for cognitive evaluation given the likelihood that this is just progressing cognitive impairment.  Consider geriatrics consult if concern for medication side effect however she was recently seen by then with all the same medications on board.  I was unable to contact pt's son to get further history.      Review of Systems:  Review of Systems   Constitutional: Negative.    HENT: Negative.     Respiratory:  Negative for cough, chest tightness, shortness of breath, wheezing and stridor.    Cardiovascular: Negative.    Gastrointestinal: Negative.    Genitourinary:  Negative for dysuria, flank pain, frequency, hematuria, pelvic pain and urgency.   Musculoskeletal: Negative.    Skin: Negative.    Neurological:  Negative for dizziness, tremors, seizures, speech difficulty, weakness, light-headedness, numbness  and headaches.   Psychiatric/Behavioral: Negative.         Past Medical and Surgical History:   Past Medical History:   Diagnosis Date    Abnormal laboratory test 08/01/2022    Acute sinusitis 02/13/2013    Age related osteoporosis 11/01/2022    Formatting of this note might be different from the original.   Last Assessment & Plan:     Formatting of this note might be different from the original.    Risedronate  on allergy list but she does not recall what happened    Her kids told her not to get treatment but I recommend it again to her.     Given the possible allergy refer to rheum to discuss treatment options    Breast cancer (HCC)     Cancer (HCC)     Chronic cough 01/13/2017    COVID-19     History of 2019 novel coronavirus disease (COVID-19) 02/15/2021    Hyperlipidemia     Hyponatremia 02/01/2021    Influenza B 03/05/2024    Insomnia 02/17/2021    MCI (mild cognitive impairment) 02/15/2021    Nonrheumatic aortic (valve) stenosis 03/15/2024    Other specified forms of tremor 03/15/2024    Palpitations 03/15/2024    Pneumonia due to COVID-19 virus 02/01/2021    Slow transit constipation 03/15/2024    Stage 3a chronic kidney disease (HCC) 09/27/2023    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 Authorizing Provider   acetaminophen (TYLENOL) 325 mg tablet Take 2 tablets (650 mg total) by mouth every 6 (six) hours as needed for mild pain, headaches or fever 3/27/24   Leesa Andrew PA-C   aspirin 81 mg chewable tablet Chew 81 mg daily    Historical Provider, MD   busPIRone (BUSPAR) 5 mg tablet Take 1 tablet (5 mg total) by mouth 3 (three) times a day  Patient taking differently: Take 5 mg by mouth 2 (two) times a day 10/11/23    Mariely Fan MD   Calcium Carb-Cholecalciferol 1000-800 MG-UNIT TABS Take 1 tablet by mouth in the morning    Historical Provider, MD   divalproex sodium (DEPAKOTE ER) 250 mg 24 hr tablet Take 250 mg by mouth daily    Historical Provider, MD   divalproex sodium (DEPAKOTE) 500 mg DR tablet Take 500 mg by mouth daily at bedtime    Historical Provider, MD   guaiFENesin (MUCINEX) 600 mg 12 hr tablet Take 1,200 mg by mouth every 12 (twelve) hours    Historical Provider, MD   ipratropium (ATROVENT) 0.03 % nasal spray 1 spray into each nostril in the morning    Historical Provider, MD   ipratropium-albuterol (DUO-NEB) 0.5-2.5 mg/3 mL nebulizer solution Take 3 mL by nebulization every 8 (eight) hours as needed for wheezing or shortness of breath    Historical Provider, MD   OLANZapine (ZyPREXA) 2.5 mg tablet Take 2.5 mg by mouth daily    Historical Provider, MD   pantoprazole (PROTONIX) 40 mg tablet Take 40 mg by mouth daily    Historical Provider, MD     I have reveiwed home medications using records provided by [facility].    Allergies:   Allergies   Allergen Reactions    Pravastatin     Risedronate     Paxlovid [Nirmatrelvir-Ritonavir] Nausea Only       Social History:  Marital Status:      Substance Use History:   Social History     Substance and Sexual Activity   Alcohol Use Not Currently    Alcohol/week: 1.0 standard drink of alcohol    Types: 1 Glasses of wine per week     Social History     Tobacco Use   Smoking Status Never   Smokeless Tobacco Never     Social History     Substance and Sexual Activity   Drug Use No       Family History:  Family History   Problem Relation Age of Onset    Breast cancer Mother     Heart disease Father         heart problem    Heart disease Sister         heart problem    Heart disease Brother         heart problem       Physical Exam:     Vitals:   Blood Pressure: 130/60 (09/02/24 0300)  Pulse: 65 (09/02/24 0300)  Temperature: 99.1 °F (37.3 °C) (09/01/24 1937)  Temp Source: Tympanic  "(09/01/24 1937)  Respirations: 18 (09/02/24 0300)  SpO2: 96 % (09/02/24 0300)    Physical Exam  Constitutional:       General: She is not in acute distress.     Appearance: She is normal weight. She is not ill-appearing, toxic-appearing or diaphoretic.   HENT:      Head: Normocephalic.      Nose: No congestion.   Cardiovascular:      Rate and Rhythm: Normal rate and regular rhythm.      Heart sounds: No murmur heard.     No friction rub. No gallop.   Pulmonary:      Breath sounds: Normal breath sounds. No wheezing, rhonchi or rales.   Abdominal:      General: Bowel sounds are normal.      Palpations: Abdomen is soft.   Neurological:      Mental Status: She is oriented to person, place, and time. Mental status is at baseline.      Comments: Pt able to hold conversation about current medical needs however later in the evening she was agitated and screaming at nursing staff.  Appears pt's mental status waxes and wanes.          Additional Data:     Lab Results:  Results from last 7 days   Lab Units 09/01/24  2136   WBC Thousand/uL 8.96   HEMOGLOBIN g/dL 12.0   HEMATOCRIT % 37.0   PLATELETS Thousands/uL 272   SEGS PCT % 64   LYMPHO PCT % 24   MONO PCT % 10   EOS PCT % 2     Results from last 7 days   Lab Units 09/01/24  2337 09/01/24  2136   SODIUM mmol/L  --  137   POTASSIUM mmol/L  --  4.4   CHLORIDE mmol/L  --  102   CO2 mmol/L  --  29   BUN mg/dL  --  28*   CREATININE mg/dL  --  1.00   ANION GAP mmol/L  --  6   CALCIUM mg/dL  --  8.7   ALBUMIN g/dL 3.1*  --    TOTAL BILIRUBIN mg/dL 0.28  --    ALK PHOS U/L 67  --    ALT U/L 8  --    AST U/L 12*  --    GLUCOSE RANDOM mg/dL  --  112             No results found for: \"HGBA1C\"        Lines/Drains:  Invasive Devices       Peripheral Intravenous Line  Duration             Peripheral IV 09/01/24 Proximal;Right;Ventral (anterior) Forearm <1 day                        Imaging: Reviewed radiology reports from this admission including: CT head  CT head wo contrast   Final " Result by Stone López MD (09/01 2146)      No acute intracranial abnormality.      Mild chronic small vessel ischemic changes.            Workstation performed: [id number]         XR chest 1 view portable    (Results Pending)       EKG and Other Studies Reviewed on Admission:   EKG: Sinus Tachycardia. HR 90.    ** Please Note: This note has been constructed using a voice recognition system. **

## 2024-09-02 NOTE — QUICK NOTE
-Although UA with only 2-4 wbc, urine this AM appears cloudy and given delirium will treat for possible developing uti with ceftriaxone.

## 2024-09-02 NOTE — ED ATTENDING ATTESTATION
9/1/2024  I, Femi Layne DO, saw and evaluated the patient. I have discussed the patient with the resident/non-physician practitioner and agree with the resident's/non-physician practitioner's findings, Plan of Care, and MDM as documented in the resident's/non-physician practitioner's note, except where noted. All available labs and Radiology studies were reviewed.  I was present for key portions of any procedure(s) performed by the resident/non-physician practitioner and I was immediately available to provide assistance.       At this point I agree with the current assessment done in the Emergency Department.  I have conducted an independent evaluation of this patient a history and physical is as follows:    This is an 88-year-old female who presents emergency department from a nursing home with noted generalized weakness and fatigue has been present for at least 24 hours.  The patient nursing home sent the patient in for evaluation the patient has no fevers or chills, she has no complaints at this point in time however she does have a history of severe dementia, difficult to gather history from, most of the history comes from the EMS report.    On initial vital signs the patient is afebrile, blood pressure stable,    On exam she Is GCS 14 which is likely appears to be her baseline with noted mild confusion, she has his lungs are clear to examination heart regular rate and rhythm, abdomen soft nontender, all extremities cool to the touch with no full range of motion with no difficulty.    Differential diagnosis at this point in time includes pneumonia, UTI, intracranial process, electrolyte abnormality, dehydration, altered mental status,    Plan at this point in time will be for chest x-rays, CBC CMP, urinalysis, CT head,    Plan likely admission for further workup.    ED Course         Critical Care Time  Procedures

## 2024-09-02 NOTE — ED PROVIDER NOTES
"History  Chief Complaint   Patient presents with    Weakness - Generalized     Pt coming from nursing home, staff said she has been more generally weak. Pt did not want to come to ER. No complaints at this time     HPI    Patient is a 88 y.o. female with PMHx dementia, breast cancer, CKD who presents to the ED via EMS for evaluation of altered mental status.  Patient is a resident of [facility].  Does not know why she was sent to the ED for evaluation.  Patient has no complaints at this time.  Denies any recent illness.  She denies any chest pain, shortness of breath, abdominal pain, nausea, vomiting, diarrhea, dysuria, hematuria, headache, dizziness.    ED Course as of 09/07/24 1310   Sun Sep 01, 2024   2009 Blood Pressure: 135/76   2009 Temperature: 99.1 °F (37.3 °C)   2009 Temp Source: Tympanic   2009 Pulse: 76   2009 Respirations: 18   2009 SpO2: 100 %   2045 Spoke to staff at [facility]. Staff reports change in mental status and more confused over the last 3 days. Saying \"nonsensical things\". Generalized weakness, not able to feed self, was hand feeding herself which is abnormal for her. Recently started on depakote within the last 2 weeks for unclear reason   2101 ASSESSMENT: Patient is a 88 y.o. female who presents with altered mental status x 3 days.   DDX includes but not limited to: infection, hypoglycemia, electrolyte abnormality, intracranial process, medication side effect.   PLAN: CBC, BMP, UA, TSH, COVID/flu/RSV, CT head, chest x-ray, EKG.    2143 WBC: 8.96  wnl   2144 Hemoglobin: 12.0  wnl   2144 Platelet Count: 272  wnl   2156 CT head wo contrast  No acute intracranial abnormality.     Mild chronic small vessel ischemic changes.   2224 TSH 3RD GENERATON: 3.810  wnl   2256 SARS-COV-2: Negative   2256 INFLU A PCR: Negative   2256 INFLU B PCR: Negative   2256 RSV PCR: Negative   2323 Leukocytes, UA(!): Trace   2323 Nitrite, UA: Negative   2323 WBC, UA(!): 2-4   2323 Bacteria, UA: " None Seen   2332 Admitted to [facility] for further workup acute delirium         Prior to Admission Medications   Prescriptions Last Dose Informant Patient Reported? Taking?   Calcium Carb-Cholecalciferol 1000-800 MG-UNIT TABS Past Week Self, Outside Facility (Specify) Yes Yes   Sig: Take 1 tablet by mouth in the morning   Magnesium Oxide (MAG- PO) Past Week Outside Facility (Specify) Yes Yes   Sig: Take by mouth daily   OLANZapine (ZyPREXA) 2.5 mg tablet 2024  Yes Yes   Sig: Take 2.5 mg by mouth daily   acetaminophen (TYLENOL) 325 mg tablet  Self, Outside Facility (Specify) No No   Sig: Take 2 tablets (650 mg total) by mouth every 6 (six) hours as needed for mild pain, headaches or fever   aspirin 81 mg chewable tablet 2024  Yes Yes   Sig: Chew 81 mg daily   busPIRone (BUSPAR) 5 mg tablet 2024 Self, Outside Facility (Specify) No Yes   Sig: Take 1 tablet (5 mg total) by mouth 3 (three) times a day   Patient taking differently: Take 5 mg by mouth 2 (two) times a day   divalproex sodium (DEPAKOTE ER) 250 mg 24 hr tablet   Yes No   Sig: Take 250 mg by mouth daily   divalproex sodium (DEPAKOTE) 500 mg DR tablet 2024  Yes Yes   Sig: Take 500 mg by mouth daily at bedtime   guaiFENesin (MUCINEX) 600 mg 12 hr tablet Past Week  Yes Yes   Sig: Take 1,200 mg by mouth every 12 (twelve) hours   ipratropium (ATROVENT) 0.03 % nasal spray   Yes No   Si spray into each nostril in the morning   ipratropium-albuterol (DUO-NEB) 0.5-2.5 mg/3 mL nebulizer solution   Yes No   Sig: Take 3 mL by nebulization every 8 (eight) hours as needed for wheezing or shortness of breath   pantoprazole (PROTONIX) 40 mg tablet 2024  Yes Yes   Sig: Take 40 mg by mouth daily   potassium chloride (KLOR-CON) 20 mEq packet Past Week  Yes Yes   Sig: Take 10 mEq by mouth daily      Facility-Administered Medications: None       Past Medical History:   Diagnosis Date    Abnormal laboratory test 2022    Acute sinusitis 2013     Age related osteoporosis 11/01/2022    Formatting of this note might be different from the original.   Last Assessment & Plan:     Formatting of this note might be different from the original.    Risedronate  on allergy list but she does not recall what happened    Her kids told her not to get treatment but I recommend it again to her.     Given the possible allergy refer to rheum to discuss treatment options    Breast cancer (HCC)     Cancer (HCC)     Chronic cough 01/13/2017    COVID-19     History of 2019 novel coronavirus disease (COVID-19) 02/15/2021    Hyperlipidemia     Hyponatremia 02/01/2021    Influenza B 03/05/2024    Insomnia 02/17/2021    MCI (mild cognitive impairment) 02/15/2021    Nonrheumatic aortic (valve) stenosis 03/15/2024    Other specified forms of tremor 03/15/2024    Palpitations 03/15/2024    Pneumonia due to COVID-19 virus 02/01/2021    Slow transit constipation 03/15/2024    Stage 3a chronic kidney disease (HCC) 09/27/2023    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    E-Cigarette/Vaping    E-Cigarette Use Never User      E-Cigarette/Vaping Substances    Nicotine No     THC No     CBD No     Flavoring No      Social History     Tobacco Use    Smoking status: Never    Smokeless tobacco: Never   Vaping Use    Vaping status: Never Used   Substance Use Topics    Alcohol use: Not Currently     Alcohol/week: 1.0 standard drink of alcohol     Types: 1 Glasses of wine per week    Drug use: No        Review of Systems   Unable to  perform ROS: Mental status change       Physical Exam  ED Triage Vitals   Temperature Pulse Respirations Blood Pressure SpO2   09/01/24 1937 09/01/24 1923 09/01/24 1923 09/01/24 1923 09/01/24 1923   99.1 °F (37.3 °C) 76 18 135/76 100 %      Temp Source Heart Rate Source Patient Position - Orthostatic VS BP Location FiO2 (%)   09/01/24 1937 09/01/24 1923 09/01/24 1923 09/01/24 1923 --   Tympanic Monitor Lying Left arm       Pain Score       09/01/24 1923       No Pain             Orthostatic Vital Signs  Vitals:    09/04/24 0802 09/04/24 1524 09/04/24 2230 09/05/24 0811   BP: 116/56 141/64 136/70 112/58   Pulse: 68 97 99 81   Patient Position - Orthostatic VS:   Lying        Physical Exam  Vitals and nursing note reviewed.   Constitutional:       General: She is not in acute distress.     Appearance: Normal appearance. She is well-developed. She is not ill-appearing, toxic-appearing or diaphoretic.   HENT:      Head: Normocephalic and atraumatic.   Eyes:      Conjunctiva/sclera: Conjunctivae normal.   Cardiovascular:      Rate and Rhythm: Normal rate and regular rhythm.      Heart sounds: No murmur heard.  Pulmonary:      Effort: Pulmonary effort is normal. No respiratory distress.      Breath sounds: Normal breath sounds. No stridor. No wheezing, rhonchi or rales.   Abdominal:      Palpations: Abdomen is soft.      Tenderness: There is no abdominal tenderness.   Musculoskeletal:         General: No swelling. Normal range of motion.      Cervical back: Normal range of motion and neck supple. No rigidity.   Skin:     General: Skin is warm and dry.      Capillary Refill: Capillary refill takes less than 2 seconds.   Neurological:      General: No focal deficit present.      Mental Status: She is alert.      Comments: Oriented to self and place but not time.   Psychiatric:         Mood and Affect: Mood normal.         ED Medications  Medications   OLANZapine (ZyPREXA) tablet 2.5 mg (2.5 mg Oral Given 9/2/24 1157)        Diagnostic Studies  Results Reviewed       Procedure Component Value Units Date/Time    Vitamin D 25 hydroxy [[id number]]  (Normal) Collected: 09/02/24 0415    Lab Status: Final result Specimen: Blood from Arm, Right Updated: 09/02/24 1143     Vit D, 25-Hydroxy 36.9 ng/mL     Folate [[id number]]  (Normal) Collected: 09/02/24 0415    Lab Status: Final result Specimen: Blood from Arm, Right Updated: 09/02/24 0511     Folate 8.6 ng/mL     Vitamin B12 [[id number]]  (Normal) Collected: 09/02/24 0415    Lab Status: Final result Specimen: Blood from Arm, Right Updated: 09/02/24 0511     Vitamin B-12 536 pg/mL     Comprehensive metabolic panel [[id number]]  (Abnormal) Collected: 09/02/24 0415    Lab Status: Final result Specimen: Blood from Arm, Right Updated: 09/02/24 0447     Sodium 139 mmol/L      Potassium 4.4 mmol/L      Chloride 102 mmol/L      CO2 30 mmol/L      ANION GAP 7 mmol/L      BUN 28 mg/dL      Creatinine 0.85 mg/dL      Glucose 94 mg/dL      Calcium 8.6 mg/dL      Corrected Calcium 9.2 mg/dL      AST 14 U/L      ALT 9 U/L      Alkaline Phosphatase 63 U/L      Total Protein 6.2 g/dL      Albumin 3.2 g/dL      Total Bilirubin 0.34 mg/dL      eGFR 61 ml/min/1.73sq m     Narrative:      National Kidney Disease Foundation guidelines for Chronic Kidney Disease (CKD):     Stage 1 with normal or high GFR (GFR > 90 mL/min/1.73 square meters)    Stage 2 Mild CKD (GFR = 60-89 mL/min/1.73 square meters)    Stage 3A Moderate CKD (GFR = 45-59 mL/min/1.73 square meters)    Stage 3B Moderate CKD (GFR = 30-44 mL/min/1.73 square meters)    Stage 4 Severe CKD (GFR = 15-29 mL/min/1.73 square meters)    Stage 5 End Stage CKD (GFR <15 mL/min/1.73 square meters)  Note: GFR calculation is accurate only with a steady state creatinine    Magnesium [[id number]]  (Normal) Collected: 09/02/24 0415    Lab Status: Final result Specimen: Blood from Arm, Right Updated: 09/02/24 0447     Magnesium 2.0 mg/dL     Protime-INR [[id number]]   (Normal) Collected: 09/02/24 0415    Lab Status: Final result Specimen: Blood from Arm, Right Updated: 09/02/24 0444     Protime 14.2 seconds      INR 1.07    Narrative:      INR Therapeutic Range    Indication                                             INR Range      Atrial Fibrillation                                               2.0-3.0  Hypercoagulable State                                    2.0.2.3  Left Ventricular Asist Device                            2.0-3.0  Mechanical Heart Valve                                  -    Aortic(with afib, MI, embolism, HF, LA enlargement,    and/or coagulopathy)                                     2.0-3.0 (2.5-3.5)     Mitral                                                             2.5-3.5  Prosthetic/Bioprosthetic Heart Valve               2.0-3.0  Venous thromboembolism (VTE: VT, PE        2.0-3.0    APTT [[id number]]  (Abnormal) Collected: 09/02/24 0415    Lab Status: Final result Specimen: Blood from Arm, Right Updated: 09/02/24 0444     PTT 36 seconds     CBC and differential [[id number]]  (Abnormal) Collected: 09/02/24 0415    Lab Status: Final result Specimen: Blood from Arm, Right Updated: 09/02/24 0429     WBC 7.64 Thousand/uL      RBC 3.27 Million/uL      Hemoglobin 11.1 g/dL      Hematocrit 34.6 %       fL      MCH 33.9 pg      MCHC 32.1 g/dL      RDW 13.2 %      MPV 9.6 fL      Platelets 261 Thousands/uL      nRBC 0 /100 WBCs      Segmented % 51 %      Immature Grans % 0 %      Lymphocytes % 37 %      Monocytes % 10 %      Eosinophils Relative 2 %      Basophils Relative 0 %      Absolute Neutrophils 3.81 Thousands/µL      Absolute Immature Grans 0.02 Thousand/uL      Absolute Lymphocytes 2.84 Thousands/µL      Absolute Monocytes 0.76 Thousand/µL      Eosinophils Absolute 0.18 Thousand/µL      Basophils Absolute 0.03 Thousands/µL     Hepatic function panel [[id number]]  (Abnormal) Collected: 09/01/24 0323    Lab Status: Final result Specimen: Blood from  Arm, Right Updated: 09/02/24 0012     Total Bilirubin 0.28 mg/dL      Bilirubin, Direct 0.04 mg/dL      Alkaline Phosphatase 67 U/L      AST 12 U/L      ALT 8 U/L      Total Protein 6.0 g/dL      Albumin 3.1 g/dL     Ammonia [[id number]]  (Normal) Collected: 09/01/24 2337    Lab Status: Final result Specimen: Blood from Arm, Right Updated: 09/02/24 0011     Ammonia 34 umol/L     Valproic acid level, total [[id number]]  (Normal) Collected: 09/01/24 2337    Lab Status: Final result Specimen: Blood from Arm, Right Updated: 09/02/24 0011     Valproic Acid, Total 91 ug/mL     Urine Microscopic [[id number]]  (Abnormal) Collected: 09/01/24 2251    Lab Status: Final result Specimen: Urine, Straight Cath Updated: 09/01/24 2319     RBC, UA 1-2 /hpf      WBC, UA 2-4 /hpf      Epithelial Cells None Seen /hpf      Bacteria, UA None Seen /hpf      MUCUS THREADS Occasional    UA w Reflex to Microscopic w Reflex to Culture [[id number]]  (Abnormal) Collected: 09/01/24 2251    Lab Status: Final result Specimen: Urine, Straight Cath Updated: 09/01/24 2318     Color, UA Yellow     Clarity, UA Clear     Specific Gravity, UA 1.024     pH, UA 6.5     Leukocytes, UA Trace     Nitrite, UA Negative     Protein, UA Trace mg/dl      Glucose, UA Negative mg/dl      Ketones, UA Trace mg/dl      Urobilinogen, UA <2.0 mg/dl      Bilirubin, UA Negative     Occult Blood, UA Negative    TSH, 3rd generation with Free T4 reflex [[id number]]  (Normal) Collected: 09/01/24 2136    Lab Status: Final result Specimen: Blood from Arm, Right Updated: 09/01/24 2219     TSH 3RD GENERATON 3.810 uIU/mL     COVID/FLU/RSV [[id number]]  (Normal) Collected: 09/01/24 2103    Lab Status: Final result Specimen: Nares from Nose Updated: 09/01/24 2212     SARS-CoV-2 Negative     INFLUENZA A PCR Negative     INFLUENZA B PCR Negative     RSV PCR Negative    Narrative:      This test has been performed using the CoV-2/Flu/RSV plus assay on the [employer] platform. This test  has been validated by the  and verified by the performing laboratory.     This test is designed to amplify and detect the following: nucleocapsid (N), envelope (E), and RNA-dependent RNA polymerase (RdRP) genes of the SARS-CoV-2 genome; matrix (M), basic polymerase (PB2), and acidic protein (PA) segments of the influenza A genome; matrix (M) and non-structural protein (NS) segments of the influenza B genome, and the nucleocapsid genes of RSV A and RSV B.     Positive results are indicative of the presence of Flu A, Flu B, RSV, and/or SARS-CoV-2 RNA. Positive results for SARS-CoV-2 or suspected novel influenza should be reported to state, local, or federal health departments according to local reporting requirements.      All results should be assessed in conjunction with clinical presentation and other laboratory markers for clinical management.     FOR PEDIATRIC PATIENTS - copy/paste COVID Guidelines URL to browser: [email or web address]       Basic metabolic panel [[id number]]  (Abnormal) Collected: 09/01/24 2136    Lab Status: Final result Specimen: Blood from Arm, Right Updated: 09/01/24 2204     Sodium 137 mmol/L      Potassium 4.4 mmol/L      Chloride 102 mmol/L      CO2 29 mmol/L      ANION GAP 6 mmol/L      BUN 28 mg/dL      Creatinine 1.00 mg/dL      Glucose 112 mg/dL      Calcium 8.7 mg/dL      eGFR 50 ml/min/1.73sq m     Narrative:      National Kidney Disease Foundation guidelines for Chronic Kidney Disease (CKD):     Stage 1 with normal or high GFR (GFR > 90 mL/min/1.73 square meters)    Stage 2 Mild CKD (GFR = 60-89 mL/min/1.73 square meters)    Stage 3A Moderate CKD (GFR = 45-59 mL/min/1.73 square meters)    Stage 3B Moderate CKD (GFR = 30-44 mL/min/1.73 square meters)    Stage 4 Severe CKD (GFR = 15-29 mL/min/1.73 square meters)    Stage 5 End Stage CKD (GFR <15 mL/min/1.73 square meters)  Note: GFR calculation is accurate only with a  steady state creatinine    CBC and differential [[id number]]  (Abnormal) Collected: 09/01/24 2136    Lab Status: Final result Specimen: Blood from Arm, Right Updated: 09/01/24 2143     WBC 8.96 Thousand/uL      RBC 3.54 Million/uL      Hemoglobin 12.0 g/dL      Hematocrit 37.0 %       fL      MCH 33.9 pg      MCHC 32.4 g/dL      RDW 13.2 %      MPV 9.5 fL      Platelets 272 Thousands/uL      nRBC 0 /100 WBCs      Segmented % 64 %      Immature Grans % 0 %      Lymphocytes % 24 %      Monocytes % 10 %      Eosinophils Relative 2 %      Basophils Relative 0 %      Absolute Neutrophils 5.76 Thousands/µL      Absolute Immature Grans 0.03 Thousand/uL      Absolute Lymphocytes 2.13 Thousands/µL      Absolute Monocytes 0.89 Thousand/µL      Eosinophils Absolute 0.13 Thousand/µL      Basophils Absolute 0.02 Thousands/µL                    CT head wo contrast   Final Result by Stone López MD (09/01 2146)      No acute intracranial abnormality.      Mild chronic small vessel ischemic changes.            Workstation performed: [id number]         XR chest 1 view portable   Final Result by Constanza Arroyo MD (09/03 0543)      No acute cardiopulmonary disease.            Workstation performed: [id number]               Procedures  Procedures        Medical Decision Making  Amount and/or Complexity of Data Reviewed  Labs: ordered. Decision-making details documented in ED Course.  Radiology: ordered. Decision-making details documented in ED Course.    Risk  Decision regarding hospitalization.      See ED course above for additional details including HPI, MDM including PLAN, and disposition.      Disposition  Final diagnoses:   Asthenia   Delirium   Urinary retention     Time reflects when diagnosis was documented in both MDM as applicable and the Disposition within this note       Time User Action Codes Description Comment    9/1/2024 11:31 PM Xiao Vicente Add [R53.1] Asthenia     9/1/2024 11:31 PM Xiao Vicente  Add [R41.0] Delirium     9/2/2024  5:23 AM Campos Barrera Add [G31.84] Mild cognitive impairment of uncertain or unknown etiology     9/3/2024 12:52 AM Rose Sherwood Add [R33.9] Urinary retention     9/5/2024 10:14 AM Mya Bloom Add [F41.9] Anxiety     9/5/2024 10:14 AM Mya Bloom Add [R51.9] Nonintractable headache, unspecified chronicity pattern, unspecified headache type     9/5/2024 10:15 AM Mya Bloom Add [K59.00] Constipation, unspecified constipation type           ED Disposition       ED Disposition   Admit    Condition   Stable    Date/Time   Sun Sep 1, 2024 11:31 PM    Comment   Case was discussed with JOSE and the patient's admission status was agreed to be Admission Status: inpatient status to the service of Dr. Barrera.               Follow-up Information    None         Discharge Medication List as of 9/5/2024 11:25 AM        START taking these medications    Details   polyethylene glycol (MIRALAX) 17 g packet Take 17 g by mouth daily, Starting Fri 9/6/2024, No Print      senna (SENOKOT) 8.6 mg Take 2 tablets (17.2 mg total) by mouth daily at bedtime as needed for constipation, Starting u 9/5/2024, No Print           CONTINUE these medications which have CHANGED    Details   busPIRone (BUSPAR) 5 mg tablet Take 1 tablet (5 mg total) by mouth 2 (two) times a day, Starting u 9/5/2024, No Print           CONTINUE these medications which have NOT CHANGED    Details   aspirin 81 mg chewable tablet Chew 81 mg daily, Historical Med      Calcium Carb-Cholecalciferol 1000-800 MG-UNIT TABS Take 1 tablet by mouth in the morning, Historical Med      divalproex sodium (DEPAKOTE) 500 mg DR tablet Take 500 mg by mouth daily at bedtime, Historical Med      Magnesium Oxide (MAG- PO) Take by mouth daily, Historical Med      OLANZapine (ZyPREXA) 2.5 mg tablet Take 2.5 mg by mouth daily, Historical Med      pantoprazole (PROTONIX) 40 mg tablet Take 40 mg by mouth daily, Historical Med       potassium chloride (KLOR-CON) 20 mEq packet Take 10 mEq by mouth daily, Historical Med      acetaminophen (TYLENOL) 325 mg tablet Take 2 tablets (650 mg total) by mouth every 6 (six) hours as needed for mild pain, headaches or fever, Starting Wed 3/27/2024, No Print      divalproex sodium (DEPAKOTE ER) 250 mg 24 hr tablet Take 250 mg by mouth daily, Historical Med      ipratropium-albuterol (DUO-NEB) 0.5-2.5 mg/3 mL nebulizer solution Take 3 mL by nebulization every 8 (eight) hours as needed for wheezing or shortness of breath, Historical Med           STOP taking these medications       guaiFENesin (MUCINEX) 600 mg 12 hr tablet Comments:   Reason for Stopping:         ipratropium (ATROVENT) 0.03 % nasal spray Comments:   Reason for Stopping:             Outpatient Discharge Orders   SILVA Pathway:  Medications to avoid: phosphate or magnesium based laxatives, NSAIDs     SILVA Pathway: Maintain SBP between 120-140 mm/Hg     SILVA Pathway: Call Nursing Home MD for decreased oral intake     SILVA Pathway: Daily Weights     SILVA Pathway: Strict I&O     SILVA Pathway: Follow up bloodwork     Discharge Condtion:  Stabilized     Free of Communicable Disease:   Yes       PDMP Review         Value Time User    PDMP Reviewed  Yes 4/25/2024  9:01 PM LANI Berrios             ED Provider  Attending physically available and evaluated Chelsey Carson. I managed the patient along with the ED Attending.    Electronically Signed by           Xiao Vicente MD  09/07/24 5270

## 2024-09-02 NOTE — ASSESSMENT & PLAN NOTE
"Per nursing staff at [facility], pt has been more confused and weak the last 3 days, eating with her hands, saying \"nonsensical\" things, so she was brought to the ED despite pt stating she has no complaints and does not want to go to the hospital  [facility] in ED with \"mild chronic small vessel ischemic changes\" but no other intracranial abnormalities  Pt does have a history of mild cognitive impairment as well as a history of episodes of sundowning and getting aggressive with staff, on chart review she was placed on Depakote, Zyprexa and Buspirone for mood stabilization  CXR and UA unremarkable, pt afebrile, normotensive, WBC WNL without any complaints of urinary F/U/D or cough/SOB/CP; ammonia WNL; glucose is normal   Consider progression and worsening of cognitive impairment - consult OT for cognitive screening   Consider geriatrics consult for further recommendations   Continue to monitor fever curve and hemodynamics while off abx   "

## 2024-09-02 NOTE — PROGRESS NOTES
"[facility]  Post Admit Follow Up Note  Name: Chelsey Carson I  MRN: [id number]  Unit/Bed#: PPHP 933-01 I Date of Admission: 9/1/2024   Date of Service: 9/2/2024  Hospital Day: 1    Assessment & Plan   * Mild cognitive impairment of uncertain or unknown etiology  Assessment & Plan  Per nursing staff at [facility], pt has been more confused and weak the last 3 days, eating with her hands, saying \"nonsensical\" things, so she was brought to the ED despite pt stating she has no complaints and does not want to go to the hospital  [facility] in ED with \"mild chronic small vessel ischemic changes\" but no other intracranial abnormalities  Pt does have a history of mild cognitive impairment as well as a history of episodes of sundowning and getting aggressive with staff, on chart review she was placed on Depakote, Zyprexa and Buspirone for mood stabilization  CXR and UA unremarkable, pt afebrile, normotensive, WBC WNL without any complaints of urinary F/U/D or cough/SOB/CP; ammonia WNL; glucose is normal   Consider progression and worsening of cognitive impairment - consult OT for cognitive screening   Geriatrics consulted  Continue to monitor fever curve and hemodynamics while off abx     Benign essential HTN  Assessment & Plan  Appears pt was previously taking propranolol but this has since been dc'd   HR appears controlled at this time    GERD (gastroesophageal reflux disease)  Assessment & Plan  Continue protonix 40mg daily             VTE Pharmacologic Prophylaxis: VTE Score: 4 Moderate Risk (Score 3-4) - Pharmacological DVT Prophylaxis Ordered: enoxaparin (Lovenox).    Mobility:   JH-HLM Achieved: 2: Bed activities/Dependent transfer  JH-HLM Goal achieved. Continue to encourage appropriate mobility.    Patient Centered Rounds: I performed bedside rounds with nursing staff today.   Discussions with Specialists or Other Care Team Provider: STEFFI    Education and Discussions with " Family / Patient: Updated  (son) via phone.  Updated son Julio over the phone.  He reports he was visiting with his mother earlier and she seemed at her baseline.  He was skeptical that the facility wanted to send her to the hospital in the first place.  He reports she has been eating primarily with her hands for weeks to months now.  He says she does better with finger foods.    Total Time Spent on Date of Encounter in care of patient:  mins. This time was spent on one or more of the following: performing physical exam; counseling and coordination of care; obtaining or reviewing history; documenting in the medical record; reviewing/ordering tests, medications or procedures; communicating with other healthcare professionals and discussing with patient's family/caregivers.    Current Length of Stay: 1 day(s)  Current Patient Status: Inpatient   Certification Statement: The patient will continue to require additional inpatient hospital stay due to geriatrics consult, PT OT, urine culture pending  Discharge Plan: Anticipate discharge in 24-48 hrs to discharge location to be determined pending rehab evaluations.    Code Status: Level 3 - DNAR and DNI    Subjective:   Patient is an asking about eating food.  She is adamant that she would like to leave the hospital and go home.  She is not redirectable.    Objective:     Vitals:   Temp (24hrs), Av.5 °F (36.9 °C), Min:97.9 °F (36.6 °C), Max:99.1 °F (37.3 °C)    Temp:  [97.9 °F (36.6 °C)-99.1 °F (37.3 °C)] 97.9 °F (36.6 °C)  HR:  [63-87] 70  Resp:  [14-22] 17  BP: ()/(49-76) 98/49  SpO2:  [92 %-100 %] 95 %  There is no height or weight on file to calculate BMI.     Input and Output Summary (last 24 hours):     Intake/Output Summary (Last 24 hours) at 2024 1656  Last data filed at 2024 1456  Gross per 24 hour   Intake --   Output 450 ml   Net -450 ml       Physical Exam:   Physical Exam  Vitals and nursing note reviewed.   Constitutional:        General: She is not in acute distress.  HENT:      Head: Normocephalic.      Nose: Nose normal.      Mouth/Throat:      Mouth: Mucous membranes are moist.      Pharynx: Oropharynx is clear.   Pulmonary:      Effort: Pulmonary effort is normal.   Musculoskeletal:      Right lower leg: No edema.      Left lower leg: No edema.   Skin:     General: Skin is warm and dry.   Neurological:      Mental Status: She is alert. She is disoriented.   Psychiatric:         Behavior: Behavior is agitated.          Additional Data:     Labs:  Results from last 7 days   Lab Units 09/02/24  0415   WBC Thousand/uL 7.64   HEMOGLOBIN g/dL 11.1*   HEMATOCRIT % 34.6*   PLATELETS Thousands/uL 261   SEGS PCT % 51   LYMPHO PCT % 37   MONO PCT % 10   EOS PCT % 2     Results from last 7 days   Lab Units 09/02/24  0415   SODIUM mmol/L 139   POTASSIUM mmol/L 4.4   CHLORIDE mmol/L 102   CO2 mmol/L 30   BUN mg/dL 28*   CREATININE mg/dL 0.85   ANION GAP mmol/L 7   CALCIUM mg/dL 8.6   ALBUMIN g/dL 3.2*   TOTAL BILIRUBIN mg/dL 0.34   ALK PHOS U/L 63   ALT U/L 9   AST U/L 14   GLUCOSE RANDOM mg/dL 94     Results from last 7 days   Lab Units 09/02/24  0415   INR  1.07                   Lines/Drains:  Invasive Devices       Peripheral Intravenous Line  Duration             Peripheral IV 09/01/24 Proximal;Right;Ventral (anterior) Forearm <1 day                          Imaging: Reviewed radiology reports from this admission including: chest xray and CT head    Recent Cultures (last 7 days):         Last 24 Hours Medication List:   Current Facility-Administered Medications   Medication Dose Route Frequency Provider Last Rate    acetaminophen  650 mg Oral Q6H PRN Campos Barrera DO      aspirin  81 mg Oral Daily Campos Barrera DO      busPIRone  5 mg Oral BID Nani Block PA-C      cefTRIAXone  1,000 mg Intravenous Q24H Campos Barrera DO Stopped (09/02/24 1008)    divalproex sodium  250 mg Oral Daily Nani Block PA-C      divalproex sodium   500 mg Oral HS Nani Block PA-C      ipratropium-albuterol  3 mL Nebulization Q8H PRN Campos Barrera DO      OLANZapine  2.5 mg Oral Q6H PRN LANI Queen      OLANZapine  2.5 mg Oral Daily Nani Block PA-C      pantoprazole  40 mg Oral Daily Campos Barrera DO          Today, Patient Was Seen By: LANI Queen    **Please Note: This note may have been constructed using a voice recognition system.**

## 2024-09-02 NOTE — ASSESSMENT & PLAN NOTE
"Per nursing staff at [facility], pt has been more confused and weak the last 3 days, eating with her hands, saying \"nonsensical\" things, so she was brought to the ED despite pt stating she has no complaints and does not want to go to the [facility] in ED with \"mild chronic small vessel ischemic changes\" but no other intracranial abnormalities  Pt does have a history of mild cognitive impairment as well as a history of episodes of sundowning and getting aggressive with staff, on chart review she was placed on Depakote, Zyprexa and Buspirone for mood stabilization  CXR and UA unremarkable, pt afebrile, normotensive, WBC WNL without any complaints of urinary F/U/D or cough/SOB/CP; ammonia WNL; glucose is normal   Consider progression and worsening of cognitive impairment - consult OT for cognitive screening   Geriatrics consulted  Started on IV ceftriaxone every 24 hours for possible UTI by admitting physician, will order urine culture to verify  "

## 2024-09-02 NOTE — ASSESSMENT & PLAN NOTE
Patient: Romel Stevenson Date: 2020   : 1936    84 year old male      OUTPATIENT WOUND CARE PROGRESS NOTE    Supervising Wound Care / Hyperbaric Medicine Physician: Not Applicable  Consulting Provider:  Christopher Reyes MD  Date of Consultation/Last Comprehensive Exam:  20  Referring  Provider:  Sue Grissom CNP    SUBJECTIVE:    Chief Complaint:  Leg ulcers, buttock ulcer      Wound/Ulcer Present:    Venous leg ulcer:  Current Vascular Assessment:  Physical exam.  Current Venous Type:  Venous stasis    Has the patient received adequate compression therapy for equal to or greater than 4 weeks?  N/A    Pressure ulcer, other sites    Additional Wound Category:  None     Maximum Baseline Ambulatory Status:  Walks with walker    History of Present Illness:  This is a 84 year old male with history of T2D, venous stasis and ulcers, immobility, [place], prostate ca, frail elderly, mild cognitive impairment, PVD, anemia, COPD, CKD, HTN/HLD, who presented to wound clinic.    Patient was seen in  once for leg ulcers, but was lost to followup.    Patient stated he had ulcers for \"many years\" on bilateral legs.  However, despite venous stasis and this issue, generally had not worn any LE compression.  No known LE procedures or known blood clots.    He stated before consult he recently noted leg wounds, but they had been nearly without drainage, mostly dry eschar covering, using foam protection and socks only.    Gluteal pressure ulcer noted during  through 20 admission, seeking ongoing care for this as well.  No dressings had been applied he stated.  He had no offloading cushion or devices.  He stated he tried to ambulate with walker whenever he can.    Stated [facility] [place] [facility]    Interval history (2020):     Patient is seen today for outpatient wound care followup.  He states he has home nursing coming in to help him. He states he did obtain gel pad  Continue protonix 40mg daily   chair cushion and has been using this in his recliner.  He states he generally has been wearing his low tetragrips without issue, however occasionally has severe pain necessitating their removal.   Denies fever, chills, chest pain, shortness of breath, cough, nausea, vomiting, diarrhea.      Current Treatment Regimen:  Dressing:        Bandaid to RLE wound, change daily.  Moisturizing lotion to bilateral feet/legs, then tetragrip low    Calazime cream BiD and PRN to closed buttock areas for protection, try to use ordered gel pad chair cushion whenever sitting.    Frequency:  Daily   Changed by:  Jennifer vs STAS DIEZ    Review of Systems:  Pertinent items are noted in HPI (history of present illness).    Past Medical History:   Diagnosis Date   • CAD (coronary artery disease)    • Chronic pain    • CKD (chronic kidney disease) stage 3, GFR 30-59 ml/min (CMS/[facility]) 4/10/2020   • COPD (chronic obstructive pulmonary disease) (CMS/[facility])    • Diabetes mellitus (CMS/[facility])    • Essential (primary) hypertension    • Fracture     right hand fifth finger   • History of agent Orange exposure from [place]   • History of kidney stones 4/10/2020   • Hydronephrosis of right kidney 4/10/2020    Stent in place; still with hydroureter.   • Iron deficiency anemia secondary to inadequate dietary iron intake 4/10/2020   • Malignant neoplasm (CMS/HCC)     prostate, skin    • Old myocardial infarction    • Osteoporosis    • Other and unspecified hyperlipidemia    • Personal history of prostate cancer 4/10/2020    Has been on Lupron in the past.   • Pneumonia    • Recurrent falls 4/10/2020   • Solitary right kidney 4/10/2020   • Unintended weight loss 4/10/2020   • Urinary tract infection    • UTI (urinary tract infection)      Past Surgical History:   Procedure Laterality Date   • Cardiac surgery N/A 2010    CABG   • Cysto transresect incise ejac ducts  5/9/2014   • Eye surgery     • Hernia repair     • Nephrectomy  in [place]   • Service to  urology      prostate seeding     Social History     Socioeconomic History   • Marital status: Single     Spouse name: Not on file   • Number of children: Not on file   • Years of education: Not on file   • Highest education level: Not on file   Occupational History   • Not on file   Social Needs   • Financial resource strain: Not on file   • Food insecurity     Worry: Not on file     Inability: Not on file   • Transportation needs     Medical: Not on file     Non-medical: Not on file   Tobacco Use   • Smoking status: Former Smoker     Years: 30.00     Types: Cigarettes   • Smokeless tobacco: Never Used   Substance and Sexual Activity   • Alcohol use: No   • Drug use: No   • Sexual activity: Not on file   Lifestyle   • Physical activity     Days per week: Not on file     Minutes per session: Not on file   • Stress: Not on file   Relationships   • Social connections     Talks on phone: Not on file     Gets together: Not on file     Attends Scientologist service: Not on file     Active member of club or organization: Not on file     Attends meetings of clubs or organizations: Not on file     Relationship status: Not on file   • Intimate partner violence     Fear of current or ex partner: Not on file     Emotionally abused: Not on file     Physically abused: Not on file     Forced sexual activity: Not on file   Other Topics Concern   • Not on file   Social History Narrative   • Not on file     Family History   Problem Relation Age of Onset   • Stroke Father        Current Outpatient Medications   Medication Sig   • DULoxetine HCl 60 MG Capsule Delayed Release Sprinkle Take 60 mg by mouth daily.   • acetaminophen (TYLENOL) 500 MG tablet Take 500 mg by mouth every 6 hours as needed for Pain.   • Podiatric Products (EUCERIN ADVANCED REPAIR) Cream Apply moderate amount to legs daily.   • folic acid (FOLATE) 1 MG tablet Take 1 tablet by mouth daily. Do not start before June 20, 2020.   • glipiZIDE (GLUCOTROL) 5 MG tablet Take 1  tablet by mouth 2 times daily (before meals).   • midodrine (PROAMATINE) 10 MG tablet Take 1 tablet by mouth 2 (two) times a day. Indications: Blood Pressure Drop Upon Standing At 7 am and 1 pm   • lidocaine (LIDOCARE) 4 % patch Place 1 patch onto the skin daily. Do not start before April 28, 2020.   • umeclidinium bromide (INCRUSE ELLIPTA) 62.5 MCG/INH inhaler Inhale 1 puff into the lungs daily.   • atorvastatin (LIPITOR) 80 MG tablet Take one half-tablet (40 mg) by mouth daily   • budesonide-formoterol (SYMBICORT) 160-4.5 MCG/ACT inhaler Inhale 2 puffs into the lungs daily.    • ferrous sulfate 325 (65 FE) MG tablet Take 325 mg by mouth daily.    • clopidogrel (PLAVIX) 75 MG tablet Take 75 mg by mouth daily.    • HYDROcodone-acetaminophen (NORCO) 5-325 MG per tablet Take 1-2 tablets by mouth every 4 hours as needed for Pain.   • nitroGLYcerin (NITROSTAT) 0.4 MG SL tablet Place 0.4 mg under the tongue every 5 minutes as needed.   • leuprolide (ELIGARD) 45 MG injection Inject 45 mg into the skin See Admin Instructions. Every 6 months   • Cholecalciferol (VITAMIN D3) 63630 UNITS CAPS Take 1 capsule by mouth once a week.   • camphor-menthol (SARNA) lotion Apply topically nightly. (legs)   • albuterol 108 (90 BASE) MCG/ACT inhaler Inhale 2 puffs into the lungs every 4 hours as needed.     No current facility-administered medications for this encounter.         ALLERGIES:  Aspirin    OBJECTIVE:  Vital Signs:    Visit Vitals  /59 (BP Location: LUE - Left upper extremity, Patient Position: Sitting)   Pulse 82   Temp 97.7 °F (36.5 °C) (Temporal)   Resp 14   SpO2 99%         Physical Exam:  General appearance: Alert, thin, in no distress and cooperative  Head:   normocephalic without obvious abnormality  Eye:  Conjunctivae/sclerae normal. No erythema, edema or exudate.  Mouth:   mucous membranes moist and patent  Pulmonary: normal respiratory effort  Cardiac: normal rate     BLE: pulses not palpable, but seemingly  warm and well perfused, varicosities present, mild hemosiderin staining, non-pitting edema noted    lateral BLE with scattered superficial wounds,  minimal if any drainage, no signs of infection, periwound intact apart from edea    L buttock: stage II ulcer noted today, periwound otherwise intact, not tender, no signs of infection            Wound Bed Quality:  Granulation tissue      Meli-wound Quality:    Edema and Intact    Additional Descriptors:  drainage    Wound Measurements Per Flowsheet:       Wound Leg Right Anterior Abrasion (Active)   Wound Care Team Consult Date 08/03/20 08/03/20 0947   Wound Length (cm) 5.8 cm 09/21/20 1421   Wound Width (cm) 2 cm 09/21/20 1421   Wound Depth (cm) 0.1 cm 09/21/20 1421   Wound Surface Area (cm^2) 11.6 cm^2 09/21/20 1421   Wound Volume (cm^3) 1.16 cm^3 09/21/20 1421   Number of days: 90       Wound Leg Right Lateral;Lower (Active)   Number of days: 66       Wound Leg Left Lateral;Lower (Active)   Wound Care Team Consult Date 08/03/20 08/03/20 0947   Wound Length (cm) 1 cm 09/21/20 1421   Wound Width (cm) 0.7 cm 09/21/20 1421   Wound Depth (cm) 0.1 cm 09/21/20 1421   Wound Surface Area (cm^2) 0.7 cm^2 09/21/20 1421   Wound Volume (cm^3) 0.07 cm^3 09/21/20 1421   Number of days: 66       Wound Coccyx Pressure Injury (Active)   Wound Care Team Consult Date 08/03/20 08/03/20 0947   Wound Length (cm) 0.4 cm 09/21/20 1421   Wound Width (cm) 1.2 cm 09/21/20 1421   Wound Depth (cm) 0.1 cm 09/21/20 1421   Wound Surface Area (cm^2) 0.48 cm^2 09/21/20 1421   Wound Volume (cm^3) 0.05 cm^3 09/21/20 1421   Wound Volume Change (Initial) 0.04 cm3 09/21/20 1421   Wound Volume % Change (Initial) 380 % 09/21/20 1421   Wound Volume Change (30 days) 0.01 cm3 08/31/20 1430   Wound Volume % Change (30 days) 60 % 08/31/20 1430   Number of days: 49         PROCEDURE:  None performed  Not indicated    Procedure was Performed by:  Not applicable    Laboratory assessments reviewed:  No results found  for: PAB   Albumin (g/dL)   Date Value   06/28/2020 1.7 (L)   06/27/2020 1.6 (L)   06/26/2020 1.6 (L)   05/10/2014 2.9 (L)   05/09/2014 3.1 (L)   06/06/2013 3.4      No results available in last 24 hours    Lab Results   Component Value Date    WBC 5.6 06/30/2020    GLUCOSE 107 (H) 06/30/2020    HGBA1C 6.6 (H) 06/24/2020    CRP 2.4 (H) 06/23/2020    RESR 3 04/20/2020    CREATININE 0.94 06/30/2020    GFRA 51 (L) 05/22/2014    GFRNA 42 (L) 05/22/2014        Culture results:  Specimen Description (no units)   Date Value   05/24/2015 ABSCESS STERNUM INCISION   05/22/2014 URINE, CLEAN CATCH/MIDSTREAM   05/09/2014 BLOOD ARM, RIGHT   05/09/2014 BLOOD ANTECUBITAL,RIGHT    CULTURE (no units)   Date Value   05/24/2015 RARE STAPHYLOCOCCUS HOMINIS (P)   05/24/2015 MANY PEPTOSTREPTOCOCCUS SPECIES (P)   05/24/2015     Organism recovery from swabs is suboptimal and may yield false negative results. Whenever possible, submit tissue or aspirates for cultures.   05/22/2014 <10,000 CFU/mL GRAM POSITIVE ABENA        Diagnostic Assessments Reviewed:  No new update    Nutritional Assessment:  Prealbumin and/or Albumin reviewed    Wound treatment goals are palliative:  No    DIAGNOSES:    Venous stasis ulcers, BLE  Stage 2 pressure injury right buttock    Medical Decision Making:     Local Wound Care  Soap/water, pat dry.  Xeroform/foam to BLE wounds, change 3x/week.  Moisturizing lotion to bilateral feet/legs, then tetragrip low, transition into 8-10 mmHg jobst when they arrive.    Calazime cream BiD and PRN to buttock areas, try to use ordered gel pad chair cushion whenever sitting.    [facility] for [facility] vs [facility] provided today    Tobacco Cessation  N/A, per patient report he is a former smoker    Offloading  Offloading was reviewed and discussed with patient today.     Patient needs to offload wound area as much as possible in order to allow the healing process to occur more efficiently.    Gel pad chair cushion  ordered 08/03/20, patient states he has received it and is using it.     Limit sitting to 1 hour three times a day with meals, change position in chair every 15 min, change position when lying down every one to two hours.    Diabetic Management  Last A1c was 6.6% on 06/24/20.  This is at goal  Recommend tight glucose control for better wound healing  Follow up with PCP for DM management and chronic disease management.  Goal for glucose is less than 150 at all times and a HgbA1c of less than 7.4%.    Edema  Started with tetragrip medium compression, but he states these were \"way too tight\" and he cut them off soon after they were put on.  Trying tetragrip low.    Gave him script 08/03/20 for 15-20mmHg compression stockings to be worn every day ([facility]), but these did not come.  Gave script 08/17/20 for 8-10 mmHg stockings as he did not tolerate tetragrip medium.    Elevate your extremities as much as possible.  Heel above hip when sitting and heel above heart when sleeping    If you have issues with your compression, remove immediately and call the office, come into the office, or go to the emergency room right away.  If you have tenderness, elevate your legs for 30 min, if no improvement, go to emergency room.     Nutrition  Low salt DM diet.  Consume protein daily in your diet.  Also try to drink a protein shake daily and take a multivitamin.  You must consume nutrition regularly three times a day to aid in wound healing.    Infectious Disease  There is not a concern for infection based on wound exam today      Patient advised to present immediately to the nearest emergency room if fever, redness, increasing pain, increased swelling or other significant changes in condition develops. Patient indicated understanding and agreement of instructions.    Imaging & Vascular  Venous insufficiency ultrasounds ordered by Dr. Wilkes in 2015, but were not completed.    JACKIE's ordered  today        Follow Up  3 weeks  Patient to follow up as scheduled for continued management and recommendations.    All questions solicited and answered.        Plan of Care:  Advanced Wound Care Recommendations:  As above  Percent Wound Closure from consult:  As above  Care plan to augment wound closure:  Wound closure greater than or equal to 30% at four weeks.       Patient stable.     Sara Ascencio MD

## 2024-09-02 NOTE — ASSESSMENT & PLAN NOTE
Appears pt was previously taking propranolol but this has since been dc'd   HR appears controlled at this time

## 2024-09-02 NOTE — ASSESSMENT & PLAN NOTE
Cannot find record that pt is still taking medication for this and it is not on the med list from the facility  Lipid profile from 3 years prior appears acceptable

## 2024-09-03 LAB
ANION GAP SERPL CALCULATED.3IONS-SCNC: 7 MMOL/L (ref 4–13)
BUN SERPL-MCNC: 21 MG/DL (ref 5–25)
CALCIUM SERPL-MCNC: 8.6 MG/DL (ref 8.4–10.2)
CHLORIDE SERPL-SCNC: 101 MMOL/L (ref 96–108)
CO2 SERPL-SCNC: 27 MMOL/L (ref 21–32)
CREAT SERPL-MCNC: 0.76 MG/DL (ref 0.6–1.3)
ERYTHROCYTE [DISTWIDTH] IN BLOOD BY AUTOMATED COUNT: 13.3 % (ref 11.6–15.1)
GFR SERPL CREATININE-BSD FRML MDRD: 70 ML/MIN/1.73SQ M
GLUCOSE SERPL-MCNC: 95 MG/DL (ref 65–140)
HCT VFR BLD AUTO: 34.4 % (ref 34.8–46.1)
HGB BLD-MCNC: 11.2 G/DL (ref 11.5–15.4)
MCH RBC QN AUTO: 34 PG (ref 26.8–34.3)
MCHC RBC AUTO-ENTMCNC: 32.6 G/DL (ref 31.4–37.4)
MCV RBC AUTO: 105 FL (ref 82–98)
PLATELET # BLD AUTO: 233 THOUSANDS/UL (ref 149–390)
PMV BLD AUTO: 9.4 FL (ref 8.9–12.7)
POTASSIUM SERPL-SCNC: 3.8 MMOL/L (ref 3.5–5.3)
RBC # BLD AUTO: 3.29 MILLION/UL (ref 3.81–5.12)
SODIUM SERPL-SCNC: 135 MMOL/L (ref 135–147)
WBC # BLD AUTO: 5.86 THOUSAND/UL (ref 4.31–10.16)

## 2024-09-03 PROCEDURE — 51702 INSERT TEMP BLADDER CATH: CPT | Performed by: PHYSICIAN ASSISTANT

## 2024-09-03 PROCEDURE — 80048 BASIC METABOLIC PNL TOTAL CA: CPT

## 2024-09-03 PROCEDURE — 97167 OT EVAL HIGH COMPLEX 60 MIN: CPT

## 2024-09-03 PROCEDURE — 85027 COMPLETE CBC AUTOMATED: CPT

## 2024-09-03 PROCEDURE — 99223 1ST HOSP IP/OBS HIGH 75: CPT | Performed by: NURSE PRACTITIONER

## 2024-09-03 PROCEDURE — 97163 PT EVAL HIGH COMPLEX 45 MIN: CPT

## 2024-09-03 PROCEDURE — 99232 SBSQ HOSP IP/OBS MODERATE 35: CPT | Performed by: STUDENT IN AN ORGANIZED HEALTH CARE EDUCATION/TRAINING PROGRAM

## 2024-09-03 RX ORDER — SENNOSIDES 8.6 MG
2 TABLET ORAL
Status: DISCONTINUED | OUTPATIENT
Start: 2024-09-03 | End: 2024-09-05 | Stop reason: HOSPADM

## 2024-09-03 RX ORDER — POLYETHYLENE GLYCOL 3350 17 G/17G
17 POWDER, FOR SOLUTION ORAL DAILY
Status: DISCONTINUED | OUTPATIENT
Start: 2024-09-03 | End: 2024-09-05 | Stop reason: HOSPADM

## 2024-09-03 RX ORDER — SODIUM CHLORIDE 9 MG/ML
100 INJECTION, SOLUTION INTRAVENOUS CONTINUOUS
Status: DISCONTINUED | OUTPATIENT
Start: 2024-09-03 | End: 2024-09-03

## 2024-09-03 RX ORDER — ALBUTEROL SULFATE 90 UG/1
2 AEROSOL, METERED RESPIRATORY (INHALATION) EVERY 4 HOURS PRN
Status: DISCONTINUED | OUTPATIENT
Start: 2024-09-03 | End: 2024-09-05 | Stop reason: HOSPADM

## 2024-09-03 RX ADMIN — PANTOPRAZOLE SODIUM 40 MG: 40 TABLET, DELAYED RELEASE ORAL at 09:24

## 2024-09-03 RX ADMIN — DIVALPROEX SODIUM 500 MG: 500 TABLET, DELAYED RELEASE ORAL at 21:33

## 2024-09-03 RX ADMIN — SODIUM CHLORIDE 100 ML/HR: 0.9 INJECTION, SOLUTION INTRAVENOUS at 15:37

## 2024-09-03 RX ADMIN — ASPIRIN 81 MG CHEWABLE TABLET 81 MG: 81 TABLET CHEWABLE at 09:24

## 2024-09-03 RX ADMIN — OLANZAPINE 2.5 MG: 2.5 TABLET, FILM COATED ORAL at 09:24

## 2024-09-03 RX ADMIN — ENOXAPARIN SODIUM 30 MG: 30 INJECTION SUBCUTANEOUS at 09:24

## 2024-09-03 RX ADMIN — BUSPIRONE HYDROCHLORIDE 5 MG: 5 TABLET ORAL at 17:16

## 2024-09-03 RX ADMIN — SENNOSIDES 17.2 MG: 8.6 TABLET, FILM COATED ORAL at 21:33

## 2024-09-03 RX ADMIN — BUSPIRONE HYDROCHLORIDE 5 MG: 5 TABLET ORAL at 09:24

## 2024-09-03 RX ADMIN — Medication 1000 MG: at 08:41

## 2024-09-03 RX ADMIN — DIVALPROEX SODIUM 250 MG: 250 TABLET, EXTENDED RELEASE ORAL at 09:24

## 2024-09-03 NOTE — ASSESSMENT & PLAN NOTE
Unclear when was last BM, patient with chronic constipation  Abdomen tender to palpation on exam  Start senna and MiraLAX

## 2024-09-03 NOTE — PROCEDURES
Universal Protocol:  Procedure performed by: (Venus RN and Julia RN)  Consent: Verbal consent obtained.  Risks and benefits: risks, benefits and alternatives were discussed  Consent given by: patient  Patient identity confirmed: verbally with patient and arm band    Bladder catheterization    Date/Time: 9/3/2024 12:46 AM    Performed by: Rose Sherwood PA-C  Authorized by: Rose Sherwood PA-C    Patient location:  Bedside  Consent:     Consent given by:  Patient  Universal protocol:     Patient identity confirmed:  Verbally with patient and arm band  Pre-procedure details:     Procedure purpose:  Therapeutic    Preparation: Patient was prepped and draped in usual sterile fashion    Anesthesia (see MAR for exact dosages):     Anesthesia method:  None  Procedure details:     Bladder irrigation: no      Catheter insertion:  Non-indwelling    Approach: natural orifice      Catheter type: Straight Cath.    Catheter size:  16 Fr    Number of attempts:  1    Successful placement: yes      Urine characteristics:  Clear and yellow (550mL)    Provider performed due to:  Nurse unable to complete (Assisted with Insertion, Lower Urethra)  Post-procedure details:     Patient tolerance of procedure:  Tolerated well, no immediate complications      Rose Sherwood

## 2024-09-03 NOTE — PROGRESS NOTES
Pt on urinary retention protocol and pt hadn't urinated since around 1700 225 ml. Pt is normally incontinent and this RN noticed pad was dry. This RN offered hydration and bedpan and pt could not urinate. Bladder scan at 2300 was >376. This RN attempted twice to straight cath as well as Tommy MIRELES RN tried twice. Julia HUTSON also at bedside. After 4 failed attempts due to difficult pt anatomy this RN reached out to Urology on call. Urology responded that no one is on site and deferred this to Surgical AP. Surgical AP came bedside and successfully straight cathed 350ml.

## 2024-09-03 NOTE — PROGRESS NOTES
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  Assessment & Plan  Noted  Supportive care    Slow transit constipation  Assessment & Plan  Unclear when was last BM, patient with chronic constipation  Abdomen tender to palpation on exam  Start senna and MiraLAX    GERD (gastroesophageal reflux disease)  Assessment & Plan  Continue protonix 40mg daily    * Mild cognitive impairment of uncertain or unknown etiology  Assessment & Plan  Per nursing staff at [facility], pt has been more confused and weak the last 3 days, eating with her hands, saying \"nonsensical\" things, so she was brought to the ED despite pt stating she has no complaints and does not want to go to the hospital  CTH in ED with \"mild chronic small vessel ischemic changes\" but no other intracranial abnormalities  Pt does have a history of mild cognitive impairment as well as a history of episodes of sundowning and getting aggressive with staff, on chart review she was placed on Depakote, Zyprexa and Buspirone for mood stabilization  CXR and UA unremarkable, pt afebrile, normotensive, WBC WNL without any complaints of urinary F/U/D or cough/SOB/CP; ammonia WNL  Consider progression and worsening of cognitive impairment - consult OT for cognitive screening pending  Geriatrics input reviewed  Urinary " retention protocol.  Delirium precautions  Started on IV ceftriaxone every 24 hours for possible UTI by admitting physician, will keep pending urine culture               VTE Pharmacologic Prophylaxis: VTE Score: 4 Moderate Risk (Score 3-4) - Pharmacological DVT Prophylaxis Ordered: enoxaparin (Lovenox).    Mobility:   Basic Mobility Inpatient Raw Score: 8  JH-HLM Goal: 3: Sit at edge of bed  JH-HLM Achieved: 3: Sit at edge of bed  JH-HLM Goal achieved. Continue to encourage appropriate mobility.    Patient Centered Rounds: I performed bedside rounds with nursing staff today.   Discussions with Specialists or Other Care Team Provider:     Education and Discussions with Family / Patient: Updated  (son) via phone.    Total Time Spent on Date of Encounter in care of patient: 35 mins. This time was spent on one or more of the following: performing physical exam; counseling and coordination of care; obtaining or reviewing history; documenting in the medical record; reviewing/ordering tests, medications or procedures; communicating with other healthcare professionals and discussing with patient's family/caregivers.    Current Length of Stay: 2 day(s)  Current Patient Status: Inpatient   Certification Statement: The patient will continue to require additional inpatient hospital stay due to dispo planning  Discharge Plan: Anticipate discharge tomorrow to prior assisted or independent living facility.    Code Status: Level 3 - DNAR and DNI    Subjective:   No events overnight.  Patient reports feeling tired this morning.  Has no pain    Objective:     Vitals:   No data recorded.    HR:  [70-93] 93  Resp:  [15-20] 15  BP: ()/(49-64) 118/49  SpO2:  [95 %-96 %] 96 %  There is no height or weight on file to calculate BMI.     Input and Output Summary (last 24 hours):     Intake/Output Summary (Last 24 hours) at 9/3/2024 1117  Last data filed at 9/3/2024 1300  Gross per 24 hour   Intake 258 ml    Output 575 ml   Net -317 ml       Physical Exam:   Physical Exam  Vitals and nursing note reviewed.   Constitutional:       General: She is not in acute distress.     Appearance: She is well-developed.   HENT:      Head: Normocephalic and atraumatic.   Eyes:      Conjunctiva/sclera: Conjunctivae normal.   Cardiovascular:      Rate and Rhythm: Normal rate and regular rhythm.   Pulmonary:      Effort: Pulmonary effort is normal. No respiratory distress.      Breath sounds: Normal breath sounds. No wheezing or rhonchi.   Abdominal:      General: Bowel sounds are normal. There is no distension.      Tenderness: There is abdominal tenderness.   Musculoskeletal:         General: No swelling.      Cervical back: Neck supple.   Skin:     General: Skin is warm and dry.   Neurological:      Mental Status: She is alert.      Comments: Alert and oriented to person        Additional Data:     Labs:  Results from last 7 days   Lab Units 09/03/24  0834 09/02/24  0415   WBC Thousand/uL 5.86 7.64   HEMOGLOBIN g/dL 11.2* 11.1*   HEMATOCRIT % 34.4* 34.6*   PLATELETS Thousands/uL 233 261   SEGS PCT %  --  51   LYMPHO PCT %  --  37   MONO PCT %  --  10   EOS PCT %  --  2     Results from last 7 days   Lab Units 09/03/24  0834 09/02/24  0415   SODIUM mmol/L 135 139   POTASSIUM mmol/L 3.8 4.4   CHLORIDE mmol/L 101 102   CO2 mmol/L 27 30   BUN mg/dL 21 28*   CREATININE mg/dL 0.76 0.85   ANION GAP mmol/L 7 7   CALCIUM mg/dL 8.6 8.6   ALBUMIN g/dL  --  3.2*   TOTAL BILIRUBIN mg/dL  --  0.34   ALK PHOS U/L  --  63   ALT U/L  --  9   AST U/L  --  14   GLUCOSE RANDOM mg/dL 95 94     Results from last 7 days   Lab Units 09/02/24  0415   INR  1.07                   Lines/Drains:  Invasive Devices       Peripheral Intravenous Line  Duration             Peripheral IV 09/01/24 Proximal;Right;Ventral (anterior) Forearm 1 day                          Imaging: No pertinent imaging reviewed.    Recent Cultures (last 7 days):         Last 24 Hours  Medication List:   Current Facility-Administered Medications   Medication Dose Route Frequency Provider Last Rate    acetaminophen  650 mg Oral Q6H PRN Campos Barrera DO      albuterol  2 puff Inhalation Q4H PRN Mya Bloom MD      aspirin  81 mg Oral Daily Campos Barrera DO      busPIRone  5 mg Oral BID Nani Block PA-C      cefTRIAXone  1,000 mg Intravenous Q24H Campos Barrera DO Stopped (09/03/24 0928)    divalproex sodium  250 mg Oral Daily Nani Block PA-C      divalproex sodium  500 mg Oral HS Nani Block PA-C      enoxaparin  30 mg Subcutaneous Daily LANI Queen      OLANZapine  2.5 mg Oral Q6H PRN LANI Queen      OLANZapine  2.5 mg Oral Daily Nani Block PA-C      pantoprazole  40 mg Oral Daily Campos Barrera DO      polyethylene glycol  17 g Oral Daily Mya Bloom MD      senna  2 tablet Oral HS Mya Bloom MD          Today, Patient Was Seen By: Mya Bloom MD    **Please Note: This note may have been constructed using a voice recognition system.**

## 2024-09-03 NOTE — SPEECH THERAPY NOTE
Order received and chart reviewed. Attempted to see pt for dysphagia tx x2 today, once in the morning and again this afternoon. Pt is lethargic and difficult to awaken, and declined all offers of PO. ST to follow up for assessment when pt is more alert, as able.

## 2024-09-03 NOTE — PLAN OF CARE
Problem: OCCUPATIONAL THERAPY ADULT  Goal: Performs self-care activities at highest level of function for planned discharge setting.  See evaluation for individualized goals.  Description: Treatment Interventions: ADL retraining, Functional transfer training, UE strengthening/ROM, Endurance training, Cognitive reorientation, Patient/family training, Equipment evaluation/education, Compensatory technique education, Continued evaluation, Energy conservation, Activityengagement          See flowsheet documentation for full assessment, interventions and recommendations.   Outcome: Progressing  Note: Limitation: Decreased ADL status, Decreased UE strength, Decreased UE ROM, Decreased Safe judgement during ADL, Decreased cognition, Decreased endurance, Decreased self-care trans, Decreased high-level ADLs  Prognosis: Good  Assessment: Pt is a 88 y.o. female seen for OT evaluation s/p admission to [facility] on 9/1/2024 with Mild cognitive impairment of uncertain or unknown etiology. 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 Pt with active OT evaluation/treatment and activity orders. Pt is a poor historian, information obtained from chart review. Per EMR, Pt was I w/ ADL/IADL and functional mobility at baseline. Pt agreeable and willing to participate in OT evaluation w/ time and approach. Pt was greeted and left supine in bed w/ alarm activated and all needs within reach.  During evaluation, pt Mod Ax1 UB ADLs, bed mobility; Mod Ax2 STS, SP; Max A LB ADLs. Pt required VC for safety and VC for attention to task. Pt currently presents with impairments in the following categories -difficulty performing ADLS, difficulty performing IADLS , and limited insight into deficits activity tolerance, endurance, standing balance/tolerance, sitting balance/tolerance, UE strength, UE ROM, memory, insight, safety , judgement , and attention . These impairments, as well as pt's fatigue, pain, and risk for falls  limit pt's ability to safely engage in all baseline areas of occupation, includingeating, grooming, bathing, dressing, toileting, functional mobility/transfers, and community mobility. The patient's raw score on the -PAC Daily Activity Inpatient Short Form is 13. A raw score of less than 19 suggests the patient may benefit from discharge to post-acute rehabilitation services. Please refer to the recommendation of the Occupational Therapist for safe discharge planning. Pt would benefit from continued acute OT services throughout hospital course in order to maximize Pt's independence and overall occupational performance. Plan for OT interventions 1-2x per week. From OT standpoint,  recommend Level II (Moderate Resource Intensity) upon d/c when pt medically stable to d/c from acute care. Will continue to follow.     Rehab Resource Intensity Level, OT: II (Moderate Resource Intensity)

## 2024-09-03 NOTE — OCCUPATIONAL THERAPY NOTE
Occupational Therapy Evaluation     Patient Name: Chelsey Carson  Today's Date: 9/3/2024  Problem List  Principal Problem:    Mild cognitive impairment of uncertain or unknown etiology  Active Problems:    GERD (gastroesophageal reflux disease)    Slow transit constipation    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    Benign essential HTN    Possible acute cystitis    Past Medical History  Past Medical History:   Diagnosis Date    Abnormal laboratory test 08/01/2022    Acute sinusitis 02/13/2013    Age related osteoporosis 11/01/2022    Formatting of this note might be different from the original.   Last Assessment & Plan:     Formatting of this note might be different from the original.    Risedronate  on allergy list but she does not recall what happened    Her kids told her not to get treatment but I recommend it again to her.     Given the possible allergy refer to rheum to discuss treatment options    Breast cancer (HCC)     Cancer (HCC)     Chronic cough 01/13/2017    COVID-19     History of 2019 novel coronavirus disease (COVID-19) 02/15/2021    Hyperlipidemia     Hyponatremia 02/01/2021    Influenza B 03/05/2024    Insomnia 02/17/2021    MCI (mild cognitive impairment) 02/15/2021    Nonrheumatic aortic (valve) stenosis 03/15/2024    Other specified forms of tremor 03/15/2024    Palpitations 03/15/2024    Pneumonia due to COVID-19 virus 02/01/2021    Slow transit constipation 03/15/2024    Stage 3a chronic kidney disease (HCC) 09/27/2023    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 Pain  "Intervention(s) Repositioned;Ambulation/increased activity   Pain Rating: FLACC (Rest) - Face 0   Pain Rating: FLACC (Rest) - Legs 0   Pain Rating: FLACC (Rest) - Activity 0   Pain Rating: FLACC (Rest) - Cry 0   Pain Rating: FLACC (Rest) - Consolability 0   Score: FLACC (Rest) 0   Pain Rating: FLACC (Activity) - Face 0   Pain Rating: FLACC (Activity) - Legs 0   Pain Rating: FLACC (Activity) - Activity 0   Pain Rating: FLACC (Activity) - Cry 0   Pain Rating: FLACC (Activity) - Consolability 0   Score: FLACC (Activity) 0   Restrictions/Precautions   Weight Bearing Precautions Per Order No   Other Precautions Cognitive;Chair Alarm;Bed Alarm;Multiple lines;Fall Risk;Pain   Home Living   Type of Home Assisted living  ([facility] personal care [place])   Home Layout One level;Performs ADLs on one level;Able to live on main level with bedroom/bathroom;Stairs to enter with rails  (1STE)   Bathroom Shower/Tub Walk-in shower   Home Equipment Walker;Wheelchair-manual   Additional Comments Pt is a poor historian, information obtained from chart review. Will continue to follow up w/ CM and family as appropriate.   Prior Function   Level of [place] Needs assistance with ADLs;Needs assistance with IADLS   Lives With Facility staff   Receives Help From Personal care attendant   IADLs Family/Friend/Other provides transportation;Family/Friend/Other provides meals;Family/Friend/Other provides medication management   Falls in the last 6 months 1 to 4  (2 per EMR)   Vocational Retired   Comments Per EMR, Pt requires assist w/ ADL, IADL.   Lifestyle   Autonomy PTA, Pt requires assist w/ ADL and IADL   Reciprocal Relationships Facility staff   Service to Others Retired   General   Family/Caregiver Present No   Subjective   Subjective \"No, I'm sick of the questions.\"   ADL   Where Assessed Edge of bed   Eating Assistance 5  Supervision/Setup   Grooming Assistance 3  Moderate Assistance   UB Bathing Assistance 3  Moderate Assistance   LB " Bathing Assistance 2  Maximal Assistance   UB Dressing Assistance 3  Moderate Assistance   LB Dressing Assistance 2  Maximal Assistance   Toileting Assistance  2  Maximal Assistance   Functional Assistance 3  Moderate Assistance   Bed Mobility   Rolling R 3  Moderate assistance   Additional items Assist x 1;Bedrails;Increased time required   Rolling L 3  Moderate assistance   Additional items Assist x 1;Increased time required;Bedrails   Supine to Sit 3  Moderate assistance   Additional items Assist x 1;Increased time required;Verbal cues   Sit to Supine 3  Moderate assistance   Additional items Assist x 1;Increased time required;Verbal cues   Additional Comments Pt greeted and left supine in bed w/ alarm on and all needs within reach   Transfers   Sit to Stand 3  Moderate assistance   Additional items Assist x 2;Increased time required   Stand to Sit 3  Moderate assistance   Additional items Assist x 2;Increased time required;Verbal cues   Stand pivot 3  Moderate assistance   Additional items Assist x 2;Increased time required   Additional Comments w/ b/l HHA   Functional Mobility   Additional Comments Deferred functional mobility at this time due to increased fatigue and agitation.   Balance   Static Sitting Fair -   Dynamic Sitting Poor +   Static Standing Poor   Dynamic Standing Poor -   Ambulatory Zero   Activity Tolerance   Activity Tolerance Patient limited by fatigue  (cognition)   Medical Staff Made Aware Co-eval w/ PT due to medical complexity and multiple comorbidities, PT Stephenie.   Nurse Made Aware RN cleared   RUE Assessment   RUE Assessment WFL   LUE Assessment   LUE Assessment WFL   Hand Function   Gross Motor Coordination Functional   Fine Motor Coordination Functional   Sensation   Light Touch Not tested   Cognition   Overall Cognitive Status Impaired   Arousal/Participation Alert;Responsive   Attention Attends with cues to redirect   Orientation Level Oriented to person   Memory Decreased recall of  precautions;Decreased recall of biographical information;Decreased recall of recent events   Following Commands Follows one step commands inconsistently   Comments Pt is a poor historian and irritated/uncooperative upon therapist arrival. Warms slightly w/ increased time and encouragement. Refuses to answer orientation/home set up questions.   Assessment   Limitation Decreased ADL status;Decreased UE strength;Decreased UE ROM;Decreased Safe judgement during ADL;Decreased cognition;Decreased endurance;Decreased self-care trans;Decreased high-level ADLs   Prognosis Good   Assessment Pt is a 88 y.o. female seen for OT evaluation s/p admission to [facility] on 9/1/2024 with Mild cognitive impairment of uncertain or unknown etiology. 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 Pt with active OT evaluation/treatment and activity orders. Pt is a poor historian, information obtained from chart review. Per EMR, Pt was I w/ ADL/IADL and functional mobility at baseline. Pt agreeable and willing to participate in OT evaluation w/ time and approach. Pt was greeted and left supine in bed w/ alarm activated and all needs within reach. During evaluation, pt Mod Ax1 UB ADLs, bed mobility; Mod Ax2 STS, SP; Max A LB ADLs. Pt required VC for safety and VC for attention to task. Pt currently presents with impairments in the following categories -difficulty performing ADLS, difficulty performing  IADLS , and limited insight into deficits activity tolerance, endurance, standing balance/tolerance, sitting balance/tolerance, UE strength, UE ROM, memory, insight, safety , judgement , and attention . These impairments, as well as pt's fatigue, pain, and risk for falls  limit pt's ability to safely engage in all baseline areas of occupation, includingeating, grooming, bathing, dressing, toileting, functional mobility/transfers, and community mobility. The patient's raw score on the -PAC Daily Activity Inpatient Short Form is 13. A raw score of less than 19 suggests the patient may benefit from discharge to post-acute rehabilitation services. Please refer to the recommendation of the Occupational Therapist for safe discharge planning. Pt would benefit from continued acute OT services throughout hospital course in order to maximize Pt's independence and overall occupational performance. Plan for OT interventions 1-2x per week. From OT standpoint,  recommend Level II (Moderate Resource Intensity) upon d/c when pt medically stable to d/c from acute care. Will continue to follow.   Goals   Patient Goals to go home   LTG Time Frame 10-14   Long Term Goal See goals below   Plan   Treatment Interventions ADL retraining;Functional transfer training;UE strengthening/ROM;Endurance training;Cognitive reorientation;Patient/family training;Equipment evaluation/education;Compensatory technique education;Continued evaluation;Energy conservation;Activityengagement   Goal Expiration Date 09/17/24   OT Treatment Day 0   OT Frequency 1-2x/wk   Discharge Recommendation   Rehab Resource Intensity Level, OT II (Moderate Resource Intensity)   AM-PAC Daily Activity Inpatient   Lower Body Dressing 2   Bathing 2   Toileting 2   Upper Body Dressing 2   Grooming 2   Eating 3   Daily Activity Raw Score 13   Daily Activity Standardized Score (Calc for Raw Score >=11) 32.03   AM-[facility] Applied Cognition Inpatient   Following a Speech/Presentation  3   Understanding Ordinary Conversation 4   Taking Medications 3   Remembering Where Things Are Placed or Put Away 3   Remembering List of 4-5 Errands 2   Taking Care of Complicated Tasks 2   Applied Cognition Raw Score 17   Applied Cognition Standardized Score 36.52   End of Consult   Education Provided Yes   Patient Position at End of Consult Supine;Bed/Chair alarm activated;All needs within reach   Nurse Communication Nurse aware of consult     Goals:      - Pt will complete UB ADLs w/ S to maximize independence and return home.    - Pt will complete LB ADLs w/ S to maximize independence and return home.     - Pt will complete toileting routine (transfers, hygiene, and clothing management) w/ Min Ax1 to maximize independence and return to prior level of function.    - Pt will complete bed mobility supine >< sit w/ Min Ax1 to maximize independence and return home.    - Pt will transfer to bed, chair, and toilet w/ Min Ax1 using AD / DME as needed to maximize independence and reduce burden of care.     - Pt will ambulate household distances w/ Min Ax1 using least restrictive device to maximize independence and return home.     - Pt will increase activity tolerance (and sitting tolerance) by eating all meals OOB in the chair.     - Pt will increase standing tolerance to 10 minutes to maximize independence w/ grooming tasks standing at the sink.     - Pt will tolerate therapeutic activities for greater than 30 minutes in order to increase tolerance for functional activities.     - Pt will participate in ongoing OT evaluation of cognitive skills to assist with safe d/c planning/recommendations.        MARTIN Carpio, OTR/L

## 2024-09-03 NOTE — RESPIRATORY THERAPY NOTE
RT Protocol Note  Chelsey Carson 88 y.o. female MRN: [id number]  [facility] Encounter: [id number]    Assessment    Principal Problem:    Mild cognitive impairment of uncertain or unknown etiology  Active Problems:    GERD (gastroesophageal reflux disease)    Benign essential HTN      Home Pulmonary Medications:  None       Past Medical History:   Diagnosis Date    Abnormal laboratory test 08/01/2022    Acute sinusitis 02/13/2013    Age related osteoporosis 11/01/2022    Formatting of this note might be different from the original.   Last Assessment & Plan:     Formatting of this note might be different from the original.    Risedronate  on allergy list but she does not recall what happened    Her kids told her not to get treatment but I recommend it again to her.     Given the possible allergy refer to rheum to discuss treatment options    Breast cancer (HCC)     Cancer (HCC)     Chronic cough 01/13/2017    COVID-19     History of 2019 novel coronavirus disease (COVID-19) 02/15/2021    Hyperlipidemia     Hyponatremia 02/01/2021    Influenza B 03/05/2024    Insomnia 02/17/2021    MCI (mild cognitive impairment) 02/15/2021    Nonrheumatic aortic (valve) stenosis 03/15/2024    Other specified forms of tremor 03/15/2024    Palpitations 03/15/2024    Pneumonia due to COVID-19 virus 02/01/2021    Slow transit constipation 03/15/2024    Stage 3a chronic kidney disease (HCC) 09/27/2023    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    Thrombocytosis, unspecified 03/15/2024     Social History     Socioeconomic History    Marital status:      Spouse name: None    Number of children: None    Years of education: None    Highest education level: None   Occupational History    None   Tobacco Use    Smoking status: Never    Smokeless tobacco: Never   Vaping Use    Vaping status: Never Used   Substance and Sexual Activity    Alcohol use: Not Currently     Alcohol/week: 1.0  standard drink of alcohol     Types: 1 Glasses of wine per week    Drug use: No    Sexual activity: Not Currently   Other Topics Concern    None   Social History Narrative    None     Social Determinants of Health     Financial Resource Strain: Low Risk  (5/23/2024)    Received from [facility]    Overall Financial Resource Strain (CARDIA)     Difficulty of Paying Living Expenses: Not very hard   Food Insecurity: No Food Insecurity (6/16/2024)    Hunger Vital Sign     Worried About Running Out of Food in the Last Year: Never true     Ran Out of Food in the Last Year: Never true   Transportation Needs: No Transportation Needs (6/16/2024)    PRAPARE - Transportation     Lack of Transportation (Medical): No     Lack of Transportation (Non-Medical): No   Physical Activity: Not on file   Stress: Not on file   Social Connections: Not on file   Intimate Partner Violence: Not At Risk (5/23/2024)    Received from [facility]    Humiliation, Afraid, Rape, and Kick questionnaire     Fear of Current or Ex-Partner: No     Emotionally Abused: No     Physically Abused: No     Sexually Abused: No   Housing Stability: Low Risk  (6/16/2024)    Housing Stability Vital Sign     Unable to Pay for Housing in the Last Year: No     Number of Times Moved in the Last Year: 1     Homeless in the Last Year: No       Subjective         Objective    Physical Exam:   Assessment Type: Assess only  General Appearance: Drowsy  Respiratory Pattern: Shallow  Chest Assessment: Chest expansion symmetrical  Bilateral Breath Sounds: Diminished  Location Specific: No  Cough: None  O2 Device: room air    Vitals:  Blood pressure (!) 118/49, pulse 93, temperature 97.9 °F (36.6 °C), resp. rate 15, SpO2 96%.          Imaging and other studies: I have personally reviewed pertinent reports.  Clear lungs. No pneumothorax or pleural effusion. Clips in the right chest wall.     O2 Device: room air     Plan    Respiratory Plan:  "Discontinue Protocol        Resp Comments: 89 y/o female per the ED report from yesterday, \"has been more confused and weak the last 3 days, eating with her hands, saying \"nonsensical\" things, so she was brought to the ED despite pt stating she has no complaints and does not want to go to the hospital.\"  Had a bladder catheterization done at the bedside in this early morning hours by surgery team.  Resp system assessed by admitting MD: \"Negative for cough, chest tightness, shortness of breath, wheezing and stridor.\"  CXR: Clear lungs. No pneumothorax or pleural effusion. Clips in the right chest wall.  No hx of any tobacco usage.  No prior COPD dx; no pulm meds at home.  Doing very well currently on room air.  Will d/c RCP at this time.   "

## 2024-09-03 NOTE — ASSESSMENT & PLAN NOTE
Patient started on IV ceftriaxone on admission for possible UTI  UA reviewed, urine culture pending  Patient with some suprapubic tenderness on exam  Continue ceftriaxone, last day 9/4

## 2024-09-03 NOTE — CONSULTS
Consultation - Geriatrics   Chelsey Carson 88 y.o. female MRN: [id number]  Unit/Bed#: [facility] 933-01 Encounter: [id number]      Assessment/Plan  Encephalopathy/delirium  Hx of prior encephalopathy with UTI  Review of medications, no changes noted  Urine culture pending  Ammonia level 34 (9/1/24)  Cannot exclude progression of underlying dementia  Baseline: alert and oriented x 2  Provide frequent redirection, reorientation, distraction techniques  Avoid deliriogenic medications such as tramadol, benzodiazepines, anticholinergics,  Benadryl  Treat pain, See geriatric pain protocol  Monitor for constipation and urinary retention  Encourage early and frequent moblization, OOB  Encourage Hydration/ Nutrition  Implement sleep hygiene, limit night time interuptions, group activities     Ambulatory dysfunction  At risk for falls secondary to age, hx of fall, gait instability, polypharmacy, visual impairment  Fall precautions  PT/OT  Increased physical exercise, recommend balance and strengthening exercises  Rehab post hospitalization     UTI  Urine culture pending  Started on IV ceftriaxone  Encourage po fluid intake  Maintain good hygiene practices     Anxiety/psychosis  Seen by psych at rehab in May  Started on depakote/buspar/zyprexa  Continue medications  Valproic acid total level 91 (9/1/24)     Frailty  Clinical Frail Scale: 6- Moderately Frail  Albumin 3.2, recommend protein supplementation  PT/OT  Continue supportive care     Mild cognitive impairment  Likely progressing dementia  Periods of agitation and confusion at baseline  Underlying cognitive impairment can worsen the setting of delirium  TSH 3.910 (9/1/24)  Vitamin B 12 level 481 (5/17/24)  CT head (9/1/24) chronic microangiopathic changes  Keep physically, mentally and socially active     Insomnia  Related to hospitalization  First line is behavioral therapy  Avoid sedative hypnotics such as benzodiazepines and benadryl  Encourage staying awake during the  day  Encourage daytime activity, morning exercise  Decrease or eliminate day time naps  Avoid caffeine especially during late afternoon and evening hours  Establish a night time routine     Home medication review  Medication list from [facility] Physician orders  Asa 81 mg po daily  Buspar 5 mg po BID  Calcium 500 mg po BID  Divalproex 250 mg po daily  Divalproex 500 mg po QHS  Ipratropium 0.03% nasal spray 1 spray each nostril daily  Metoprolol 25 mg po q12  Mucus relief 600 mg po Q12  Olanzapine 2.5 mg po Q am  Pantoprazole 40 mg po daily     PRN  APAP 500 mg po Q 6  Iprat-albuterol TID             History of Present Illness   Physician Requesting Consult: Mya Bloom MD  Reason for Consult / Principal Problem: weakness  Hx and PE limited by: NA  HPI: Chelsey Carson is a 88 y.o. year old female who presents with weakness and altered mental status. Per staff she has been weaker, not eating as much. Progressing to not making sense when she speaks.   Review of medications, no recent changes      Prior to arrival she lives at the [facility] at [place]. She needs assistance with IADLs and ADLs. She ambulates with a roller walker.     She is lying in bed, sleeping, awakens briefly to name. Per nursing she did not sleep well last night.     Inpatient consult to Gerontology  Consult performed by: LANI Loyola  Consult ordered by: Campos Barrera DO          Additional history obtained from: Chart review and patient evaluation.   Review of Systems   Reason unable to perform ROS: altered mental status.       Historical Information   Past Medical History:   Diagnosis Date    Abnormal laboratory test 08/01/2022    Acute sinusitis 02/13/2013    Age related osteoporosis 11/01/2022    Formatting of this note might be different from the original.   Last Assessment & Plan:     Formatting of this note might be different from the original.    Risedronate  on allergy list but she does not recall what happened    Her kids  told her not to get treatment but I recommend it again to her.     Given the possible allergy refer to rheum to discuss treatment options    Breast cancer (HCC)     Cancer (HCC)     Chronic cough 01/13/2017    COVID-19     History of 2019 novel coronavirus disease (COVID-19) 02/15/2021    Hyperlipidemia     Hyponatremia 02/01/2021    Influenza B 03/05/2024    Insomnia 02/17/2021    MCI (mild cognitive impairment) 02/15/2021    Nonrheumatic aortic (valve) stenosis 03/15/2024    Other specified forms of tremor 03/15/2024    Palpitations 03/15/2024    Pneumonia due to COVID-19 virus 02/01/2021    Slow transit constipation 03/15/2024    Stage 3a chronic kidney disease (HCC) 09/27/2023    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    Thrombocytosis, unspecified 03/15/2024     Past Surgical History:   Procedure Laterality Date    BREAST SURGERY      cancer (> 5 years)    HYSTERECTOMY       Social History   Social History     Substance and Sexual Activity   Alcohol Use Not Currently    Alcohol/week: 1.0 standard drink of alcohol    Types: 1 Glasses of wine per week     Social History     Substance and Sexual Activity   Drug Use No     Social History     Tobacco Use   Smoking Status Never   Smokeless Tobacco Never         Family History:   Family History   Problem Relation Age of Onset    Breast cancer Mother     Heart disease Father         heart problem    Heart disease Sister         heart problem    Heart disease Brother         heart problem       Meds/Allergies   Current meds:   Current Facility-Administered Medications   Medication Dose Route Frequency    acetaminophen (TYLENOL) tablet 650 mg  650 mg Oral Q6H PRN    aspirin chewable tablet 81 mg  81 mg Oral Daily    busPIRone (BUSPAR) tablet 5 mg  5 mg Oral BID    ceftriaxone (ROCEPHIN) 1 g/50 mL in dextrose IVPB  1,000 mg Intravenous Q24H    divalproex sodium (DEPAKOTE ER) 24 hr tablet 250 mg  250 mg Oral Daily    divalproex sodium  (DEPAKOTE) DR tablet 500 mg  500 mg Oral HS    enoxaparin (LOVENOX) subcutaneous injection 30 mg  30 mg Subcutaneous Daily    ipratropium-albuterol (DUO-NEB) 0.5-2.5 mg/3 mL inhalation solution 3 mL  3 mL Nebulization Q8H PRN    OLANZapine (ZyPREXA ZYDIS) dispersible tablet 2.5 mg  2.5 mg Oral Q6H PRN    OLANZapine (ZyPREXA) tablet 2.5 mg  2.5 mg Oral Daily    pantoprazole (PROTONIX) EC tablet 40 mg  40 mg Oral Daily        Allergies   Allergen Reactions    Pravastatin     Risedronate     Paxlovid [Nirmatrelvir-Ritonavir] Nausea Only       Objective   Vitals: Blood pressure (!) 118/49, pulse 93, temperature 97.9 °F (36.6 °C), resp. rate 15, SpO2 96%.,There is no height or weight on file to calculate BMI.      Physical Exam  Vitals and nursing note reviewed.   Constitutional:       Appearance: She is ill-appearing.      Comments: Thin and frail   HENT:      Head: Normocephalic.      Nose: No congestion.      Mouth/Throat:      Mouth: Mucous membranes are moist.   Eyes:      General:         Right eye: No discharge.         Left eye: No discharge.   Cardiovascular:      Rate and Rhythm: Normal rate and regular rhythm.      Pulses: Normal pulses.   Pulmonary:      Effort: Pulmonary effort is normal.      Breath sounds: Normal breath sounds.   Abdominal:      General: Bowel sounds are normal.      Palpations: Abdomen is soft.   Musculoskeletal:         General: Normal range of motion.      Cervical back: Normal range of motion.   Skin:     General: Skin is warm and dry.   Neurological:      Mental Status: She is disoriented.   Psychiatric:         Mood and Affect: Mood normal.         Lab Results:   I have personally reviewed pertinent lab results including the following:    Lab Results:   Results from last 7 days   Lab Units 09/03/24  0834   WBC Thousand/uL 5.86   HEMOGLOBIN g/dL 11.2*   HEMATOCRIT % 34.4*   PLATELETS Thousands/uL 233        Results from last 7 days   Lab Units 09/03/24  0834 09/02/24  0415   POTASSIUM  mmol/L 3.8 4.4   CHLORIDE mmol/L 101 102   CO2 mmol/L 27 30   BUN mg/dL 21 28*   CREATININE mg/dL 0.76 0.85   CALCIUM mg/dL 8.6 8.6   ALK PHOS U/L  --  63   ALT U/L  --  9   AST U/L  --  14       Imaging Studies: I have personally reviewed pertinent films in PACS  CT head (9/1/24) chronic microangiopathic changes    Imaging Studies: I have personally reviewed pertinent films in PACS  EKG, Pathology, and Other Studies: I have personally reviewed pertinent films in PACS  VTE Prophylaxis: Sequential compression device (Venodyne)     Code Status: Level 3 - DNAR and DNI

## 2024-09-03 NOTE — PHYSICAL THERAPY NOTE
Physical Therapy Evaluation     Patient's Name: Chelsey Carson    Admitting Diagnosis  Delirium [R41.0]  Asthenia [R53.1]  Weakness [R53.1]  Mild cognitive impairment of uncertain or unknown etiology [G31.84]    Problem List  Patient Active Problem List   Diagnosis    Hyperlipidemia    Pes anserinus bursitis of right knee    Nonrheumatic aortic valve insufficiency    Perforation of left tympanic membrane    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HCC)    Breast cancer (HCC)    Dry mouth    Chronic maxillary sinusitis    Other fatigue    Venous insufficiency    Stage 3a chronic kidney disease (HCC)    GERD (gastroesophageal reflux disease)    Age related osteoporosis    Tremor    Hyponatremia    Constipation    Slow transit constipation    Primary insomnia    Nonintractable headache    Abnormal brain MRI    Tinnitus of both ears    Xerostomia    Mixed conductive and sensorineural hearing loss of left ear with restricted hearing of right ear    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    Ambulatory dysfunction    Proctitis    Macrocytic anemia    Thrombocytosis    Nausea    Urinary retention    Anemia    Mild cognitive impairment of uncertain or unknown etiology    Hypokalemia    Loss of appetite    Benign essential HTN    Anxiety disorder, unspecified    Aortic valve disorder    Dysuria    Abnormal CT of the chest    Pneumonitis    Closed nondisplaced intertrochanteric fracture of right femur (HCC)    Delirium due to another medical condition       Past Medical History  Past Medical History:   Diagnosis Date    Abnormal laboratory test 08/01/2022    Acute sinusitis 02/13/2013    Age related osteoporosis 11/01/2022    Formatting of this note might be different from the original.   Last Assessment & Plan:     Formatting of this note might be different from the original.    Risedronate  on allergy list but she does not recall what happened    Her kids told her not to get treatment but  I recommend it again to her.     Given the possible allergy refer to rheum to discuss treatment options    Breast cancer (HCC)     Cancer (HCC)     Chronic cough 01/13/2017    COVID-19     History of 2019 novel coronavirus disease (COVID-19) 02/15/2021    Hyperlipidemia     Hyponatremia 02/01/2021    Influenza B 03/05/2024    Insomnia 02/17/2021    MCI (mild cognitive impairment) 02/15/2021    Nonrheumatic aortic (valve) stenosis 03/15/2024    Other specified forms of tremor 03/15/2024    Palpitations 03/15/2024    Pneumonia due to COVID-19 virus 02/01/2021    Slow transit constipation 03/15/2024    Stage 3a chronic kidney disease (HCC) 09/27/2023    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    Thrombocytosis, unspecified 03/15/2024       Past Surgical History  Past Surgical History:   Procedure Laterality Date    BREAST SURGERY      cancer (> 5 years)    HYSTERECTOMY          09/03/24 0910   PT Last Visit   PT Visit Date 09/03/24   Note Type   Note type Evaluation   Pain Assessment   Pain Assessment Tool [facility] Pain Intervention(s) Repositioned;Ambulation/increased activity;Emotional support   Pain Rating: FLACC (Rest) - Face 0   Pain Rating: FLACC (Rest) - Legs 0   Pain Rating: FLACC (Rest) - Activity 0   Pain Rating: FLACC (Rest) - Cry 0   Pain Rating: FLACC (Rest) - Consolability 0   Score: FLACC (Rest) 0   Pain Rating: FLACC (Activity) - Face 0   Pain Rating: FLACC (Activity) - Legs 0   Pain Rating: FLACC (Activity) - Activity 0   Pain Rating: FLACC (Activity) - Cry 0   Pain Rating: FLACC (Activity) - Consolability 0   Score: FLACC (Activity) 0   Restrictions/Precautions   Weight Bearing Precautions Per Order No   Other Precautions Cognitive;Chair Alarm;Bed Alarm;Multiple lines;Fall Risk;Pain   Home Living   Type of Home Assisted living  ([facility] personal care home)   Home Layout One level;Performs ADLs on one level;Able to live on main level with bedroom/bathroom    Home Equipment Walker;Wheelchair-manual   Additional Comments pt is a poor historian, information obtained from chart review- will need to confirm with CM   Prior Function   Level of [place] Needs assistance with ADLs;Needs assistance with IADLS   Lives With Facility staff   Receives Help From Personal care attendant   IADLs Family/Friend/Other provides transportation;Family/Friend/Other provides meals;Family/Friend/Other provides medication management   Falls in the last 6 months 1 to 4  (2 per pt)   Vocational Retired   General   Family/Caregiver Present No   Cognition   Overall Cognitive Status Impaired   Arousal/Participation   (semi-cooperative with time)   Orientation Level Oriented to person   Following Commands Follows one step commands inconsistently   Comments pt initially irritable and uncopperative, warms slightly with approach + time. Irritated with questions   RLE Assessment   RLE Assessment   (functionally 3/5)   LLE Assessment   LLE Assessment   (functionally 3/5)   Bed Mobility   Rolling R 3  Moderate assistance   Additional items Assist x 1;Bedrails;Increased time required   Rolling L 3  Moderate assistance   Additional items Assist x 1;Bedrails   Supine to Sit 3  Moderate assistance   Additional items Assist x 1;Increased time required;Verbal cues   Sit to Supine 3  Moderate assistance   Additional items Assist x 1;Increased time required;Verbal cues   Additional Comments pt found/ left supine in bed with all needs within reach- alarm on post session   Transfers   Sit to Stand 3  Moderate assistance   Additional items Assist x 2;Increased time required   Stand to Sit 3  Moderate assistance   Additional items Assist x 2;Increased time required;Verbal cues   Stand pivot 3  Moderate assistance   Additional items Assist x 2;Increased time required   Additional Comments transfers with b/l HHA   Ambulation/Elevation   Gait pattern Not appropriate  (2* decreased standing tolerance/ cognition - PT  to continue to follow/assess)   Balance   Static Sitting Fair -   Dynamic Sitting Poor +   Static Standing Poor   Dynamic Standing Poor -   Ambulatory Zero   Endurance Deficit   Endurance Deficit Yes   Endurance Deficit Description generalized weakness, fatigue, deconditioning, cognition   Activity Tolerance   Activity Tolerance Patient limited by fatigue;Other (Comment)  (cognition)   Medical Staff Made Aware OT; co-session completed this date 2* increased medical complexity and multiple co-morbidities   Nurse Made Aware RN cleared   Assessment   Prognosis Guarded   Problem List Decreased strength;Impaired balance;Decreased endurance;Decreased mobility;Impaired judgement;Decreased safety awareness;Decreased cognition;Pain   Assessment Pt is a 88 y.o. female seen for PT evaluation s/p admit to [facility] on 9/1/2024. Pt was admitted with a primary dx of: mild cognitive impairment.  PT now consulted for assessment of mobility and d/c needs. Pt with Up and OOB as tolerated  orders.  Pts current comorbidities effecting treatment include: delirium, asthenia, weakness, HTN, GERD. Pt  has a past medical history of Abnormal laboratory test (08/01/2022), Acute sinusitis (02/13/2013), Age related osteoporosis (11/01/2022), Breast cancer ([facility]), Cancer ([facility]), Chronic cough (01/13/2017), COVID-19, History of 2019 novel coronavirus disease (COVID-19) (02/15/2021), Hyperlipidemia, Hyponatremia (02/01/2021), Influenza B (03/05/2024), Insomnia (02/17/2021), MCI (mild cognitive impairment) (02/15/2021), Nonrheumatic aortic (valve) stenosis (03/15/2024), Other specified forms of tremor (03/15/2024), Palpitations (03/15/2024), Pneumonia due to COVID-19 virus (02/01/2021), Slow transit constipation (03/15/2024), Stage 3a chronic kidney disease ([facility]) (09/27/2023), Syndrome of inappropriate secretion of antidiuretic hormone ([facility]) (03/15/2024), and Thrombocytosis, unspecified (03/15/2024). Pts current clinical presentation is  "Unstable/ Unpredictable (high complexity) due to Ongoing medical management for primary dx, Increased reliance on more restrictive AD compared to baseline, Decreased activity tolerance compared to baseline, Fall risk, Increased assistance needed from caregiver at current time, Cog status, Trending lab values, Continuous pulse oximetry monitoring . Prior to admission, pt was residing at [facility] and was requiring A for ADLs/ AIDL, using RW. Upon evaluation, pt currently is requiring mod A for bed mobility; mod Ax2 for transfers. Pt presents at PT eval functioning below baseline and currently w/ overall mobility deficits 2* to: BLE weakness, impaired balance, decreased endurance, gait deviations, decreased activity tolerance compared to baseline, decreased functional mobility tolerance compared to baseline, decreased safety awareness, impaired judgement, fall risk. Pt currently at a fall risk 2* to impairments listed above.  Pt will continue to benefit from skilled acute PT interventions to address stated impairments; to maximize functional mobility; for ongoing pt/ family training; and DME needs. At conclusion of PT session pt returned BTB and bed alarm engaged with phone and call bell within reach. Pt denies any further questions at this time. The patient's AM-PAC Basic Mobility Inpatient Short Form Raw Score is 8. A Raw score of less than or equal to 16 suggests the patient may benefit from discharge to post-acute rehabilitation services. Please also refer to the recommendation of the Physical Therapist for safe discharge planning. PT to continue to follow pt t/o hospital stay, recommend level II resource intensity upon hospital D/C.   Goals   Patient Goals \"I wanna go home\"   STG Expiration Date 09/17/24   Short Term Goal #1 STG 1. Pt will be able to perform bed mobility tasks with S level in order to improve overall functional mobility and assist in safe d/c. STG 2. Pt with sit EOB for at least 25 " minutes at S level in order to strengthen abdominal musculature and assist in future transfers/ ambulation. STG 3. Pt will be able to perform functional transfer with S level in order to improve overall functional mobility and assist in safe d/c. STG 4. Pt will be able to ambulate at least 50 feet with least restrictive device with S level A in order to improve overall functional mobility and assist in safe d/c. STG 5. Pt will improve sitting/standing static/dynamic balance 1/2 grade in order to improve functional mobility and assist in safe d/c. STG 6. Pt will improve LE strength by 1/2 grade in order to improve functional mobility and assist in safe d/c.   PT Treatment Day 0   Plan   Treatment/Interventions ADL retraining;Functional transfer training;LE strengthening/ROM;Therapeutic exercise;Endurance training;Cognitive reorientation;Patient/family training;Equipment eval/education;Bed mobility;Gait training;Spoke to nursing;Spoke to case management;OT   PT Frequency 1-2x/wk  (pending pt participation)   Discharge Recommendation   Rehab Resource Intensity Level, PT II (Moderate Resource Intensity)  (return to facility with appropriate level of assistance)   AM-PAC Basic Mobility Inpatient   Turning in Flat Bed Without Bedrails 2   Lying on Back to Sitting on Edge of Flat Bed Without Bedrails 2   Moving Bed to Chair 1   Standing Up From Chair Using Arms 1   Walk in Room 1   Climb 3-5 Stairs With Railing 1   Basic Mobility Inpatient Raw Score 8   [facility] Highest Level Of Mobility   -HLM Goal 3: Sit at edge of bed   JH-HLM Achieved 3: Sit at edge of bed   Modified Cadence Scale   Modified Dwight Scale 4           Stephenie Masterson, PT DPT

## 2024-09-03 NOTE — CASE MANAGEMENT
Case Management Assessment & Discharge Planning Note    Patient name Chelsey Carson  Location [facility] 9[facility] 9[facility]-01 MRN [id number]  : 1935 Date 9/3/2024       Current Admission Date: 2024  Current Admission Diagnosis:Mild cognitive impairment of uncertain or unknown etiology   Patient Active Problem List    Diagnosis Date Noted Date Diagnosed    Delirium due to another medical condition 2024     Closed nondisplaced intertrochanteric fracture of right femur (HCC) 2024     Abnormal CT of the chest 2024     Pneumonitis 2024     Dysuria 2024     Urinary retention 2024     Nausea 2024     Macrocytic anemia 2024     Thrombocytosis 2024     Ambulatory dysfunction 2024     Proctitis 2024     Anemia 03/15/2024     Loss of appetite 03/15/2024     Anxiety disorder, unspecified 03/15/2024     Tinnitus of both ears 2023     Xerostomia 2023     Mixed conductive and sensorineural hearing loss of left ear with restricted hearing of right ear 2023     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 2023     Nonintractable headache 2023     Abnormal brain MRI 2023     Slow transit constipation 2023     Primary insomnia 2023     Constipation 2023     Tremor 2023     Hyponatremia 2023     Age related osteoporosis 2022     GERD (gastroesophageal reflux disease) 10/10/2022     Stage 3a chronic kidney disease (HCC) 2022     Venous insufficiency 08/10/2022     Other fatigue 2022     Mild cognitive impairment of uncertain or unknown etiology 02/15/2021     Hypokalemia 2021     Dry mouth 2019     Chronic maxillary sinusitis 2019     Breast cancer ([facility]) 2019     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[facility]) 2019     Perforation of left tympanic membrane 2019     Nonrheumatic aortic valve insufficiency 11/15/2018     Pes  anserinus bursitis of right knee 06/29/2018     Benign essential HTN 10/19/2017     Aortic valve disorder 08/19/2013     Hyperlipidemia 12/19/2012       LOS (days): 2  Geometric Mean LOS (GMLOS) (days): 4  Days to GMLOS:2.4     OBJECTIVE:    Risk of Unplanned Readmission Score: 30.42         Current admission status: Inpatient       Preferred Pharmacy:   UNKNOWN - FOLLOW UP PRIOR TO DISCHARGE TO E-PRESCRIBE  No address on file      Primary Care Provider: Mariely Fan MD    Primary Insurance: MEDICARE  Secondary Insurance: COMMERCIAL MISCELLANEOUS    ASSESSMENT:  Active Health Care Proxies    There are no active Health Care Proxies on file.                 Readmission Root Cause  30 Day Readmission: No    Patient Information  Admitted from:: Facility ([facility])  Mental Status: Confused  During Assessment patient was accompanied by: Not accompanied during assessment  Assessment information provided by:: Son  Primary Caregiver: Other (Comment)  Caregiver's Name:: [facility]  Caregiver's Relationship to Patient:: Other (Specify)  Caregiver's Telephone Number:: [phone]  Support Systems: Son, Family members  County of Residence: [place]  What city do you live in?: [place]  Home entry access options. Select all that apply.: No steps to enter home  Type of Current Residence: Facility  Upon entering residence, is there a bedroom on the main floor (no further steps)?: Yes  Upon entering residence, is there a bathroom on the main floor (no further steps)?: Yes  Living Arrangements: Other (Comment) ([facility] in [place])  Is patient a ?: No    Activities of Daily Living Prior to Admission  Functional Status: Assistance  Completes ADLs independently?: No  Level of ADL dependence: Assistance  Ambulates independently?: No  Level of ambulatory dependence: Assistance  Does patient use assisted devices?: Yes  Assisted Devices (DME) used: Wheelchair, Walker  Does patient currently own DME?: Yes  What DME does the  patient currently own?: Wheelchair, Walker  Does patient have a history of Outpatient Therapy (PT/OT)?: No  Does the patient have a history of Short-Term Rehab?: Yes ([facility])  Does patient have a history of HHC?: No  Does patient currently have HHC?: No         Patient Information Continued  Income Source: Pension/prison  Does patient have prescription coverage?: Yes  Does patient receive dialysis treatments?: No  Does patient have a history of substance abuse?: No  Does patient have a history of Mental Health Diagnosis?: No         Means of Transportation  Means of Transport to Appts:: Other (Comment) ([facility] provides transport)      Social Determinants of Health (SDOH)      Flowsheet Row Most Recent Value   Housing Stability    In the last 12 months, was there a time when you were not able to pay the mortgage or rent on time? N   In the past 12 months, how many times have you moved where you were living? 1   At any time in the past 12 months, were you homeless or living in a shelter (including now)? N   Transportation Needs    In the past 12 months, has lack of transportation kept you from medical appointments or from getting medications? no   In the past 12 months, has lack of transportation kept you from meetings, work, or from getting things needed for daily living? No   Food Insecurity    Within the past 12 months, you worried that your food would run out before you got the money to buy more. Never true   Within the past 12 months, the food you bought just didn't last and you didn't have money to get more. Never true   Utilities    In the past 12 months has the electric, gas, oil, or water company threatened to shut off services in your home? No            DISCHARGE DETAILS:    Discharge planning discussed with:: Pt's son Julio  Freedom of Choice: Yes  Comments - Freedom of Choice: Pt's son requesting pt to return to the [facility]  CM contacted family/caregiver?: Yes  Were Treatment Team discharge  recommendations reviewed with patient/caregiver?: Yes  Did patient/caregiver verbalize understanding of patient care needs?: N/A- going to facility  Were patient/caregiver advised of the risks associated with not following Treatment Team discharge recommendations?: Yes    Contacts  Patient Contacts: Julio Carson-son  Relationship to Patient:: Family  Contact Method: Phone  Phone Number: [phone]  Reason/Outcome: Emergency Contact, Discharge Planning    Requested Home Health Care         Is the patient interested in HHC at discharge?: No    DME Referral Provided  Referral made for DME?: No    Other Referral/Resources/Interventions Provided:  Interventions: Facility Return  Referral Comments: Pt to return to the [facility] in [place] on discharge, currently receiving therapy twice a week         Treatment Team Recommendation: Facility Return  Discharge Destination Plan:: Facility Return             CM spoke with pt's son Julio via phone to complete assessment.  Pt currently resides at the [facility] in [place].  Pt requires assistance for ADL's, normally requires assist x1 but has had assistance x2 at the facility.  Pt primarily utilizes a wheelchair, during therapy was working on transfers independently in and out of the chair or bed.  Pt does have a walker.  Pt currently receives therapy services twice a week at the [facility].  Pt was previously at [facility].  Pt has no mental health hx or substance use hx.  Pt's facility provides transport to appointments.  Pt's son is very supportive.  CM will continue to follow for discharge planning needs.

## 2024-09-03 NOTE — PLAN OF CARE
Problem: PHYSICAL THERAPY ADULT  Goal: Performs mobility at highest level of function for planned discharge setting.  See evaluation for individualized goals.  Description: Treatment/Interventions: ADL retraining, Functional transfer training, LE strengthening/ROM, Therapeutic exercise, Endurance training, Cognitive reorientation, Patient/family training, Equipment eval/education, Bed mobility, Gait training, Spoke to nursing, Spoke to case management, OT          See flowsheet documentation for full assessment, interventions and recommendations.  9/3/2024 1343 by Stephenie Masterson PT  Note: Prognosis: Guarded  Problem List: Decreased strength, Impaired balance, Decreased endurance, Decreased mobility, Impaired judgement, Decreased safety awareness, Decreased cognition, Pain  Assessment: Pt is a 88 y.o. female seen for PT evaluation s/p admit to [facility] on 9/1/2024. Pt was admitted with a primary dx of: mild cognitive impairment.  PT now consulted for assessment of mobility and d/c needs. Pt with Up and OOB as tolerated  orders.  Pts current comorbidities effecting treatment include: delirium, asthenia, weakness, HTN, GERD. Pt  has a past medical history of Abnormal laboratory test (08/01/2022), Acute sinusitis (02/13/2013), Age related osteoporosis (11/01/2022), Breast cancer ([facility]), Cancer ([facility]), Chronic cough (01/13/2017), COVID-19, History of 2019 novel coronavirus disease (COVID-19) (02/15/2021), Hyperlipidemia, Hyponatremia (02/01/2021), Influenza B (03/05/2024), Insomnia (02/17/2021), MCI (mild cognitive impairment) (02/15/2021), Nonrheumatic aortic (valve) stenosis (03/15/2024), Other specified forms of tremor (03/15/2024), Palpitations (03/15/2024), Pneumonia due to COVID-19 virus (02/01/2021), Slow transit constipation (03/15/2024), Stage 3a chronic kidney disease (HCC) (09/27/2023), 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 Pts current clinical presentation is Unstable/ Unpredictable (high complexity) due to Ongoing medical management for primary dx, Increased reliance on more restrictive AD compared to baseline, Decreased activity tolerance compared to baseline, Fall risk, Increased assistance needed from caregiver at current time, Cog status, Trending lab values, Continuous pulse oximetry monitoring . Prior to admission, pt was residing at [facility] and was requiring A for ADLs/ AIDL, using RW. Upon evaluation, pt currently is requiring mod A for bed mobility; mod Ax2 for transfers. Pt presents at PT eval functioning below baseline and currently w/ overall mobility deficits 2* to: BLE weakness, impaired balance, decreased endurance, gait deviations, decreased activity tolerance compared to baseline, decreased functional mobility tolerance compared to baseline, decreased safety awareness, impaired judgement, fall risk. Pt currently at a fall risk 2* to impairments listed above.  Pt will continue to benefit from skilled acute PT interventions to address stated impairments; to maximize functional mobility; for ongoing pt/ family training; and DME needs. At conclusion of PT session pt returned BTB and bed alarm engaged with phone and call bell within reach. Pt denies any further questions at this time. The patient's AM-PAC Basic Mobility Inpatient Short Form Raw Score is 8. A Raw score of less than or equal to 16 suggests the patient may benefit from discharge to post-acute rehabilitation services. Please also refer to the recommendation of the Physical Therapist for safe discharge planning. PT to continue to follow pt t/o hospital stay, recommend level II resource intensity upon hospital D/C.        Rehab Resource Intensity Level, PT: II (Moderate Resource Intensity) (return to facility with appropriate level of assistance)    See flowsheet documentation for full assessment.

## 2024-09-03 NOTE — ASSESSMENT & PLAN NOTE
"Per nursing staff at [facility], pt has been more confused and weak the last 3 days, eating with her hands, saying \"nonsensical\" things, so she was brought to the ED despite pt stating she has no complaints and does not want to go to the hospital  [facility] in ED with \"mild chronic small vessel ischemic changes\" but no other intracranial abnormalities  Pt does have a history of mild cognitive impairment as well as a history of episodes of sundowning and getting aggressive with staff, on chart review she was placed on Depakote, Zyprexa and Buspirone for mood stabilization  CXR and UA unremarkable, pt afebrile, normotensive, WBC WNL without any complaints of urinary F/U/D or cough/SOB/CP; ammonia WNL  Consider progression and worsening of cognitive impairment - consult OT for cognitive screening pending  Geriatrics input reviewed  Urinary retention protocol.  Delirium precautions  Started on IV ceftriaxone every 24 hours for possible UTI by admitting physician, will keep pending urine culture  "

## 2024-09-03 NOTE — PLAN OF CARE
Problem: INFECTION - ADULT  Goal: Absence or prevention of progression during hospitalization  Description: INTERVENTIONS:  - Assess and monitor for signs and symptoms of infection  - Monitor lab/diagnostic results  - Monitor all insertion sites, i.e. indwelling lines, tubes, and drains  - Monitor endotracheal if appropriate and nasal secretions for changes in amount and color  - [place] appropriate cooling/warming therapies per order  - Administer medications as ordered  - Instruct and encourage patient and family to use good hand hygiene technique  - Identify and instruct in appropriate isolation precautions for identified infection/condition  Outcome: Progressing     Problem: SAFETY ADULT  Goal: Patient will remain free of falls  Description: INTERVENTIONS:  - Educate patient/family on patient safety including physical limitations  - Instruct patient to call for assistance with activity   - Consult OT/PT to assist with strengthening/mobility   - Keep Call bell within reach  - Keep bed low and locked with side rails adjusted as appropriate  - Keep care items and personal belongings within reach  - Initiate and maintain comfort rounds  - Make Fall Risk Sign visible to staff  - Offer Toileting every 2 Hours, in advance of need  - Initiate/Maintain bed alarm  - Apply yellow socks and bracelet for high fall risk patients  - Consider moving patient to room near nurses station  Outcome: Progressing

## 2024-09-03 NOTE — RESPIRATORY THERAPY NOTE
RT Protocol Note  Chelsey Carson 88 y.o. female MRN: [id number]  Unit/Bed#: [facility] 933-01 Encounter: [id number]    Assessment    Principal Problem:    Mild cognitive impairment of uncertain or unknown etiology  Active Problems:    GERD (gastroesophageal reflux disease)    Benign essential HTN      Home Pulmonary Medications:  DuoNeb Inhal Soln for wheezing PRN       Past Medical History:   Diagnosis Date    Abnormal laboratory test 08/01/2022    Acute sinusitis 02/13/2013    Age related osteoporosis 11/01/2022    Formatting of this note might be different from the original.   Last Assessment & Plan:     Formatting of this note might be different from the original.    Risedronate  on allergy list but she does not recall what happened    Her kids told her not to get treatment but I recommend it again to her.     Given the possible allergy refer to rheum to discuss treatment options    Breast cancer (HCC)     Cancer (HCC)     Chronic cough 01/13/2017    COVID-19     History of 2019 novel coronavirus disease (COVID-19) 02/15/2021    Hyperlipidemia     Hyponatremia 02/01/2021    Influenza B 03/05/2024    Insomnia 02/17/2021    MCI (mild cognitive impairment) 02/15/2021    Nonrheumatic aortic (valve) stenosis 03/15/2024    Other specified forms of tremor 03/15/2024    Palpitations 03/15/2024    Pneumonia due to COVID-19 virus 02/01/2021    Slow transit constipation 03/15/2024    Stage 3a chronic kidney disease (HCC) 09/27/2023    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    Thrombocytosis, unspecified 03/15/2024     Social History     Socioeconomic History    Marital status:      Spouse name: None    Number of children: None    Years of education: None    Highest education level: None   Occupational History    None   Tobacco Use    Smoking status: Never    Smokeless tobacco: Never   Vaping Use    Vaping status: Never Used   Substance and Sexual Activity    Alcohol use: Not  "Currently     Alcohol/week: 1.0 standard drink of alcohol     Types: 1 Glasses of wine per week    Drug use: No    Sexual activity: Not Currently   Other Topics Concern    None   Social History Narrative    None     Social Determinants of Health     Financial Resource Strain: Low Risk  (5/23/2024)    Received from [facility]    Overall Financial Resource Strain (CARDIA)     Difficulty of Paying Living Expenses: Not very hard   Food Insecurity: No Food Insecurity (6/16/2024)    Hunger Vital Sign     Worried About Running Out of Food in the Last Year: Never true     Ran Out of Food in the Last Year: Never true   Transportation Needs: No Transportation Needs (6/16/2024)    PRAPARE - Transportation     Lack of Transportation (Medical): No     Lack of Transportation (Non-Medical): No   Physical Activity: Not on file   Stress: Not on file   Social Connections: Not on file   Intimate Partner Violence: Not At Risk (5/23/2024)    Received from [facility]    Humiliation, Afraid, Rape, and Kick questionnaire     Fear of Current or Ex-Partner: No     Emotionally Abused: No     Physically Abused: No     Sexually Abused: No   Housing Stability: Low Risk  (6/16/2024)    Housing Stability Vital Sign     Unable to Pay for Housing in the Last Year: No     Number of Times Moved in the Last Year: 1     Homeless in the Last Year: No       Subjective         Objective    Physical Exam:   Assessment Type: Assess only  General Appearance: Drowsy  Respiratory Pattern: Shallow  Chest Assessment: Chest expansion symmetrical  Bilateral Breath Sounds: Diminished  Location Specific: No  Cough: None  O2 Device: room air    Vitals:  Blood pressure (!) 118/49, pulse 93, temperature 97.9 °F (36.6 °C), resp. rate 15, SpO2 96%.          Imaging and other studies:     O2 Device: room air     Plan    Respiratory Plan: Discontinue Protocol        Resp Comments: 87 y/o female per the ED report from yesterday, \"has been " "more confused and weak the last 3 days, eating with her hands, saying \"nonsensical\" things, so she was brought to the ED despite pt stating she has no complaints and does not want to go to the hospital.\"  Had a bladder catheterization done at the bedside in this early morning hours by surgery team.  Resp system assessed by admitting MD: \"Negative for cough, chest tightness, shortness of breath, wheezing and stridor.\"  CXR: Clear lungs. No pneumothorax or pleural effusion. Clips in the right chest wall.  No hx of any tobacco usage.  No prior COPD dx; DuoNeb neb soln provided at home for PRN usage when wheezing. (Already ordered). Doing very well currently on room air.  Will change DuoNeb to Ventolin MDI PRN for wheezing and d/c RCP.   "

## 2024-09-04 LAB — BACTERIA UR CULT: NORMAL

## 2024-09-04 PROCEDURE — 87081 CULTURE SCREEN ONLY: CPT | Performed by: STUDENT IN AN ORGANIZED HEALTH CARE EDUCATION/TRAINING PROGRAM

## 2024-09-04 PROCEDURE — 92610 EVALUATE SWALLOWING FUNCTION: CPT

## 2024-09-04 PROCEDURE — 99233 SBSQ HOSP IP/OBS HIGH 50: CPT | Performed by: NURSE PRACTITIONER

## 2024-09-04 PROCEDURE — 99232 SBSQ HOSP IP/OBS MODERATE 35: CPT | Performed by: STUDENT IN AN ORGANIZED HEALTH CARE EDUCATION/TRAINING PROGRAM

## 2024-09-04 RX ADMIN — BUSPIRONE HYDROCHLORIDE 5 MG: 5 TABLET ORAL at 09:04

## 2024-09-04 RX ADMIN — OLANZAPINE 2.5 MG: 2.5 TABLET, FILM COATED ORAL at 09:04

## 2024-09-04 RX ADMIN — PANTOPRAZOLE SODIUM 40 MG: 40 TABLET, DELAYED RELEASE ORAL at 09:04

## 2024-09-04 RX ADMIN — DIVALPROEX SODIUM 250 MG: 250 TABLET, EXTENDED RELEASE ORAL at 09:04

## 2024-09-04 RX ADMIN — Medication 1000 MG: at 09:03

## 2024-09-04 RX ADMIN — BUSPIRONE HYDROCHLORIDE 5 MG: 5 TABLET ORAL at 17:38

## 2024-09-04 RX ADMIN — ASPIRIN 81 MG CHEWABLE TABLET 81 MG: 81 TABLET CHEWABLE at 09:04

## 2024-09-04 RX ADMIN — POLYETHYLENE GLYCOL 3350 17 G: 17 POWDER, FOR SOLUTION ORAL at 09:04

## 2024-09-04 RX ADMIN — SENNOSIDES 17.2 MG: 8.6 TABLET, FILM COATED ORAL at 20:03

## 2024-09-04 RX ADMIN — DIVALPROEX SODIUM 500 MG: 500 TABLET, DELAYED RELEASE ORAL at 20:03

## 2024-09-04 RX ADMIN — ENOXAPARIN SODIUM 30 MG: 30 INJECTION SUBCUTANEOUS at 09:04

## 2024-09-04 NOTE — ASSESSMENT & PLAN NOTE
Unclear when was last BM, patient with chronic constipation  Abdomen tender to palpation on exam on 9/3, none today  Continue senna and MiraLAX

## 2024-09-04 NOTE — ASSESSMENT & PLAN NOTE
Patient started on IV ceftriaxone on admission for possible UTI  UA reviewed, urine culture with no growth  Patient with some suprapubic tenderness on exam  Completed 3 days of ceftriaxone

## 2024-09-04 NOTE — PROGRESS NOTES
Progress Note - Chelsey Carson 88 y.o. female MRN: [id number]    Unit/Bed#: [facility] 933-01 Encounter: [id number]      Assessment/Plan:  Encephalopathy/delirium  Hx of prior encephalopathy with UTI  Review of medications, no changes noted  Urine culture pending  Ammonia level 34 (9/1/24)  Cannot exclude progression of underlying dementia  Baseline: alert and oriented x 2  Provide frequent redirection, reorientation, distraction techniques  Avoid deliriogenic medications such as tramadol, benzodiazepines, anticholinergics,  Benadryl  Treat pain, See geriatric pain protocol  Monitor for constipation and urinary retention  Encourage early and frequent moblization, OOB  Encourage Hydration/ Nutrition  Implement sleep hygiene, limit night time interuptions, group activities     Ambulatory dysfunction  At risk for falls secondary to age, hx of fall, gait instability, polypharmacy, visual impairment  Fall precautions  PT/OT  Increased physical exercise, recommend balance and strengthening exercises  Rehab post hospitalization     UTI  Urine culture pending  Started on IV ceftriaxone  Encourage po fluid intake  Maintain good hygiene practices     Anxiety/psychosis  Seen by psych at rehab in May  Started on depakote/buspar/zyprexa  Continue medications  Valproic acid total level 91 (9/1/24)     Frailty  Clinical Frail Scale: 6- Moderately Frail  Albumin 3.2, recommend protein supplementation  PT/OT  Continue supportive care     Mild cognitive impairment  Likely progressing dementia  Periods of agitation and confusion at baseline  Underlying cognitive impairment can worsen the setting of delirium  TSH 3.910 (9/1/24)  Vitamin B 12 level 481 (5/17/24)  CT head (9/1/24) chronic microangiopathic changes  Keep physically, mentally and socially active     Insomnia  Related to hospitalization  First line is behavioral therapy  Avoid sedative hypnotics such as benzodiazepines and benadryl  Encourage staying awake during the  day  Encourage daytime activity, morning exercise  Decrease or eliminate day time naps  Avoid caffeine especially during late afternoon and evening hours  Establish a night time routine        Subjective:   She is in bed, sleeping, opens eyes briefly in response to pain.     Objective:     Vitals: Blood pressure 116/56, pulse 68, temperature 97.8 °F (36.6 °C), temperature source Oral, resp. rate 16, SpO2 95%.,There is no height or weight on file to calculate BMI.      Intake/Output Summary (Last 24 hours) at 9/4/2024 1329  Last data filed at 9/3/2024 1701  Gross per 24 hour   Intake 60 ml   Output --   Net 60 ml       Physical Exam:   General : NAD  HEENT : MMM   Heart : Normal rate, no murmur rub or gallop  Lungs : CTA no wheezes, rales or rhonchi  Abdomen : Soft, NT/ND, BS auscultated in all 4 quads.   Ext :  no edema  Skin : Pink, warm, dry, age appropriate turgor and mobility  Neuro : Nonfocal  Psych : Alert and O x 3     Invasive Devices       Peripheral Intravenous Line  Duration             Peripheral IV 09/01/24 Proximal;Right;Ventral (anterior) Forearm 2 days                    Lab, Imaging and other studies: I have personally reviewed pertinent films in PACS  VTE Pharmacologic Prophylaxis: Sequential compression device (Venodyne)   VTE Mechanical Prophylaxis: sequential compression device

## 2024-09-04 NOTE — CASE MANAGEMENT
Case Management Discharge Planning Note    Patient name Chelsey Carson  Location [facility] 933/[facility] 933-01 MRN [id number]  : 1935 Date 2024       Current Admission Date: 2024  Current Admission Diagnosis:Mild cognitive impairment of uncertain or unknown etiology   Patient Active Problem List    Diagnosis Date Noted Date Diagnosed    Possible acute cystitis 2024     Delirium due to another medical condition 2024     Closed nondisplaced intertrochanteric fracture of right femur (HCC) 2024     Abnormal CT of the chest 2024     Pneumonitis 2024     Dysuria 2024     Urinary retention 2024     Nausea 2024     Macrocytic anemia 2024     Thrombocytosis 2024     Ambulatory dysfunction 2024     Proctitis 2024     Anemia 03/15/2024     Loss of appetite 03/15/2024     Anxiety disorder, unspecified 03/15/2024     Tinnitus of both ears 2023     Xerostomia 2023     Mixed conductive and sensorineural hearing loss of left ear with restricted hearing of right ear 2023     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 2023     Nonintractable headache 2023     Abnormal brain MRI 2023     Slow transit constipation 2023     Primary insomnia 2023     Constipation 2023     Tremor 2023     Hyponatremia 2023     Age related osteoporosis 2022     GERD (gastroesophageal reflux disease) 10/10/2022     Stage 3a chronic kidney disease (HCC) 2022     Venous insufficiency 08/10/2022     Other fatigue 2022     Mild cognitive impairment of uncertain or unknown etiology 02/15/2021     Hypokalemia 2021     Dry mouth 2019     Chronic maxillary sinusitis 2019     Breast cancer (HCC) 2019     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[facility]) 2019     Perforation of left tympanic membrane 2019     Nonrheumatic aortic valve  insufficiency 11/15/2018     Pes anserinus bursitis of right knee 06/29/2018     Benign essential HTN 10/19/2017     Aortic valve disorder 08/19/2013     Hyperlipidemia 12/19/2012       LOS (days): 3  Geometric Mean LOS (GMLOS) (days): 4  Days to GMLOS:1.5     OBJECTIVE:  Risk of Unplanned Readmission Score: 31.27         Current admission status: Inpatient   Preferred Pharmacy:   UNKNOWN - FOLLOW UP PRIOR TO DISCHARGE TO E-PRESCRIBE  No address on file      Primary Care Provider: Mariely Fan MD    Primary Insurance: MEDICARE  Secondary Insurance: COMMERCIAL MISCELLANEOUS    DISCHARGE DETAILS:                         Additional Comments: CM spoke to Dariela from the [facility] via phone. CM stated pt is medically cleared and ready for DC. [facility] attending was also on the phone and provided clinical update to Dariela. Per Dariela she stated she is going to speak to the son in regards to pt transferring to there memory care unit or possibly going to a SNF. CM verbalized understanding and asked Dariela to follow up with her once she speaks to the son. CM will continue to follow as needed.

## 2024-09-04 NOTE — SPEECH THERAPY NOTE
Speech-Language Pathology Bedside Swallow Evaluation      Patient Name: Chelsey Carson    Today's Date: 9/4/2024     Problem List  Principal Problem:    Mild cognitive impairment of uncertain or unknown etiology  Active Problems:    GERD (gastroesophageal reflux disease)    Slow transit constipation    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    Benign essential HTN    Possible acute cystitis    Past Medical History  Past Medical History:   Diagnosis Date    Abnormal laboratory test 08/01/2022    Acute sinusitis 02/13/2013    Age related osteoporosis 11/01/2022    Formatting of this note might be different from the original.   Last Assessment & Plan:     Formatting of this note might be different from the original.    Risedronate  on allergy list but she does not recall what happened    Her kids told her not to get treatment but I recommend it again to her.     Given the possible allergy refer to rheum to discuss treatment options    Breast cancer (HCC)     Cancer (HCC)     Chronic cough 01/13/2017    COVID-19     History of 2019 novel coronavirus disease (COVID-19) 02/15/2021    Hyperlipidemia     Hyponatremia 02/01/2021    Influenza B 03/05/2024    Insomnia 02/17/2021    MCI (mild cognitive impairment) 02/15/2021    Nonrheumatic aortic (valve) stenosis 03/15/2024    Other specified forms of tremor 03/15/2024    Palpitations 03/15/2024    Pneumonia due to COVID-19 virus 02/01/2021    Slow transit constipation 03/15/2024    Stage 3a chronic kidney disease (HCC) 09/27/2023    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 She  presented with s/s suggestive of mild oropharyngeal dysphagia. Symptoms or concerns included decreased bolus acceptance, decreased mastication, and suspect reduced oral control. Suspect mild pharyngeal swallow delay. No s/s aspiration noted with trials offered. Limited overall intake today. Will continue [facility] soft diet for now with plan to advance in subsequent ST f/u sessions.     Risk/s for Aspiration: mild    Recommended Diet: mechanically altered/level 2 diet and thin liquids   Recommended Form of Meds:  as tolerated    Aspiration precautions and swallowing strategies: upright posture, only feed when fully alert, and slow rate of feeding  Other Recommendations: Continue frequent oral care      Current Medical Status per H&P 9/2/24  Chelsey Carson is a 88 y.o. female with a PMH of hyperlipidemia, GERD, CKD, mild cognitive impairment who presents with weakness and AMS per [facility]. The staff felt that over the last 3 days pt has been weaker and not making sense when she speaks. On discussion with the pt, she states she has just been eating less and not wanting to move around as much. She denies any infectious signs or symptoms, denies urinary complaints, SOB, CP, N/V/D.  CTH normal as documented above.  Labs all within normal limits. Will consult OT for cognitive evaluation given the likelihood that this is just progressing cognitive impairment.  Consider geriatrics consult if concern for medication side effect however she was recently seen by then with all the same medications on board.  I was unable to contact pt's son to get further history.      Special Studies:  CT head 9/1/24 IMPRESSION:  No acute intracranial abnormality.  Mild chronic small vessel ischemic changes.  CXR 9/1/24 IMPRESSION:  No acute cardiopulmonary disease.      Social/Education/Vocational Hx:  Pt lives in SNF/ECF    Swallow Information   Current Risks for Dysphagia & Aspiration: AMS  Current Diet: mechanically altered/level  2 diet and nectar thick liquids   Baseline Diet: regular diet and thin liquids      Baseline Assessment   Behavior/Cognition: alert  Speech/Language Status: able to participate in some basic conversation and able to follow commands inconsistently  Patient Positioning: upright in bed  Pain Status/Interventions/Response to Interventions: No report of or nonverbal indications of pain.       Swallow Mechanism Exam  Facial: symmetrical  Labial: bilateral decreased ROM  Lingual: decreased coordination  Velum: symmetrical  Mandible: adequate ROM  Dentition: adequate  Vocal quality:clear/adequate   Volitional Cough: unable to initiate volitional cough   Respiratory Status: on RA     Consistencies Assessed and Performance   Consistencies Administered: thin liquids, puree, and mechanical soft solids    Oral Stage: mild, decreased bolus acceptance, decreased bolus propulsion, and decreased bolus formation    Pharyngeal Stage: mild and suspected pharyngeal swallow delay. No coughing, throat clearing, change in vocal quality or respiratory status noted today.     Esophageal Concerns:  hx GERD      Summary and Recommendations (see above)    Results Reviewed with: patient, RN, and MD     Treatment Recommended: yes     Frequency of treatment: 2-3x/week      Dysphagia LTG  -Patient will demonstrate safe and effective oral intake (without overt s/s significant oral/pharyngeal dysphagia including s/s penetration or aspiration) for the highest appropriate diet level.     Short Term Goals:  -Pt will tolerate Dysphagia 2/mechanical soft diet and thin liquid with no significant s/s oral or pharyngeal dysphagia across 1-3 diagnostic sessions.    -Patient will tolerate trials of upgraded food texture with no significant s/s of oral or pharyngeal dysphagia including aspiration across 1-3 diagnostic sessions.

## 2024-09-04 NOTE — ASSESSMENT & PLAN NOTE
"Per nursing staff at [facility], pt has been more confused and weak the last 3 days, eating with her hands, saying \"nonsensical\" things, so she was brought to the ED despite pt stating she has no complaints and does not want to go to the hospital  [facility] in ED with \"mild chronic small vessel ischemic changes\" but no other intracranial abnormalities  Pt does have a history of mild cognitive impairment as well as a history of episodes of sundowning and getting aggressive with staff, on chart review she was placed on Depakote, Zyprexa and Buspirone for mood stabilization  CXR and UA unremarkable, pt afebrile, normotensive, WBC WNL without any complaints of urinary F/U/D or cough/SOB/CP; ammonia WNL  Suspect progression of dementia   Geriatrics input reviewed  Urinary retention protocol.  Delirium precautions  Completed course of antibiotics  "

## 2024-09-04 NOTE — PLAN OF CARE
Problem: PAIN - ADULT  Goal: Verbalizes/displays adequate comfort level or baseline comfort level  Description: Interventions:  - Encourage patient to monitor pain and request assistance  - Assess pain using appropriate pain scale  - Administer analgesics based on type and severity of pain and evaluate response  - Implement non-pharmacological measures as appropriate and evaluate response  - Consider cultural and social influences on pain and pain management  - Notify physician/advanced practitioner if interventions unsuccessful or patient reports new pain  Outcome: Progressing     Problem: INFECTION - ADULT  Goal: Absence or prevention of progression during hospitalization  Description: INTERVENTIONS:  - Assess and monitor for signs and symptoms of infection  - Monitor lab/diagnostic results  - Monitor all insertion sites, i.e. indwelling lines, tubes, and drains  - Monitor endotracheal if appropriate and nasal secretions for changes in amount and color  - [place] appropriate cooling/warming therapies per order  - Administer medications as ordered  - Instruct and encourage patient and family to use good hand hygiene technique  - Identify and instruct in appropriate isolation precautions for identified infection/condition  Outcome: Progressing  Goal: Absence of fever/infection during neutropenic period  Description: INTERVENTIONS:  - Monitor WBC    Outcome: Progressing     Problem: SAFETY ADULT  Goal: Patient will remain free of falls  Description: INTERVENTIONS:  - Educate patient/family on patient safety including physical limitations  - Instruct patient to call for assistance with activity   - Consult OT/PT to assist with strengthening/mobility   - Keep Call bell within reach  - Keep bed low and locked with side rails adjusted as appropriate  - Keep care items and personal belongings within reach  - Initiate and maintain comfort rounds  - Make Fall Risk Sign visible to staff  - Offer Toileting every 2 Hours,  in advance of need  - Initiate/Maintain bed alarm  - Apply yellow socks and bracelet for high fall risk patients  - Consider moving patient to room near nurses station  Outcome: Progressing  Goal: Maintain or return to baseline ADL function  Description: INTERVENTIONS:  -  Assess patient's ability to carry out ADLs; assess patient's baseline for ADL function and identify physical deficits which impact ability to perform ADLs (bathing, care of mouth/teeth, toileting, grooming, dressing, etc.)  - Assess/evaluate cause of self-care deficits   - Assess range of motion  - Assess patient's mobility; develop plan if impaired  - Assess patient's need for assistive devices and provide as appropriate  - Encourage maximum independence but intervene and supervise when necessary  - Involve family in performance of ADLs  - Assess for home care needs following discharge   - Consider OT consult to assist with ADL evaluation and planning for discharge  - Provide patient education as appropriate  Outcome: Progressing  Goal: Maintains/Returns to pre admission functional level  Description: INTERVENTIONS:  - Perform AM-PAC 6 Click Basic Mobility/ Daily Activity assessment daily.  - Set and communicate daily mobility goal to care team and patient/family/caregiver.   - Collaborate with rehabilitation services on mobility goals if consulted  - Perform Range of Motion 4 times a day.  - Reposition patient every 2 hours.  - Dangle patient 4 times a day  - Stand patient 4 times a day  - Ambulate patient 4 times a day  - Out of bed to chair 4 times a day   - Out of bed for meals 4 times a day  - Out of bed for toileting  - Record patient progress and toleration of activity level   Outcome: Progressing     Problem: DISCHARGE PLANNING  Goal: Discharge to home or other facility with appropriate resources  Description: INTERVENTIONS:  - Identify barriers to discharge w/patient and caregiver  - Arrange for needed discharge resources and  transportation as appropriate  - Identify discharge learning needs (meds, wound care, etc.)  - Arrange for interpretive services to assist at discharge as needed  - Refer to Case Management Department for coordinating discharge planning if the patient needs post-hospital services based on physician/advanced practitioner order or complex needs related to functional status, cognitive ability, or social support system  Outcome: Progressing     Problem: Prexisting or High Potential for Compromised Skin Integrity  Goal: Skin integrity is maintained or improved  Description: INTERVENTIONS:  - Identify patients at risk for skin breakdown  - Assess and monitor skin integrity  - Assess and monitor nutrition and hydration status  - Monitor labs   - Assess for incontinence   - Turn and reposition patient  - Assist with mobility/ambulation  - Relieve pressure over bony prominences  - Avoid friction and shearing  - Provide appropriate hygiene as needed including keeping skin clean and dry  - Evaluate need for skin moisturizer/barrier cream  - Collaborate with interdisciplinary team   - Patient/family teaching  - Consider wound care consult   Outcome: Progressing     Problem: Nutrition/Hydration-ADULT  Goal: Nutrient/Hydration intake appropriate for improving, restoring or maintaining nutritional needs  Description: Monitor and assess patient's nutrition/hydration status for malnutrition. Collaborate with interdisciplinary team and initiate plan and interventions as ordered.  Monitor patient's weight and dietary intake as ordered or per policy. Utilize nutrition screening tool and intervene as necessary. Determine patient's food preferences and provide high-protein, high-caloric foods as appropriate.     INTERVENTIONS:  - Monitor oral intake, urinary output, labs, and treatment plans  - Assess nutrition and hydration status and recommend course of action  - Evaluate amount of meals eaten  - Assist patient with eating if necessary    - Allow adequate time for meals  - Recommend/ encourage appropriate diets, oral nutritional supplements, and vitamin/mineral supplements  - Order, calculate, and assess calorie counts as needed  - Recommend, monitor, and adjust tube feedings and TPN/PPN based on assessed needs  - Assess need for intravenous fluids  - Provide specific nutrition/hydration education as appropriate  - Include patient/family/caregiver in decisions related to nutrition  Outcome: Progressing

## 2024-09-04 NOTE — PROGRESS NOTES
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  Assessment & Plan  Noted  Supportive care    Slow transit constipation  Assessment & Plan  Unclear when was last BM, patient with chronic constipation  Abdomen tender to palpation on exam on 9/3, none today  Continue senna and MiraLAX    GERD (gastroesophageal reflux disease)  Assessment & Plan  Continue protonix 40mg daily    * Mild cognitive impairment of uncertain or unknown etiology  Assessment & Plan  Per nursing staff at [facility], pt has been more confused and weak the last 3 days, eating with her hands, saying \"nonsensical\" things, so she was brought to the ED despite pt stating she has no complaints and does not want to go to the [facility]  CT in ED with \"mild chronic small vessel ischemic changes\" but no other intracranial abnormalities  Pt does have a history of mild cognitive impairment as well as a history of episodes of sundowning and getting aggressive with staff, on chart review she was placed on Depakote, Zyprexa and Buspirone for mood stabilization  CXR and UA unremarkable, pt afebrile, normotensive, WBC WNL without any complaints of urinary F/U/D or cough/SOB/CP; ammonia WNL  Suspect progression of dementia   Geriatrics input reviewed  Urinary retention protocol.  Delirium precautions  Completed course of antibiotics       "         VTE Pharmacologic Prophylaxis: VTE Score: 4 Moderate Risk (Score 3-4) - Pharmacological DVT Prophylaxis Ordered: enoxaparin (Lovenox).    Mobility:   Basic Mobility Inpatient Raw Score: 8  -HLM Goal: 3: Sit at edge of bed  JH-HLM Achieved: 2: Bed activities/Dependent transfer  JH-HLM Goal NOT achieved. Continue with multidisciplinary rounding and encourage appropriate mobility to improve upon JH-HLM goals.    Patient Centered Rounds: I performed bedside rounds with nursing staff today.   Discussions with Specialists or Other Care Team Provider:     Education and Discussions with Family / Patient: Updated  (son) via phone.    Total Time Spent on Date of Encounter in care of patient: 35 mins. This time was spent on one or more of the following: performing physical exam; counseling and coordination of care; obtaining or reviewing history; documenting in the medical record; reviewing/ordering tests, medications or procedures; communicating with other healthcare professionals and discussing with patient's family/caregivers.    Current Length of Stay: 3 day(s)  Current Patient Status: Inpatient   Certification Statement: The patient will continue to require additional inpatient hospital stay due to dispo planning  Discharge Plan: Anticipate discharge in 24-48 hrs to prior assisted or independent living facility.    Code Status: Level 3 - DNAR and DNI    Subjective:   No events overnight.  Patient is more alert this morning, reports feeling well.  Resting comfortably.  Tolerating oral intake.  Denies pain.    Objective:     Vitals:   Temp (24hrs), Av.9 °F (36.6 °C), Min:97.8 °F (36.6 °C), Max:97.9 °F (36.6 °C)    Temp:  [97.8 °F (36.6 °C)-97.9 °F (36.6 °C)] 97.8 °F (36.6 °C)  HR:  [68-97] 97  Resp:  [16-17] 16  BP: (116-141)/(56-64) 141/64  SpO2:  [94 %-99 %] 94 %  There is no height or weight on file to calculate BMI.     Input and Output Summary (last 24 hours):     Intake/Output  Summary (Last 24 hours) at 9/4/2024 1545  Last data filed at 9/3/2024 1701  Gross per 24 hour   Intake 60 ml   Output --   Net 60 ml       Physical Exam:   Physical Exam  Vitals and nursing note reviewed.   Constitutional:       General: She is not in acute distress.     Appearance: She is not ill-appearing.   HENT:      Head: Normocephalic and atraumatic.   Eyes:      Conjunctiva/sclera: Conjunctivae normal.   Cardiovascular:      Rate and Rhythm: Normal rate and regular rhythm.   Pulmonary:      Effort: No respiratory distress.      Breath sounds: Normal breath sounds. No wheezing.   Abdominal:      General: Bowel sounds are normal.      Palpations: Abdomen is soft.      Tenderness: There is no abdominal tenderness.   Musculoskeletal:         General: No swelling.   Skin:     General: Skin is warm and dry.   Neurological:      Mental Status: She is alert.          Additional Data:     Labs:  Results from last 7 days   Lab Units 09/03/24  0834 09/02/24  0415   WBC Thousand/uL 5.86 7.64   HEMOGLOBIN g/dL 11.2* 11.1*   HEMATOCRIT % 34.4* 34.6*   PLATELETS Thousands/uL 233 261   SEGS PCT %  --  51   LYMPHO PCT %  --  37   MONO PCT %  --  10   EOS PCT %  --  2     Results from last 7 days   Lab Units 09/03/24  0834 09/02/24  0415   SODIUM mmol/L 135 139   POTASSIUM mmol/L 3.8 4.4   CHLORIDE mmol/L 101 102   CO2 mmol/L 27 30   BUN mg/dL 21 28*   CREATININE mg/dL 0.76 0.85   ANION GAP mmol/L 7 7   CALCIUM mg/dL 8.6 8.6   ALBUMIN g/dL  --  3.2*   TOTAL BILIRUBIN mg/dL  --  0.34   ALK PHOS U/L  --  63   ALT U/L  --  9   AST U/L  --  14   GLUCOSE RANDOM mg/dL 95 94     Results from last 7 days   Lab Units 09/02/24  0415   INR  1.07                   Lines/Drains:  Invasive Devices       Peripheral Intravenous Line  Duration             Peripheral IV 09/01/24 Proximal;Right;Ventral (anterior) Forearm 2 days                          Imaging: No pertinent imaging reviewed.    Recent Cultures (last 7 days):   Results from last  7 days   Lab Units 09/02/24  9954   URINE CULTURE  No Growth <1000 cfu/mL       Last 24 Hours Medication List:   Current Facility-Administered Medications   Medication Dose Route Frequency Provider Last Rate    acetaminophen  650 mg Oral Q6H PRN Campos Barrera DO      albuterol  2 puff Inhalation Q4H PRN Mya Bloom MD      aspirin  81 mg Oral Daily Campos Barrera DO      busPIRone  5 mg Oral BID Nani Block PA-C      divalproex sodium  250 mg Oral Daily Nani Block PA-C      divalproex sodium  500 mg Oral HS Nani Block PA-C      enoxaparin  30 mg Subcutaneous Daily LANI Queen      OLANZapine  2.5 mg Oral Q6H PRN LANI Queen      OLANZapine  2.5 mg Oral Daily Nani Block PA-C      pantoprazole  40 mg Oral Daily Campos Barrera DO      polyethylene glycol  17 g Oral Daily Mya Bloom MD      senna  2 tablet Oral HS Mya Bloom MD          Today, Patient Was Seen By: Mya Bloom MD    **Please Note: This note may have been constructed using a voice recognition system.**

## 2024-09-05 VITALS
DIASTOLIC BLOOD PRESSURE: 58 MMHG | TEMPERATURE: 98.1 F | HEART RATE: 81 BPM | SYSTOLIC BLOOD PRESSURE: 112 MMHG | RESPIRATION RATE: 17 BRPM | OXYGEN SATURATION: 94 %

## 2024-09-05 LAB
ANION GAP SERPL CALCULATED.3IONS-SCNC: 5 MMOL/L (ref 4–13)
BASOPHILS # BLD AUTO: 0.02 THOUSANDS/ÂΜL (ref 0–0.1)
BASOPHILS NFR BLD AUTO: 0 % (ref 0–1)
BUN SERPL-MCNC: 15 MG/DL (ref 5–25)
CALCIUM SERPL-MCNC: 8.1 MG/DL (ref 8.4–10.2)
CHLORIDE SERPL-SCNC: 100 MMOL/L (ref 96–108)
CO2 SERPL-SCNC: 30 MMOL/L (ref 21–32)
CREAT SERPL-MCNC: 0.77 MG/DL (ref 0.6–1.3)
EOSINOPHIL # BLD AUTO: 0.13 THOUSAND/ÂΜL (ref 0–0.61)
EOSINOPHIL NFR BLD AUTO: 2 % (ref 0–6)
ERYTHROCYTE [DISTWIDTH] IN BLOOD BY AUTOMATED COUNT: 13 % (ref 11.6–15.1)
GFR SERPL CREATININE-BSD FRML MDRD: 69 ML/MIN/1.73SQ M
GLUCOSE SERPL-MCNC: 87 MG/DL (ref 65–140)
HCT VFR BLD AUTO: 33.9 % (ref 34.8–46.1)
HGB BLD-MCNC: 11 G/DL (ref 11.5–15.4)
IMM GRANULOCYTES # BLD AUTO: 0.03 THOUSAND/UL (ref 0–0.2)
IMM GRANULOCYTES NFR BLD AUTO: 1 % (ref 0–2)
LYMPHOCYTES # BLD AUTO: 1.79 THOUSANDS/ÂΜL (ref 0.6–4.47)
LYMPHOCYTES NFR BLD AUTO: 32 % (ref 14–44)
MCH RBC QN AUTO: 34.2 PG (ref 26.8–34.3)
MCHC RBC AUTO-ENTMCNC: 32.4 G/DL (ref 31.4–37.4)
MCV RBC AUTO: 105 FL (ref 82–98)
MONOCYTES # BLD AUTO: 0.73 THOUSAND/ÂΜL (ref 0.17–1.22)
MONOCYTES NFR BLD AUTO: 13 % (ref 4–12)
MRSA NOSE QL CULT: NORMAL
NEUTROPHILS # BLD AUTO: 2.97 THOUSANDS/ÂΜL (ref 1.85–7.62)
NEUTS SEG NFR BLD AUTO: 52 % (ref 43–75)
NRBC BLD AUTO-RTO: 0 /100 WBCS
PLATELET # BLD AUTO: 235 THOUSANDS/UL (ref 149–390)
PMV BLD AUTO: 9.8 FL (ref 8.9–12.7)
POTASSIUM SERPL-SCNC: 3.5 MMOL/L (ref 3.5–5.3)
RBC # BLD AUTO: 3.22 MILLION/UL (ref 3.81–5.12)
SODIUM SERPL-SCNC: 135 MMOL/L (ref 135–147)
WBC # BLD AUTO: 5.67 THOUSAND/UL (ref 4.31–10.16)

## 2024-09-05 PROCEDURE — 99239 HOSP IP/OBS DSCHRG MGMT >30: CPT | Performed by: STUDENT IN AN ORGANIZED HEALTH CARE EDUCATION/TRAINING PROGRAM

## 2024-09-05 PROCEDURE — 80048 BASIC METABOLIC PNL TOTAL CA: CPT | Performed by: STUDENT IN AN ORGANIZED HEALTH CARE EDUCATION/TRAINING PROGRAM

## 2024-09-05 PROCEDURE — 85025 COMPLETE CBC W/AUTO DIFF WBC: CPT | Performed by: STUDENT IN AN ORGANIZED HEALTH CARE EDUCATION/TRAINING PROGRAM

## 2024-09-05 RX ORDER — BUSPIRONE HYDROCHLORIDE 5 MG/1
5 TABLET ORAL 2 TIMES DAILY
Start: 2024-09-05

## 2024-09-05 RX ORDER — POLYETHYLENE GLYCOL 3350 17 G/17G
17 POWDER, FOR SOLUTION ORAL DAILY
Start: 2024-09-06

## 2024-09-05 RX ORDER — SENNOSIDES 8.6 MG
17.2 TABLET ORAL
Start: 2024-09-05

## 2024-09-05 RX ADMIN — PANTOPRAZOLE SODIUM 40 MG: 40 TABLET, DELAYED RELEASE ORAL at 09:45

## 2024-09-05 RX ADMIN — ASPIRIN 81 MG CHEWABLE TABLET 81 MG: 81 TABLET CHEWABLE at 09:45

## 2024-09-05 RX ADMIN — ENOXAPARIN SODIUM 30 MG: 30 INJECTION SUBCUTANEOUS at 09:45

## 2024-09-05 RX ADMIN — POLYETHYLENE GLYCOL 3350 17 G: 17 POWDER, FOR SOLUTION ORAL at 09:45

## 2024-09-05 RX ADMIN — OLANZAPINE 2.5 MG: 2.5 TABLET, FILM COATED ORAL at 09:45

## 2024-09-05 RX ADMIN — DIVALPROEX SODIUM 250 MG: 250 TABLET, EXTENDED RELEASE ORAL at 09:45

## 2024-09-05 RX ADMIN — BUSPIRONE HYDROCHLORIDE 5 MG: 5 TABLET ORAL at 09:45

## 2024-09-05 NOTE — ASSESSMENT & PLAN NOTE
Unclear when was last BM, patient with chronic constipation  Abdomen tender to palpation on exam on 9/3, resolved  Continue senna and MiraLAX

## 2024-09-05 NOTE — CASE MANAGEMENT
Case Management Discharge Planning Note    Patient name Chelsey Carson  Location [facility] 933/[facility] 933-01 MRN [id number]  : 1935 Date 2024       Current Admission Date: 2024  Current Admission Diagnosis:Mild cognitive impairment of uncertain or unknown etiology   Patient Active Problem List    Diagnosis Date Noted Date Diagnosed    Possible acute cystitis 2024     Delirium due to another medical condition 2024     Closed nondisplaced intertrochanteric fracture of right femur (HCC) 2024     Abnormal CT of the chest 2024     Pneumonitis 2024     Dysuria 2024     Urinary retention 2024     Nausea 2024     Macrocytic anemia 2024     Thrombocytosis 2024     Ambulatory dysfunction 2024     Proctitis 2024     Anemia 03/15/2024     Loss of appetite 03/15/2024     Anxiety disorder, unspecified 03/15/2024     Tinnitus of both ears 2023     Xerostomia 2023     Mixed conductive and sensorineural hearing loss of left ear with restricted hearing of right ear 2023     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 2023     Nonintractable headache 2023     Abnormal brain MRI 2023     Slow transit constipation 2023     Primary insomnia 2023     Constipation 2023     Tremor 2023     Hyponatremia 2023     Age related osteoporosis 2022     GERD (gastroesophageal reflux disease) 10/10/2022     Stage 3a chronic kidney disease (HCC) 2022     Venous insufficiency 08/10/2022     Other fatigue 2022     Mild cognitive impairment of uncertain or unknown etiology 02/15/2021     Hypokalemia 2021     Dry mouth 2019     Chronic maxillary sinusitis 2019     Breast cancer (HCC) 2019     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[facility]) 2019     Perforation of left tympanic membrane 2019     Nonrheumatic aortic valve  insufficiency 11/15/2018     Pes anserinus bursitis of right knee 06/29/2018     Benign essential HTN 10/19/2017     Aortic valve disorder 08/19/2013     Hyperlipidemia 12/19/2012       LOS (days): 4  Geometric Mean LOS (GMLOS) (days): 4  Days to GMLOS:0.6     OBJECTIVE:  Risk of Unplanned Readmission Score: 35.06         Current admission status: Inpatient   Preferred Pharmacy:   UNKNOWN - FOLLOW UP PRIOR TO DISCHARGE TO E-PRESCRIBE  No address on file      Primary Care Provider: Mariely Fan MD    Primary Insurance: MEDICARE  Secondary Insurance: COMMERCIAL MISCELLANEOUS    DISCHARGE DETAILS:    Discharge planning discussed with:: Damon from [facility]  [place] of Choice: Yes     CM contacted family/caregiver?: Yes  Were Treatment Team discharge recommendations reviewed with patient/caregiver?: Yes  Did patient/caregiver verbalize understanding of patient care needs?: N/A- going to facility  Were patient/caregiver advised of the risks associated with not following Treatment Team discharge recommendations?: Yes    Contacts  Patient Contacts: Julio Carson-son  Relationship to Patient:: Family  Contact Method: Phone  Phone Number: [phone]  Reason/Outcome: Emergency Contact, Discharge Planning, Continuity of Care    Requested Home Health Care         Is the patient interested in HHC at discharge?: No    DME Referral Provided  Referral made for DME?: No              Treatment Team Recommendation: Facility Return (with hospice)  Discharge Destination Plan:: Hospice (with hospice)  Transport at Discharge : [facility] Ambulance                                      Additional Comments: CM spoek to Damon from [facility] via phone. Per Damon pt qualifies for hospice services at the facility, and family is agreeable. [facility] faxed over requested clinical to Damon [phone]. Damon recieved clinical documents via fax. [facility] requested transport for 12:30 pm via BLS. CM will continue to follow as needed.

## 2024-09-05 NOTE — CASE MANAGEMENT
Case Management Discharge Planning Note    Patient name Chelsey Carson  Location [facility] 933/[facility] 933-01 MRN [id number]  : 1935 Date 2024       Current Admission Date: 2024  Current Admission Diagnosis:Mild cognitive impairment of uncertain or unknown etiology   Patient Active Problem List    Diagnosis Date Noted Date Diagnosed    Possible acute cystitis 2024     Delirium due to another medical condition 2024     Closed nondisplaced intertrochanteric fracture of right femur (HCC) 2024     Abnormal CT of the chest 2024     Pneumonitis 2024     Dysuria 2024     Urinary retention 2024     Nausea 2024     Macrocytic anemia 2024     Thrombocytosis 2024     Ambulatory dysfunction 2024     Proctitis 2024     Anemia 03/15/2024     Loss of appetite 03/15/2024     Anxiety disorder, unspecified 03/15/2024     Tinnitus of both ears 2023     Xerostomia 2023     Mixed conductive and sensorineural hearing loss of left ear with restricted hearing of right ear 2023     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 2023     Nonintractable headache 2023     Abnormal brain MRI 2023     Slow transit constipation 2023     Primary insomnia 2023     Constipation 2023     Tremor 2023     Hyponatremia 2023     Age related osteoporosis 2022     GERD (gastroesophageal reflux disease) 10/10/2022     Stage 3a chronic kidney disease (HCC) 2022     Venous insufficiency 08/10/2022     Other fatigue 2022     Mild cognitive impairment of uncertain or unknown etiology 02/15/2021     Hypokalemia 2021     Dry mouth 2019     Chronic maxillary sinusitis 2019     Breast cancer (HCC) 2019     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[facility]) 2019     Perforation of left tympanic membrane 2019     Nonrheumatic aortic valve  insufficiency 11/15/2018     Pes anserinus bursitis of right knee 06/29/2018     Benign essential HTN 10/19/2017     Aortic valve disorder 08/19/2013     Hyperlipidemia 12/19/2012       LOS (days): 4  Geometric Mean LOS (GMLOS) (days): 4  Days to GMLOS:0.6     OBJECTIVE:  Risk of Unplanned Readmission Score: 35.06         Current admission status: Inpatient   Preferred Pharmacy:   UNKNOWN - FOLLOW UP PRIOR TO DISCHARGE TO E-PRESCRIBE  No address on file      Primary Care Provider: Mariely Fan MD    Primary Insurance: MEDICARE  Secondary Insurance: COMMERCIAL MISCELLANEOUS    DISCHARGE DETAILS:    Discharge planning discussed with:: Damon from [facility]  [place] of Choice: Yes     CM contacted family/caregiver?: Yes  Were Treatment Team discharge recommendations reviewed with patient/caregiver?: Yes  Did patient/caregiver verbalize understanding of patient care needs?: N/A- going to facility  Were patient/caregiver advised of the risks associated with not following Treatment Team discharge recommendations?: Yes    Contacts  Patient Contacts: Julio Carson-son  Relationship to Patient:: Family  Contact Method: Phone  Phone Number: [phone]  Reason/Outcome: Emergency Contact, Discharge Planning, Continuity of Care    Requested Home Health Care         Is the patient interested in HHC at discharge?: No    DME Referral Provided  Referral made for DME?: No              Treatment Team Recommendation: Facility Return (with hospice)  Discharge Destination Plan:: Facility Return (with hospice services)  Transport at Discharge : [facility] Ambulance           ETA of Transport (Date): 09/05/24  ETA of Transport (Time): 1230                       Additional Comments: [facility] claimed the ride for 12:30 pm. Pt will DC back to the [facility] with hospice services. CM notified Damon and pt son Julio via phone. CM will continue to follow as needed.

## 2024-09-05 NOTE — ASSESSMENT & PLAN NOTE
"Per nursing staff at [facility], pt has been more confused and weak the last 3 days, eating with her hands, saying \"nonsensical\" things, so she was brought to the ED despite pt stating she has no complaints and does not want to go to the [facility] in ED with \"mild chronic small vessel ischemic changes\" but no other intracranial abnormalities  Pt does have a history of mild cognitive impairment as well as a history of episodes of sundowning and getting aggressive with staff, on chart review she was placed on Depakote, Zyprexa and Buspirone for mood stabilization  CXR and UA unremarkable, pt afebrile, normotensive, WBC WNL without any complaints of urinary F/U/D or cough/SOB/CP; ammonia WNL  Suspect progression of dementia   Geriatrics input reviewed  Continue home buspirone and olanzapine  Patient reports feeling well this morning.  She is stable and feeling well  "

## 2024-09-05 NOTE — DISCHARGE SUMMARY
"[facility]  Discharge- Chelsey Carson 1935, 88 y.o. female MRN: [id number]  Unit/Bed#: [facility] 933-01 Encounter: [id number]  Primary Care Provider: Mariely Fan MD   Date and time admitted to hospital: 9/1/2024  7:18 PM    Possible acute cystitis  Assessment & Plan  Patient started on IV ceftriaxone on admission for possible UTI  UA reviewed, urine culture with no growth  Patient with some suprapubic tenderness on exam on admission  Completed 3 days of ceftriaxone  Patient has remained hemodynamically stable, no fever and no leukocytosis at the time of discharge    Benign essential HTN  Assessment & Plan  Appears pt was previously taking propranolol but this has since been dc'd   BP stable    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  Assessment & Plan  Noted  Supportive care    Slow transit constipation  Assessment & Plan  Unclear when was last BM, patient with chronic constipation  Abdomen tender to palpation on exam on 9/3, resolved  Continue senna and MiraLAX    GERD (gastroesophageal reflux disease)  Assessment & Plan  Continue protonix 40mg daily    * Mild cognitive impairment of uncertain or unknown etiology  Assessment & Plan  Per nursing staff at [facility], pt has been more confused and weak the last 3 days, eating with her hands, saying \"nonsensical\" things, so she was brought to the ED despite pt stating she has no complaints and does not want to go to the hospital  CT in ED with \"mild chronic small vessel ischemic changes\" but no other intracranial abnormalities  Pt does have a history of mild cognitive impairment as well as a history of episodes of sundowning and getting aggressive with staff, on chart review she was placed on Depakote, Zyprexa and Buspirone for mood stabilization  CXR and UA unremarkable, pt afebrile, normotensive, WBC WNL without any complaints of urinary F/U/D or cough/SOB/CP; ammonia " WNL  Suspect progression of dementia   Geriatrics input reviewed  Continue home buspirone and olanzapine  Patient reports feeling well this morning.  She is stable and feeling well        Medical Problems       Resolved Problems  Date Reviewed: 9/5/2024   None       Discharging Physician / Practitioner: Mya Bloom MD  PCP: Mariely Fan MD  Admission Date:   Admission Orders (From admission, onward)       Ordered        09/01/24 2332  INPATIENT ADMISSION  Once                          Discharge Date: 09/05/24    Consultations During Hospital Stay:  Geriatrics     Procedures Performed:   none    Significant Findings / Test Results:   none    Incidental Findings:   none       Test Results Pending at Discharge (will require follow up):   none     Outpatient Tests Requested:  none    Complications:  none    Reason for Admission: Altered mental status    Hospital Course:   Chelsey Carson is a 88 y.o. female patient who originally presented to the hospital on 9/1/2024 due to reported altered mental status at facility.  Workup was unremarkable for acute etiology including electrolyte abnormalities or infection.  CT head unremarkable.  There was concern of possible UTI as patient unable to report symptoms, she completed 3 days of antibiotics.  Evaluated by geriatrics who determined mood behavioral changes secondary to progression of dementia.   She remained stable during hospital stay with no delirium episodes.      Please see above list of diagnoses and related plan for additional information.     Condition at Discharge: stable    Discharge Day Visit / Exam:   Subjective: No events overnight.  Patient reports feeling well this morning.  She is very pleasant and tolerating oral intake.  Vitals: Blood Pressure: 112/58 (09/05/24 0811)  Pulse: 81 (09/05/24 0811)  Temperature: 98.1 °F (36.7 °C) (09/05/24 0811)  Temp Source: Oral (09/04/24 2230)  Respirations: 17 (09/05/24 0811)  SpO2: 94 % (09/05/24 0811)  Exam:    Physical Exam  Vitals and nursing note reviewed.   Constitutional:       General: She is not in acute distress.     Appearance: She is well-developed.   HENT:      Head: Normocephalic and atraumatic.   Eyes:      Conjunctiva/sclera: Conjunctivae normal.   Cardiovascular:      Rate and Rhythm: Normal rate and regular rhythm.   Pulmonary:      Effort: Pulmonary effort is normal. No respiratory distress.      Breath sounds: Normal breath sounds. No wheezing or rhonchi.   Abdominal:      General: Bowel sounds are normal. There is no distension.      Palpations: Abdomen is soft.      Tenderness: There is no abdominal tenderness.   Musculoskeletal:         General: No swelling.      Cervical back: Neck supple.   Skin:     General: Skin is warm and dry.   Neurological:      Mental Status: She is alert.          Discussion with Family:  Will update son.     Discharge instructions/Information to patient and family:   See after visit summary for information provided to patient and family.      Provisions for Follow-Up Care:  See after visit summary for information related to follow-up care and any pertinent home health orders.      Mobility at time of Discharge:   Basic Mobility Inpatient Raw Score: 8  -HLM Goal: 3: Sit at edge of bed  JH-HLM Achieved: 3: Sit at edge of bed  HLM Goal achieved. Continue to encourage appropriate mobility.     Disposition:   Other Skilled Nursing Facility at the [facility]    Planned Readmission: no     Discharge Statement:  I spent 35 minutes discharging the patient. This time was spent on the day of discharge. I had direct contact with the patient on the day of discharge. Greater than 50% of the total time was spent examining patient, answering all patient questions, arranging and discussing plan of care with patient as well as directly providing post-discharge instructions.  Additional time then spent on discharge activities.    Discharge Medications:  See after visit summary for reconciled  discharge medications provided to patient and/or family.      **Please Note: This note may have been constructed using a voice recognition system**

## 2024-09-05 NOTE — ASSESSMENT & PLAN NOTE
Patient started on IV ceftriaxone on admission for possible UTI  UA reviewed, urine culture with no growth  Patient with some suprapubic tenderness on exam on admission  Completed 3 days of ceftriaxone  Patient has remained hemodynamically stable, no fever and no leukocytosis at the time of discharge

## 2024-09-05 NOTE — PLAN OF CARE
Problem: PAIN - ADULT  Goal: Verbalizes/displays adequate comfort level or baseline comfort level  Description: Interventions:  - Encourage patient to monitor pain and request assistance  - Assess pain using appropriate pain scale  - Administer analgesics based on type and severity of pain and evaluate response  - Implement non-pharmacological measures as appropriate and evaluate response  - Consider cultural and social influences on pain and pain management  - Notify physician/advanced practitioner if interventions unsuccessful or patient reports new pain  Outcome: Progressing     Problem: INFECTION - ADULT  Goal: Absence or prevention of progression during hospitalization  Description: INTERVENTIONS:  - Assess and monitor for signs and symptoms of infection  - Monitor lab/diagnostic results  - Monitor all insertion sites, i.e. indwelling lines, tubes, and drains  - Monitor endotracheal if appropriate and nasal secretions for changes in amount and color  - [place] appropriate cooling/warming therapies per order  - Administer medications as ordered  - Instruct and encourage patient and family to use good hand hygiene technique  - Identify and instruct in appropriate isolation precautions for identified infection/condition  Outcome: Progressing

## 2024-09-05 NOTE — PLAN OF CARE
Problem: PAIN - ADULT  Goal: Verbalizes/displays adequate comfort level or baseline comfort level  Description: Interventions:  - Encourage patient to monitor pain and request assistance  - Assess pain using appropriate pain scale  - Administer analgesics based on type and severity of pain and evaluate response  - Implement non-pharmacological measures as appropriate and evaluate response  - Consider cultural and social influences on pain and pain management  - Notify physician/advanced practitioner if interventions unsuccessful or patient reports new pain  Outcome: Progressing     Problem: INFECTION - ADULT  Goal: Absence or prevention of progression during hospitalization  Description: INTERVENTIONS:  - Assess and monitor for signs and symptoms of infection  - Monitor lab/diagnostic results  - Monitor all insertion sites, i.e. indwelling lines, tubes, and drains  - Monitor endotracheal if appropriate and nasal secretions for changes in amount and color  - [place] appropriate cooling/warming therapies per order  - Administer medications as ordered  - Instruct and encourage patient and family to use good hand hygiene technique  - Identify and instruct in appropriate isolation precautions for identified infection/condition  Outcome: Progressing

## 2024-09-06 ENCOUNTER — TRANSITIONAL CARE MANAGEMENT (OUTPATIENT)
Dept: INTERNAL MEDICINE CLINIC | Facility: CLINIC | Age: 89
End: 2024-09-06

## 2024-09-06 PROCEDURE — TCMXX

## 2024-10-02 ENCOUNTER — TELEPHONE (OUTPATIENT)
Age: 89
End: 2024-10-02

## 2024-10-02 NOTE — TELEPHONE ENCOUNTER
Winter with [facility] called in stating patient passed away yesterday at [facility] at [facility] and will be faxing over the death certificate for Dr. Fan to sign.     Phone number is [phone]  Fax number is [phone]    Thank you.

## 2024-10-03 PROBLEM — N30.00 ACUTE CYSTITIS: Status: RESOLVED | Noted: 2024-01-01 | Resolved: 2024-10-03

## 2024-10-03 NOTE — TELEPHONE ENCOUNTER
Spoke with [facility]. Received death certificate today 10/3, Dr Fan not in office till Monday 10/7. They said this should be fine, we will have Dr Fan sign and fax as soon as she signs it.    FYI- Dr Fan this is on your pinto board ready for you to fill out ASAP

## 2024-10-07 NOTE — TELEPHONE ENCOUNTER
Winter called in regarding the signing of the death certificate, she states she has not received it back. Winter states the family in looking to cremate the pt and is unable to do so until the death certificate is signed. Winter is requesting the certificate to be faxed over once completed, if possible.    Please advise.  Fax #: [phone]

## 2024-10-08 NOTE — TELEPHONE ENCOUNTER
I have not seen this pt since last year.   Can we ask her nursing home if they know her immediate cause of death? Is it from an infection? Sepsis? Heart attack? Or else? In order for me to put in the form.

## 2024-10-08 NOTE — TELEPHONE ENCOUNTER
THIAGO at [facility] for someone to call back to advise on Dr. Fan's questions.    I called to give an update to Winter at [facility] and was advised that the hospice doctor already signed the death certificate but she asked me to call back with any updated information once [facility] calls back.    [facility] [phone]

## 2025-03-06 NOTE — PROGRESS NOTES
Hpi Title: Evaluation of Skin Lesions
Name: Tiff Sanchez      : 1935      MRN: [id number]  Encounter Provider: Essence Vargas MD  Encounter Date: 10/10/2022   Encounter department: [facility]    Assessment & Plan     1  Dry mouth  Assessment & Plan:  Try OTC artificial saliva  Try sugar free gum  If you continue to have dry mouth, make an appointment with ENT  2  Esophageal dysphagia  Assessment & Plan:  Discussed see GI for upper endoscopy  Continue to monitor  If it gets worse let me know  Will put referral for your  3  Black stool  Assessment & Plan:  Likely from pepto bismo use  Stop pepto bismo  It should improve  If after a week, you still have black stool please let me know  4  Encounter for immunization  -     influenza vaccine, high-dose, PF 0 5 mL         Subjective      HPI     Was talking pepto bismo all week last week for dry mouth/nause, stool turned black over the weekend  Continues to have dry mouth     Forgot to mention it to ENT when she went for ear issue    Sensation of food getting stuck in chest      Review of Systems    Current Outpatient Medications on File Prior to Visit   Medication Sig   • Calcium Carb-Cholecalciferol 1000-800 MG-UNIT TABS Take by mouth   • Multiple Vitamins-Minerals (CENTRUM SILVER 50+WOMEN PO) Take by mouth   • simvastatin (ZOCOR) 5 MG tablet Take 1 tablet (5 mg total) by mouth daily at bedtime       Objective     /82 (BP Location: Left arm, Patient Position: Sitting, Cuff Size: Standard)   Pulse 84   Ht 4' 8" (1 422 m)   Wt 53 kg (116 lb 12 8 oz)   SpO2 96%   BMI 26 19 kg/m²     Physical Exam  Essence Vargas MD
How Severe Are Your Spot(S)?: mild